# Patient Record
Sex: FEMALE | Race: WHITE | NOT HISPANIC OR LATINO | Employment: FULL TIME | URBAN - METROPOLITAN AREA
[De-identification: names, ages, dates, MRNs, and addresses within clinical notes are randomized per-mention and may not be internally consistent; named-entity substitution may affect disease eponyms.]

---

## 2017-03-28 ENCOUNTER — CARE COORDINATION (OUTPATIENT)
Dept: CARDIOLOGY | Facility: CLINIC | Age: 52
End: 2017-03-28

## 2017-03-28 DIAGNOSIS — I10 HTN (HYPERTENSION): Primary | ICD-10-CM

## 2017-03-28 NOTE — PROGRESS NOTES
Writer received a call from Karyna HUNT at [facility] mint clinic to report pt presented with flu/congestion symptoms. BP was 180/110 x2. Karyna NP request to speak with Jaqui Edmond PA-C to review plan of care for hypertension. Jaqui spoke to Karyna and advised pt will increase dose of Lisinopril to 20 mg daily, including today. Pt will continue her current dose of Biastolic. Pt will see Jaqui Edmond PA-C on 4/7/17 at 11:30 AM with a BMP, BNP and Hgb at 10:30 AM. Appointments scheduled and lab orders placed. CHACORTA Valencia.

## 2017-03-30 ENCOUNTER — HOSPITAL ENCOUNTER (OUTPATIENT)
Facility: CLINIC | Age: 52
Setting detail: OBSERVATION
Discharge: HOME | End: 2017-03-31
Attending: EMERGENCY MEDICINE | Admitting: INTERNAL MEDICINE
Payer: COMMERCIAL

## 2017-03-30 ENCOUNTER — APPOINTMENT (OUTPATIENT)
Dept: GENERAL RADIOLOGY | Facility: CLINIC | Age: 52
End: 2017-03-30
Attending: EMERGENCY MEDICINE
Payer: COMMERCIAL

## 2017-03-30 ENCOUNTER — OFFICE VISIT (OUTPATIENT)
Dept: CARDIOLOGY | Facility: CLINIC | Age: 52
End: 2017-03-30
Payer: COMMERCIAL

## 2017-03-30 VITALS
BODY MASS INDEX: 39.68 KG/M2 | WEIGHT: 293 LBS | HEART RATE: 56 BPM | HEIGHT: 72 IN | DIASTOLIC BLOOD PRESSURE: 120 MMHG | SYSTOLIC BLOOD PRESSURE: 190 MMHG

## 2017-03-30 DIAGNOSIS — I10 BENIGN ESSENTIAL HYPERTENSION: ICD-10-CM

## 2017-03-30 DIAGNOSIS — I16.1 HYPERTENSIVE EMERGENCY: ICD-10-CM

## 2017-03-30 DIAGNOSIS — I10 HTN (HYPERTENSION): ICD-10-CM

## 2017-03-30 DIAGNOSIS — R60.0 LOCALIZED EDEMA: ICD-10-CM

## 2017-03-30 DIAGNOSIS — R07.9 ACUTE CHEST PAIN: ICD-10-CM

## 2017-03-30 DIAGNOSIS — I10 BENIGN ESSENTIAL HYPERTENSION: Primary | ICD-10-CM

## 2017-03-30 LAB
ANION GAP SERPL CALCULATED.3IONS-SCNC: 10 MMOL/L (ref 3–14)
ANION GAP SERPL CALCULATED.3IONS-SCNC: 10.3 MMOL/L (ref 6–17)
BASOPHILS # BLD AUTO: 0.1 10E9/L (ref 0–0.2)
BASOPHILS NFR BLD AUTO: 1 %
BUN SERPL-MCNC: 11 MG/DL (ref 7–30)
BUN SERPL-MCNC: 9 MG/DL (ref 7–30)
CALCIUM SERPL-MCNC: 8.6 MG/DL (ref 8.5–10.1)
CALCIUM SERPL-MCNC: 8.9 MG/DL (ref 8.5–10.5)
CHLORIDE SERPL-SCNC: 106 MMOL/L (ref 94–109)
CHLORIDE SERPL-SCNC: 106 MMOL/L (ref 98–107)
CO2 SERPL-SCNC: 26 MMOL/L (ref 20–32)
CO2 SERPL-SCNC: 31 MMOL/L (ref 23–29)
CREAT SERPL-MCNC: 0.8 MG/DL (ref 0.52–1.04)
CREAT SERPL-MCNC: 0.89 MG/DL (ref 0.7–1.3)
DIFFERENTIAL METHOD BLD: NORMAL
EOSINOPHIL # BLD AUTO: 0.2 10E9/L (ref 0–0.7)
EOSINOPHIL NFR BLD AUTO: 2.5 %
ERYTHROCYTE [DISTWIDTH] IN BLOOD BY AUTOMATED COUNT: 12.4 % (ref 10–15)
GFR SERPL CREATININE-BSD FRML MDRD: 67 ML/MIN/1.7M2
GFR SERPL CREATININE-BSD FRML MDRD: 75 ML/MIN/1.7M2
GLUCOSE SERPL-MCNC: 114 MG/DL (ref 70–105)
GLUCOSE SERPL-MCNC: 98 MG/DL (ref 70–99)
HCT VFR BLD AUTO: 43.1 % (ref 35–47)
HGB BLD-MCNC: 13.9 G/DL (ref 11.7–15.7)
HGB BLD-MCNC: 14 G/DL (ref 11.7–15.7)
IMM GRANULOCYTES # BLD: 0 10E9/L (ref 0–0.4)
IMM GRANULOCYTES NFR BLD: 0.3 %
LYMPHOCYTES # BLD AUTO: 2.7 10E9/L (ref 0.8–5.3)
LYMPHOCYTES NFR BLD AUTO: 40.3 %
MCH RBC QN AUTO: 27.5 PG (ref 26.5–33)
MCHC RBC AUTO-ENTMCNC: 32.5 G/DL (ref 31.5–36.5)
MCV RBC AUTO: 85 FL (ref 78–100)
MONOCYTES # BLD AUTO: 0.4 10E9/L (ref 0–1.3)
MONOCYTES NFR BLD AUTO: 6.4 %
NEUTROPHILS # BLD AUTO: 3.3 10E9/L (ref 1.6–8.3)
NEUTROPHILS NFR BLD AUTO: 49.5 %
NRBC # BLD AUTO: 0 10*3/UL
NRBC BLD AUTO-RTO: 0 /100
NT-PROBNP SERPL-MCNC: 359 PG/ML (ref 0–125)
PLATELET # BLD AUTO: 250 10E9/L (ref 150–450)
POTASSIUM SERPL-SCNC: 3.5 MMOL/L (ref 3.4–5.3)
POTASSIUM SERPL-SCNC: 4.3 MMOL/L (ref 3.5–5.1)
RBC # BLD AUTO: 5.09 10E12/L (ref 3.8–5.2)
SODIUM SERPL-SCNC: 142 MMOL/L (ref 133–144)
SODIUM SERPL-SCNC: 143 MMOL/L (ref 136–145)
TROPONIN I SERPL-MCNC: NORMAL UG/L (ref 0–0.04)
WBC # BLD AUTO: 6.7 10E9/L (ref 4–11)

## 2017-03-30 PROCEDURE — 93005 ELECTROCARDIOGRAM TRACING: CPT

## 2017-03-30 PROCEDURE — 96374 THER/PROPH/DIAG INJ IV PUSH: CPT

## 2017-03-30 PROCEDURE — 83880 ASSAY OF NATRIURETIC PEPTIDE: CPT | Performed by: PHYSICIAN ASSISTANT

## 2017-03-30 PROCEDURE — 80048 BASIC METABOLIC PNL TOTAL CA: CPT | Performed by: PHYSICIAN ASSISTANT

## 2017-03-30 PROCEDURE — 80048 BASIC METABOLIC PNL TOTAL CA: CPT | Performed by: EMERGENCY MEDICINE

## 2017-03-30 PROCEDURE — 99214 OFFICE O/P EST MOD 30 MIN: CPT | Performed by: PHYSICIAN ASSISTANT

## 2017-03-30 PROCEDURE — 99285 EMERGENCY DEPT VISIT HI MDM: CPT | Mod: 25

## 2017-03-30 PROCEDURE — 96375 TX/PRO/DX INJ NEW DRUG ADDON: CPT

## 2017-03-30 PROCEDURE — 25000128 H RX IP 250 OP 636: Performed by: EMERGENCY MEDICINE

## 2017-03-30 PROCEDURE — 85018 HEMOGLOBIN: CPT | Performed by: PHYSICIAN ASSISTANT

## 2017-03-30 PROCEDURE — 71020 XR CHEST 2 VW: CPT

## 2017-03-30 PROCEDURE — 25000132 ZZH RX MED GY IP 250 OP 250 PS 637: Performed by: EMERGENCY MEDICINE

## 2017-03-30 PROCEDURE — 84484 ASSAY OF TROPONIN QUANT: CPT | Performed by: EMERGENCY MEDICINE

## 2017-03-30 PROCEDURE — 36415 COLL VENOUS BLD VENIPUNCTURE: CPT | Performed by: PHYSICIAN ASSISTANT

## 2017-03-30 PROCEDURE — 85025 COMPLETE CBC W/AUTO DIFF WBC: CPT | Performed by: EMERGENCY MEDICINE

## 2017-03-30 RX ORDER — LIDOCAINE 40 MG/G
CREAM TOPICAL
Status: DISCONTINUED | OUTPATIENT
Start: 2017-03-30 | End: 2017-03-31 | Stop reason: HOSPADM

## 2017-03-30 RX ORDER — NEBIVOLOL 20 MG/1
20 TABLET ORAL DAILY
COMMUNITY
End: 2017-03-30

## 2017-03-30 RX ORDER — LISINOPRIL 10 MG/1
10 TABLET ORAL 2 TIMES DAILY
Qty: 60 TABLET | Refills: 11 | Status: SHIPPED | OUTPATIENT
Start: 2017-03-30 | End: 2017-05-23

## 2017-03-30 RX ORDER — LISINOPRIL 10 MG/1
10 TABLET ORAL 2 TIMES DAILY
COMMUNITY
End: 2017-03-30

## 2017-03-30 RX ORDER — DIPHENHYDRAMINE HYDROCHLORIDE 50 MG/ML
25 INJECTION INTRAMUSCULAR; INTRAVENOUS ONCE
Status: COMPLETED | OUTPATIENT
Start: 2017-03-30 | End: 2017-03-30

## 2017-03-30 RX ORDER — SPIRONOLACTONE 25 MG/1
12.5 TABLET ORAL DAILY
Qty: 30 TABLET | Refills: 3 | Status: ON HOLD | OUTPATIENT
Start: 2017-03-30 | End: 2017-03-31

## 2017-03-30 RX ORDER — CLONIDINE HYDROCHLORIDE 0.1 MG/1
0.2 TABLET ORAL ONCE
Status: COMPLETED | OUTPATIENT
Start: 2017-03-30 | End: 2017-03-30

## 2017-03-30 RX ORDER — METOCLOPRAMIDE HYDROCHLORIDE 5 MG/ML
10 INJECTION INTRAMUSCULAR; INTRAVENOUS ONCE
Status: COMPLETED | OUTPATIENT
Start: 2017-03-30 | End: 2017-03-30

## 2017-03-30 RX ORDER — NEBIVOLOL 20 MG/1
20 TABLET ORAL DAILY
Qty: 90 TABLET | Refills: 3 | Status: SHIPPED | OUTPATIENT
Start: 2017-03-30 | End: 2018-05-07

## 2017-03-30 RX ADMIN — CLONIDINE HYDROCHLORIDE 0.2 MG: 0.1 TABLET ORAL at 22:58

## 2017-03-30 RX ADMIN — METOCLOPRAMIDE 10 MG: 5 INJECTION, SOLUTION INTRAMUSCULAR; INTRAVENOUS at 22:58

## 2017-03-30 RX ADMIN — DIPHENHYDRAMINE HYDROCHLORIDE 25 MG: 50 INJECTION, SOLUTION INTRAMUSCULAR; INTRAVENOUS at 22:58

## 2017-03-30 NOTE — PATIENT INSTRUCTIONS
1. BP is much too high, and has been x 6 months.  Limit sodium in diet   Continue lisinopril 10 mg twice a day   Continue Bystollic 20 mg daily at night   START spironolactone 12.5 mg (1/2 of a 25 mg tablet) every day in AM    2. Start taking BPs around noon so we can see if meds are working    3. See me 1 week with an update and non-fasting blood work.    4. My nurses Addie/Megan: [phone]

## 2017-03-30 NOTE — MR AVS SNAPSHOT
After Visit Summary   3/30/2017    Fouzia Arenas    MRN: [id number]           Patient Information     Date Of Birth          1965        Visit Information        Provider Department      3/30/2017 11:30 AM Peggy Edmond PA-C [facility] HEART AT [place]        Today's Diagnoses     Benign essential hypertension    -  1    Localized edema          Care Instructions    1. BP is much too high, and has been x 6 months.  Limit sodium in diet   Continue lisinopril 10 mg twice a day   Continue Bystollic 20 mg daily at night   START spironolactone 12.5 mg (1/2 of a 25 mg tablet) every day in AM    2. Start taking BPs around noon so we can see if meds are working    3. See me 1 week with an update and non-fasting blood work.    4. My nurses Addie/Megan: [phone]            Follow-ups after your visit        Additional Services     Follow-Up with Cardiac Advanced Practice Provider                 Your next 10 appointments already scheduled     May 15, 2017  4:30 PM CDT   Return Sleep Patient with Ruy Skaggs MD   [facility] ([facility])    [address] [address] [address] [phone]              Future tests that were ordered for you today     Open Future Orders        Priority Expected Expires Ordered    Basic metabolic panel Routine 4/6/2017 3/30/2018 3/30/2017    Follow-Up with Cardiac Advanced Practice Provider Routine 4/6/2017 3/30/2018 3/30/2017            Who to contact     If you have questions or need follow up information about today's clinic visit or your schedule please contact [facility] PHYSICI[facility] HEART [facility] directly at [phone].  Normal or non-critical lab and imaging results will be communicated to you by MyChart, letter or phone within 4 business days after the clinic has received the results. If you do not hear from us within 7 days, please  "contact the clinic through [employer] or phone. If you have a critical or abnormal lab result, we will notify you by phone as soon as possible.  Submit refill requests through [employer] or call your pharmacy and they will forward the refill request to us. Please allow 3 business days for your refill to be completed.          Additional Information About Your Visit        [email or web address] Information     [employer] lets you send messages to your doctor, view your test results, renew your prescriptions, schedule appointments and more. To sign up, go to [email or web address] . Click on \"Log in\" on the left side of the screen, which will take you to the Welcome page. Then click on \"Sign up Now\" on the right side of the page.     You will be asked to enter the access code listed below, as well as some personal information. Please follow the directions to create your username and password.     Your access code is: [email or web address]  Expires: 2017 12:01 PM     Your access code will  in 90 days. If you need help or a new code, please call your [place] clinic or [phone].        Care EveryWhere ID     This is your Care EveryWhere ID. This could be used by other organizations to access your [place] medical records  [id number]        Your Vitals Were     Pulse Height BMI (Body Mass Index)             56 1.829 m (6' 0.01\") 43.17 kg/m2          Blood Pressure from Last 3 Encounters:   17 (!) 190/120   16 154/84   10/10/16 (!) (P) 171/92    Weight from Last 3 Encounters:   17 (!) 144.4 kg (318 lb 6.4 oz)   16 (!) 141 kg (310 lb 13.6 oz)   10/10/16 (!) 141.1 kg (311 lb)                 Today's Medication Changes          These changes are accurate as of: 3/30/17 12:02 PM.  If you have any questions, ask your nurse or doctor.               Start taking these medicines.        Dose/Directions    spironolactone 25 MG tablet   Commonly known as:  ALDACTONE   Used for:  Benign essential hypertension   Started by: "  Peggy Edmond PA-C        Dose:  12.5 mg   Take 0.5 tablets (12.5 mg) by mouth daily   Quantity:  30 tablet   Refills:  3         These medicines have changed or have updated prescriptions.        Dose/Directions    lisinopril 10 MG tablet   Commonly known as:  PRINIVIL/ZESTRIL   This may have changed:  Another medication with the same name was removed. Continue taking this medication, and follow the directions you see here.   Used for:  Benign essential hypertension   Changed by:  Peggy Edmond PA-C        Dose:  10 mg   Take 1 tablet (10 mg) by mouth 2 times daily   Quantity:  60 tablet   Refills:  11       Nebivolol HCl 20 MG Tabs   Commonly known as:  BYSTOLIC   This may have changed:  Another medication with the same name was removed. Continue taking this medication, and follow the directions you see here.   Used for:  Benign essential hypertension   Changed by:  Peggy Edmond PA-C        Dose:  20 mg   Take 20 mg by mouth daily   Quantity:  90 tablet   Refills:  3            Where to get your medicines      These medications were sent to [facility] [address] [address] AT [facility] [address], [facility] [address]    Hours:  24-hours Phone:  [phone]     lisinopril 10 MG tablet    Nebivolol HCl 20 MG Tabs    spironolactone 25 MG tablet                Primary Care Provider Office Phone # Fax #    Karen Weiler, [phone]       [facility] [address] [facility] [address]        Thank you!     Thank you for choosing [facility] PHYSICIANS HEART AT [place]  for your care. Our goal is always to provide you with excellent care. Hearing back from our patients is one way we can continue to improve our services. Please take a few minutes to complete the written survey that you may receive in the mail after your visit with us. Thank you!             Your Updated Medication  List - Protect others around you: Learn how to safely use, store and throw away your medicines at www.disposemymeds.org.          This list is accurate as of: 3/30/17 12:02 PM.  Always use your most recent med list.                   Brand Name Dispense Instructions for use    acetaminophen-caffeine 500-65 MG Tabs    EXCEDRIN TENSION HEADACHE     Take 2 tablets by mouth every 6 hours as needed for mild pain       lisinopril 10 MG tablet    PRINIVIL/ZESTRIL    60 tablet    Take 1 tablet (10 mg) by mouth 2 times daily       Nebivolol HCl 20 MG Tabs    BYSTOLIC    90 tablet    Take 20 mg by mouth daily       order for DME      DREAMSTATION 9-12 CM/H20 FF MIRAGE QUATTRO       spironolactone 25 MG tablet    ALDACTONE    30 tablet    Take 0.5 tablets (12.5 mg) by mouth daily

## 2017-03-30 NOTE — IP AVS SNAPSHOT
MRN:[id number]                      After Visit Summary   3/30/2017    Fouzia Arenas    MRN: [id number]           Thank you!     Thank you for choosing [facility] for your care. Our goal is always to provide you with excellent care. Hearing back from our patients is one way we can continue to improve our services. Please take a few minutes to complete the written survey that you may receive in the mail after you visit. If you would like to speak to someone directly about your visit please contact Patient Relations at [phone]. Thank you!          Patient Information     Date Of Birth          1965        About your hospital stay     You were admitted on:  March 31, 2017 You last received care in the:  [facility] Observation Department    You were discharged on:  March 31, 2017        Reason for your hospital stay       Chest pain likely associated with hypertensive urgency. Symptoms resolved with improvement in blood pressure. Adjustments were made to home medications. Heart rates were in the upper 40s on day of discharge but asymptomatic at rest and with ambulation.                  Who to Call     For medical emergencies, please call 911.  For non-urgent questions about your medical care, please call your primary care provider or clinic, [phone]          Attending Provider     Provider Specialty    Alfred Ballesteros MD Emergency Medicine    [facility]       Primary Care Provider Office Phone # Fax #    Karen Weiler, [phone]       [facility] [address] [facility] [address]         When to contact your care team       Call your primary doctor if you have any of the following: temperature greater than 101, increased shortness of breath, increased chest pain, or systolic (top number) blood pressure >180.                  After Care Instructions     Activity       Your activity  upon discharge: activity as tolerated            Diet       Follow this diet upon discharge: Regular                  Follow-up Appointments     Follow-up and recommended labs and tests        Follow up with primary care provider, Karen Weiler, within 7 days to evaluate medication change and for hospital follow- up.  No follow up labs or test are needed.  Check blood pressures once daily, in AM, if possible at home.                  Your next 10 appointments already scheduled     Apr 07, 2017  7:30 AM CDT   LAB with [facility]   [facility] ([facility])    [address] [place] [address]   [phone]           Patient must bring picture ID.  Patient should be prepared to give a urine specimen  Please do not eat 10-12 hours before your appointment if you are coming in fasting for labs on lipids, cholesterol, or glucose (sugar).  Pregnant women should follow their Care Team instructions. Water with medications is okay. Do not drink coffee or other fluids.   If you have concerns about taking  your medications, please ask at office or if scheduling via [employer], send a message by clicking on Secure Messaging, Message Your Care Team.            Apr 07, 2017  8:10 AM CDT   Return Visit with Peggy Edmond PA-C   [facility] ([facility])    [address] [place] [id number] [phone]            May 15, 2017  4:30 PM CDT   Return Sleep Patient with Ruy Skaggs MD   [facility] ([facility])    [address] [facility] [address] [facility] [facility] [address]  [facility] [address]   [phone]                         Pending Results     No orders found for last 3 day(s).            Statement of Approval     Ordered          03/31/17 0927  I have reviewed and agree with all the recommendations and orders detailed in this document.  EFFECTIVE NOW    "  Approved and electronically signed by:  Wanda Tom PA-C             Admission Information     Date & Time Provider Department Dept. Phone    3/30/2017 Shen Andersen MD [facility] Observation Department [phone]      Your Vitals Were     Blood Pressure Pulse Temperature Respirations Height Weight    141/68 (BP Location: Left arm) 47 96.8  F (36  C) (Oral) 16 1.829 m (6') 136.1 kg (300 lb)    Pulse Oximetry BMI (Body Mass Index)                96% 40.69 kg/m2          MyChart Information     [employer] lets you send messages to your doctor, view your test results, renew your prescriptions, schedule appointments and more. To sign up, go to [email or web address] . Click on \"Log in\" on the left side of the screen, which will take you to the Welcome page. Then click on \"Sign up Now\" on the right side of the page.     You will be asked to enter the access code listed below, as well as some personal information. Please follow the directions to create your username and password.     Your access code is: [email or web address]  Expires: 2017 12:01 PM     Your access code will  in 90 days. If you need help or a new code, please call your [place] clinic or [phone].        Care EveryWhere ID     This is your Care EveryWhere ID. This could be used by other organizations to access your [place] medical records  [id number]           Review of your medicines      CONTINUE these medicines which may have CHANGED, or have new prescriptions. If we are uncertain of the size of tablets/capsules you have at home, strength may be listed as something that might have changed.        Dose / Directions    lisinopril 10 MG tablet   Commonly known as:  PRINIVIL/ZESTRIL   This may have changed:  how much to take   Used for:  Benign essential hypertension        Dose:  10 mg   Take 1 tablet (10 mg) by mouth 2 times daily   Quantity:  60 tablet   Refills:  11       spironolactone 25 MG tablet "   Commonly known as:  ALDACTONE   This may have changed:  how much to take   Used for:  Benign essential hypertension        Dose:  25 mg   Take 1 tablet (25 mg) by mouth daily   Quantity:  30 tablet   Refills:  3         CONTINUE these medicines which have NOT CHANGED        Dose / Directions    acetaminophen-caffeine 500-65 MG Tabs   Commonly known as:  EXCEDRIN TENSION HEADACHE        Dose:  2 tablet   Take 2 tablets by mouth every 6 hours as needed for mild pain   Refills:  0       Nebivolol HCl 20 MG Tabs   Commonly known as:  BYSTOLIC   Used for:  Benign essential hypertension        Dose:  20 mg   Take 20 mg by mouth daily   Quantity:  90 tablet   Refills:  3       order for DME        DREAMSTATION 9-12 CM/H20 FF MIRAGE QUATTRO   Refills:  0                Protect others around you: Learn how to safely use, store and throw away your medicines at www.disposemymeds.org.             Medication List: This is a list of all your medications and when to take them. Check marks below indicate your daily home schedule. Keep this list as a reference.      Medications           Morning Afternoon Evening Bedtime As Needed    acetaminophen-caffeine 500-65 MG Tabs   Commonly known as:  EXCEDRIN TENSION HEADACHE   Take 2 tablets by mouth every 6 hours as needed for mild pain                                lisinopril 10 MG tablet   Commonly known as:  PRINIVIL/ZESTRIL   Take 1 tablet (10 mg) by mouth 2 times daily                                Nebivolol HCl 20 MG Tabs   Commonly known as:  BYSTOLIC   Take 20 mg by mouth daily                                order for DME   DREAMSTATION 9-12 CM/H20 FF MIRAGE QUATTRO                                spironolactone 25 MG tablet   Commonly known as:  ALDACTONE   Take 1 tablet (25 mg) by mouth daily

## 2017-03-30 NOTE — LETTER
3/30/2017    Karen Weiler, MD  [facility] ([facility])   [address]  [facility] [address]    RE: Fouzia LAWRENCE Deepa       Dear Colleague,    I had the pleasure of seeing Fouzia today when she came accompanied by her mother and daughter for concerns regarding hypertension.  She is a very pleasant 51-year-old.  She has no known history of coronary disease.  Last stress echocardiogram done for atypical chest pain in 08/2015 at [facility] was negative for wall motion abnormalities, and echo in 01/2016 showed a low-normal ejection fraction.  She has a history of migraine headaches, hypertension and palpitations.  Palpitations have been proven by event monitor to be related to atrial tachycardia, which have usually been short-lived.  She was tried on carvedilol and eventually switched to Bystolic, which has done a good job in controlling her palpitations.      She saw Dr. Hensley back in August, at which time Aldactone, which I had started in June for hypertension and swelling, was switched to lisinopril.  She apparently did not want to take it any longer, but she does not remember having any specific problem with this.  She cannot recall if spironolactone decreased her lower extremity edema or what her blood pressures were running when she was taking it.  In any event, when she saw Dr. Hensley in August, he switched her to lisinopril and asked her to up-titrate this to 10 mg daily if blood pressure was under poor control.  She was to come back in 3 months and see us for further evaluation but we have not seen her since.    She was seen at [facility] on Tuesday, 03/28.  She saw an NP there who noted extremely high blood pressures x2 and lower extremity edema with venous stasis changes.  She was concerned about her blood pressure, but Fouzia refused to go to the Emergency Department, stating that it had been high for quite a long time.  Over the phone, I suggested that Karyna increase Fouzia's lisinopril to 10 mg  "b.i.d., continue Bystolic 20 mg daily and see me in clinic with blood work today.      Fouzia tells me that she did not remember that she was supposed to come back and see us after Dr. Hensley switched her spironolactone to lisinopril.  She states that her blood pressures have usually been in the 170-190s for at least the last 6 months, and these blood pressures that she is getting here are \"nothing new.\"  She can think of nothing she has been doing differently over these last 6 months including increased sodium in the diet, decongestant use or significant NSAID use.      The reason she went into urgent care was actually for some cold/flu symptoms, and she does admit to taking Advil Cold and Sinus on Sunday the 26th.      She denies any change in her migraines headache pattern.  She denies any nausea, vomiting or vision changes.  It appears that she has actually tolerated these elevated blood pressures without issues.      She continues to complain of lower extremity edema, noting worsening venous stasis changes.  She denies orthopnea or PND.  She is compliant with CPAP therapy but does sometimes wake up to find that the mask had been taken off in the night.  She is unaware of doing so and does put the mask back on when she notices this.      She denies chest pain, back discomfort or abdominal discomfort.     Blood work today really looked OK, with BMP showing a sodium of 142, potassium 3.5,  BUN 11 and Cr 0.8.  Her Hgb was wnl at 14.0 and BNP was just mildly elevated in the 300s.      Outpatient Encounter Prescriptions as of 3/30/2017   Medication Sig Dispense Refill     lisinopril (PRINIVIL/ZESTRIL) 10 MG tablet Take 1 tablet (10 mg) by mouth 2 times daily 60 tablet 11     Nebivolol HCl (BYSTOLIC) 20 MG TABS Take 20 mg by mouth daily 90 tablet 3     [DISCONTINUED] spironolactone (ALDACTONE) 25 MG tablet Take 0.5 tablets (12.5 mg) by mouth daily 30 tablet 3     order for DME DREAMSTATION  9-12 CM/H20  FF MIRAGE " QUATTRO       acetaminophen-caffeine (EXCEDRIN TENSION HEADACHE) 500-65 MG TABS Take 2 tablets by mouth every 6 hours as needed for mild pain       [DISCONTINUED] lisinopril (PRINIVIL/ZESTRIL) 10 MG tablet Take 10 mg by mouth 2 times daily       [DISCONTINUED] Nebivolol HCl (BYSTOLIC) 20 MG TABS Take 20 mg by mouth daily       [DISCONTINUED] lisinopril (PRINIVIL/ZESTRIL) 10 MG tablet Take 1 tablet (10 mg) by mouth daily (Patient taking differently: Take 20 mg by mouth daily ) 90 tablet 3     [DISCONTINUED] nebivolol 20 MG TABS Take 10 mg by mouth 2 times daily Take 1/2 tab by mouth (10 mg) twice daily (Patient taking differently: Take 10 mg by mouth daily 20 mg at hs) 90 tablet 3     No facility-administered encounter medications on file as of 3/30/2017.       ASSESSMENT AND PLAN:     1.  Hypertension.  Blood pressure is terribly elevated today and she states that these are the blood pressures she has been getting at home on her monitor for at least the last 6 months.  She thought she was only to worry about the diastolic number and so was not concerned when she saw systolic blood pressures this high.  I explained that we certainly were concerned about her systolic blood pressure.  We did talk about sending her to the emergency department for IV medication but again, she states these blood pressures have been chronic over the last 6 months, and she is having no symptoms such as blurred vision, increased headache or chest discomfort.      At this time, she agreed to close followup.  I will restart spironolactone 12.5 mg daily, and she will see me in a week with a BMP.  She will continue lisinopril 10 mg twice daily and Bystolic 20 mg daily at night.  I explained that we would like to limit her Excedrin use (which she has taken in the past for migraines) due to the increased caffeine which could be driving blood pressures higher.  She already knows to avoid decongestants and will not take any more Advil Cold and  Sinus.  She feels she already follows a less than 2300 mg sodium diet.  She will avoid alcohol and continue to wear CPAP for further control of her blood pressure.      She will see me in a week, at which time we will repeat a BMP.  We will see if we can increase either her Aldactone or spironolactone.  She had problems with amlodipine in the past causing significant lower extremity edema and is not eager to retry that.  Certainly we could try chlorthalidone, if spironolactone is intolerable or changes her potassium levels too much.  Other options include up-titrating lisinopril a bit further to 20 mg twice daily (though this will likely not result in a significant change).  Other options include hydralazine 3 times daily or clonidine patch.      If we get to that point, I would like to send her Dr. Au for resistant hypertension and obtain renal artery ultrasound to ensure she does not have a renal artery stenosis as a cause of her elevated blood pressures.      She at the end of the visit was able to reiterate what would bring her to the emergency department for this high blood pressure, as she does not want to go right now.      2.  Palpitations.  Bystolic seems to be working well to control her atrial tachycardia.      She will see me in 1 week with a BMP and call me in the interim should she have any problems with the spironolactone.     Again, thank you for allowing me to participate in the care of your patient.      Sincerely,    Peggy Edmond PA-C     [facility]

## 2017-03-30 NOTE — IP AVS SNAPSHOT
[facility] Observation Department    [address]    [facility] [address]    Phone:  [phone]                                       After Visit Summary   3/30/2017    Fouzia Arenas    MRN: [id number]           After Visit Summary Signature Page     I have received my discharge instructions, and my questions have been answered. I have discussed any challenges I see with this plan with the nurse or doctor.    ..........................................................................................................................................  Patient/Patient Representative Signature      ..........................................................................................................................................  Patient Representative Print Name and Relationship to Patient    ..................................................               ................................................  Date                                            Time    ..........................................................................................................................................  Reviewed by Signature/Title    ...................................................              ..............................................  Date                                                            Time

## 2017-03-30 NOTE — PROGRESS NOTES
HPI and Plan:   See dictation #[id number]    Orders Placed This Encounter   Procedures     Basic metabolic panel     Follow-Up with Cardiac Advanced Practice Provider       Orders Placed This Encounter   Medications     DISCONTD: lisinopril (PRINIVIL/ZESTRIL) 10 MG tablet     Sig: Take 10 mg by mouth 2 times daily     DISCONTD: Nebivolol HCl (BYSTOLIC) 20 MG TABS     Sig: Take 20 mg by mouth daily     spironolactone (ALDACTONE) 25 MG tablet     Sig: Take 0.5 tablets (12.5 mg) by mouth daily     Dispense:  30 tablet     Refill:  3     lisinopril (PRINIVIL/ZESTRIL) 10 MG tablet     Sig: Take 1 tablet (10 mg) by mouth 2 times daily     Dispense:  60 tablet     Refill:  11     Nebivolol HCl (BYSTOLIC) 20 MG TABS     Sig: Take 20 mg by mouth daily     Dispense:  90 tablet     Refill:  3       Medications Discontinued During This Encounter   Medication Reason     lisinopril (PRINIVIL/ZESTRIL) 10 MG tablet      nebivolol 20 MG TABS      lisinopril (PRINIVIL/ZESTRIL) 10 MG tablet Reorder     Nebivolol HCl (BYSTOLIC) 20 MG TABS Reorder         Encounter Diagnoses   Name Primary?     Benign essential hypertension Yes     Localized edema        CURRENT MEDICATIONS:  Current Outpatient Prescriptions   Medication Sig Dispense Refill     spironolactone (ALDACTONE) 25 MG tablet Take 0.5 tablets (12.5 mg) by mouth daily 30 tablet 3     lisinopril (PRINIVIL/ZESTRIL) 10 MG tablet Take 1 tablet (10 mg) by mouth 2 times daily 60 tablet 11     Nebivolol HCl (BYSTOLIC) 20 MG TABS Take 20 mg by mouth daily 90 tablet 3     order for DME DREAMSTATION  9-12 CM/H20  FF MIRAGE QUATTRO       acetaminophen-caffeine (EXCEDRIN TENSION HEADACHE) 500-65 MG TABS Take 2 tablets by mouth every 6 hours as needed for mild pain       [DISCONTINUED] lisinopril (PRINIVIL/ZESTRIL) 10 MG tablet Take 10 mg by mouth 2 times daily       [DISCONTINUED] Nebivolol HCl (BYSTOLIC) 20 MG TABS Take 20 mg by mouth daily       [DISCONTINUED] lisinopril (PRINIVIL/ZESTRIL) 10  MG tablet Take 1 tablet (10 mg) by mouth daily (Patient taking differently: Take 20 mg by mouth daily ) 90 tablet 3     [DISCONTINUED] nebivolol 20 MG TABS Take 10 mg by mouth 2 times daily Take 1/2 tab by mouth (10 mg) twice daily (Patient taking differently: Take 10 mg by mouth daily 20 mg at hs) 90 tablet 3       ALLERGIES     Allergies   Allergen Reactions     Amoxicillin Nausea and Vomiting     Percocet [Oxycodone-Acetaminophen] Nausea and Vomiting       PAST MEDICAL HISTORY:  Past Medical History:   Diagnosis Date     Classic migraine      Hypertension      Palpitations      SVT (supraventricular tachycardia) (H) 9/2015       PAST SURGICAL HISTORY:  Past Surgical History:   Procedure Laterality Date     CHOLECYSTECTOMY, LAPOROSCOPIC      Cholecystectomy, Laparoscopic     COLONOSCOPY N/A 11/25/2014    Procedure: COLONOSCOPY;  Surgeon: Wenceslao Galo MD;  Location:  GI     D & C      X2-3 for abnormal bleeding     ESOPHAGOSCOPY, GASTROSCOPY, DUODENOSCOPY (EGD), COMBINED N/A 11/25/2014    Procedure: COMBINED ESOPHAGOSCOPY, GASTROSCOPY, DUODENOSCOPY (EGD), BIOPSY SINGLE OR MULTIPLE;  Surgeon: Wenceslao Galo MD;  Location: [facility]     LAPAROSCOPY      For endometriosis       FAMILY HISTORY:  Family History   Problem Relation Age of Onset     Hypertension Mother      Lipids Mother      Hypertension Father      Prostate Cancer Father      Lipids Father      Breast Cancer Maternal Aunt      Hypertension Maternal Grandmother      C.A.D. Maternal Grandmother      Gallbladder Disease Maternal Grandmother      CANCER Maternal Grandfather      lung     CEREBROVASCULAR DISEASE Paternal Grandmother      C.A.D. Paternal Grandfather      Cancer - colorectal Other      cousin maternal        SOCIAL HISTORY:  Social History     Social History     Marital status: Single     Spouse name: N/A     Number of children: N/A     Years of education: N/A     Social History Main Topics     Smoking status: Never Smoker      "Smokeless tobacco: Never Used     Alcohol use 1.0 oz/week      Comment: 1 drink monthly     Drug use: No     Sexual activity: No     Other Topics Concern     Caffeine Concern Yes     1-2 cans qd     Special Diet Yes     started mediterranian      Exercise Yes     20 minutes walking 3-4 days weekly      Seat Belt Yes     Parent/Sibling W/ Cabg, Mi Or Angioplasty Before 65f 55m? No     Social History Narrative       Review of Systems:  Skin:  Negative       Eyes:  Positive for glasses    ENT:  Negative      Respiratory:  Positive for dyspnea on exertion     Cardiovascular:    Positive for;edema See HPI  Gastroenterology: Positive for constipation;heartburn    Genitourinary:  Negative      Musculoskeletal:  Positive for back pain    Neurologic:  Positive for migraine headaches    Psychiatric:  Negative      Heme/Lymph/Imm:  Negative      Endocrine:  Negative        Physical Exam:  Vitals: BP (!) 190/120  Pulse 56  Ht 1.829 m (6' 0.01\")  Wt (!) 144.4 kg (318 lb 6.4 oz)  BMI 43.17 kg/m2    Constitutional:  cooperative, alert and oriented, well developed, well nourished, in no acute distress obese      Skin:  warm and dry to the touch, no apparent skin lesions or masses noted        Head:  normocephalic, no masses or lesions        Eyes:  pupils equal and round;conjunctivae and lids unremarkable;sclera white;no xanthalasma        ENT:  no pallor or cyanosis, dentition good        Neck:  JVP normal        Chest:  normal respiratory excursion;normal breath sounds, clear to auscultation, normal A-P diameter, normal symmetry, normal respiratory excursion, no use of accessory muscles     no reproducible pain    Cardiac: regular rhythm;no murmurs, gallops or rubs detected                  Abdomen:  abdomen soft        Vascular: pulses full and equal                                        Extremities and Back:  no deformities, clubbing, cyanosis, erythema observed stasis pigmentation bilateral LE edema;trace      "     Neurological:  affect appropriate, oriented to time, person and place

## 2017-03-31 VITALS
WEIGHT: 293 LBS | RESPIRATION RATE: 16 BRPM | TEMPERATURE: 96.8 F | OXYGEN SATURATION: 94 % | BODY MASS INDEX: 39.68 KG/M2 | HEART RATE: 58 BPM | HEIGHT: 72 IN | SYSTOLIC BLOOD PRESSURE: 154 MMHG | DIASTOLIC BLOOD PRESSURE: 61 MMHG

## 2017-03-31 LAB
INTERPRETATION ECG - MUSE: NORMAL
TROPONIN I SERPL-MCNC: NORMAL UG/L (ref 0–0.04)
TROPONIN I SERPL-MCNC: NORMAL UG/L (ref 0–0.04)

## 2017-03-31 PROCEDURE — 99207 ZZC APP CREDIT; MD BILLING SHARED VISIT: CPT | Performed by: PHYSICIAN ASSISTANT

## 2017-03-31 PROCEDURE — 36415 COLL VENOUS BLD VENIPUNCTURE: CPT | Performed by: INTERNAL MEDICINE

## 2017-03-31 PROCEDURE — 99235 HOSP IP/OBS SAME DATE MOD 70: CPT | Performed by: INTERNAL MEDICINE

## 2017-03-31 PROCEDURE — G0378 HOSPITAL OBSERVATION PER HR: HCPCS

## 2017-03-31 PROCEDURE — 25000132 ZZH RX MED GY IP 250 OP 250 PS 637: Performed by: INTERNAL MEDICINE

## 2017-03-31 PROCEDURE — 84484 ASSAY OF TROPONIN QUANT: CPT | Mod: 91 | Performed by: INTERNAL MEDICINE

## 2017-03-31 RX ORDER — SPIRONOLACTONE 25 MG/1
25 TABLET ORAL DAILY
Qty: 30 TABLET | Refills: 3 | COMMUNITY
Start: 2017-03-31 | End: 2017-04-07

## 2017-03-31 RX ORDER — SPIRONOLACTONE 25 MG/1
25 TABLET ORAL DAILY
Status: DISCONTINUED | OUTPATIENT
Start: 2017-03-31 | End: 2017-03-31 | Stop reason: HOSPADM

## 2017-03-31 RX ORDER — LISINOPRIL 10 MG/1
10 TABLET ORAL 2 TIMES DAILY
Status: DISCONTINUED | OUTPATIENT
Start: 2017-03-31 | End: 2017-03-31 | Stop reason: HOSPADM

## 2017-03-31 RX ORDER — NEBIVOLOL 10 MG/1
20 TABLET ORAL EVERY EVENING
Status: DISCONTINUED | OUTPATIENT
Start: 2017-03-31 | End: 2017-03-31 | Stop reason: HOSPADM

## 2017-03-31 RX ORDER — ASPIRIN 81 MG/1
81 TABLET ORAL DAILY
Status: DISCONTINUED | OUTPATIENT
Start: 2017-03-31 | End: 2017-03-31 | Stop reason: HOSPADM

## 2017-03-31 RX ORDER — NEBIVOLOL 20 MG/1
20 TABLET ORAL DAILY
Status: DISCONTINUED | OUTPATIENT
Start: 2017-03-31 | End: 2017-03-31 | Stop reason: DRUGHIGH

## 2017-03-31 RX ORDER — LIDOCAINE 40 MG/G
CREAM TOPICAL
Status: DISCONTINUED | OUTPATIENT
Start: 2017-03-31 | End: 2017-03-31 | Stop reason: HOSPADM

## 2017-03-31 RX ORDER — ACETAMINOPHEN 325 MG/1
650 TABLET ORAL EVERY 4 HOURS PRN
Status: DISCONTINUED | OUTPATIENT
Start: 2017-03-31 | End: 2017-03-31 | Stop reason: HOSPADM

## 2017-03-31 RX ORDER — NITROGLYCERIN 0.4 MG/1
0.4 TABLET SUBLINGUAL EVERY 5 MIN PRN
Status: DISCONTINUED | OUTPATIENT
Start: 2017-03-31 | End: 2017-03-31 | Stop reason: HOSPADM

## 2017-03-31 RX ORDER — ALUMINA, MAGNESIA, AND SIMETHICONE 2400; 2400; 240 MG/30ML; MG/30ML; MG/30ML
15-30 SUSPENSION ORAL EVERY 4 HOURS PRN
Status: DISCONTINUED | OUTPATIENT
Start: 2017-03-31 | End: 2017-03-31 | Stop reason: HOSPADM

## 2017-03-31 RX ORDER — NALOXONE HYDROCHLORIDE 0.4 MG/ML
.1-.4 INJECTION, SOLUTION INTRAMUSCULAR; INTRAVENOUS; SUBCUTANEOUS
Status: DISCONTINUED | OUTPATIENT
Start: 2017-03-31 | End: 2017-03-31 | Stop reason: HOSPADM

## 2017-03-31 RX ORDER — ACETAMINOPHEN 650 MG/1
650 SUPPOSITORY RECTAL EVERY 4 HOURS PRN
Status: DISCONTINUED | OUTPATIENT
Start: 2017-03-31 | End: 2017-03-31 | Stop reason: HOSPADM

## 2017-03-31 RX ADMIN — ASPIRIN 81 MG: 81 TABLET, COATED ORAL at 08:27

## 2017-03-31 ASSESSMENT — ENCOUNTER SYMPTOMS
DIZZINESS: 1
NAUSEA: 1
CHEST TIGHTNESS: 1
HEADACHES: 1
SHORTNESS OF BREATH: 1

## 2017-03-31 NOTE — PLAN OF CARE
Problem: Discharge Planning  Goal: Discharge Planning (Adult, OB, Behavioral, Peds)  Outcome: Improving  ROOM #225     Living Situation (if not independent, order SW consult):ind  Facility name:     Activity level at baseline: ind  Activity level on admit: ind  Is patient a falls risk? No  Falls armband on? No,   Within Arm's Reach? Yes   Bed alarm turned on?   No  Personal alarm in place and turned on?   No     Patient registered to observation; given Patient Bill of Rights; given the opportunity to ask questions about observation status and their plan of care.  Patient has been oriented to the observation room, bathroom and call light is in place.     :

## 2017-03-31 NOTE — PLAN OF CARE
Problem: Discharge Planning  Goal: Discharge Planning (Adult, OB, Behavioral, Peds)  Outcome: Improving  PRIMARY DIAGNOSIS: CHEST PAIN  OUTPATIENT/OBSERVATION GOALS TO BE MET BEFORE DISCHARGE:     1. Negative Serial Troponin Yes x3  2. Resolution of chest pain Yes  3. Pain status: Pain free.  4. Negative stress test N/A  5. Stable vital signs No, pulse 47  6. ADLs back to baseline?  Yes  7. Activity and level of assistance: Ambulating independently.  8. Barriers to discharge noted No  9.Interpretation of rhythm per telemetry tech:Sinus binh rates in 50s.     Patient is alert and oriented. Patient denies chest pain/ SOB/heart palpitations/dizziness/lightheadedness. Patient had three trops neg. No plan for stress test. Patient on tele running sinus binh with rates in 40s-50s. Last BP in 140s systolic, HR last 47. Plan to walk patient to see how she feels when up and moving. Will continue to monitor.

## 2017-03-31 NOTE — ED PROVIDER NOTES
History     Chief Complaint:  Chest pain       HPI   Fouzia Arenas is a 51 year old female who with a history of hypertension presents via EMS for evaluation following an episode of chest pain. 2 hours before presenting to the ED this evening, the patient was grocery shopping with her daughter when she suddenly developed some chest tightness, dizziness, and nausea. The patient then rested in the car while her daughter loaded groceries. The pair began to drive home, but the patient's symptoms soon worsened and she developed more extreme chest pain, prompting the patient to pull the car over and call 911. Upon EMS arrival, the patient noted that she was hypertensive and brought her to the ED. In the ED, she complains of a headache and mild shortness of breath, but denies any problems with vision, nausea, or chest pain.     Of note, the patient saw her cardiologist in clinic this morning, following up for her ongoing, uncontrolled blood pressure. She was restarted on Spironolactone 12.5 mg daily and continuing Lisinopril 10 mg bid and Bystolic 20 mg qhs with plans to recheck in a week. She has a history of hypertension, no known history of coronary disease. Her last stress echocardiogram was in 8/2015 in [place] which was negative for wall motion abnormalities. She had an echo in 1/2016 that showed a low-normal ejection fraction.     Cardiac Risk Factors:  CAD:    Neg  Hypertension:   Pos  Hyperlipidemia:  Neg  Diabetes:   Neg  Tobacco use:   Neg  Gender:   F  Age:    51  Familial Hx of CAD:  Pos     Allergies:  Amoxicillin  Percocet [Oxycodone-Acetaminophen]     Medications:    Aldactone  Prinivil  Bystolic  Excedrin tension headache     Past Medical History:    STORMY  Migraine, classic  Palpitations  HTN  SVT  Esophageal reflux    Past Surgical History:    Cholecystectomy, laparoscopic  Colonoscopy  D & C  EGD, combined  Laparoscopy for endometriosis     Family History:    HTN  Lipids  Prostate  cancer  CAD  Gallbladder disease  Lung cancer  Cerebrovascular disease   Colorectal cancer    Social History:  Relationship status: Single  Tobacco use: Neg  Alcohol use: Pos  The patient presents alone.      Review of Systems   Respiratory: Positive for chest tightness (resolved) and shortness of breath.    Cardiovascular: Positive for chest pain (resolved).   Gastrointestinal: Positive for nausea (resolved).   Neurological: Positive for dizziness (resolved) and headaches.   All other systems reviewed and are negative.    Physical Exam   First Vitals:  BP: (!) 203/91  Pulse: 56  Heart Rate: 56  Temp: 98.1  F (36.7  C)  Resp: 18  Height: 182.9 cm (6')  Weight: 136.1 kg (300 lb)  SpO2: 96 %    Physical Exam  Constitutional:  Appears well-developed and well-nourished. Alert. Conversant. Non toxic.  HENT:   Head: Atraumatic.   Nose: Nose normal.  Mouth/Throat: Oral mucosa is clear and moist. no trismus. Pharynx normal. Tonsils symmetric. No tonsillar enlargement, erythema, or exudate.  Eyes: Conjunctivae normal. EOM normal. Pupils equal, round, and reactive to light. No scleral icterus.   Neck: Normal range of motion. Neck supple. No tracheal deviation present.  no JVD  Cardiovascular: Normal rate, regular rhythm. No gallop. No friction rub. No murmur heard. Symmetric radial and PT artery pulses   Pulmonary/Chest: Effort normal. No stridor. No respiratory distress. No wheezes. No rales. No rhonchi . No tenderness.   Abdominal: Soft. Bowel sounds normal. No distension. No mass. No tenderness. No rebound. No guarding.   Musculoskeletal: Normal range of motion.  Bilateral ankle and shin edema. No tenderness. No deformity   Lymph: No cervical adenopathy.   Neurological: Alert and oriented to person, place, and time. Cranial Nerves intact II-XII except I did not formally test gag or visual acuity.  EOMI. Strength 5/5 bilaterally in the trapezius, deltoid, biceps, triceps, , thumb opposition, finger abduction, psoas,  quadriceps, hamstring, gastrocnemius, tibialis anterior. Sensation intact to light touch in all 4 extremities (C4-T1 and L4-S1). coordination normal. Mental status normal. Attention normal. GCS 15. Memory normal. Speech fluent. Cognition normal. Gait normal.   Skin: Skin is warm and dry. No rash noted. No pallor. Normal capillary refill.  Psychiatric:  Normal mood. Normal affect.     Emergency Department Course   ECG (22:26:55):  Indication: Hypertension  Rate 56 bpm. OH interval 194. QRS duration 96. QT/QTc 456/440. P-R-T axes 4.   Interpretation: Sinus bradycardia, ST & T wave abnormality, consider anterolateral ischemia, Abnormal ECG.   Agree with computer interpretation.   No significant change compared to EKG dated 3/30/17.   Interpreted at 2230 by Dr. Ballesteros.      Imaging:  Chest XR, per radiology:  No evidence of active cardiopulmonary disease.    Radiographic findings were communicated with the patient who voiced understanding of the findings.    Laboratory:  CBC: WNL (WBC 6.7, HGB 14.0, )  BMP: WNL (Creatinine 0.80)    2249: Troponin I: <0.015    Interventions:  2258: Reglan, 10 mg, IV  2258: Benadryl, 25 mg, IV  2258: Catapres, 0.2 mg, PO    Emergency Department Course:  Nursing notes and vitals reviewed.  I performed an exam of the patient as documented above.  The above workup was undertaken.  0025: I discussed the patient with Dr. Mir of the hospitalist service who agrees to admit the patient to the hospital.   0030: I rechecked the patient and discussed results.    Findings and plan explained to the Patient who consents to admission. Discussed the patient with Dr. Mir, who will admit the patient to a [facility] bed for further monitoring, evaluation, and treatment.     Impression & Plan    Medical Decision Making:  Fouzia Arenas is a 51 year old female who presents for evaluation of elevated blood pressure that has been ongoing for her.  It was associated with a concerning  episode of exertional chest discomfort, nausea, lightheadedness this evening at about 9 PM. The workup here is negative for renal failure, CHF and the patient does not have any clinical, laboratory, ecg or historical signs of end-organ dysfunction.  She had a mild headache that is now resolved.  Initial EKG shows lateral T-wave inversions which are unchanged from prior.  Initial troponin is negative.  However not enough time has elapsed since her episode of pain to rule out an STEMI.  She is not having any active chest pain or shortness of breath.  The ER to require nitrates.  We administered clonidine for blood pressure with good effect.  Reglan and Benadryl for headache with good relief.  No signs of stroke.  Headache was not abrupt in onset to raise concern for subarachnoid hemorrhage.  We will admit to the hospitalist service for serial enzymes, blood pressure monitoring, further workup.      Critical Care time:  none    Diagnosis:    ICD-10-CM   1. Hypertensive emergency I16.1   2. Acute chest pain R07.9       Disposition:  Admitted to a obs tele bed under the care of Dr. Mir of the hospitalist service.     I, Betito Barreto, am serving as a scribe on 3/30/2017 at 10:34 PM to personally document services performed by Alfred Ballesteros,  based on my observations and the provider's statements to me.     [facility] EMERGENCY DEPARTMENT       Alfred Ballesteros MD  03/31/17 0211

## 2017-03-31 NOTE — DISCHARGE SUMMARY
[facility] Outpatient / Observation Unit  Discharge Summary        Fouzia Arenas MRN# [id number]   YOB: 1965 Age: 51 year old     Date of Admission:  3/30/2017  Date of Discharge:  3/31/2017  Admitting Physician:  Shen Andersen MD  Discharge Physician: Wanda Tom PA-C  Discharging Service: Hospitalist      Primary Provider: Weiler, Karen  Primary Care Physician Phone Number: [phone]         Primary Discharge Diagnoses:    Fouzia Arenas was admitted on 3/30/2017 for concerns of acute chest pain and hypertension.     1. Chest pain: Ruled out ACS with serial troponins. Suspect due to accelerated hypertension, symptoms resolved with improvement of blood pressure. Stress testing would not be unreasonable but recommend pursuing as an outpatient once blood pressures are stable. Defer to PCP  2. Accelerated HTN - hx of fairly poorly controlled HTN. Seen by PA in Cardiology yesterday prior to ED visit. Added spironolactone 12.5 mg, this was increased to 25 mg here. Continue Lisinopril at 10 mg BID and Bystolic at 20 mg, cardiology recommended taking at night. Follow up in one week for BP recheck and titration of medications. Likely will need recheck of BMP  3. Bradycardia - HRs 47 on morning of discharge. Asymptomatic at rest, HRs increased into 70s with ambulation and still asymptomatic. On Bystolic due to hx of SVT, recommend continuing Bystolic at current dose. Recheck with PCP.           Secondary Discharge Diagnoses:     Past Medical History:   Diagnosis Date     Classic migraine      Hypertension      Palpitations      SVT (supraventricular tachycardia) (H) 9/2015                Code Status:      Full Code        Brief Hospital Summary:        Reason for your hospital stay       Chest pain likely associated with hypertensive urgency. Symptoms resolved   with improvement in blood pressure. Adjustments were made to home   medications. Heart rates were in the upper 40s on day of  discharge but   asymptomatic at rest and with ambulation.                    Please refer to initial admission history and physical for further details.   Briefly, Fouzia Arenas was admitted on 3/30/2017 for concerns of acute chest pain and hypertension.   Initial work up in the ED did not reveal evidence of STEMI or findings consistent with unstable angina or acute coronary ischemia. BP improved in ED with 0.2 mg PO Clonidine, symptoms also resolved. Pt was registered to the Observation Unit for further evaluation.   Pt ruled out with serial troponins and home antihypertensives were adjusted. Labs were reviewed and significant results addressed. On the day of discharge, pt was pain free, with no complaints of pain. Medications were reviewed and adjustments made as necessary. Pt is instructed to follow up as below.           Significant Lab During Hospitalization:        Recent Labs  Lab 03/30/17 2249 03/30/17  1036   WBC 6.7  --    HGB 14.0 13.9   HCT 43.1  --    MCV 85  --      --        Recent Labs  Lab 03/30/17 2249 03/30/17  1036    143   POTASSIUM 3.5 4.3   CHLORIDE 106 106   CO2 26 31*   ANIONGAP 10 10.3   GLC 98 114*   BUN 11 9   CR 0.80 0.89   GFRESTIMATED 75 67   GFRESTBLACK >90African American GFR Calc 81   ZACH 8.6 8.9       Recent Labs  Lab 03/31/17  0710 03/31/17  0145 03/30/17 2249   TROPI <0.015The 99th percentile for upper reference range is 0.045 ug/L.  Troponin values in the range of 0.045 - 0.120 ug/L may be associated with risks of adverse clinical events. <0.015The 99th percentile for upper reference range is 0.045 ug/L.  Troponin values in the range of 0.045 - 0.120 ug/L may be associated with risks of adverse clinical events. <0.015The 99th percentile for upper reference range is 0.045 ug/L.  Troponin values in the range of 0.045 - 0.120 ug/L may be associated with risks of adverse clinical events.                Significant Imaging During Hospitalization:      Recent Results  (from the past 48 hour(s))   XR Chest 2 Views    Narrative    CHEST 2 VIEWS  3/30/2017 11:42 PM     HISTORY: Chest pain.    COMPARISON: 10/24/2014.    FINDINGS: The lungs are clear. Normal-sized cardiac silhouette.      Impression    IMPRESSION: No evidence of active cardiopulmonary disease.    SHANDA RIOS MD              Pending Results:        Unresulted Labs Ordered in the Past 30 Days of this Admission     No orders found for last 61 day(s).              Consultations This Hospital Stay:      No consultations were requested during this admission         Discharge Instructions and Follow-Up:      Follow-up Appointments     Follow-up and recommended labs and tests        Follow up with primary care provider, Karen Weiler, within 7 days to   evaluate medication change and for hospital follow- up.  No follow up labs   or test are needed.  Check blood pressures once daily, in AM, if possible at home.                  Pt instructed to follow up with PCP in 7 days    Follow-up Labs BMP        Discharge Disposition:      Discharged to home         Discharge Medications:        Current Discharge Medication List      CONTINUE these medications which have CHANGED    Details   spironolactone (ALDACTONE) 25 MG tablet Take 1 tablet (25 mg) by mouth daily  Qty: 30 tablet, Refills: 3    Associated Diagnoses: Benign essential hypertension         CONTINUE these medications which have NOT CHANGED    Details   lisinopril (PRINIVIL/ZESTRIL) 10 MG tablet Take 1 tablet (10 mg) by mouth 2 times daily  Qty: 60 tablet, Refills: 11    Associated Diagnoses: Benign essential hypertension      Nebivolol HCl (BYSTOLIC) 20 MG TABS Take 20 mg by mouth daily  Qty: 90 tablet, Refills: 3    Associated Diagnoses: Benign essential hypertension      order for DME DREAMSTATION  9-12 CM/H20  FF MIRAGE QUATTRO      acetaminophen-caffeine (EXCEDRIN TENSION HEADACHE) 500-65 MG TABS Take 2 tablets by mouth every 6 hours as needed for mild pain                  Allergies:         Allergies   Allergen Reactions     Amoxicillin Nausea and Vomiting     Percocet [Oxycodone-Acetaminophen] Nausea and Vomiting           Condition and Physical on Discharge:      Discharge condition: Stable   Vitals: Blood pressure 141/68, pulse (!) 47, temperature 96.8  F (36  C), temperature source Oral, resp. rate 16, height 1.829 m (6'), weight 136.1 kg (300 lb), SpO2 96 %.  300 lbs 0 oz      GENERAL:  Comfortable.  PSYCH: pleasant, oriented, No acute distress.  HEART:  Bradycardic at rest. HR regular  LUNGS:  Normal Respiratory effort.    EXTREMITIES:  Able to ambulate independently   SKIN:  Dry to touch, No obvious rash, wound or ulcerations.  NEUROLOGIC:  Grossly intact.     Wanda Tom PA-C

## 2017-03-31 NOTE — PROGRESS NOTES
HISTORY OF PRESENT ILLNESS:  I had the pleasure of seeing Fouzia today when she came accompanied by her mother and daughter for concerns regarding hypertension.  She is a very pleasant 51-year-old.  She has no known history of coronary disease.  Last stress echocardiogram done for atypical chest pain in 08/2015 at [facility] was negative for wall motion abnormalities, and echo in 01/2016 showed a low-normal ejection fraction.  She has a history of migraine headaches, hypertension and palpitations.  Palpitations have been proven by event monitor to be related to atrial tachycardia, which have usually been short-lived.  She was tried on carvedilol and eventually switched to Bystolic, which has done a good job in controlling her palpitations.      She saw Dr. Hensley back in August, at which time Aldactone, which I had started in June for hypertension and swelling, was switched to lisinopril.  She apparently did not want to take it any longer, but she does not remember having any specific problem with this.  She cannot recall if spironolactone decreased her lower extremity edema or what her blood pressures were running when she was taking it.  In any event, when she saw Dr. Hensley in August, he switched her to lisinopril and asked her to up-titrate this to 10 mg daily if blood pressure was under poor control.  She was to come back in 3 months and see us for further evaluation but we have not seen her since.    She was seen at [facility] on Tuesday, 03/28.  She saw an NP there who noted extremely high blood pressures x2 and lower extremity edema with venous stasis changes.  She was concerned about her blood pressure, but Fouzia refused to go to the Emergency Department, stating that it had been high for quite a long time.  Over the phone, I suggested that Karyna increase Fouzia's lisinopril to 10 mg b.i.d., continue Bystolic 20 mg daily and see me in clinic with blood work today.      Fouzia tells me that she  "did not remember that she was supposed to come back and see us after Dr. Hensley switched her spironolactone to lisinopril.  She states that her blood pressures have usually been in the 170-190s for at least the last 6 months, and these blood pressures that she is getting here are \"nothing new.\"  She can think of nothing she has been doing differently over these last 6 months including increased sodium in the diet, decongestant use or significant NSAID use.      The reason she went into urgent care was actually for some cold/flu symptoms, and she does admit to taking Advil Cold and Sinus on Sunday the 26th.      She denies any change in her migraines headache pattern.  She denies any nausea, vomiting or vision changes.  It appears that she has actually tolerated these elevated blood pressures without issues.      She continues to complain of lower extremity edema, noting worsening venous stasis changes.  She denies orthopnea or PND.  She is compliant with CPAP therapy but does sometimes wake up to find that the mask had been taken off in the night.  She is unaware of doing so and does put the mask back on when she notices this.      She denies chest pain, back discomfort or abdominal discomfort.     Blood work today really looked OK, with BMP showing a sodium of 142, potassium 3.5,  BUN 11 and Cr 0.8.  Her Hgb was wnl at 14.0 and BNP was just mildly elevated in the 300s.         ASSESSMENT AND PLAN:     1.  Hypertension.  Blood pressure is terribly elevated today and she states that these are the blood pressures she has been getting at home on her monitor for at least the last 6 months.  She thought she was only to worry about the diastolic number and so was not concerned when she saw systolic blood pressures this high.  I explained that we certainly were concerned about her systolic blood pressure.  We did talk about sending her to the emergency department for IV medication but again, she states these blood pressures " have been chronic over the last 6 months, and she is having no symptoms such as blurred vision, increased headache or chest discomfort.      At this time, she agreed to close followup.  I will restart spironolactone 12.5 mg daily, and she will see me in a week with a BMP.  She will continue lisinopril 10 mg twice daily and Bystolic 20 mg daily at night.  I explained that we would like to limit her Excedrin use (which she has taken in the past for migraines) due to the increased caffeine which could be driving blood pressures higher.  She already knows to avoid decongestants and will not take any more Advil Cold and Sinus.  She feels she already follows a less than 2300 mg sodium diet.  She will avoid alcohol and continue to wear CPAP for further control of her blood pressure.      She will see me in a week, at which time we will repeat a BMP.  We will see if we can increase either her Aldactone or spironolactone.  She had problems with amlodipine in the past causing significant lower extremity edema and is not eager to retry that.  Certainly we could try chlorthalidone, if spironolactone is intolerable or changes her potassium levels too much.  Other options include up-titrating lisinopril a bit further to 20 mg twice daily (though this will likely not result in a significant change).  Other options include hydralazine 3 times daily or clonidine patch.      If we get to that point, I would like to send her Dr. Au for resistant hypertension and obtain renal artery ultrasound to ensure she does not have a renal artery stenosis as a cause of her elevated blood pressures.      She at the end of the visit was able to reiterate what would bring her to the emergency department for this high blood pressure, as she does not want to go right now.      2.  Palpitations.  Bystolic seems to be working well to control her atrial tachycardia.      She will see me in 1 week with a BMP and call me in the interim should she have any  problems with the spironolactone.         KAREN GIBSON PA-C             D: 2017 12:17   T: 2017 22:19   MT: DIAMOND      Name:     EYAD TORRES   MRN:      [id number]        Account:      [id number]   :      1965           Service Date: 2017      Document: [id number]

## 2017-03-31 NOTE — PLAN OF CARE
Problem: Discharge Planning  Goal: Discharge Planning (Adult, OB, Behavioral, Peds)  Outcome: Adequate for Discharge Date Met:  03/31/17  Patient's After Visit Summary was reviewed with patient.  Patient verbalized understanding of After Visit Summary, recommended follow up and was given an opportunity to ask questions.   Discharge medications sent home with patient/family: Not applicable   Discharged with :Mother and Father. Patient is ambulatory at discharge. Patient is also stable.

## 2017-03-31 NOTE — ED NOTES
Bed: ED18  Expected date: 3/30/17  Expected time: 10:09 PM  Means of arrival: Ambulance  Comments:  Jimmie Galvan

## 2017-03-31 NOTE — PLAN OF CARE
Problem: Discharge Planning  Goal: Discharge Planning (Adult, OB, Behavioral, Peds)  Outcome: Improving  OBSERVATION patient END time: 9233

## 2017-03-31 NOTE — H&P
[facility]  Hospitalist Admission Note  Name: Fouzia Arenas    MRN: [id number]  YOB: 1965    Age: 51 year old  Date of admission: 3/30/2017  Primary care provider: Weiler, Karen    Chief Complaint:  Accelerated hypertension, chest pain    Fouzia Arenas is a 51 year old female with PMH including HTN, SVT who presents with chest pain and dizziness found to have accelerated hypertension which responded well (with symptom resolution) to 0.2 mg clonidine given in the ER.  She is being admitted to rule out ACS and also to ensure adequate blood pressure control.      Assessment and Plan:   1. Chest Pain: I suspect this is being driven by accelerated hypertension though certainly her long-standing poor bp control places her at risk for development of coronary disease.  Her initial troponin is negative and we will trend these to rule out ACS.  Her EKG shows non-specific lateral ST changes which are most likely due to blood pressure effects.  Overnight I favor working on better blood pressure control.  If she rules out for ACS and blood pressure is adequately controlled and she remains pain free I would plan on discharging her tomorrow then continuing to have close follow up with Cardiology in clinic and she could consider repeat stress testing once her blood pressure is better controlled and more stable.  I will not plan to have a stress test done now as this may just exacerbate her blood pressure.    2.   Accelerated Hypertension: It seems she has had chronically very poor control for at least six months.  Current regimen is spironolactone 12.5 mg daily (started today), lisinopril 10 mg BID and Bystolic 20 mg.      --increase spironolactone to 25 mg daily  --continue lisinopril 10 mg BID, consider increasing further  --continue Bystolic 20 mg.  --will need close monitoring of potassium in the outpatient setting while on spironolactone and lisinopril.   --previously had leg swelling w/  amlodipine.    3.   Hx of SVT: reportedly well controlled (atrial tachycardia w/ palpitations) on bystolic.  Follows w/ Cardiology for this.      DVT Prophylaxis: ambulate  Code Status: Full Code  Discharge Dispo: admit to obs status      History of Present Illness:  Fouzia Arenas is a 51 year old female with PMH including HTN, SVT who presents with chest pain which started while grocery shopping around 21:00.  She had fairly abrupt onset of chest tightness with dizziness and nausea.  Her daughter was with and she helped get her loaded into the car for rest.  The pain actually became somewhat worse and she became dizzy after she started to drive so she pulled into a parking lot and called 911.    She actually saw her cardiology PA in clinic this morning.  Of note she has had difficulty controlling her blood pressure and her medications have been titrated accordingly with recent addition of spironolactone 12.5 mg (also on lisinopril and bystolic).  She was actually seen in Urgent care on 3/28 for cold/URI symptoms and encouraged to go to the ER due to very high blood pressures but refused as she feels her pressures have been high for quite some time and she didn't see the emergency.       She notes she had a negative stress test 8/2015.    Here in the ER her blood pressure was 203/91.  EKG showed sinus bradycardiawith non-specific ST changes.  She was given 0.2 mg clonidine with good effect.  Troponin was negative in the ER.    She has recently had some cold/flu symptoms.     Past Medical History:  Past Medical History:   Diagnosis Date     Classic migraine      Hypertension      Palpitations      SVT (supraventricular tachycardia) (H) 9/2015     Past Surgical History:  Past Surgical History:   Procedure Laterality Date     CHOLECYSTECTOMY, LAPOROSCOPIC      Cholecystectomy, Laparoscopic     COLONOSCOPY N/A 11/25/2014    Procedure: COLONOSCOPY;  Surgeon: Wenceslao Galo MD;  Location:  GI     D & C      X2-3  for abnormal bleeding     ESOPHAGOSCOPY, GASTROSCOPY, DUODENOSCOPY (EGD), COMBINED N/A 11/25/2014    Procedure: COMBINED ESOPHAGOSCOPY, GASTROSCOPY, DUODENOSCOPY (EGD), BIOPSY SINGLE OR MULTIPLE;  Surgeon: Wenceslao Galo MD;  Location:  GI     LAPAROSCOPY      For endometriosis     Social History:  Social History   Substance Use Topics     Smoking status: Never Smoker     Smokeless tobacco: Never Used     Alcohol use 1.0 oz/week      Comment: 1 drink monthly     Social History     Social History Narrative     Family History:  Family History   Problem Relation Age of Onset     Hypertension Mother      Lipids Mother      Hypertension Father      Prostate Cancer Father      Lipids Father      Breast Cancer Maternal Aunt      Hypertension Maternal Grandmother      C.A.D. Maternal Grandmother      Gallbladder Disease Maternal Grandmother      CANCER Maternal Grandfather      lung     CEREBROVASCULAR DISEASE Paternal Grandmother      C.A.D. Paternal Grandfather      Cancer - colorectal Other      cousin maternal      Allergies:  Allergies   Allergen Reactions     Amoxicillin Nausea and Vomiting     Percocet [Oxycodone-Acetaminophen] Nausea and Vomiting     Medications:  Prior to Admission Medications   Prescriptions Last Dose Informant Patient Reported? Taking?   Nebivolol HCl (BYSTOLIC) 20 MG TABS   No No   Sig: Take 20 mg by mouth daily   acetaminophen-caffeine (EXCEDRIN TENSION HEADACHE) 500-65 MG TABS   Yes No   Sig: Take 2 tablets by mouth every 6 hours as needed for mild pain   lisinopril (PRINIVIL/ZESTRIL) 10 MG tablet   No No   Sig: Take 1 tablet (10 mg) by mouth 2 times daily   Patient taking differently: Take 20 mg by mouth 2 times daily    order for DME   Yes No   Sig: [employer]  9-12 CM/H20  FF [employer]   spironolactone (ALDACTONE) 25 MG tablet   No No   Sig: Take 0.5 tablets (12.5 mg) by mouth daily      Facility-Administered Medications: None         Review of Systems:  A Comprehensive  greater than 10 system review of systems was carried out.  Pertinent positives and negatives are noted above.  Otherwise negative for contributory information.     Physical Exam:  Blood pressure 150/83, pulse 56, temperature 98.1  F (36.7  C), temperature source Oral, resp. rate 18, height 1.829 m (6'), weight 136.1 kg (300 lb), SpO2 92 %.  Wt Readings from Last 1 Encounters:   03/30/17 136.1 kg (300 lb)     Exam:  General: Alert, awake, no acute distress.  Obese.  Pleasant and calm.  HEENT: NC/AT, eyes anicteric, external occular movements intact, face symmetric.  Dentition WNL, MM moist.  Cardiac: RRR, S1, S2.  1-2/6 systolic murmur.  Pulmonary: Normal chest rise, normal work of breathing.  Lungs CTA BL  Abdomen: soft, non-tender, non-distended.  Bowel Sounds Present.  No guarding.  Extremities: no deformities.  Warm, well perfused.  Skin: no rashes or lesions noted.  Warm and Dry.  Neuro: No focal deficits noted.  Speech clear.  Coordination and strength grossly normal.  Psych: Appropriate affect.    Data:  EKG:  NSR with lateral non-specific ST changes without elevations.    Imaging:  Recent Results (from the past 48 hour(s))   XR Chest 2 Views    Narrative    CHEST 2 VIEWS  3/30/2017 11:42 PM     HISTORY: Chest pain.    COMPARISON: 10/24/2014.    FINDINGS: The lungs are clear. Normal-sized cardiac silhouette.      Impression    IMPRESSION: No evidence of active cardiopulmonary disease.     Labs:    Recent Labs  Lab 03/30/17  2249 03/30/17  1036   WBC 6.7  --    HGB 14.0 13.9   HCT 43.1  --    MCV 85  --      --           Lab Results   Component Value Date     03/30/2017     03/30/2017     06/30/2016    Lab Results   Component Value Date    CHLORIDE 106 03/30/2017    CHLORIDE 106 03/30/2017    CHLORIDE 106 06/30/2016    Lab Results   Component Value Date    BUN 11 03/30/2017    BUN 9 03/30/2017    BUN 15 06/30/2016      Lab Results   Component Value Date    POTASSIUM 3.5 03/30/2017     POTASSIUM 4.3 03/30/2017    POTASSIUM 4.1 06/30/2016    Lab Results   Component Value Date    CO2 26 03/30/2017    CO2 31 03/30/2017    CO2 30 06/30/2016    Lab Results   Component Value Date    CR 0.80 03/30/2017    CR 0.89 03/30/2017    CR 0.76 06/30/2016          Recent Labs  Lab 03/30/17  2249   TROPI <0.015The 99th percentile for upper reference range is 0.045 ug/L.  Troponin values in the range of 0.045 - 0.120 ug/L may be associated with risks of adverse clinical events.         Lauri Andersen MD  Hospitalist  [facility]

## 2017-03-31 NOTE — PHARMACY-ADMISSION MEDICATION HISTORY
Admission medication history interview status for this patient is complete. See [facility] admission navigator for allergy information, prior to admission medications and immunization status.     Medication history interview source(s):Patient  Medication history resources (including written lists, pill bottles, clinic record):None    Changes made to PTA medication list:  Added: none  Deleted: none  Changed: lisinopril 20mg BID --> 10mg BID    Actions taken by pharmacist (provider contacted, etc):None     Additional medication history information:None    Medication reconciliation/reorder completed by provider prior to medication history? Yes      For patients on insulin therapy: No  Lantus/levemir/NPH/Mix 70/30 dose:  _____   in AM/PM  or twice daily   Sliding scale Novolog Y/N  If Yes, do you have a baseline novolog pre-meal dose:  ______units with meals   Patients eat three meals a day:   Y/N     Any Barriers to therapy:  cost of medications/comfortable with giving injections (if applicable)/ comfortable and confident with current diabetes regimen       Prior to Admission medications    Medication Sig Last Dose Taking? Auth Provider   spironolactone (ALDACTONE) 25 MG tablet Take 1 tablet (25 mg) by mouth daily new med yesterday Yes Wanda Tom PA-C   lisinopril (PRINIVIL/ZESTRIL) 10 MG tablet Take 1 tablet (10 mg) by mouth 2 times daily 3/30/2017 at Unknown time Yes Peggy Edmond PA-C   Nebivolol HCl (BYSTOLIC) 20 MG TABS Take 20 mg by mouth daily 3/30/2017 at pm Yes Peggy Edmond PA-C   acetaminophen-caffeine (EXCEDRIN TENSION HEADACHE) 500-65 MG TABS Take 2 tablets by mouth every 6 hours as needed for mild pain Past Month at Unknown time Yes Reported, Patient   order for DME [employer]  9-12 CM/H20  FF [employer] Unknown at Unknown time  Reported, Patient

## 2017-03-31 NOTE — ED NOTES
Pt arrives via EMS in a parking lot where she was experiencing chest pressure, SOB and lightheaded. Pt states she was shopping around 9 o'clock when she started to experience these symptoms and needed to sit down. Pt was going to drive herself home and then felt lightheaded and needed to pull into a parking lot and called 911.  Pt's BP elevated for EMS. Pt has been having hypertension since August and was seen at the clinic Tuesday where they told her she needed to go to the hospital for her BP being high. Pt seen at her cardiologist today where they messed around with some of her BP meds. ABC's intact. Denies any chest pain, SOB currently anymore but does have headache in left side of head, H/O of migraines.

## 2017-03-31 NOTE — PLAN OF CARE
Problem: Discharge Planning  Goal: Discharge Planning (Adult, OB, Behavioral, Peds)  Outcome: Improving  PRIMARY DIAGNOSIS: CHEST PAIN  OUTPATIENT/OBSERVATION GOALS TO BE MET BEFORE DISCHARGE:     1. Negative Serial Troponin Yes x1  2. Resolution of chest pain Yes  3. Pain status: Pain free.  4. Negative stress test: no test scheduled at this time  5. Stable vital signs BP elevated 165/83 HR 51  6. ADLs back to baseline?  Yes  7. Activity and level of assistance: Ambulating independently.  8. Barriers to discharge noted No  9.Interpretation of rhythm per telemetry tech: SB hr in the 40's     Patient is alert and oriented x4, independent with ambulation. Came to ED d/t having chest pain, headache, SOB, and lightheadedness. Patient reports all symptoms have resolved. Tele monitoring in place, tele tech reports SB with a HR in the 40's. IV site saline locked. Will continue to monitor, assess, and offer supportive cares.

## 2017-03-31 NOTE — PLAN OF CARE
Problem: Discharge Planning  Goal: Discharge Planning (Adult, OB, Behavioral, Peds)  Outcome: Improving  PRIMARY DIAGNOSIS: CHEST PAIN  OUTPATIENT/OBSERVATION GOALS TO BE MET BEFORE DISCHARGE:     1. Negative Serial Troponin Yes x2  2. Resolution of chest pain Yes  3. Pain status: Pain free.  4. Negative stress test: no test scheduled at this time  5. Stable vital signs HR ranging from the 40's to 50's  6. ADLs back to baseline?  Yes  7. Activity and level of assistance: Ambulating independently.  8. Barriers to discharge noted No  9.Interpretation of rhythm per telemetry tech: SB hr in the 40's-50's     Patient is alert and oriented x4, independent with ambulation. Denies chest pain, headache, SOB, and lightheadedness. Tele monitoring in place, tele tech reports SB with a HR in the 40's- 50's. IV site saline locked. Will continue to monitor, assess, and offer supportive cares.

## 2017-04-05 PROBLEM — R07.9 CHEST PAIN: Status: ACTIVE | Noted: 2017-04-05

## 2017-04-07 ENCOUNTER — OFFICE VISIT (OUTPATIENT)
Dept: CARDIOLOGY | Facility: CLINIC | Age: 52
End: 2017-04-07
Attending: PHYSICIAN ASSISTANT
Payer: COMMERCIAL

## 2017-04-07 VITALS
BODY MASS INDEX: 39.68 KG/M2 | SYSTOLIC BLOOD PRESSURE: 130 MMHG | HEIGHT: 72 IN | DIASTOLIC BLOOD PRESSURE: 82 MMHG | WEIGHT: 293 LBS | HEART RATE: 60 BPM

## 2017-04-07 DIAGNOSIS — I10 BENIGN ESSENTIAL HYPERTENSION: Primary | ICD-10-CM

## 2017-04-07 DIAGNOSIS — I10 BENIGN ESSENTIAL HYPERTENSION: ICD-10-CM

## 2017-04-07 LAB
ANION GAP SERPL CALCULATED.3IONS-SCNC: 10.2 MMOL/L (ref 6–17)
BUN SERPL-MCNC: 12 MG/DL (ref 7–30)
CALCIUM SERPL-MCNC: 8.9 MG/DL (ref 8.5–10.5)
CHLORIDE SERPL-SCNC: 102 MMOL/L (ref 98–107)
CO2 SERPL-SCNC: 30 MMOL/L (ref 23–29)
CREAT SERPL-MCNC: 0.93 MG/DL (ref 0.7–1.3)
GFR SERPL CREATININE-BSD FRML MDRD: 64 ML/MIN/1.7M2
GLUCOSE SERPL-MCNC: 129 MG/DL (ref 70–105)
POTASSIUM SERPL-SCNC: 4.2 MMOL/L (ref 3.5–5.1)
SODIUM SERPL-SCNC: 138 MMOL/L (ref 136–145)

## 2017-04-07 PROCEDURE — 99213 OFFICE O/P EST LOW 20 MIN: CPT | Performed by: PHYSICIAN ASSISTANT

## 2017-04-07 PROCEDURE — 36415 COLL VENOUS BLD VENIPUNCTURE: CPT | Performed by: PHYSICIAN ASSISTANT

## 2017-04-07 PROCEDURE — 80048 BASIC METABOLIC PNL TOTAL CA: CPT | Performed by: PHYSICIAN ASSISTANT

## 2017-04-07 RX ORDER — SPIRONOLACTONE 25 MG/1
25 TABLET ORAL DAILY
Qty: 30 TABLET | Refills: 5 | Status: SHIPPED | OUTPATIENT
Start: 2017-04-07 | End: 2017-10-06

## 2017-04-07 NOTE — PROGRESS NOTES
"HISTORY OF PRESENT ILLNESS:  I had the pleasure of seeing Fouzia today when she came for followup of  hypertension.  She is a very pleasant 51-year-old whose history is well documented in my note from just last week, 03/30/2017.  At that time we had seen her after being contacted by a nurse practitioner at [facility] where she had gone for an upper respiratory infection due to concerning elevations in blood pressure.  When I saw her, she noted that blood pressure had been running high for about 6 months but she did not feel poorly, so did not think much of it.  I instituted spironolactone 12.5 mg daily with expectations to increase this to 25 with a BMP at this visit.  Unfortunately, later after our visit, she developed chest discomfort while grocery shopping.  She also had some vision changes and a headache.  She went to the Emergency Department at [facility].  She was kept in observation.  Troponins were all negative.  Blood pressure came down from 203/91 with 0.2 of clonidine.  Upon discharge the following day (03/31), they increased her spironolactone to 25 mg daily and continued her on Bystolic and lisinopril at their current doses.      She comes in today telling me that blood pressures at home have actually looked quite a bit better on the higher dose of spironolactone.  She also states that her swelling in her lower extremities has improved dramatically.  She denies any problems with edema, orthopnea or PND.  Denies lightheadedness, amrit syncope or palpitations.  She denies any recurrent central chest discomfort.  She does note an occasional \"ache\" on the left side of her chest which is nonexertional and comes and goes.  It is not related to position or palpation.  It is nothing like what she had when she presented to the Emergency Department, which was in the center of her chest.      Last exercise echocardiogram 08/2015 at [facility] was negative for ischemia and is available in CareEverywhere.        ASSESSMENT AND PLAN:  "     1. Hypertension.  Blood pressure has really come down nicely.  It was 130/90 when she first came in and when I rechecked it I got 130/82.  Blood pressures at home have lately been in the 140s, so we are certainly making improvement on the spironolactone 25 mg.      BMP today looks good with a sodium of 138, potassium 4.2, BUN 12, creatinine 0.93.      At this time, we will have her continue spironolactone 25 mg daily, lisinopril 10 mg twice a day and Bystolic 20 mg daily at night (which she takes for palpitations).  She will contact us in 2 weeks with an update on blood pressures.  We will determine if we need to get a stress test at that time given the concerns for chest discomfort she had while being hypertensive as noted in the emergency department and observation unit.      It has been a pleasure to see Fouzia in clinic.  I have asked her to call me if she has any other questions or concerns or if she notes blood pressures climbing before 2 weeks.         KAREN GIBSON PA-C             D: 2017 08:35   T: 2017 13:45   MT: IVETTE      Name:     FOUZIA TORRES   MRN:      [id number]        Account:      [id number]   :      1965           Service Date: 2017      Document: [id number]

## 2017-04-07 NOTE — PROGRESS NOTES
HPI and Plan:   See dictation #[id number]    Orders Placed This Encounter   Medications     spironolactone (ALDACTONE) 25 MG tablet     Sig: Take 1 tablet (25 mg) by mouth daily     Dispense:  30 tablet     Refill:  5       Medications Discontinued During This Encounter   Medication Reason     spironolactone (ALDACTONE) 25 MG tablet Reorder     Encounter Diagnosis   Name Primary?     Benign essential hypertension Yes     CURRENT MEDICATIONS:  Current Outpatient Prescriptions   Medication Sig Dispense Refill     spironolactone (ALDACTONE) 25 MG tablet Take 1 tablet (25 mg) by mouth daily 30 tablet 5     lisinopril (PRINIVIL/ZESTRIL) 10 MG tablet Take 1 tablet (10 mg) by mouth 2 times daily 60 tablet 11     Nebivolol HCl (BYSTOLIC) 20 MG TABS Take 20 mg by mouth daily 90 tablet 3     order for DME DREAMSTATION  9-12 CM/H20  FF MIRAGE QUATTRO       acetaminophen-caffeine (EXCEDRIN TENSION HEADACHE) 500-65 MG TABS Take 2 tablets by mouth every 6 hours as needed for mild pain       [DISCONTINUED] spironolactone (ALDACTONE) 25 MG tablet Take 1 tablet (25 mg) by mouth daily 30 tablet 3       ALLERGIES     Allergies   Allergen Reactions     Amoxicillin Nausea and Vomiting     Percocet [Oxycodone-Acetaminophen] Nausea and Vomiting       PAST MEDICAL HISTORY:  Past Medical History:   Diagnosis Date     Classic migraine      Hypertension      Palpitations      SVT (supraventricular tachycardia) (H) 9/2015       PAST SURGICAL HISTORY:  Past Surgical History:   Procedure Laterality Date     CHOLECYSTECTOMY, LAPOROSCOPIC      Cholecystectomy, Laparoscopic     COLONOSCOPY N/A 11/25/2014    Procedure: COLONOSCOPY;  Surgeon: Wenceslao Galo MD;  Location:  GI     D & C      X2-3 for abnormal bleeding     ESOPHAGOSCOPY, GASTROSCOPY, DUODENOSCOPY (EGD), COMBINED N/A 11/25/2014    Procedure: COMBINED ESOPHAGOSCOPY, GASTROSCOPY, DUODENOSCOPY (EGD), BIOPSY SINGLE OR MULTIPLE;  Surgeon: Wenceslao Galo MD;  Location:  GI      LAPAROSCOPY      For endometriosis       FAMILY HISTORY:  Family History   Problem Relation Age of Onset     Hypertension Mother      Lipids Mother      Hypertension Father      Prostate Cancer Father      Lipids Father      Breast Cancer Maternal Aunt      Hypertension Maternal Grandmother      C.A.CRISTA. Maternal Grandmother      Gallbladder Disease Maternal Grandmother      CANCER Maternal Grandfather      lung     CEREBROVASCULAR DISEASE Paternal Grandmother      C.A.D. Paternal Grandfather      Cancer - colorectal Other      cousin maternal        SOCIAL HISTORY:  Social History     Social History     Marital status: Single     Spouse name: N/A     Number of children: N/A     Years of education: N/A     Social History Main Topics     Smoking status: Never Smoker     Smokeless tobacco: Never Used     Alcohol use 1.0 oz/week      Comment: 1 drink monthly     Drug use: No     Sexual activity: No     Other Topics Concern     Caffeine Concern Yes     1-2 cans qd     Special Diet Yes     started mediterranian      Exercise Yes     20 minutes walking 3-4 days weekly      Seat Belt Yes     Parent/Sibling W/ Cabg, Mi Or Angioplasty Before 65f 55m? No     Social History Narrative       Review of Systems:  Skin:  Negative       Eyes:  Positive for glasses    ENT:  Negative      Respiratory:  Positive for dyspnea on exertion     Cardiovascular:  Negative for;syncope or near-syncope;cyanosis;palpitations;chest pain      Gastroenterology: Positive for constipation;heartburn    Genitourinary:  Negative      Musculoskeletal:  Positive for back pain    Neurologic:  Positive for migraine headaches    Psychiatric:  Negative      Heme/Lymph/Imm:  Negative night sweats    Endocrine:  Negative        Physical Exam:  Vitals: /82  Pulse 60  Ht 1.829 m (6')  Wt (!) 143.3 kg (316 lb)  BMI 42.86 kg/m2    Constitutional:  cooperative, alert and oriented, well developed, well nourished, in no acute distress obese      Skin:  warm and  dry to the touch, no apparent skin lesions or masses noted        Head:  normocephalic, no masses or lesions        Eyes:  pupils equal and round;conjunctivae and lids unremarkable;sclera white;no xanthalasma        ENT:  no pallor or cyanosis, dentition good        Neck:  JVP normal        Chest:  normal respiratory excursion;normal breath sounds, clear to auscultation, normal A-P diameter, normal symmetry, normal respiratory excursion, no use of accessory muscles     no reproducible pain    Cardiac: regular rhythm;no murmurs, gallops or rubs detected                  Abdomen:  abdomen soft        Vascular: pulses full and equal                                        Extremities and Back:  no deformities, clubbing, cyanosis, erythema observed stasis pigmentation bilateral LE edema;trace          Neurological:  affect appropriate, oriented to time, person and place

## 2017-04-07 NOTE — PATIENT INSTRUCTIONS
1. BP looks MUCH better on the full dose of spironolactone!!  HOORAY!    2. Blood work today looked good    3. Call my nurse Carmen in ~ 2 weeks (week of 4/24) with update on BP.  If they are still looking good, will plan stress test.  [phone]

## 2017-04-07 NOTE — LETTER
"4/7/2017    Karen Weiler, MD  [facility]   [address]  [facility] [address]    RE: Fouzia LAWRENCE Deepa       Dear Colleague,    I had the pleasure of seeing Fouzia today when she came for followup of  hypertension.  She is a very pleasant 51-year-old whose history is well documented in my note from just last week, 03/30/2017.  At that time we had seen her after being contacted by a nurse practitioner at [facility] where she had gone for an upper respiratory infection due to concerning elevations in blood pressure.  When I saw her, she noted that blood pressure had been running high for about 6 months but she did not feel poorly, so did not think much of it.  I instituted spironolactone 12.5 mg daily with expectations to increase this to 25 with a BMP at this visit.  Unfortunately, later after our visit, she developed chest discomfort while grocery shopping.  She also had some vision changes and a headache.  She went to the Emergency Department at [facility].  She was kept in observation.  Troponins were all negative.  Blood pressure came down from 203/91 with 0.2 of clonidine.  Upon discharge the following day (03/31), they increased her spironolactone to 25 mg daily and continued her on Bystolic and lisinopril at their current doses.      She comes in today telling me that blood pressures at home have actually looked quite a bit better on the higher dose of spironolactone.  She also states that her swelling in her lower extremities has improved dramatically.  She denies any problems with edema, orthopnea or PND.  Denies lightheadedness, amrit syncope or palpitations.  She denies any recurrent central chest discomfort.  She does note an occasional \"ache\" on the left side of her chest which is nonexertional and comes and goes.  It is not related to position or palpation.  It is nothing like what she had when she presented to the Emergency Department, which was in the center of her chest.      Last exercise echocardiogram " 08/2015 at [facility] negative for ischemia [facility]      Outpatient Encounter Prescriptions as of 4/7/2017   Medication Sig Dispense Refill     spironolactone (ALDACTONE) 25 MG tablet Take 1 tablet (25 mg) by mouth daily 30 tablet 5     lisinopril (PRINIVIL/ZESTRIL) 10 MG tablet Take 1 tablet (10 mg) by mouth 2 times daily 60 tablet 11     Nebivolol HCl (BYSTOLIC) 20 MG TABS Take 20 mg by mouth daily 90 tablet 3     order for DME DREAMSTATION  9-12 CM/H20  FF MIRAGE QUATTRO       acetaminophen-caffeine (EXCEDRIN TENSION HEADACHE) 500-65 MG TABS Take 2 tablets by mouth every 6 hours as needed for mild pain       [DISCONTINUED] spironolactone (ALDACTONE) 25 MG tablet Take 1 tablet (25 mg) by mouth daily 30 tablet 3     No facility-administered encounter medications on file as of 4/7/2017.      ASSESSMENT AND PLAN:      1. Hypertension.  Blood pressure has really come down nicely.  It was 130/90 when she first came in and when I rechecked it I got 130/82.  Blood pressures at home have lately been in the 140s, so we are certainly making improvement on the spironolactone 25 mg.      BMP today looks good with a sodium of 138, potassium 4.2, BUN 12, creatinine 0.93.      At this time, we will have her continue spironolactone 25 mg daily, lisinopril 10 mg twice a day and Bystolic 20 mg daily at night (which she takes for palpitations).  She will contact us in 2 weeks with an update on blood pressures.  We will determine if we need to get a stress test at that time given the concerns for chest discomfort she had while being hypertensive as noted in the emergency department and observation unit.      It has been a pleasure to see Fouzia in clinic.  I have asked her to call me if she has any other questions or concerns or if she notes blood pressures climbing before 2 weeks.     Sincerely,    Peggy Edmond PA-C     [facility]

## 2017-04-07 NOTE — MR AVS SNAPSHOT
After Visit Summary   4/7/2017    Fouzia Arenas    MRN: [id number]           Patient Information     Date Of Birth          1965        Visit Information        Provider Department      4/7/2017 8:10 AM Peggy Edmond PA-C [facility] [place]        Today's Diagnoses     Benign essential hypertension    -  1      Care Instructions    1. BP looks MUCH better on the full dose of spironolactone!!  HOORAY!    2. Blood work today looked good    3. Call my nurse Carmen in ~ 2 weeks (week of 4/24) with update on BP.  If they are still looking good, will plan stress test.  [phone]            Follow-ups after your visit        Your next 10 appointments already scheduled     May 15, 2017  4:30 PM CDT   Return Sleep Patient with Ruy Skaggs MD   [facility]    [address]  [place] [address] [phone]              Who to contact     If you have questions or need follow up information about today's clinic visit or your schedule please contact [facility] [place] directly at [phone].  Normal or non-critical lab and imaging results will be communicated to you by MyChart, letter or phone within 4 business days after the clinic has received the results. If you do not hear from us within 7 days, please contact the clinic through MyChart or phone. If you have a critical or abnormal lab result, we will notify you by phone as soon as possible.  Submit refill requests through [employer] or call your pharmacy and they will forward the refill request to us. Please allow 3 business days for your refill to be completed.          Additional Information About Your Visit        [employer] Information     [employer] lets you send messages to your doctor, view your test results, renew your prescriptions, schedule appointments and more. To sign up, go to  [email or web address] . Click on \"Log in\" on the left side of the screen, which will take you to the Welcome page. Then click on \"Sign up Now\" on the right side of the page.     You will be asked to enter the access code listed below, as well as some personal information. Please follow the directions to create your username and password.     Your access code is: [email or web address]  Expires: 2017 12:01 PM     Your access code will  in 90 days. If you need help or a new code, please call your [place] clinic or [phone].        Care EveryWhere ID     This is your Care EveryWhere ID. This could be used by other organizations to access your [place] medical records  [id number]        Your Vitals Were     Pulse Height BMI (Body Mass Index)             60 1.829 m (6') 42.86 kg/m2          Blood Pressure from Last 3 Encounters:   17 130/82   17 154/61   17 (!) 190/120    Weight from Last 3 Encounters:   17 (!) 143.3 kg (316 lb)   17 136.1 kg (300 lb)   17 (!) 144.4 kg (318 lb 6.4 oz)              We Performed the Following     Follow-Up with Cardiac Advanced Practice Provider          Where to get your medicines      These medications were sent to [facility] [address] [address] AT [facility] [address] [address], [facility] [address]    Hours:  24-hours Phone:  [phone]     spironolactone 25 MG tablet          Primary Care Provider Office Phone # Fax #    Karen Weiler, [phone]       [facility] [address] [address]        Thank you!     Thank you for choosing [facility] [place]  for your care. Our goal is always to provide you with excellent care. Hearing back from our patients is one way we can continue to improve our services. Please take a few minutes to complete the written survey that you may receive in the mail after your visit with " us. Thank you!             Your Updated Medication List - Protect others around you: Learn how to safely use, store and throw away your medicines at www.disposemymeds.org.          This list is accurate as of: 4/7/17  8:44 AM.  Always use your most recent med list.                   Brand Name Dispense Instructions for use    acetaminophen-caffeine 500-65 MG Tabs    EXCEDRIN TENSION HEADACHE     Take 2 tablets by mouth every 6 hours as needed for mild pain       lisinopril 10 MG tablet    PRINIVIL/ZESTRIL    60 tablet    Take 1 tablet (10 mg) by mouth 2 times daily       Nebivolol HCl 20 MG Tabs    BYSTOLIC    90 tablet    Take 20 mg by mouth daily       order for DME      DREAMSTATION 9-12 CM/H20 FF MIRAGE QUATTRO       spironolactone 25 MG tablet    ALDACTONE    30 tablet    Take 1 tablet (25 mg) by mouth daily

## 2017-04-18 ENCOUNTER — DOCUMENTATION ONLY (OUTPATIENT)
Dept: SLEEP MEDICINE | Facility: CLINIC | Age: 52
End: 2017-04-18

## 2017-04-18 NOTE — PROGRESS NOTES
6 Month [facility] visit    Data only recheck unable to leave voicemail as mailbox is currently full.     Objective measures: 14 day rolling measures     Device settings:    11.0 cm H20     Objective measures: 14 day rolling measures      Compliance   (Goal >70%)  --% compliance greater than four hours rolling average 14 days: 78.57 %      Leak   (Goal < 10%)  --Average % of night in large leak Rolling Average 14 days (CHU): 0%      AHI  (Goal < 5)  --AHI Rolling Average 14 Day: 2.21      Usage  (Goal >240)  --Time mask on face 14 day average: 347 min    Assessment:Pt meeting objective benchmarks.   Action plan: pt to follow up per provider request (1-2 yrs)

## 2017-04-28 ENCOUNTER — TELEPHONE (OUTPATIENT)
Dept: CARDIOLOGY | Facility: CLINIC | Age: 52
End: 2017-04-28

## 2017-04-28 DIAGNOSIS — R07.2 PRECORDIAL PAIN: Primary | ICD-10-CM

## 2017-04-28 DIAGNOSIS — I10 BENIGN ESSENTIAL HYPERTENSION: ICD-10-CM

## 2017-04-28 NOTE — TELEPHONE ENCOUNTER
Pt called and still having the same pain when she had gone to the ER, but now states that it is more continuous. States that she repositions herself it is a little better. Pt states that BP's have been better with yesterday 136/86, pt did not take one today. Pt states the pain is deep above left breast and under L arm pit and also occasionally in her back. Pt is just wondering if there is something that she should do.  Pt has not discussed this with her PMD. Blaise

## 2017-04-28 NOTE — TELEPHONE ENCOUNTER
Pt was seen in hospital 3/30 with c/o atypical CP and ruled out with serial troponins.    Last stress test was stress echo 8/2015 ([facility], normal)    Given that she can reposition herself and sxs improve, it would be very atypical for ischemia BUT ER MD suggested a stress test once her BP was under good control. If appears that it's good on the current medications so we can get a stress test to evaluate this chest discomfort (though atypical).     If she is able, would like to get an exercise nuclear stress test (they can switch to chemical stress if unable to exercise). Would like to keep her ON Bystolic for the test (though may blunt HR response) to see if ischemia on current meds and to keep BP down during test. Get BMP at that time as well.     I'll see her back to review stress test, blood work and list of BPs.

## 2017-04-28 NOTE — TELEPHONE ENCOUNTER
Made pt aware of Ebony recommendations and agrees to have Stress test and lab work. Explained that scheduling will not call until Monday to schedule. Pt stated understanding. Blaise

## 2017-05-10 ENCOUNTER — HOSPITAL ENCOUNTER (OUTPATIENT)
Dept: NUCLEAR MEDICINE | Facility: CLINIC | Age: 52
Setting detail: NUCLEAR MEDICINE
Discharge: HOME | End: 2017-05-11
Attending: PHYSICIAN ASSISTANT | Admitting: PHYSICIAN ASSISTANT
Payer: COMMERCIAL

## 2017-05-10 DIAGNOSIS — R07.2 PRECORDIAL PAIN: ICD-10-CM

## 2017-05-10 RX ADMIN — Medication 10.8 MILLICURIE: at 10:36

## 2017-05-11 ENCOUNTER — HOSPITAL ENCOUNTER (OUTPATIENT)
Dept: NUCLEAR MEDICINE | Facility: CLINIC | Age: 52
Setting detail: NUCLEAR MEDICINE
End: 2017-05-11
Attending: PHYSICIAN ASSISTANT
Payer: COMMERCIAL

## 2017-05-11 ENCOUNTER — RADIANT APPOINTMENT (OUTPATIENT)
Dept: MAMMOGRAPHY | Facility: CLINIC | Age: 52
End: 2017-05-11
Payer: COMMERCIAL

## 2017-05-11 ENCOUNTER — HOSPITAL ENCOUNTER (OUTPATIENT)
Dept: CARDIOLOGY | Facility: CLINIC | Age: 52
Discharge: HOME | End: 2017-05-11
Attending: PHYSICIAN ASSISTANT | Admitting: PHYSICIAN ASSISTANT
Payer: COMMERCIAL

## 2017-05-11 DIAGNOSIS — Z12.31 VISIT FOR SCREENING MAMMOGRAM: ICD-10-CM

## 2017-05-11 PROCEDURE — 93017 CV STRESS TEST TRACING ONLY: CPT

## 2017-05-11 PROCEDURE — 78452 HT MUSCLE IMAGE SPECT MULT: CPT | Mod: 26 | Performed by: INTERNAL MEDICINE

## 2017-05-11 PROCEDURE — 93018 CV STRESS TEST I&R ONLY: CPT | Performed by: INTERNAL MEDICINE

## 2017-05-11 PROCEDURE — G0202 SCR MAMMO BI INCL CAD: HCPCS | Mod: TC

## 2017-05-11 PROCEDURE — 93016 CV STRESS TEST SUPVJ ONLY: CPT | Performed by: INTERNAL MEDICINE

## 2017-05-11 RX ADMIN — Medication 33 MILLICURIE: at 11:37

## 2017-05-11 NOTE — LETTER
[facility]     [address] [place] [address]                                           [phone]   May 15, 2017    Fouzia Arenas  [address] [place] [address]      Dear Fouzia,    Here is a summary of your recent test results:    Your recent mammogram was normal. Annual mammograms are recommended, so you will be due again in May 2018.   You may receive a separate result letter in the mail from the imaging center.     Your test results are enclosed.      Thank you for choosing [facility].  We appreciate the opportunity to serve you and look forward to supporting your healthcare needs in the future.    If you have any questions or concerns, please call me or my staff at [phone].    Best regards,  Jaqui Edwards PA-C  Physician extender for Dr. Karen Weiler       Results for orders placed or performed in visit on 05/11/17   MA Screening Digital Bilateral    Narrative    SCREENING MAMMOGRAM, BILATERAL, DIGITAL w/CAD - 5/11/2017 9:25 AM.    BREAST SYMPTOMS: No current breast complaints.     COMPARISON:  12/09/2010, 05/05/2009, 11/29/2007.    BREAST DENSITY: Scattered fibroglandular densities.    COMMENTS: No findings of suspicion for malignancy.       Impression    IMPRESSION: BI-RADS CATEGORY: 1 - Negative.    RECOMMENDED FOLLOW-UP: Annual Mammography.  Recommend routine annual screening mammography.    Exam results letter mailed to patient.                INDU MADRID MD

## 2017-05-12 NOTE — TELEPHONE ENCOUNTER
Pls let pt know that at level she was able to exercise to (HR stayed low b/c we kept her on Bystolic) there was no evidence that the chest discomfort she's had is due to heart blockages.    Keep track of BP - see me 5.23 with lab    Thx!

## 2017-05-15 NOTE — TELEPHONE ENCOUNTER
Pt made aware of stress test results and was happy to hear them. Pt will see Jaqui on 5/23 with labs at 1340 and Jaqui at 1430. Blaise

## 2017-05-23 ENCOUNTER — OFFICE VISIT (OUTPATIENT)
Dept: CARDIOLOGY | Facility: CLINIC | Age: 52
End: 2017-05-23
Attending: PHYSICIAN ASSISTANT
Payer: COMMERCIAL

## 2017-05-23 VITALS
WEIGHT: 293 LBS | HEART RATE: 56 BPM | DIASTOLIC BLOOD PRESSURE: 86 MMHG | SYSTOLIC BLOOD PRESSURE: 132 MMHG | BODY MASS INDEX: 39.68 KG/M2 | HEIGHT: 72 IN

## 2017-05-23 DIAGNOSIS — I87.2 VENOUS (PERIPHERAL) INSUFFICIENCY: ICD-10-CM

## 2017-05-23 DIAGNOSIS — I10 BENIGN ESSENTIAL HYPERTENSION: ICD-10-CM

## 2017-05-23 DIAGNOSIS — R07.2 PRECORDIAL PAIN: ICD-10-CM

## 2017-05-23 LAB
ANION GAP SERPL CALCULATED.3IONS-SCNC: 11.1 MMOL/L (ref 6–17)
BUN SERPL-MCNC: 9 MG/DL (ref 7–30)
CALCIUM SERPL-MCNC: 8.4 MG/DL (ref 8.5–10.5)
CHLORIDE SERPL-SCNC: 105 MMOL/L (ref 98–107)
CO2 SERPL-SCNC: 27 MMOL/L (ref 23–29)
CREAT SERPL-MCNC: 0.82 MG/DL (ref 0.7–1.3)
GFR SERPL CREATININE-BSD FRML MDRD: 74 ML/MIN/1.7M2
GLUCOSE SERPL-MCNC: 93 MG/DL (ref 70–105)
POTASSIUM SERPL-SCNC: 4.1 MMOL/L (ref 3.5–5.1)
SODIUM SERPL-SCNC: 139 MMOL/L (ref 136–145)

## 2017-05-23 PROCEDURE — 99214 OFFICE O/P EST MOD 30 MIN: CPT | Performed by: PHYSICIAN ASSISTANT

## 2017-05-23 PROCEDURE — 80048 BASIC METABOLIC PNL TOTAL CA: CPT | Performed by: PHYSICIAN ASSISTANT

## 2017-05-23 PROCEDURE — 36415 COLL VENOUS BLD VENIPUNCTURE: CPT | Performed by: PHYSICIAN ASSISTANT

## 2017-05-23 RX ORDER — LISINOPRIL 10 MG/1
20 TABLET ORAL 2 TIMES DAILY
Start: 2017-05-23 | End: 2017-06-06 | Stop reason: ALTCHOICE

## 2017-05-23 NOTE — LETTER
5/23/2017    Karen Weiler, MD  [facility]   [address]  [facility] [address]    RE: Fouzia LAWRENCE Deepa       Dear Colleague,    I had the pleasure of seeing Fouzia today when she came for followup of her hypertension and recent testing.  She is a very pleasant 51 year old.  Her history is well documented in my note from 03/30/2017.  We have since started her on and up titrated spironolactone for resistant hypertension and ordered a treadmill stress test (on Bystolic) to evaluate some atypical chest discomfort that she had been seen in the emergency department for.      She comes back today telling me that her blood pressures are looking better.  They dropped into the low 140s and high 130s, which is certainly an improvement.  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  She is hopeful to continue to lose weight and get her blood pressure down even further.      The chest discomfort that she had described is still present.  She gets it at random times throughout the day.  It is not exertional.  It is noted on the left side of the chest and comes and goes.  It is not related to position or palpation, and she states it is quite random.      Stress test on 05/11/2017 was negative for ischemia with a normal ejection fraction.      Blood work today on spironolactone 25 mg daily and lisinopril 10 mg twice daily looked great, showing a sodium of 139, potassium 4.1, BUN 9, creatinine 0.82.      Her only concern is that she is experiencing more significant redness in the right lower extremity.  On exam, she has significant erythema of the right medial malleolus.  This is certainly a significant change compared to her last visit, where she demonstrated just venous stasis changes.  She usually wears compression stockings, but is just not wearing them today.     Outpatient Encounter Prescriptions as of 5/23/2017   Medication Sig Dispense  Refill     lisinopril (PRINIVIL/ZESTRIL) 10 MG tablet Take 2 tablets (20 mg) by mouth 2 times daily       spironolactone (ALDACTONE) 25 MG tablet Take 1 tablet (25 mg) by mouth daily 30 tablet 5     Nebivolol HCl (BYSTOLIC) 20 MG TABS Take 20 mg by mouth daily 90 tablet 3     order for DME DREAMSTATION  9-12 CM/H20  FF MIRAGE QUATTRO       acetaminophen-caffeine (EXCEDRIN TENSION HEADACHE) 500-65 MG TABS Take 2 tablets by mouth every 6 hours as needed for mild pain       [DISCONTINUED] lisinopril (PRINIVIL/ZESTRIL) 10 MG tablet Take 1 tablet (10 mg) by mouth 2 times daily 60 tablet 11     No facility-administered encounter medications on file as of 5/23/2017.       ASSESSMENT AND PLAN:     1.  Hypertension.  Blood pressure is coming down, but it is still not great.  We will increase her lisinopril to 20 mg twice daily.  She will call me in 2 weeks with an update.  We can certainly add diuretic therapy (chlorthalidone/HCTZ if needed) with close followup of her blood work.     2.  Concern for venous ulceration.  I was able to contact Sabina Edwards from our Vascular Department, who recommended evaluation for venous insufficiency.  I have ordered a venous competency study per her recommendation and encouraged her to wear her compression stockings.  She will then meet with our vascular providers to review this.     3.  Chest discomfort.  She has atypical chest discomfort located on the left side.  Her stress test just done in May was negative for ischemia with a normal ejection fraction.  She previously also had had a normal stress echocardiogram.  It does not appear that this is cardiac in nature.      It has been a pleasure to see her in clinic.     Sincerely,    Peggy Edmond PA-C     [facility]

## 2017-05-23 NOTE — MR AVS SNAPSHOT
After Visit Summary   5/23/2017    Fouzia Arenas    MRN: [id number]           Patient Information     Date Of Birth          1965        Visit Information        Provider Department      5/23/2017 2:30 PM Peggy Edmond PA-C [facility]        Today's Diagnoses     Venous (peripheral) insufficiency    -  1    Benign essential hypertension        Precordial pain          Care Instructions    1. Due to concern for venous insufficiency, will get venous ultrasound and see one of the providers in the Vein Clinic    2. Wear compression stockings    3. Blood work today looked great!!    4. INCREASE lisinopril to 20 mg twice a day for better BP control. Keep up awesome lifestyle changes!!    5. Call me 2 weeks with monse (Addie/Lexis: [phone]) or Carmen [phone]        Follow-ups after your visit        Additional Services     Follow-Up with Vascular Cardiologist                 Future tests that were ordered for you today     Open Future Orders        Priority Expected Expires Ordered    Follow-Up with Vascular Cardiologist Routine 5/24/2017 5/23/2018 5/23/2017    US Venous Competency Bilateral Routine 6/22/2017 5/23/2018 5/23/2017            Who to contact     If you have questions or need follow up information about today's clinic visit or your schedule please contact [facility] directly at [phone].  Normal or non-critical lab and imaging results will be communicated to you by MyChart, letter or phone within 4 business days after the clinic has received the results. If you do not hear from us within 7 days, please contact the clinic through MyChart or phone. If you have a critical or abnormal lab result, we will notify you by phone as soon as possible.  Submit refill requests through [employer] or call your pharmacy and they will forward the refill request to us. Please allow 3 business days  "for your refill to be completed.          Additional Information About Your Visit        Pixchart Information     [employer] lets you send messages to your doctor, view your test results, renew your prescriptions, schedule appointments and more. To sign up, go to [email or web address] . Click on \"Log in\" on the left side of the screen, which will take you to the Welcome page. Then click on \"Sign up Now\" on the right side of the page.     You will be asked to enter the access code listed below, as well as some personal information. Please follow the directions to create your username and password.     Your access code is: [email or web address]  Expires: 2017 12:01 PM     Your access code will  in 90 days. If you need help or a new code, please call your [place] clinic or [phone].        Care EveryWhere ID     This is your Care EveryWhere ID. This could be used by other organizations to access your [place] medical records  [id number]        Your Vitals Were     Pulse Height BMI (Body Mass Index)             56 1.829 m (6') 43.52 kg/m2          Blood Pressure from Last 3 Encounters:   17 132/86   17 130/82   17 154/61    Weight from Last 3 Encounters:   17 (!) 145.6 kg (320 lb 14.4 oz)   17 (!) 143.3 kg (316 lb)   17 136.1 kg (300 lb)              We Performed the Following     Follow-Up with Cardiac Advanced Practice Provider          Today's Medication Changes          These changes are accurate as of: 17  3:09 PM.  If you have any questions, ask your nurse or doctor.               These medicines have changed or have updated prescriptions.        Dose/Directions    lisinopril 10 MG tablet   Commonly known as:  PRINIVIL/ZESTRIL   This may have changed:  how much to take   Used for:  Benign essential hypertension   Changed by:  Peggy Edmond PA-C        Dose:  20 mg   Take 2 tablets (20 mg) by mouth 2 times daily   Refills:  0            Where to get your " medicines      Some of these will need a paper prescription and others can be bought over the counter.  Ask your nurse if you have questions.     You don't need a prescription for these medications     lisinopril 10 MG tablet                Primary Care Provider Office Phone # Fax #    Karen Weiler, [phone]       [facility] [address] [address]        Thank you!     Thank you for choosing [facility] [place]  for your care. Our goal is always to provide you with excellent care. Hearing back from our patients is one way we can continue to improve our services. Please take a few minutes to complete the written survey that you may receive in the mail after your visit with us. Thank you!             Your Updated Medication List - Protect others around you: Learn how to safely use, store and throw away your medicines at www.disposemymeds.org.          This list is accurate as of: 5/23/17  3:09 PM.  Always use your most recent med list.                   Brand Name Dispense Instructions for use    acetaminophen-caffeine 500-65 MG Tabs    EXCEDRIN TENSION HEADACHE     Take 2 tablets by mouth every 6 hours as needed for mild pain       lisinopril 10 MG tablet    PRINIVIL/ZESTRIL     Take 2 tablets (20 mg) by mouth 2 times daily       Nebivolol HCl 20 MG Tabs    BYSTOLIC    90 tablet    Take 20 mg by mouth daily       order for DME      DREAMSTATION 9-12 CM/H20 FF MIRAGE QUATTRO       spironolactone 25 MG tablet    ALDACTONE    30 tablet    Take 1 tablet (25 mg) by mouth daily

## 2017-05-23 NOTE — PROGRESS NOTES
HPI and Plan:   See dictation #[id number]    Orders Placed This Encounter   Procedures     US Venous Competency Bilateral     Follow-Up with Vascular Cardiologist       Orders Placed This Encounter   Medications     lisinopril (PRINIVIL/ZESTRIL) 10 MG tablet     Sig: Take 2 tablets (20 mg) by mouth 2 times daily       Medications Discontinued During This Encounter   Medication Reason     lisinopril (PRINIVIL/ZESTRIL) 10 MG tablet Reorder       Encounter Diagnoses   Name Primary?     Benign essential hypertension      Precordial pain      Venous (peripheral) insufficiency        CURRENT MEDICATIONS:  Current Outpatient Prescriptions   Medication Sig Dispense Refill     lisinopril (PRINIVIL/ZESTRIL) 10 MG tablet Take 2 tablets (20 mg) by mouth 2 times daily       spironolactone (ALDACTONE) 25 MG tablet Take 1 tablet (25 mg) by mouth daily 30 tablet 5     Nebivolol HCl (BYSTOLIC) 20 MG TABS Take 20 mg by mouth daily 90 tablet 3     order for DME DREAMSTATION  9-12 CM/H20  FF MIRAGE QUATTRO       acetaminophen-caffeine (EXCEDRIN TENSION HEADACHE) 500-65 MG TABS Take 2 tablets by mouth every 6 hours as needed for mild pain       [DISCONTINUED] lisinopril (PRINIVIL/ZESTRIL) 10 MG tablet Take 1 tablet (10 mg) by mouth 2 times daily 60 tablet 11       ALLERGIES     Allergies   Allergen Reactions     Amoxicillin Nausea and Vomiting     Percocet [Oxycodone-Acetaminophen] Nausea and Vomiting       PAST MEDICAL HISTORY:  Past Medical History:   Diagnosis Date     Classic migraine      Hypertension      Palpitations      SVT (supraventricular tachycardia) (H) 9/2015       PAST SURGICAL HISTORY:  Past Surgical History:   Procedure Laterality Date     CHOLECYSTECTOMY, LAPOROSCOPIC      Cholecystectomy, Laparoscopic     COLONOSCOPY N/A 11/25/2014    Procedure: COLONOSCOPY;  Surgeon: Wenceslao Galo MD;  Location:  GI     D & C      X2-3 for abnormal bleeding     ESOPHAGOSCOPY, GASTROSCOPY, DUODENOSCOPY (EGD), COMBINED N/A  11/25/2014    Procedure: COMBINED ESOPHAGOSCOPY, GASTROSCOPY, DUODENOSCOPY (EGD), BIOPSY SINGLE OR MULTIPLE;  Surgeon: Wenceslao Galo MD;  Location:  GI     LAPAROSCOPY      For endometriosis       FAMILY HISTORY:  Family History   Problem Relation Age of Onset     Hypertension Mother      Lipids Mother      Hypertension Father      Prostate Cancer Father      Lipids Father      Breast Cancer Maternal Aunt      Hypertension Maternal Grandmother      C.A.D. Maternal Grandmother      Gallbladder Disease Maternal Grandmother      CANCER Maternal Grandfather      lung     CEREBROVASCULAR DISEASE Paternal Grandmother      C.A.D. Paternal Grandfather      Cancer - colorectal Other      cousin maternal        SOCIAL HISTORY:  Social History     Social History     Marital status: Single     Spouse name: N/A     Number of children: N/A     Years of education: N/A     Social History Main Topics     Smoking status: Never Smoker     Smokeless tobacco: Never Used     Alcohol use No     Drug use: No     Sexual activity: No     Other Topics Concern     Caffeine Concern Yes     1-2 cans qd     Special Diet Yes     started mediterranian      Exercise Yes     20 minutes walking 3-4 days weekly      Seat Belt Yes     Parent/Sibling W/ Cabg, Mi Or Angioplasty Before 65f 55m? No     Social History Narrative       Review of Systems:  Skin:  Negative       Eyes:  Positive for glasses    ENT:  Negative      Respiratory:  Positive for dyspnea on exertion     Cardiovascular:  Negative for;syncope or near-syncope;cyanosis;palpitations;chest pain      Gastroenterology: Positive for constipation;heartburn    Genitourinary:  Negative      Musculoskeletal:  Positive for back pain    Neurologic:  Positive for migraine headaches    Psychiatric:  Negative      Heme/Lymph/Imm:  Negative night sweats    Endocrine:  Negative        Physical Exam:  Vitals: /86  Pulse 56  Ht 1.829 m (6')  Wt (!) 145.6 kg (320 lb 14.4 oz)  BMI 43.52  kg/m2    Constitutional:  cooperative, alert and oriented, well developed, well nourished, in no acute distress obese      Skin:  warm and dry to the touch, no apparent skin lesions or masses noted        Head:  normocephalic, no masses or lesions        Eyes:  pupils equal and round;conjunctivae and lids unremarkable;sclera white;no xanthalasma        ENT:  no pallor or cyanosis, dentition good        Neck:  JVP normal        Chest:  normal respiratory excursion;normal breath sounds, clear to auscultation, normal A-P diameter, normal symmetry, normal respiratory excursion, no use of accessory muscles     no reproducible pain    Cardiac: regular rhythm;no murmurs, gallops or rubs detected                  Abdomen:  abdomen soft obese      Vascular: pulses full and equal                                        Extremities and Back:  no deformities, clubbing, cyanosis, erythema observed stasis pigmentation;erythema bilateral LE edema;trace     R medial malleolus erythema. No drainage    Neurological:  affect appropriate, oriented to time, person and place

## 2017-05-23 NOTE — PATIENT INSTRUCTIONS
1. Due to concern for venous insufficiency, will get venous ultrasound and see one of the providers in the Vein Clinic    2. Wear compression stockings    3. Blood work today looked great!!    4. INCREASE lisinopril to 20 mg twice a day for better BP control. Keep up awesome lifestyle changes!!    5. Call me 2 weeks with update (Segundo: [phone]) or Carmen [phone]

## 2017-05-24 NOTE — PROGRESS NOTES
HISTORY OF PRESENT ILLNESS:  I had the pleasure of seeing Fouzia today when she came for followup of her hypertension and recent testing.  She is a very pleasant 51 year old.  Her history is well documented in my note from 03/30/2017.  We have since started her on and up titrated spironolactone for resistant hypertension and ordered a treadmill stress test (on Bystolic) to evaluate some atypical chest discomfort that she had been seen in the emergency department for.      She comes back today telling me that her blood pressures are looking better.  They dropped into the low 140s and high 130s, which is certainly an improvement.  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  She is hopeful to continue to lose weight and get her blood pressure down even further.      The chest discomfort that she had described is still present.  She gets it at random times throughout the day.  It is not exertional.  It is noted on the left side of the chest and comes and goes.  It is not related to position or palpation, and she states it is quite random.      Stress test on 05/11/2017 was negative for ischemia with a normal ejection fraction.      Blood work today on spironolactone 25 mg daily and lisinopril 10 mg twice daily looked great, showing a sodium of 139, potassium 4.1, BUN 9, creatinine 0.82.      Her only concern is that she is experiencing more significant redness in the right lower extremity.  On exam, she has significant erythema of the right medial malleolus.  This is certainly a significant change compared to her last visit, where she demonstrated just venous stasis changes.  She usually wears compression stockings, but is just not wearing them today.        ASSESSMENT AND PLAN:     1.  Hypertension.  Blood pressure is coming down, but it is still not great.  We will increase her lisinopril to 20 mg twice daily.  She will call me in 2  weeks with an update.  We can certainly add diuretic therapy (chlorthalidone/HCTZ if needed) with close followup of her blood work.     2.  Concern for venous ulceration.  I was able to contact Sabina Edwards from our Vascular Department, who recommended evaluation for venous insufficiency.  I have ordered a venous competency study per her recommendation and encouraged her to wear her compression stockings.  She will then meet with our vascular providers to review this.     3.  Chest discomfort.  She has atypical chest discomfort located on the left side.  Her stress test just done in May was negative for ischemia with a normal ejection fraction.  She previously also had had a normal stress echocardiogram.  It does not appear that this is cardiac in nature.      It has been a pleasure to see her in clinic.         KAREN GIBSON PA-C             D: 2017 15:46   T: 2017 15:24   MT: [email or web address]      Name:     EYAD TORRES   MRN:      [id number]        Account:      [id number]   :      1965           Service Date: 2017      Document: [id number]

## 2017-05-30 ENCOUNTER — RADIANT APPOINTMENT (OUTPATIENT)
Dept: VASCULAR ULTRASOUND | Facility: CLINIC | Age: 52
End: 2017-05-30
Attending: PHYSICIAN ASSISTANT
Payer: COMMERCIAL

## 2017-05-30 DIAGNOSIS — I87.2 VENOUS (PERIPHERAL) INSUFFICIENCY: ICD-10-CM

## 2017-05-30 PROCEDURE — 93970 EXTREMITY STUDY: CPT | Performed by: INTERNAL MEDICINE

## 2017-06-05 ENCOUNTER — OFFICE VISIT (OUTPATIENT)
Dept: CARDIOLOGY | Facility: CLINIC | Age: 52
End: 2017-06-05
Attending: PHYSICIAN ASSISTANT
Payer: COMMERCIAL

## 2017-06-05 VITALS — DIASTOLIC BLOOD PRESSURE: 80 MMHG | HEART RATE: 62 BPM | SYSTOLIC BLOOD PRESSURE: 145 MMHG

## 2017-06-05 DIAGNOSIS — I87.2 VENOUS (PERIPHERAL) INSUFFICIENCY: ICD-10-CM

## 2017-06-05 PROCEDURE — 99214 OFFICE O/P EST MOD 30 MIN: CPT | Performed by: NURSE PRACTITIONER

## 2017-06-05 NOTE — LETTER
6/5/2017    Karen Weiler, MD  [address] [place] [address]    RE: Fouzia Arenas       Dear Colleague,    I had the pleasure of seeing Fouzia Arenas in the [facility].    I had the pleasure of meeting Fouzia Arenas and her mom in Vascular Clinic today to review the results of her vascular ultrasound.  She is a pleasant 51-year-old patient who follows with Jaqui and Dr. Hensley in our group for her resistant hypertension and palpitations.  Her palpitations have been proven by event monitor to be related to atrial tachycardia and are usually short-lived.  For this reason, she is on for this reason she is on Bystolic.  She also has a history of migraines.      She saw Jaqui in May and was doing well, but was having more redness on her right lower inner leg and on the right medial malleolus.  Jaqui recommended venous competency studies which do show significant reflexes throughout her right leg.    throughout the course of the greater saphenous vein from of saphenofemoral junction to the level of the ankle, it was 3.1 to 5.3 seconds.  The left greater saphenous vein had 2 areas of reflex at the saphenofemoral junction of 1.8 seconds and at the level of the knee of 3.5 seconds.  She also has significant reflux noted in the right small saphenous vein at calf level.      Today, we reviewed the results and measured her for compression stockings and gave her thigh-high Jobst stockings which are 20-30 mmHg.  She tells me today that she is concerned about the right area of erythema on her malleolus.  She has some pain in it when the sheet covers it while she was lying in bed.  She also complains of leg fatigue.  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      PHYSICAL EXAMINATION:   GENERAL:  A 51-year-old woman, obese.   VITAL SIGNS:  Blood pressure 145/80 by my check, heart rate 62.  Weight, refused.   LUNGS:  Bilaterally clear to auscultation.   CARDIOVASCULAR:   Rate regular, normal S1, S2.   EXTREMITIES:  Bilateral trace ankle edema and telangiectasia.  She does have an area of right erythema at her malleolus.        She does have significant reflex on the right.  I suspect this is a venous ulcer she is developing.  I do recommend compression therapy and I have given her the stockings.  She should follow up with Dr. Blake or Dr. Au in a couple of months to discuss the possibility of radiofrequency ablation.  I did review the procedure with her briefly and gave her literature to further review.  I discussed that this would be for symptomatic improvement.      Thank you for allowing me to see Fouzia today.  She says she is supposed to call Jaqui on Friday for her blood pressure.         KOKI HAM, CNP       Addendum: After our visit I noticed she has not had stress ABIs done, I will have those done before she see Dr. Au.    Again, thank you for allowing me to participate in the care of your patient.      Sincerely,    KOKI Shelley CNP     [facility]

## 2017-06-05 NOTE — MR AVS SNAPSHOT
After Visit Summary   6/5/2017    Fouzia Arenas    MRN: [id number]           Patient Information     Date Of Birth          1965        Visit Information        Provider Department      6/5/2017 3:10 PM Leti Watkins APRN CNP [facility]        Today's Diagnoses     Venous (peripheral) insufficiency           Follow-ups after your visit        Additional Services     Follow-Up with Vascular Cardiologist       Dr. Blake or Dr Au to assess results from compression/conservative therapy                  Your next 10 appointments already scheduled     Aug 10, 2017  9:45 AM CDT   Return Visit with Adair Au MD   [facility] ([facility])    [address] [place] [id number] [phone]              Future tests that were ordered for you today     Open Future Orders        Priority Expected Expires Ordered    Follow-Up with Vascular Cardiologist Routine 8/11/2017 6/5/2018 6/5/2017            Who to contact     If you have questions or need follow up information about today's clinic visit or your schedule please contact [facility] directly at [phone].  Normal or non-critical lab and imaging results will be communicated to you by K[employer]elah[employer], letter or phone within 4 business days after the clinic has received the results. If you do not hear from us within 7 days, please contact the clinic through K[employer]el[employer] or phone. If you have a critical or abnormal lab result, we will notify you by phone as soon as possible.  Submit refill requests through [employer] or call your pharmacy and they will forward the refill request to us. Please allow 3 business days for your refill to be completed.          Additional Information About Your Visit        MyChart Information     [employer] lets you send messages to your doctor, view your test results,  "renew your prescriptions, schedule appointments and more. To sign up, go to [email or web address] . Click on \"Log in\" on the left side of the screen, which will take you to the Welcome page. Then click on \"Sign up Now\" on the right side of the page.     You will be asked to enter the access code listed below, as well as some personal information. Please follow the directions to create your username and password.     Your access code is: [email or web address]  Expires: 2017 12:01 PM     Your access code will  in 90 days. If you need help or a new code, please call your [place] clinic or [phone].        Care EveryWhere ID     This is your Care EveryWhere ID. This could be used by other organizations to access your [place] medical records  [id number]        Your Vitals Were     Pulse                   62            Blood Pressure from Last 3 Encounters:   17 145/80   17 132/86   17 130/82    Weight from Last 3 Encounters:   17 (!) 145.6 kg (320 lb 14.4 oz)   17 (!) 143.3 kg (316 lb)   17 136.1 kg (300 lb)              We Performed the Following     Follow-Up with Vascular Cardiologist        Primary Care Provider Office Phone # Fax #    Karen Weiler, [phone]       [facility] [address] [facility] [address]        Thank you!     Thank you for choosing [facility] [place]  for your care. Our goal is always to provide you with excellent care. Hearing back from our patients is one way we can continue to improve our services. Please take a few minutes to complete the written survey that you may receive in the mail after your visit with us. Thank you!             Your Updated Medication List - Protect others around you: Learn how to safely use, store and throw away your medicines at www.disposemymeds.org.          This list is accurate as of: 17  4:04 PM.  Always use your most recent med list.             "       Brand Name Dispense Instructions for use    acetaminophen-caffeine 500-65 MG Tabs    EXCEDRIN TENSION HEADACHE     Take 2 tablets by mouth every 6 hours as needed for mild pain       lisinopril 10 MG tablet    PRINIVIL/ZESTRIL     Take 2 tablets (20 mg) by mouth 2 times daily       Nebivolol HCl 20 MG Tabs    BYSTOLIC    90 tablet    Take 20 mg by mouth daily       order for DME      DREAMSTATION 9-12 CM/H20 FF MIRAGE QUATTRO       spironolactone 25 MG tablet    ALDACTONE    30 tablet    Take 1 tablet (25 mg) by mouth daily

## 2017-06-05 NOTE — PROGRESS NOTES
HPI and Plan:   See dictation    No orders of the defined types were placed in this encounter.      No orders of the defined types were placed in this encounter.      There are no discontinued medications.      Encounter Diagnosis   Name Primary?     Venous (peripheral) insufficiency        CURRENT MEDICATIONS:  Current Outpatient Prescriptions   Medication Sig Dispense Refill     lisinopril (PRINIVIL/ZESTRIL) 10 MG tablet Take 2 tablets (20 mg) by mouth 2 times daily       spironolactone (ALDACTONE) 25 MG tablet Take 1 tablet (25 mg) by mouth daily 30 tablet 5     Nebivolol HCl (BYSTOLIC) 20 MG TABS Take 20 mg by mouth daily 90 tablet 3     acetaminophen-caffeine (EXCEDRIN TENSION HEADACHE) 500-65 MG TABS Take 2 tablets by mouth every 6 hours as needed for mild pain       order for DME DREAMSTATION  9-12 CM/H20  FF MIRAGE QUATTRO         ALLERGIES     Allergies   Allergen Reactions     Amoxicillin Nausea and Vomiting     Percocet [Oxycodone-Acetaminophen] Nausea and Vomiting       PAST MEDICAL HISTORY:  Past Medical History:   Diagnosis Date     Classic migraine      Hypertension      Palpitations      SVT (supraventricular tachycardia) (H) 9/2015       PAST SURGICAL HISTORY:  Past Surgical History:   Procedure Laterality Date     CHOLECYSTECTOMY, LAPOROSCOPIC      Cholecystectomy, Laparoscopic     COLONOSCOPY N/A 11/25/2014    Procedure: COLONOSCOPY;  Surgeon: Wenceslao Galo MD;  Location:  GI     D & C      X2-3 for abnormal bleeding     ESOPHAGOSCOPY, GASTROSCOPY, DUODENOSCOPY (EGD), COMBINED N/A 11/25/2014    Procedure: COMBINED ESOPHAGOSCOPY, GASTROSCOPY, DUODENOSCOPY (EGD), BIOPSY SINGLE OR MULTIPLE;  Surgeon: Wenceslao Galo MD;  Location:  GI     LAPAROSCOPY      For endometriosis       FAMILY HISTORY:  Family History   Problem Relation Age of Onset     Hypertension Mother      Lipids Mother      Hypertension Father      Prostate Cancer Father      Lipids Father      Breast Cancer Maternal Aunt       Hypertension Maternal Grandmother      C.A.CRISTA. Maternal Grandmother      Gallbladder Disease Maternal Grandmother      CANCER Maternal Grandfather      lung     CEREBROVASCULAR DISEASE Paternal Grandmother      C.A.CRISTA. Paternal Grandfather      Cancer - colorectal Other      cousin maternal        SOCIAL HISTORY:  Social History     Social History     Marital status: Single     Spouse name: N/A     Number of children: N/A     Years of education: N/A     Social History Main Topics     Smoking status: Never Smoker     Smokeless tobacco: Never Used     Alcohol use No     Drug use: No     Sexual activity: No     Other Topics Concern     Caffeine Concern Yes     1-2 cans qd     Special Diet Yes     started mediterranian      Exercise Yes     20 minutes walking 3-4 days weekly      Seat Belt Yes     Parent/Sibling W/ Cabg, Mi Or Angioplasty Before 65f 55m? No     Social History Narrative       Review of Systems:  Skin:  Positive for bruising Bilateral lower legs   Eyes:  Positive for glasses    ENT:  Negative      Respiratory:  Positive for dyspnea on exertion     Cardiovascular:  Negative for;syncope or near-syncope;cyanosis;chest pain palpitations;Positive for    Gastroenterology: Positive for constipation;heartburn    Genitourinary:  Negative      Musculoskeletal:  Positive for back pain    Neurologic:  Positive for migraine headaches    Psychiatric:  Negative      Heme/Lymph/Imm:  Negative night sweats    Endocrine:  Negative        Physical Exam:  Vitals: BP (!) 152/98  Pulse 62    Constitutional:  cooperative, alert and oriented, well developed, well nourished, in no acute distress obese      Skin:  warm and dry to the touch, no apparent skin lesions or masses noted        Head:  normocephalic, no masses or lesions        Eyes:  pupils equal and round;conjunctivae and lids unremarkable;sclera white;no xanthalasma        ENT:  no pallor or cyanosis, dentition good        Neck:  JVP normal        Chest:  normal  respiratory excursion;normal breath sounds, clear to auscultation, normal A-P diameter, normal symmetry, normal respiratory excursion, no use of accessory muscles     no reproducible pain    Cardiac: regular rhythm;no murmurs, gallops or rubs detected                  Abdomen:  abdomen soft obese      Vascular: pulses full and equal                                        Extremities and Back:  no deformities, clubbing, cyanosis, erythema observed stasis pigmentation;erythema bilateral LE edema;trace;telangiectasia     R medial malleolus erythema, no open wound    Neurological:  affect appropriate, oriented to time, person and place              CC  Karen Weiler, MD  [facility]  [address] [place] [address]

## 2017-06-06 ENCOUNTER — TELEPHONE (OUTPATIENT)
Dept: CARDIOLOGY | Facility: CLINIC | Age: 52
End: 2017-06-06

## 2017-06-06 DIAGNOSIS — I10 HTN (HYPERTENSION): Primary | ICD-10-CM

## 2017-06-06 RX ORDER — LISINOPRIL 20 MG/1
20 TABLET ORAL 2 TIMES DAILY
Qty: 60 TABLET | Refills: 11 | Status: SHIPPED | OUTPATIENT
Start: 2017-06-06 | End: 2018-06-19

## 2017-06-06 NOTE — PROGRESS NOTES
HISTORY OF PRESENT ILLNESS:  I had the pleasure of meeting Fouzia Arenas and her mom in Vascular Clinic today to review the results of her vascular ultrasound.  She is a pleasant 51-year-old patient who follows with Jaqui and Dr. Hensley in our group for her resistant hypertension and palpitations.  Her palpitations have been proven by event monitor to be related to atrial tachycardia and are usually short-lived.  For this reason, she is on for this reason she is on Bystolic.  She also has a history of migraines.      She saw Jaqui in May and was doing well, but was having more redness on her right lower inner leg and on the right medial malleolus.  Jaqui recommended venous competency studies which do show significant reflexes throughout her right leg.    throughout the course of the greater saphenous vein from of saphenofemoral junction to the level of the ankle, it was 3.1 to 5.3 seconds.  The left greater saphenous vein had 2 areas of reflex at the saphenofemoral junction of 1.8 seconds and at the level of the knee of 3.5 seconds.  She also has significant reflux noted in the right small saphenous vein at calf level.      Today, we reviewed the results and measured her for compression stockings and gave her thigh-high Jobst stockings which are 20-30 mmHg.  She tells me today that she is concerned about the right area of erythema on her malleolus.  She has some pain in it when the sheet covers it while she was lying in bed.  She also complains of leg fatigue.  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      PHYSICAL EXAMINATION:   GENERAL:  A 51-year-old woman, obese.   VITAL SIGNS:  Blood pressure 145/80 by my check, heart rate 62.  Weight, refused.   LUNGS:  Bilaterally clear to auscultation.   CARDIOVASCULAR:  Rate regular, normal S1, S2.   EXTREMITIES:  Bilateral trace ankle edema and telangiectasia.  She does have an area of right erythema at her malleolus.        She does have  significant reflex on the right.  I suspect this is a venous ulcer she is developing.  I do recommend compression therapy and I have given her the stockings.  She should follow up with Dr. Blake or Dr. Au in a couple of months to discuss the possibility of radiofrequency ablation.  I did review the procedure with her briefly and gave her literature to further review.  I discussed that this would be for symptomatic improvement.      Thank you for allowing me to see Eyad today.  She says she is supposed to call Jaqui on Friday for her blood pressure.         KOKI HAM, CNP       Addendum: After our visit I noticed she has not had stress ABIs done, I will have those done before she see Dr. Au.     D: 2017 16:02   T: 2017 07:17   MT: MIGUEL A      Name:     EYAD TORRES   MRN:      [id number]        Account:      [id number]   :      1965           Service Date: 2017      Document: [id number]

## 2017-06-07 ENCOUNTER — TELEPHONE (OUTPATIENT)
Dept: CARDIOLOGY | Facility: CLINIC | Age: 52
End: 2017-06-07

## 2017-06-07 DIAGNOSIS — I87.2 VENOUS (PERIPHERAL) INSUFFICIENCY: Primary | ICD-10-CM

## 2017-06-07 NOTE — TELEPHONE ENCOUNTER
Received message from Gracy NP:      Please call pt, I see she hasn't had stress ABIs done before. She should have those done before she sees Dr. Au in f/u as part of a work up before possible ablation.      Thanks, Gracy    Stress ABIs ordered. Called patient but she did not answer. Left a message to call team 4 with the direct number.

## 2017-06-07 NOTE — TELEPHONE ENCOUNTER
Please call pt, I see she hasn't had stress ABIs done before. She should have those done before she sees Dr. Au in f/u as part of a work up before possible ablation.     Thanks, Gracy

## 2017-06-08 NOTE — TELEPHONE ENCOUNTER
Spoke to patient and reviewed Gracy's recommendation below. Pt scheduled for Stress [place] 6/13/17.  Pt also stated that she had tried to wear the thigh high compression stockings and they are too painful and annoying to wear especially when she is sitting all day for work and it bunches behind the knee. Pt stated that at the end of the day yesterday her leg was throbbing. Pt stated she would not continue to wear them. Writer asked patient if she would be willing to wear the knee high ones. Pt stated she would wear those. Writer placed knee high stockings behind  for writer to .     Will route to Gracy to notify her of the compression stockings.     6/5/17 OV with Gracy NP  She saw Jaqui in May and was doing well, but was having more redness on her right lower inner leg and on the right medial malleolus.  Jaqui recommended venous competency studies which do show significant reflexes throughout her right leg.    throughout the course of the greater saphenous vein from of saphenofemoral junction to the level of the ankle, it was 3.1 to 5.3 seconds.  The left greater saphenous vein had 2 areas of reflex at the saphenofemoral junction of 1.8 seconds and at the level of the knee of 3.5 seconds.  She also has significant reflux noted in the right small saphenous vein at calf level.       Today, we reviewed the results and measured her for compression stockings and gave her thigh-high Jobst stockings which are 20-30 mmHg.  She tells me today that she is concerned about the right area of erythema on her malleolus.  She has some pain in it when the sheet covers it while she was lying in bed.  She also complains of leg fatigue.  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      She does have significant reflex on the right.  I suspect this is a venous ulcer she is developing.  I do recommend compression therapy and I have given her the stockings.  She should follow up with Dr. Blake  or Dr. Au in a couple of months to discuss the possibility of radiofrequency ablation.  I did review the procedure with her briefly and gave her literature to further review.  I discussed that this would be for symptomatic improvement.

## 2017-06-20 ENCOUNTER — OFFICE VISIT (OUTPATIENT)
Dept: FAMILY MEDICINE | Facility: CLINIC | Age: 52
End: 2017-06-20
Payer: COMMERCIAL

## 2017-06-20 VITALS
TEMPERATURE: 98.4 F | WEIGHT: 293 LBS | DIASTOLIC BLOOD PRESSURE: 78 MMHG | OXYGEN SATURATION: 97 % | BODY MASS INDEX: 39.68 KG/M2 | HEIGHT: 72 IN | HEART RATE: 59 BPM | SYSTOLIC BLOOD PRESSURE: 134 MMHG

## 2017-06-20 DIAGNOSIS — J06.9 UPPER RESPIRATORY TRACT INFECTION, UNSPECIFIED TYPE: Primary | ICD-10-CM

## 2017-06-20 PROCEDURE — 99213 OFFICE O/P EST LOW 20 MIN: CPT | Performed by: PHYSICIAN ASSISTANT

## 2017-06-20 RX ORDER — CODEINE PHOSPHATE AND GUAIFENESIN 10; 100 MG/5ML; MG/5ML
1-2 SOLUTION ORAL AT BEDTIME
Qty: 120 ML | Refills: 0 | Status: SHIPPED | OUTPATIENT
Start: 2017-06-20 | End: 2017-08-10

## 2017-06-20 NOTE — PROGRESS NOTES
SUBJECTIVE:                                                    Fouzia Arenas is a 51 year old female who presents to clinic today for the following health issues:      Acute Illness   Acute illness concerns: cough, body aches, sore throat  Non-smoker.  No hx of asthma.  Remote hx of pneumonia in the past.  No improvement from 1 week ago.  Hurts in her chest/throat to cough.   No painful breathing or SOB.  Nasal congestion has improved today.   Gums/teeth feel a little sore.   No recent abx use.   Dad suggested she get checked out as he had walking pneumonia.  Does not feel like when she's had pneumonia in the past though.    Onset: started last monday    Fever: no    Chills/Sweats: YES, sweats    Headache (location?): YES    Sinus Pressure:YES    Conjunctivitis:  no    Ear Pain: no    Rhinorrhea: no    Congestion: YES    Sore Throat: YES     Cough: YES - sometimes a little phlegm.    Wheeze: no    Decreased Appetite: no    Nausea: no    Vomiting: no    Diarrhea:  no    Dysuria/Freq.: no    Fatigue/Achiness: YES    Sick/Strep Exposure: no     Therapies Tried and outcome: Tried Flonase, Zyrtec and Mucinex at the pharmacy - hasn't noticed any difference since she started taking them Saturday night.       Problem list and histories reviewed & adjusted, as indicated.  Additional history: as documented    Patient Active Problem List   Diagnosis     Esophageal reflux     CARDIOVASCULAR SCREENING; LDL GOAL LESS THAN 160     Hypertension, goal below 140/90     Palpitations     STORMY (obstructive sleep apnea)     Chest pain     Past Surgical History:   Procedure Laterality Date     CHOLECYSTECTOMY, LAPOROSCOPIC      Cholecystectomy, Laparoscopic     COLONOSCOPY N/A 11/25/2014    Procedure: COLONOSCOPY;  Surgeon: Wenceslao Galo MD;  Location:  GI     D & C      X2-3 for abnormal bleeding     ESOPHAGOSCOPY, GASTROSCOPY, DUODENOSCOPY (EGD), COMBINED N/A 11/25/2014    Procedure: COMBINED ESOPHAGOSCOPY, GASTROSCOPY,  DUODENOSCOPY (EGD), BIOPSY SINGLE OR MULTIPLE;  Surgeon: Wenceslao Galo MD;  Location: [facility]     LAPAROSCOPY      For endometriosis       Social History   Substance Use Topics     Smoking status: Never Smoker     Smokeless tobacco: Never Used     Alcohol use No     Family History   Problem Relation Age of Onset     Hypertension Mother      Lipids Mother      Hypertension Father      Prostate Cancer Father      Lipids Father      Breast Cancer Maternal Aunt      Hypertension Maternal Grandmother      C.A.D. Maternal Grandmother      Gallbladder Disease Maternal Grandmother      CANCER Maternal Grandfather      lung     CEREBROVASCULAR DISEASE Paternal Grandmother      C.A.D. Paternal Grandfather      Cancer - colorectal Other      cousin maternal          Current Outpatient Prescriptions   Medication Sig Dispense Refill     lisinopril (PRINIVIL/ZESTRIL) 20 MG tablet Take 1 tablet (20 mg) by mouth 2 times daily 60 tablet 11     spironolactone (ALDACTONE) 25 MG tablet Take 1 tablet (25 mg) by mouth daily 30 tablet 5     Nebivolol HCl (BYSTOLIC) 20 MG TABS Take 20 mg by mouth daily 90 tablet 3     order for DME DREAMSTATION  9-12 CM/H20  FF MIRAGE QUATTRO       acetaminophen-caffeine (EXCEDRIN TENSION HEADACHE) 500-65 MG TABS Take 2 tablets by mouth every 6 hours as needed for mild pain       Allergies   Allergen Reactions     Amoxicillin Nausea and Vomiting     Percocet [Oxycodone-Acetaminophen] Nausea and Vomiting       Reviewed and updated as needed this visit by clinical staff       Reviewed and updated as needed this visit by Provider         ROS:  Constitutional, HEENT, cardiovascular, pulmonary, gi and gu systems are negative, except as otherwise noted.    OBJECTIVE:                                                    /78  Pulse 59  Temp 98.4  F (36.9  C) (Oral)  Ht 6' (1.829 m)  Wt (!) 315 lb (142.9 kg)  SpO2 97%  Breastfeeding? No  BMI 42.72 kg/m2  Body mass index is 42.72 kg/(m^2).  GENERAL:  healthy, alert and no distress  EYES: Eyes grossly normal to inspection, PERRL and conjunctivae and sclerae normal  HENT: ear canals and TM's normal, nose and mouth without ulcers or lesions. Mild nasal congestion.   NECK: no adenopathy, no asymmetry, masses, or scars and thyroid normal to palpation  RESP: lungs clear to auscultation - no rales, rhonchi or wheezes  CV: regular rate and rhythm, normal S1 S2, no S3 or S4, no murmur, click or rub, no peripheral edema and peripheral pulses strong    Diagnostic Test Results:  none      ASSESSMENT/PLAN:                                                        ICD-10-CM    1. Upper respiratory tract infection, unspecified type J06.9 guaiFENesin-codeine (ROBITUSSIN AC) 100-10 MG/5ML SOLN solution     See Patient Instructions  Patient Instructions   I'm sorry you're not feeling well.  Symptoms and exam findings are most consistent with a viral process.  Treatment is supportive.  Re-check anytime with failure to improve as expected or with new concerns.  May take cough med at bedtime. SE/risks as reviewed.    Electronically Signed By: Ruthie Phipps PA-C

## 2017-06-20 NOTE — MR AVS SNAPSHOT
After Visit Summary   6/20/2017    Fouzia Arenas    MRN: [id number]           Patient Information     Date Of Birth          1965        Visit Information        Provider Department      6/20/2017 11:00 AM Ruthie Phipps PA-C [facility]        Today's Diagnoses     Upper respiratory tract infection, unspecified type    -  1      Care Instructions    I'm sorry you're not feeling well.  Symptoms and exam findings are most consistent with a viral process.  Treatment is supportive.  Re-check anytime with failure to improve as expected or with new concerns.  May take cough med at bedtime. SE/risks as reviewed.    Electronically Signed By: Ruthie Phipps PA-C            Follow-ups after your visit        Your next 10 appointments already scheduled     Aug 10, 2017  9:45 AM CDT   Vein Return with Adair Au MD   [facility])    [address] [place] [id number] [phone]              Who to contact     If you have questions or need follow up information about today's clinic visit or your schedule please contact [facility] directly at [phone].  Normal or non-critical lab and imaging results will be communicated to you by MyChart, letter or phone within 4 business days after the clinic has received the results. If you do not hear from us within 7 days, please contact the clinic through MyChart or phone. If you have a critical or abnormal lab result, we will notify you by phone as soon as possible.  Submit refill requests through [employer] or call your pharmacy and they will forward the refill request to us. Please allow 3 business days for your refill to be completed.          Additional Information About Your Visit        [employer] Information     Cross[employer]ader lets you send messages to your doctor, view your test results, renew your prescriptions, schedule appointments and  "more. To sign up, go to [email or web address] . Click on \"Log in\" on the left side of the screen, which will take you to the Welcome page. Then click on \"Sign up Now\" on the right side of the page.     You will be asked to enter the access code listed below, as well as some personal information. Please follow the directions to create your username and password.     Your access code is: [email or web address]  Expires: 2017 12:01 PM     Your access code will  in 90 days. If you need help or a new code, please call your [facility] clinic or [phone].        Care EveryWhere ID     This is your Care EveryWhere ID. This could be used by other organizations to access your [facility] medical records  [id number]        Your Vitals Were     Pulse Temperature Height Pulse Oximetry Breastfeeding? BMI (Body Mass Index)    59 98.4  F (36.9  C) (Oral) 6' (1.829 m) 97% No 42.72 kg/m2       Blood Pressure from Last 3 Encounters:   17 134/78   17 145/80   17 132/86    Weight from Last 3 Encounters:   17 (!) 315 lb (142.9 kg)   17 (!) 320 lb 14.4 oz (145.6 kg)   17 (!) 316 lb (143.3 kg)              Today, you had the following     No orders found for display         Today's Medication Changes          These changes are accurate as of: 17 11:21 AM.  If you have any questions, ask your nurse or doctor.               Start taking these medicines.        Dose/Directions    guaiFENesin-codeine 100-10 MG/5ML Soln solution   Commonly known as:  ROBITUSSIN AC   Used for:  Upper respiratory tract infection, unspecified type   Started by:  Ruthie Phipps PA-C        Dose:  1-2 tsp.   Take 5-10 mLs by mouth At Bedtime   Quantity:  120 mL   Refills:  0            Where to get your medicines      Some of these will need a paper prescription and others can be bought over the counter.  Ask your nurse if you have questions.     Bring a paper prescription for each of these medications     " guaiFENesin-codeine 100-10 MG/5ML Soln solution                Primary Care Provider Office Phone # Fax #    Karen Weiler, [phone]       [facility] [address] [place] [address]        Thank you!     Thank you for choosing [facility]  for your care. Our goal is always to provide you with excellent care. Hearing back from our patients is one way we can continue to improve our services. Please take a few minutes to complete the written survey that you may receive in the mail after your visit with us. Thank you!             Your Updated Medication List - Protect others around you: Learn how to safely use, store and throw away your medicines at www.disposemymeds.org.          This list is accurate as of: 6/20/17 11:21 AM.  Always use your most recent med list.                   Brand Name Dispense Instructions for use    acetaminophen-caffeine 500-65 MG Tabs    EXCEDRIN TENSION HEADACHE     Take 2 tablets by mouth every 6 hours as needed for mild pain       guaiFENesin-codeine 100-10 MG/5ML Soln solution    ROBITUSSIN AC    120 mL    Take 5-10 mLs by mouth At Bedtime       lisinopril 20 MG tablet    PRINIVIL/ZESTRIL    60 tablet    Take 1 tablet (20 mg) by mouth 2 times daily       Nebivolol HCl 20 MG Tabs    BYSTOLIC    90 tablet    Take 20 mg by mouth daily       order for DME      DREAMSTATION 9-12 CM/H20 FF MIRAGE QUATTRO       spironolactone 25 MG tablet    ALDACTONE    30 tablet    Take 1 tablet (25 mg) by mouth daily

## 2017-06-20 NOTE — PATIENT INSTRUCTIONS
I'm sorry you're not feeling well.  Symptoms and exam findings are most consistent with a viral process.  Treatment is supportive.  Re-check anytime with failure to improve as expected or with new concerns.  May take cough med at bedtime. SE/risks as reviewed.    Electronically Signed By: Ruthie Phipps PA-C

## 2017-06-20 NOTE — NURSING NOTE
Chief Complaint   Patient presents with     URI       Initial /78  Pulse 59  Temp 98.4  F (36.9  C) (Oral)  Ht 6' (1.829 m)  Wt (!) 315 lb (142.9 kg)  SpO2 97%  Breastfeeding? No  BMI 42.72 kg/m2 Estimated body mass index is 42.72 kg/(m^2) as calculated from the following:    Height as of this encounter: 6' (1.829 m).    Weight as of this encounter: 315 lb (142.9 kg).  Medication Reconciliation: complete

## 2017-08-03 ENCOUNTER — HOSPITAL ENCOUNTER (OUTPATIENT)
Dept: ULTRASOUND IMAGING | Facility: CLINIC | Age: 52
Discharge: HOME | End: 2017-08-03
Attending: NURSE PRACTITIONER | Admitting: NURSE PRACTITIONER
Payer: COMMERCIAL

## 2017-08-03 DIAGNOSIS — I87.2 VENOUS (PERIPHERAL) INSUFFICIENCY: ICD-10-CM

## 2017-08-03 PROCEDURE — 93924 LWR XTR VASC STDY BILAT: CPT

## 2017-08-03 PROCEDURE — 93924 LWR XTR VASC STDY BILAT: CPT | Performed by: INTERNAL MEDICINE

## 2017-08-10 ENCOUNTER — OFFICE VISIT (OUTPATIENT)
Dept: CARDIOLOGY | Facility: CLINIC | Age: 52
End: 2017-08-10
Attending: NURSE PRACTITIONER
Payer: COMMERCIAL

## 2017-08-10 VITALS
HEART RATE: 60 BPM | WEIGHT: 293 LBS | HEIGHT: 72 IN | BODY MASS INDEX: 39.68 KG/M2 | DIASTOLIC BLOOD PRESSURE: 98 MMHG | SYSTOLIC BLOOD PRESSURE: 156 MMHG

## 2017-08-10 DIAGNOSIS — I10 HYPERTENSION, GOAL BELOW 140/90: Primary | ICD-10-CM

## 2017-08-10 DIAGNOSIS — I87.2 VENOUS (PERIPHERAL) INSUFFICIENCY: ICD-10-CM

## 2017-08-10 PROCEDURE — 99213 OFFICE O/P EST LOW 20 MIN: CPT | Performed by: INTERNAL MEDICINE

## 2017-08-10 NOTE — PROGRESS NOTES
Vascular Cardiology Progress Note          Assessment and Plan:     1. Right lower extremity lipoma dermatosclerosis, CEAP class 4 with increased risk of future venous ulcer    Venous competency study reviewed.  Severe reflux along the entire length of the greater saphenous vein.  There is also a significant venous varicosity feeding into the small saphenous vein.  We discussed radiofrequency ablation of the GSV and SSV with foam sclerotherapy of the varicose vein refluxing into the small saphenous vein.    Patient is concerned about having a procedure done.  She would like to try weight loss with decreasing the amount of sugared pop she drinks.  We discussed that if her skin changes progress, we should see her back sooner.  Otherwise three-month follow-up and reassess.      This note was transcribed using electronic voice recognition software and there may be typographical errors present.                Interval History:   The patient is a very pleasant 52 year old whom I'm meeting for the first time.  She has significant right-sided venous disease with greater saphenous vein reflux and small saphenous vein reflux fed by a lengthy varicosity.  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  She is trying to wear her compression stockings as much as possible.                       Review of Systems:   Review of Systems:  Skin:  Positive for itching;bruising   Eyes:  Positive for glasses  ENT:  Negative    Respiratory:  Negative    Cardiovascular:    Positive for;palpitations;lower extremity symptoms;edema  Gastroenterology: Positive for heartburn  Genitourinary:  not assessed    Musculoskeletal:  Positive for back pain  Neurologic:  Positive for migraine headaches  Psychiatric:  Negative    Heme/Lymph/Imm:  Negative    Endocrine:  Negative                Physical Exam:     Vitals: BP (!) 156/98  Pulse 60  Ht 1.829 m (6')  Wt (!) 143.8 kg (317 lb)  BMI 42.99 kg/m2    The skin in the medial aspect of the  ankle is tight and would be consistent with lipoma dermatosclerosis from vein disease.  There is not yet an ulcer, but there is discoloration and skin changes both in the color and texture.         Medications:     Current Outpatient Prescriptions   Medication Sig Dispense Refill     Ibuprofen (ADVIL PO) Take 200 mg by mouth as needed for moderate pain       lisinopril (PRINIVIL/ZESTRIL) 20 MG tablet Take 1 tablet (20 mg) by mouth 2 times daily 60 tablet 11     spironolactone (ALDACTONE) 25 MG tablet Take 1 tablet (25 mg) by mouth daily 30 tablet 5     Nebivolol HCl (BYSTOLIC) 20 MG TABS Take 20 mg by mouth daily 90 tablet 3     acetaminophen-caffeine (EXCEDRIN TENSION HEADACHE) 500-65 MG TABS Take 2 tablets by mouth every 6 hours as needed for mild pain       order for DME DREAMSTATION  9-12 CM/H20  FF [facility]                  Data:   All laboratory data reviewed  No results found for this or any previous visit (from the past 24 hour(s)).    All laboratory data reviewed  Lab Results   Component Value Date    CHOL 190 07/07/2014     Lab Results   Component Value Date    HDL 48 07/07/2014     Lab Results   Component Value Date     07/07/2014     Lab Results   Component Value Date    TRIG 94 07/07/2014     Lab Results   Component Value Date    CHOLHDLRATIO 4.0 07/07/2014     TSH   Date Value Ref Range Status   06/23/2014 2.71 0.4 - 5.0 mU/L Final     Last Basic Metabolic Panel:  Lab Results   Component Value Date     05/23/2017      Lab Results   Component Value Date    POTASSIUM 4.1 05/23/2017     Lab Results   Component Value Date    CHLORIDE 105 05/23/2017     Lab Results   Component Value Date    ZACH 8.4 05/23/2017     Lab Results   Component Value Date    CO2 27 05/23/2017     Lab Results   Component Value Date    BUN 9 05/23/2017     Lab Results   Component Value Date    CR 0.82 05/23/2017     Lab Results   Component Value Date    GLC 93 05/23/2017     Lab Results   Component Value Date     WBC 6.7 03/30/2017     Lab Results   Component Value Date    RBC 5.09 03/30/2017     Lab Results   Component Value Date    HGB 14.0 03/30/2017     Lab Results   Component Value Date    HCT 43.1 03/30/2017     Lab Results   Component Value Date    MCV 85 03/30/2017     Lab Results   Component Value Date    MCH 27.5 03/30/2017     Lab Results   Component Value Date    MCHC 32.5 03/30/2017     Lab Results   Component Value Date    RDW 12.4 03/30/2017     Lab Results   Component Value Date     03/30/2017

## 2017-08-10 NOTE — MR AVS SNAPSHOT
"              After Visit Summary   8/10/2017    Fouzia Arenas    MRN: [id number]           Patient Information     Date Of Birth          1965        Visit Information        Provider Department      8/10/2017 9:45 AM Adair Au MD [facility]        Today's Diagnoses     Hypertension, goal below 140/90    -  1    Venous (peripheral) insufficiency           Follow-ups after your visit        Additional Services     Follow-Up with Cardiologist                 Future tests that were ordered for you today     Open Future Orders        Priority Expected Expires Ordered    Follow-Up with Cardiologist Routine 11/8/2017 8/10/2018 8/10/2017            Who to contact     If you have questions or need follow up information about today's clinic visit or your schedule please contact [facility] directly at [phone].  Normal or non-critical lab and imaging results will be communicated to you by MyChart, letter or phone within 4 business days after the clinic has received the results. If you do not hear from us within 7 days, please contact the clinic through MyChart or phone. If you have a critical or abnormal lab result, we will notify you by phone as soon as possible.  Submit refill requests through [employer] or call your pharmacy and they will forward the refill request to us. Please allow 3 business days for your refill to be completed.          Additional Information About Your Visit        MyChart Information     [employer] lets you send messages to your doctor, view your test results, renew your prescriptions, schedule appointments and more. To sign up, go to [email or web address] . Click on \"Log in\" on the left side of the screen, which will take you to the Welcome page. Then click on \"Sign up Now\" on the right side of the page.     You will be asked to enter the access code listed below, as well as some personal " information. Please follow the directions to create your username and password.     Your access code is: [email or web address]  Expires: 2017 10:32 AM     Your access code will  in 90 days. If you need help or a new code, please call your [facility] clinic or [phone].        Care EveryWhere ID     This is your Care EveryWhere ID. This could be used by other organizations to access your [facility] medical records          Your Vitals Were     Pulse Height BMI (Body Mass Index)             60 1.829 m (6') 42.99 kg/m2          Blood Pressure from Last 3 Encounters:   08/10/17 (!) 156/98   17 134/78   17 145/80    Weight from Last 3 Encounters:   08/10/17 (!) 143.8 kg (317 lb)   17 (!) 142.9 kg (315 lb)   17 (!) 145.6 kg (320 lb 14.4 oz)              We Performed the Following     Follow-Up with Vascular Cardiologist        Primary Care Provider Office Phone # Fax #    Karen Weiler, [phone] [address]  [place] [address]        Equal Access to Services     [facility] AH: Hadii aad ku hadasho Soomaali, waaxda luqadaha, qaybta kaalmada adeegyada, lili nickersonin haypamelan darya bahena. So [facility] [phone].    ATENCIÓN: Si habla español, tiene a hay disposición servicios gratuitos de asistencia lingüística. Llame al [phone].    We comply with applicable federal civil rights laws and [place] laws. We do not discriminate on the basis of race, color, national origin, age, disability sex, sexual orientation or gender identity.            Thank you!     Thank you for choosing [facility] [place]  for your care. Our goal is always to provide you with excellent care. Hearing back from our patients is one way we can continue to improve our services. Please take a few minutes to complete the written survey that you may receive in the mail after your visit with us. Thank you!             Your Updated Medication List -  Protect others around you: Learn how to safely use, store and throw away your medicines at www.disposemymeds.org.          This list is accurate as of: 8/10/17 10:32 AM.  Always use your most recent med list.                   Brand Name Dispense Instructions for use Diagnosis    acetaminophen-caffeine 500-65 MG Tabs    EXCEDRIN TENSION HEADACHE     Take 2 tablets by mouth every 6 hours as needed for mild pain        ADVIL PO      Take 200 mg by mouth as needed for moderate pain        lisinopril 20 MG tablet    PRINIVIL/ZESTRIL    60 tablet    Take 1 tablet (20 mg) by mouth 2 times daily    HTN (hypertension)       Nebivolol HCl 20 MG Tabs    BYSTOLIC    90 tablet    Take 20 mg by mouth daily    Benign essential hypertension       order for DME      DREAMSTATION 9-12 CM/H20 FF [facility]IR[facility]        spironolactone 25 MG tablet    ALDACTONE    30 tablet    Take 1 tablet (25 mg) by mouth daily    Benign essential hypertension

## 2017-08-10 NOTE — LETTER
8/10/2017    Karen Weiler, MD  [address] [place] [address]    RE: Fouzia Arenas       Dear Colleague,    I had the pleasure of seeing Fouzia Arenas in the [facility].    Vascular Cardiology Progress Note          Assessment and Plan:     1. Right lower extremity lipoma dermatosclerosis, CEAP class 4 with increased risk of future venous ulcer    Venous competency study reviewed.  Severe reflux along the entire length of the greater saphenous vein.  There is also a significant venous varicosity feeding into the small saphenous vein.  We discussed radiofrequency ablation of the GSV and SSV with foam sclerotherapy of the varicose vein refluxing into the small saphenous vein.    Patient is concerned about having a procedure done.  She would like to try weight loss with decreasing the amount of sugared pop she drinks.  We discussed that if her skin changes progress, we should see her back sooner.  Otherwise three-month follow-up and reassess.      This note was transcribed using electronic voice recognition software and there may be typographical errors present.                Interval History:   The patient is a very pleasant 52 year old whom I'm meeting for the first time.  She has significant right-sided venous disease with greater saphenous vein reflux and small saphenous vein reflux fed by a lengthy varicosity.  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  She is trying to wear her compression stockings as much as possible.                       Review of Systems:   Review of Systems:  Skin:  Positive for itching;bruising   Eyes:  Positive for glasses  ENT:  Negative    Respiratory:  Negative    Cardiovascular:    Positive for;palpitations;lower extremity symptoms;edema  Gastroenterology: Positive for heartburn  Genitourinary:  not assessed    Musculoskeletal:  Positive for back pain  Neurologic:  Positive for migraine headaches  Psychiatric:  Negative    Heme/Lymph/Imm:   Negative    Endocrine:  Negative                Physical Exam:     Vitals: BP (!) 156/98  Pulse 60  Ht 1.829 m (6')  Wt (!) 143.8 kg (317 lb)  BMI 42.99 kg/m2    The skin in the medial aspect of the ankle is tight and would be consistent with lipoma dermatosclerosis from vein disease.  There is not yet an ulcer, but there is discoloration and skin changes both in the color and texture.         Medications:     Current Outpatient Prescriptions   Medication Sig Dispense Refill     Ibuprofen (ADVIL PO) Take 200 mg by mouth as needed for moderate pain       lisinopril (PRINIVIL/ZESTRIL) 20 MG tablet Take 1 tablet (20 mg) by mouth 2 times daily 60 tablet 11     spironolactone (ALDACTONE) 25 MG tablet Take 1 tablet (25 mg) by mouth daily 30 tablet 5     Nebivolol HCl (BYSTOLIC) 20 MG TABS Take 20 mg by mouth daily 90 tablet 3     acetaminophen-caffeine (EXCEDRIN TENSION HEADACHE) 500-65 MG TABS Take 2 tablets by mouth every 6 hours as needed for mild pain       order for DME DREAMSTATION  9-12 CM/H20  FF [facility]IR[facility]                  Data:   All laboratory data reviewed  No results found for this or any previous visit (from the past 24 hour(s)).    All laboratory data reviewed  Lab Results   Component Value Date    CHOL 190 07/07/2014     Lab Results   Component Value Date    HDL 48 07/07/2014     Lab Results   Component Value Date     07/07/2014     Lab Results   Component Value Date    TRIG 94 07/07/2014     Lab Results   Component Value Date    CHOLHDLRATIO 4.0 07/07/2014     TSH   Date Value Ref Range Status   06/23/2014 2.71 0.4 - 5.0 mU/L Final     Last Basic Metabolic Panel:  Lab Results   Component Value Date     05/23/2017      Lab Results   Component Value Date    POTASSIUM 4.1 05/23/2017     Lab Results   Component Value Date    CHLORIDE 105 05/23/2017     Lab Results   Component Value Date    ZACH 8.4 05/23/2017     Lab Results   Component Value Date    CO2 27 05/23/2017     Lab Results    Component Value Date    BUN 9 05/23/2017     Lab Results   Component Value Date    CR 0.82 05/23/2017     Lab Results   Component Value Date    GLC 93 05/23/2017     Lab Results   Component Value Date    WBC 6.7 03/30/2017     Lab Results   Component Value Date    RBC 5.09 03/30/2017     Lab Results   Component Value Date    HGB 14.0 03/30/2017     Lab Results   Component Value Date    HCT 43.1 03/30/2017     Lab Results   Component Value Date    MCV 85 03/30/2017     Lab Results   Component Value Date    MCH 27.5 03/30/2017     Lab Results   Component Value Date    MCHC 32.5 03/30/2017     Lab Results   Component Value Date    RDW 12.4 03/30/2017     Lab Results   Component Value Date     03/30/2017     Thank you for allowing me to participate in the care of your patient.    Sincerely,     Adair Au MD     [facility]

## 2017-09-09 ENCOUNTER — HEALTH MAINTENANCE LETTER (OUTPATIENT)
Age: 52
End: 2017-09-09

## 2017-09-28 ENCOUNTER — APPOINTMENT (OUTPATIENT)
Dept: GENERAL RADIOLOGY | Facility: CLINIC | Age: 52
End: 2017-09-28
Attending: EMERGENCY MEDICINE
Payer: COMMERCIAL

## 2017-09-28 ENCOUNTER — HOSPITAL ENCOUNTER (EMERGENCY)
Facility: CLINIC | Age: 52
Discharge: HOME | End: 2017-09-28
Attending: EMERGENCY MEDICINE | Admitting: EMERGENCY MEDICINE
Payer: COMMERCIAL

## 2017-09-28 VITALS
BODY MASS INDEX: 39.68 KG/M2 | DIASTOLIC BLOOD PRESSURE: 92 MMHG | HEIGHT: 72 IN | RESPIRATION RATE: 18 BRPM | OXYGEN SATURATION: 93 % | WEIGHT: 293 LBS | HEART RATE: 63 BPM | SYSTOLIC BLOOD PRESSURE: 122 MMHG | TEMPERATURE: 97.9 F

## 2017-09-28 DIAGNOSIS — R07.9 ACUTE CHEST PAIN: ICD-10-CM

## 2017-09-28 PROBLEM — R07.89 ATYPICAL CHEST PAIN: Status: ACTIVE | Noted: 2017-04-05

## 2017-09-28 LAB
ANION GAP SERPL CALCULATED.3IONS-SCNC: 7 MMOL/L (ref 3–14)
BASOPHILS # BLD AUTO: 0.1 10E9/L (ref 0–0.2)
BASOPHILS NFR BLD AUTO: 1.4 %
BUN SERPL-MCNC: 10 MG/DL (ref 7–30)
CALCIUM SERPL-MCNC: 9.2 MG/DL (ref 8.5–10.1)
CHLORIDE SERPL-SCNC: 107 MMOL/L (ref 94–109)
CO2 SERPL-SCNC: 26 MMOL/L (ref 20–32)
CREAT SERPL-MCNC: 0.84 MG/DL (ref 0.52–1.04)
D DIMER PPP FEU-MCNC: <0.3 UG/ML FEU (ref 0–0.5)
DIFFERENTIAL METHOD BLD: NORMAL
EOSINOPHIL # BLD AUTO: 0.1 10E9/L (ref 0–0.7)
EOSINOPHIL NFR BLD AUTO: 2.3 %
ERYTHROCYTE [DISTWIDTH] IN BLOOD BY AUTOMATED COUNT: 12.4 % (ref 10–15)
GFR SERPL CREATININE-BSD FRML MDRD: 71 ML/MIN/1.7M2
GLUCOSE SERPL-MCNC: 101 MG/DL (ref 70–99)
HCT VFR BLD AUTO: 44.5 % (ref 35–47)
HGB BLD-MCNC: 14.4 G/DL (ref 11.7–15.7)
IMM GRANULOCYTES # BLD: 0 10E9/L (ref 0–0.4)
IMM GRANULOCYTES NFR BLD: 0.2 %
INTERPRETATION ECG - MUSE: NORMAL
INTERPRETATION ECG - MUSE: NORMAL
LYMPHOCYTES # BLD AUTO: 2.1 10E9/L (ref 0.8–5.3)
LYMPHOCYTES NFR BLD AUTO: 37.5 %
MCH RBC QN AUTO: 27.8 PG (ref 26.5–33)
MCHC RBC AUTO-ENTMCNC: 32.4 G/DL (ref 31.5–36.5)
MCV RBC AUTO: 86 FL (ref 78–100)
MONOCYTES # BLD AUTO: 0.4 10E9/L (ref 0–1.3)
MONOCYTES NFR BLD AUTO: 7.1 %
NEUTROPHILS # BLD AUTO: 2.9 10E9/L (ref 1.6–8.3)
NEUTROPHILS NFR BLD AUTO: 51.5 %
NRBC # BLD AUTO: 0 10*3/UL
NRBC BLD AUTO-RTO: 0 /100
PLATELET # BLD AUTO: 185 10E9/L (ref 150–450)
PLATELET # BLD EST: NORMAL 10*3/UL
POTASSIUM SERPL-SCNC: 3.7 MMOL/L (ref 3.4–5.3)
RBC # BLD AUTO: 5.18 10E12/L (ref 3.8–5.2)
RBC MORPH BLD: NORMAL
SODIUM SERPL-SCNC: 140 MMOL/L (ref 133–144)
TROPONIN I BLD-MCNC: 0 UG/L (ref 0–0.1)
TROPONIN I SERPL-MCNC: <0.015 UG/L (ref 0–0.04)
WBC # BLD AUTO: 5.7 10E9/L (ref 4–11)

## 2017-09-28 PROCEDURE — 93005 ELECTROCARDIOGRAM TRACING: CPT

## 2017-09-28 PROCEDURE — 80048 BASIC METABOLIC PNL TOTAL CA: CPT | Performed by: EMERGENCY MEDICINE

## 2017-09-28 PROCEDURE — 36415 COLL VENOUS BLD VENIPUNCTURE: CPT | Performed by: EMERGENCY MEDICINE

## 2017-09-28 PROCEDURE — 93005 ELECTROCARDIOGRAM TRACING: CPT | Mod: 76

## 2017-09-28 PROCEDURE — 84484 ASSAY OF TROPONIN QUANT: CPT

## 2017-09-28 PROCEDURE — 84484 ASSAY OF TROPONIN QUANT: CPT | Mod: 91 | Performed by: EMERGENCY MEDICINE

## 2017-09-28 PROCEDURE — 71020 XR CHEST 2 VW: CPT

## 2017-09-28 PROCEDURE — 99285 EMERGENCY DEPT VISIT HI MDM: CPT

## 2017-09-28 PROCEDURE — 25000132 ZZH RX MED GY IP 250 OP 250 PS 637: Performed by: EMERGENCY MEDICINE

## 2017-09-28 PROCEDURE — 85025 COMPLETE CBC W/AUTO DIFF WBC: CPT | Performed by: EMERGENCY MEDICINE

## 2017-09-28 PROCEDURE — 85379 FIBRIN DEGRADATION QUANT: CPT | Performed by: EMERGENCY MEDICINE

## 2017-09-28 PROCEDURE — 25000128 H RX IP 250 OP 636

## 2017-09-28 RX ORDER — NITROGLYCERIN 0.4 MG/1
0.4 TABLET SUBLINGUAL
Status: COMPLETED | OUTPATIENT
Start: 2017-09-28 | End: 2017-09-28

## 2017-09-28 RX ORDER — ONDANSETRON 2 MG/ML
INJECTION INTRAMUSCULAR; INTRAVENOUS
Status: COMPLETED
Start: 2017-09-28 | End: 2017-09-28

## 2017-09-28 RX ORDER — NITROGLYCERIN 0.4 MG/1
0.4 TABLET SUBLINGUAL EVERY 5 MIN PRN
Status: DISCONTINUED | OUTPATIENT
Start: 2017-09-28 | End: 2017-09-28 | Stop reason: HOSPADM

## 2017-09-28 RX ADMIN — NITROGLYCERIN 0.4 MG: 0.4 TABLET SUBLINGUAL at 04:42

## 2017-09-28 RX ADMIN — NITROGLYCERIN 0.4 MG: 0.4 TABLET SUBLINGUAL at 04:32

## 2017-09-28 RX ADMIN — ONDANSETRON 4 MG: 2 INJECTION INTRAMUSCULAR; INTRAVENOUS at 04:36

## 2017-09-28 ASSESSMENT — ENCOUNTER SYMPTOMS
CHEST TIGHTNESS: 1
NAUSEA: 1
SHORTNESS OF BREATH: 1
LIGHT-HEADEDNESS: 1

## 2017-09-28 NOTE — ED AVS SNAPSHOT
[facility] Emergency Department    [address]    [facility] [address]    Phone:  [phone]    Fax:  [phone]                                       Fouzia Arenas   MRN: [id number]    Department:  [facility] Emergency Department   Date of Visit:  9/28/2017           Patient Information     Date Of Birth          1965        Your diagnoses for this visit were:     Acute chest pain        You were seen by Macrina Welsh MD.      Follow-up Information     Follow up with [facility] Emergency Department.    Specialty:  EMERGENCY MEDICINE    Why:  immediately , If symptoms worsen    Contact information:    [address]  [facility] [address] [phone]        Follow up with Weiler, Karen, MD In 2 days.    Specialty:  Family Practice    Why:  for recheck of your symptoms    Contact information:    [address] [place] [address]  [phone]          Discharge Instructions       Discharge Instructions  Chest Pain    You have been seen today for chest pain or discomfort.  At this time, your doctor has found no signs that your chest pain is due to a serious or life-threatening condition, (or you have declined more testing and/or admission to the hospital). However, sometimes there is a serious problem that does not show up right away. Your evaluation today may not be complete and you may need further testing and evaluation.     You need to follow-up with your regular doctor within 3 days.    Return to the Emergency Department if:    Your chest pain changes, gets worse, starts to happen more often, or comes with less activity.    You are short of breath.    You get very weak or tired.    You pass out or faint.    You have any new symptoms, like fever, cough, numb legs, or you cough up blood.    You have anything else that worries you.    Until you follow-up with your regular doctor please do the following:    Take one aspirin daily  unless you have an allergy or are told not to by your doctor.    If a stress test appointment has been made, go to the appointment.    If you have questions, contact your regular doctor.    If your doctor today has told you to follow-up with your regular doctor, it is very important that you make an appointment with your clinic and go to the appointment.  If you do not follow-up with your primary doctor, it may result in missing an important development which could result in permanent injury or disability and/or lasting pain.  If there is any problem keeping your appointment, call your doctor or return to the Emergency Department.    If you were given a prescription for medicine here today, be sure to read all of the information (including the package insert) that comes with your prescription.  This will include important information about the medicine, its side effects, and any warnings that you need to know about.  The pharmacist who fills the prescription can provide more information and answer questions you may have about the medicine.  If you have questions or concerns that the pharmacist cannot address, please call or return to the Emergency Department.         24 Hour Appointment Hotline       To make an appointment at any [facility], call [phone] ([phone]). If you don't have a family doctor or clinic, we will help you find one. [facility] clinics are conveniently located to serve the needs of you and your family.             Review of your medicines      Our records show that you are taking the medicines listed below. If these are incorrect, please call your family doctor or clinic.        Dose / Directions Last dose taken    acetaminophen-caffeine 500-65 MG Tabs   Commonly known as:  EXCEDRIN TENSION HEADACHE   Dose:  2 tablet        Take 2 tablets by mouth every 6 hours as needed for mild pain   Refills:  0        ADVIL PO   Dose:  200 mg        Take 200 mg by mouth as needed for moderate  pain   Refills:  0        lisinopril 20 MG tablet   Commonly known as:  PRINIVIL/ZESTRIL   Dose:  20 mg   Quantity:  60 tablet        Take 1 tablet (20 mg) by mouth 2 times daily   Refills:  11        Nebivolol HCl 20 MG Tabs   Commonly known as:  BYSTOLIC   Dose:  20 mg   Quantity:  90 tablet        Take 20 mg by mouth daily   Refills:  3        order for DME        DREAMSTATION 9-12 CM/H20 FF MIRAGE QUATTRO   Refills:  0        spironolactone 25 MG tablet   Commonly known as:  ALDACTONE   Dose:  25 mg   Quantity:  30 tablet        Take 1 tablet (25 mg) by mouth daily   Refills:  5                Procedures and tests performed during your visit     Procedure/Test Number of Times Performed    Basic metabolic panel 1    CBC with platelets differential 1    D dimer quantitative 1    EKG 12 lead 2    Troponin I 1    Troponin POCT 1    XR Chest 2 Views 1      Orders Needing Specimen Collection     None      Pending Results     No orders found from 9/26/2017 to 9/29/2017.            Pending Culture Results     No orders found from 9/26/2017 to 9/29/2017.            Pending Results Instructions     If you had any lab results that were not finalized at the time of your Discharge, you can call the ED Lab Result RN at [phone]. You will be contacted by this team for any positive Lab results or changes in treatment. The nurses are available 7 days a week from 10A to 6:30P.  You can leave a message 24 hours per day and they will return your call.        Test Results From Your Hospital Stay        9/28/2017  5:04 AM      Component Results     Component Value Ref Range & Units Status    WBC 5.7 4.0 - 11.0 10e9/L Final    RBC Count 5.18 3.8 - 5.2 10e12/L Final    Hemoglobin 14.4 11.7 - 15.7 g/dL Final    Hematocrit 44.5 35.0 - 47.0 % Final    MCV 86 78 - 100 fl Final    MCH 27.8 26.5 - 33.0 pg Final    MCHC 32.4 31.5 - 36.5 g/dL Final    RDW 12.4 10.0 - 15.0 % Final    Platelet Count 185 150 - 450 10e9/L Final    Diff Method  Automated Method  Final    % Neutrophils 51.5 % Final    % Lymphocytes 37.5 % Final    % Monocytes 7.1 % Final    % Eosinophils 2.3 % Final    % Basophils 1.4 % Final    % Immature Granulocytes 0.2 % Final    Nucleated RBCs 0 0 /100 Final    Absolute Neutrophil 2.9 1.6 - 8.3 10e9/L Final    Absolute Lymphocytes 2.1 0.8 - 5.3 10e9/L Final    Absolute Monocytes 0.4 0.0 - 1.3 10e9/L Final    Absolute Eosinophils 0.1 0.0 - 0.7 10e9/L Final    Absolute Basophils 0.1 0.0 - 0.2 10e9/L Final    Abs Immature Granulocytes 0.0 0 - 0.4 10e9/L Final    Absolute Nucleated RBC 0.0  Final    RBC Morphology   Final    Consistent with reported results    Platelet Estimate Normal  Final         9/28/2017  4:52 AM      Component Results     Component Value Ref Range & Units Status    Sodium 140 133 - 144 mmol/L Final    Potassium 3.7 3.4 - 5.3 mmol/L Final    Chloride 107 94 - 109 mmol/L Final    Carbon Dioxide 26 20 - 32 mmol/L Final    Anion Gap 7 3 - 14 mmol/L Final    Glucose 101 (H) 70 - 99 mg/dL Final    Urea Nitrogen 10 7 - 30 mg/dL Final    Creatinine 0.84 0.52 - 1.04 mg/dL Final    GFR Estimate 71 >60 mL/min/1.7m2 Final    Non  GFR Calc    GFR Estimate If Black 86 >60 mL/min/1.7m2 Final    African American GFR Calc    Calcium 9.2 8.5 - 10.1 mg/dL Final         9/28/2017  4:52 AM      Component Results     Component Value Ref Range & Units Status    Troponin I ES <0.015 0.000 - 0.045 ug/L Final    The 99th percentile for upper reference range is 0.045 ug/L.  Troponin values   in the range of 0.045 - 0.120 ug/L may be associated with risks of adverse   clinical events.           9/28/2017  5:03 AM      Component Results     Component Value Ref Range & Units Status    D Dimer <0.3 0.0 - 0.50 ug/ml FEU Final    This D-dimer assay is intended for use in conjunction with a clinical pretest   probability assessment model to exclude pulmonary embolism (PE) and deep   venous thrombosis (DVT) in outpatients suspected of  PE or DVT. The cut-off   value is 0.5 ug/mL FEU.           9/28/2017  4:30 AM      Component Results     Component Value Ref Range & Units Status    Troponin I 0.00 0.00 - 0.10 ug/L Final         9/28/2017  6:13 AM      Narrative     XR CHEST 2 VW  9/28/2017 5:30 AM      HISTORY: Chest Pain, Shortness of Breath     COMPARISON: 3/30/2017.    FINDINGS: The heart size is normal. The lungs are clear. No  pneumothorax or pleural effusion.        Impression     IMPRESSION:  No acute abnormality.    SUREKHA VEE MD                Clinical Quality Measure: Blood Pressure Screening     Your blood pressure was checked while you were in the emergency department today. The last reading we obtained was  BP: 106/61 . Please read the guidelines below about what these numbers mean and what you should do about them.  If your systolic blood pressure (the top number) is less than 120 and your diastolic blood pressure (the bottom number) is less than 80, then your blood pressure is normal. There is nothing more that you need to do about it.  If your systolic blood pressure (the top number) is 120-139 or your diastolic blood pressure (the bottom number) is 80-89, your blood pressure may be higher than it should be. You should have your blood pressure rechecked within a year by a primary care provider.  If your systolic blood pressure (the top number) is 140 or greater or your diastolic blood pressure (the bottom number) is 90 or greater, you may have high blood pressure. High blood pressure is treatable, but if left untreated over time it can put you at risk for heart attack, stroke, or kidney failure. You should have your blood pressure rechecked by a primary care provider within the next 4 weeks.  If your provider in the emergency department today gave you specific instructions to follow-up with your doctor or provider even sooner than that, you should follow that instruction and not wait for up to 4 weeks for your follow-up  "visit.        Thank you for choosing [facility]       Thank you for choosing [facility] for your care. Our goal is always to provide you with excellent care. Hearing back from our patients is one way we can continue to improve our services. Please take a few minutes to complete the written survey that you may receive in the mail after you visit with us. Thank you!        [employer] Information     [employer] lets you send messages to your doctor, view your test results, renew your prescriptions, schedule appointments and more. To sign up, go to [email or web address] . Click on \"Log in\" on the left side of the screen, which will take you to the Welcome page. Then click on \"Sign up Now\" on the right side of the page.     You will be asked to enter the access code listed below, as well as some personal information. Please follow the directions to create your username and password.     Your access code is: [email or web address]  Expires: 2017 10:32 AM     Your access code will  in 90 days. If you need help or a new code, please call your [facility] clinic or [phone].        Care EveryWhere ID     This is your Care EveryWhere ID. This could be used by other organizations to access your [facility] medical records  [id number]        Equal Access to Services     TESSA GUERRA : Carla jarviso Sogaleali, waaxda luqadaha, qaybta kaalmada adeegyada, lili bahena. So [facility] [phone].    ATENCIÓN: Si habla español, tiene a hay disposición servicios gratuitos de asistencia lingüística. Llame al [phone].    We comply with applicable federal civil rights laws and [place] laws. We do not discriminate on the basis of race, color, national origin, age, disability sex, sexual orientation or gender identity.            After Visit Summary       This is your record. Keep this with you and show to your community pharmacist(s) and doctor(s) at your next visit.                  "

## 2017-09-28 NOTE — ED AVS SNAPSHOT
[facility] Emergency Department    [address]    [facility] [address]    Phone:  [phone]    Fax:  [phone]                                       Fouzia Arenas   MRN: [id number]    Department:  [facility] Emergency Department   Date of Visit:  9/28/2017           After Visit Summary Signature Page     I have received my discharge instructions, and my questions have been answered. I have discussed any challenges I see with this plan with the nurse or doctor.    ..........................................................................................................................................  Patient/Patient Representative Signature      ..........................................................................................................................................  Patient Representative Print Name and Relationship to Patient    ..................................................               ................................................  Date                                            Time    ..........................................................................................................................................  Reviewed by Signature/Title    ...................................................              ..............................................  Date                                                            Time

## 2017-09-28 NOTE — ED PROVIDER NOTES
"  History     Chief Complaint:  Chest Pain    HPI   Fouzia Arenas is a nonanticoagulated 52 year old female with history of hypertension who presents to the emergency department today for evaluation of chest pain. The patient reports earlier yesterday evening she felt \"car sick\", but later around , she felt onset of chest pain with symptoms of chest \"heaviness\", shortness of breath, and lightheadedness which decreased around midnight, but the chest pain flared back up to a severity of 7/10 and presented with increased heart rate and nausea, waking her up from her sleep around 0300 today. The patient endorses similar pain which she was seen in the hospital for and also saw a heart specialist. The patient reports standing, walking and leaning improve the chest pain, but sitting down worsens it. The patient endorses history of irregular heart beat. The patient endorses daily medications of Lisinopril, Spironolactone, and Nebivolol. The patient reports \"achyness\" in the upper right thigh. The patient reports the chest discomfort is presently a 6/10. The patient denies leg swelling .    Cardiac/PE/DVT Risk Factors:  History of hypertension - Positive  History of hyperlipidemia - Negative  History of diabetes - Negative  History of smoking - Negative  Personal history of PE/DVT - Negative  Family history of PE/DVT - Negative  Family history of heart complications - Positive, 2 grandparents  from MIs    Nuc Med Stress Test 2017  Impression  1.  Myocardial perfusion imaging using single isotope technique  demonstrated probably normal perfusion with breast attenuation  artifact. The study is nondiagnostic on the basis of inadequate heart  rate achieved, however.  2. Gated images demonstrated normal wall motion and thickening.  The  left ventricular systolic function is 627% at rest and 65% post  stress.  3. Compared to the prior study from 2014, prior study  demonstrated no ischemia or infarct " .    Allergies:  Amoxicillin  Percocet [Oxycodone-Acetaminophen]    Medications:    Advil  Lisinopril  Spironolactone   Nebivolol  Excedrin     Past Medical History:    Classic migraine   Hypertension   Palpitations   SVT (supraventricular tachycardia)     Past Surgical History:    Laparoscopic Cholecystectomy  D & C, X2-3 abdominal bleeding    Family History:    Mother: Hypertension, Hyperlipidemia  Father: Hypertension, Prostate Cancer, Hyperlipidemia  Maternal Aunt: Breast Cancer  Maternal Grandmother: Hypertension, C.A.D., Gallbladder Disease  Maternal Grandfather: Lung Cancer  Paternal Grandmother: Cerebrovascular Disease  Paternal Grandfather: C.A.D.    Social History:  The patient was accompanied to the ED by daughter.  Smoking Status: Never Smoker  Smokeless Tobacco: Never Used  Alcohol Use: Negative  Marital Status:  Single [1]     Review of Systems   Respiratory: Positive for chest tightness and shortness of breath (resolved).    Cardiovascular: Positive for chest pain. Negative for leg swelling.   Gastrointestinal: Positive for nausea.   Musculoskeletal:        Upper right thigh pain   Neurological: Positive for light-headedness.   All other systems reviewed and are negative.    Physical Exam     Vitals:  Patient Vitals for the past 24 hrs:   BP Temp Temp src Pulse Heart Rate Resp SpO2 Height Weight   09/28/17 0630 (!) 122/92 - - - 55 - 93 % - -   09/28/17 0500 106/61 - - - 54 - 94 % - -   09/28/17 0445 110/64 - - - 59 - 90 % - -   09/28/17 0437 (!) 122/93 - - - 56 - 98 % - -   09/28/17 0430 143/86 - - - 61 - - - -   09/28/17 0419 137/79 - - - 59 - 99 % - -   09/28/17 0401 - 97.9  F (36.6  C) Temporal 63 - 18 96 % 1.829 m (6') 136.1 kg (300 lb)     Physical Exam  Gen: Pleasant, appears stated age.    Eye:   Pupils are equal, round, and reactive.     Sclera non-injected.    ENT:   Moist mucus membranes.     Normal tongue.    Oropharynx without lesions.    Cardiac:     Normal rate and regular rhythm.    No  murmurs, gallops, or rubs.    Pulmonary:     Clear to auscultation bilaterally.    No wheezes, rales, or rhonchi.    Abdomen:     Normal active bowel sounds.     Abdomen is soft and non-distended, without focal tenderness.    Musculoskeletal:     Normal movement of all extremities without evidence for deficit.    Extremities:    No edema.   Minimal tenderness on the top of the right thigh.   Calves are nontender.    Skin:   Warm and dry.    Neurologic:    Non-focal exam without asymmetric weakness or numbness.    Normal tone    Psychiatric:     Normal affect with appropriate interaction with examiner.    Emergency Department Course     ECG #1:  ECG taken at 0359, ECG read at 0359  Normal sinus rhythm  ST & T wave abnormality, consider inferior ischemia  ST & T wave abnormality, consider anterolateral ischemia  Abnormal ECG  Rate 63 bpm. ME interval 162 ms. QRS duration 102 ms. QT/QTc 434/444 ms. P-R-T axes -10 16 -13.    ECG #2:  ECG taken at 0404, ECG read at 0405  Junctional rhythm  ST & T wave abnormality, consider inferior ischemia  ST & T wave abnormality, consider anterior ischemia  Abnormal ECG  Posterior  Rate 63 bpm. ME interval * ms. QRS duration 94 ms. QT/QTc 424/433 ms. P-R-T axes * 5 -18.     Imaging:  Radiology findings were communicated with the patient who voiced understanding of the findings.    XR Chest 2 Views  1.  No acute abnormality.  SUREKHA VEE MD  Reading per radiology    Laboratory:  Laboratory findings were communicated with the patient who voiced understanding of the findings.    D dimer (Collected 0443): <0.3  CBC: WBC 5.7, HGB 14.4,   BMP: Glucose: 101 (H) o/w WNL (Creatinine 0.84)  Troponin (Collected 0420): <0.015  Troponin POCT (Collected 0417): 0.00    Interventions:  0432 Nitroglycerin 0.4 mg Sublingual  0436 Zofran 4 mg IV  0442 Nitroglycerin 0.4 mg Sublingual    Emergency Department Course:    0401 Nursing notes and vitals reviewed.    0402 I performed an exam of the  patient as documented above.     0455 The patient reports she is chest pain free after 2 nitroglycerins.     0526 The patient was sent for a XR Chest 2 Views while in the emergency department, results above.     0533 I rechecked the patient who maintains they are pain free.    0534 I discussed the treatment plan with the patient. They expressed understanding of this plan and consented to discharge. They will be discharged home with instructions for care and follow up. In addition, the patient will return to the emergency department if their symptoms persist, worsen, if new symptoms arise or if there is any concern.  All questions were answered.    0534 I personally reviewed the laboratory, imaging, and ECG results with the patient and answered all related questions prior to discharge.    Impression & Plan      Medical Decision Making:  Fouzia Arenas is a 52 year old female with history of hypertension who presents to the emergency department today for evaluation of 8 hours of substernal chest pressure. On exam, the patient was initially noted to be hypertensive, however when she had her blood pressure measured with a cuff that actually fit, blood pressure was normal. ECG demonstrates lateral T wave inversions and mild ST depressions although this is unchanged compared to previous. Chest pain was completely resolved following 2 nitroglycerin tablets. Troponin test is negative. At this point, she has been ruled out for ACS given the troponin testing was obtained greater than 8 hours after onset of constant pain. D dimer test is negative which I think rules out pulmonary embolism given she is otherwise low risk. Chest Xray is otherwise negative for evidence of aortic dissection with normal mediastinum, and also there's no evidence for pneumothorax or pneumonia. At this point, I think she is safe to be discharged home. She had provacative testing 4 months ago that was limited by habitus but otherwise negative for any  evidence of ischemia. She will return to the ED for chest discomfort, shortness of breath, dizziness, or any other concerns.     Diagnosis:    ICD-10-CM    1. Acute chest pain R07.9      Disposition:   The patient is discharged to home.    Scribe Disclosure:  Kevin MULLIGAN, am serving as a scribe at 4:02 AM on 9/28/2017 to document services personally performed by Macrina Welsh MD, based on my observations and the provider's statements to me.    [facility] EMERGENCY DEPARTMENT       Macrina Welsh MD  09/28/17 173

## 2017-09-28 NOTE — ED NOTES
Pt felt off this afternoon   Felt like heart  Racing  Then when picked up daughter chest heavy feeling rapid heart rate on and off  Went to bed 12 woke with this cont feeling   Here for eval   Also c/o tenderness right thigh leg   Lying back pain improves  Did take 6 baby asa prior  to arrival  Rates pain 7/10   Nauseated

## 2017-10-06 DIAGNOSIS — I10 BENIGN ESSENTIAL HYPERTENSION: ICD-10-CM

## 2017-10-06 RX ORDER — SPIRONOLACTONE 25 MG/1
25 TABLET ORAL DAILY
Qty: 90 TABLET | Refills: 3 | Status: SHIPPED | OUTPATIENT
Start: 2017-10-06 | End: 2018-10-10

## 2017-12-19 ENCOUNTER — TELEPHONE (OUTPATIENT)
Dept: FAMILY MEDICINE | Facility: CLINIC | Age: 52
End: 2017-12-19

## 2017-12-19 NOTE — TELEPHONE ENCOUNTER
12/19/2017    Call Regarding Preventive Health Screening Cervical/PAP    Attempt 1    Message on voicemail    Comments:       Outreach   Araceli Garcia

## 2017-12-27 NOTE — TELEPHONE ENCOUNTER
12/27/2017    Call Regarding Preventive Health Screening Cervical/PAP    Attempt 2    Message on voicemail    Comments:       Outreach   Araceli Garcia

## 2018-01-02 NOTE — TELEPHONE ENCOUNTER
1/2/2018    Call Regarding Preventive Health Screening Cervical/PAP    Attempt 3    Message on voicemail    Comments:       Outreach   Araceli Garcia

## 2018-02-06 ENCOUNTER — OFFICE VISIT (OUTPATIENT)
Dept: FAMILY MEDICINE | Facility: CLINIC | Age: 53
End: 2018-02-06
Payer: COMMERCIAL

## 2018-02-06 VITALS
HEART RATE: 61 BPM | DIASTOLIC BLOOD PRESSURE: 84 MMHG | BODY MASS INDEX: 39.68 KG/M2 | TEMPERATURE: 98.3 F | WEIGHT: 293 LBS | OXYGEN SATURATION: 98 % | HEIGHT: 72 IN | SYSTOLIC BLOOD PRESSURE: 118 MMHG

## 2018-02-06 DIAGNOSIS — Z23 NEED FOR PROPHYLACTIC VACCINATION AND INOCULATION AGAINST INFLUENZA: ICD-10-CM

## 2018-02-06 DIAGNOSIS — Z23 NEED FOR TD VACCINE: ICD-10-CM

## 2018-02-06 DIAGNOSIS — Z23 NEED FOR TETANUS BOOSTER: ICD-10-CM

## 2018-02-06 DIAGNOSIS — R73.9 ELEVATED BLOOD SUGAR: ICD-10-CM

## 2018-02-06 DIAGNOSIS — N95.0 POST-MENOPAUSE BLEEDING: ICD-10-CM

## 2018-02-06 DIAGNOSIS — R39.15 URINARY URGENCY: ICD-10-CM

## 2018-02-06 DIAGNOSIS — E66.01 MORBID OBESITY (H): ICD-10-CM

## 2018-02-06 DIAGNOSIS — Z12.31 ENCOUNTER FOR SCREENING MAMMOGRAM FOR BREAST CANCER: ICD-10-CM

## 2018-02-06 DIAGNOSIS — R35.0 URINARY FREQUENCY: ICD-10-CM

## 2018-02-06 DIAGNOSIS — Z12.4 CERVICAL CANCER SCREENING: ICD-10-CM

## 2018-02-06 DIAGNOSIS — Z00.01 ENCOUNTER FOR ROUTINE ADULT HEALTH EXAMINATION WITH ABNORMAL FINDINGS: Primary | ICD-10-CM

## 2018-02-06 DIAGNOSIS — N64.59 NIPPLE SYMPTOM OR SIGN IN FEMALE: ICD-10-CM

## 2018-02-06 DIAGNOSIS — N39.41 URGENCY INCONTINENCE: ICD-10-CM

## 2018-02-06 DIAGNOSIS — Z11.59 NEED FOR HEPATITIS C SCREENING TEST: ICD-10-CM

## 2018-02-06 DIAGNOSIS — Z13.6 CARDIOVASCULAR SCREENING; LDL GOAL LESS THAN 160: ICD-10-CM

## 2018-02-06 LAB
ALBUMIN UR-MCNC: NEGATIVE MG/DL
APPEARANCE UR: CLEAR
BACTERIA #/AREA URNS HPF: ABNORMAL /HPF
BILIRUB UR QL STRIP: NEGATIVE
CHOLEST SERPL-MCNC: 168 MG/DL
COLOR UR AUTO: YELLOW
ERYTHROCYTE [DISTWIDTH] IN BLOOD BY AUTOMATED COUNT: 12.4 % (ref 10–15)
GLUCOSE UR STRIP-MCNC: NEGATIVE MG/DL
HBA1C MFR BLD: 5.6 % (ref 4.3–6)
HCT VFR BLD AUTO: 42.9 % (ref 35–47)
HCV AB SERPL QL IA: NONREACTIVE
HDLC SERPL-MCNC: 53 MG/DL
HGB BLD-MCNC: 13.8 G/DL (ref 11.7–15.7)
HGB UR QL STRIP: ABNORMAL
KETONES UR STRIP-MCNC: NEGATIVE MG/DL
LDLC SERPL CALC-MCNC: 102 MG/DL
LEUKOCYTE ESTERASE UR QL STRIP: ABNORMAL
MCH RBC QN AUTO: 28.2 PG (ref 26.5–33)
MCHC RBC AUTO-ENTMCNC: 32.2 G/DL (ref 31.5–36.5)
MCV RBC AUTO: 88 FL (ref 78–100)
NITRATE UR QL: NEGATIVE
NON-SQ EPI CELLS #/AREA URNS LPF: ABNORMAL /LPF
NONHDLC SERPL-MCNC: 115 MG/DL
PH UR STRIP: 5.5 PH (ref 5–7)
PLATELET # BLD AUTO: 234 10E9/L (ref 150–450)
RBC # BLD AUTO: 4.89 10E12/L (ref 3.8–5.2)
RBC #/AREA URNS AUTO: ABNORMAL /HPF
SOURCE: ABNORMAL
SP GR UR STRIP: 1.02 (ref 1–1.03)
T4 FREE SERPL-MCNC: 0.93 NG/DL (ref 0.76–1.46)
TRIGL SERPL-MCNC: 66 MG/DL
TSH SERPL DL<=0.005 MIU/L-ACNC: 4.12 MU/L (ref 0.4–4)
UROBILINOGEN UR STRIP-ACNC: 0.2 EU/DL (ref 0.2–1)
WBC # BLD AUTO: 6.7 10E9/L (ref 4–11)
WBC #/AREA URNS AUTO: ABNORMAL /HPF

## 2018-02-06 PROCEDURE — 84439 ASSAY OF FREE THYROXINE: CPT | Performed by: PHYSICIAN ASSISTANT

## 2018-02-06 PROCEDURE — 86803 HEPATITIS C AB TEST: CPT | Performed by: PHYSICIAN ASSISTANT

## 2018-02-06 PROCEDURE — 90686 IIV4 VACC NO PRSV 0.5 ML IM: CPT | Performed by: PHYSICIAN ASSISTANT

## 2018-02-06 PROCEDURE — 80061 LIPID PANEL: CPT | Performed by: PHYSICIAN ASSISTANT

## 2018-02-06 PROCEDURE — 84443 ASSAY THYROID STIM HORMONE: CPT | Performed by: PHYSICIAN ASSISTANT

## 2018-02-06 PROCEDURE — 85027 COMPLETE CBC AUTOMATED: CPT | Performed by: PHYSICIAN ASSISTANT

## 2018-02-06 PROCEDURE — 83036 HEMOGLOBIN GLYCOSYLATED A1C: CPT | Performed by: PHYSICIAN ASSISTANT

## 2018-02-06 PROCEDURE — 87624 HPV HI-RISK TYP POOLED RSLT: CPT | Performed by: PHYSICIAN ASSISTANT

## 2018-02-06 PROCEDURE — 99213 OFFICE O/P EST LOW 20 MIN: CPT | Mod: 25 | Performed by: PHYSICIAN ASSISTANT

## 2018-02-06 PROCEDURE — 90714 TD VACC NO PRESV 7 YRS+ IM: CPT | Performed by: PHYSICIAN ASSISTANT

## 2018-02-06 PROCEDURE — 81001 URINALYSIS AUTO W/SCOPE: CPT | Performed by: PHYSICIAN ASSISTANT

## 2018-02-06 PROCEDURE — 36415 COLL VENOUS BLD VENIPUNCTURE: CPT | Performed by: PHYSICIAN ASSISTANT

## 2018-02-06 PROCEDURE — 99396 PREV VISIT EST AGE 40-64: CPT | Mod: 25 | Performed by: PHYSICIAN ASSISTANT

## 2018-02-06 PROCEDURE — 90471 IMMUNIZATION ADMIN: CPT | Performed by: PHYSICIAN ASSISTANT

## 2018-02-06 PROCEDURE — G0145 SCR C/V CYTO,THINLAYER,RESCR: HCPCS | Performed by: PHYSICIAN ASSISTANT

## 2018-02-06 NOTE — LETTER
February 14, 2018    Fouzia Arenas  [address]  [place] [address]    Dear Fouzia,  We are happy to inform you that your PAP smear result from 02/06/18 is normal. We are now able to do a follow up test on PAP smears. The DNA test is for HPV (Human Papilloma Virus). Cervical cancer is closely linked with certain types of HPV. Your result showed no evidence of HPV.  Therefore we recommend you return in 3 years for your next pap smear.  You will still need to return to the clinic every year for an annual exam and other preventive tests.  Please make an appointment for follow up appointment with OB/Gyn for vaginal bleeding. See the referral below.  Your provider has referred you to:  [facility]: [facility] [place] [phone]   [email or web address]    Dr. Rachel Starr  Please contact the clinic at [phone] with any questions.  Sincerely,  Ruthie Phipps PA-C./  Anel De Leon RN-Pap Tracking

## 2018-02-06 NOTE — NURSING NOTE
Chief Complaint   Patient presents with     Physical       Initial /84 (BP Location: Right arm, Cuff Size: Adult Large)  Pulse 61  Temp 98.3  F (36.8  C) (Oral)  Ht 6' (1.829 m)  Wt (!) 321 lb (145.6 kg)  SpO2 98%  Breastfeeding? No  BMI 43.54 kg/m2 Estimated body mass index is 43.54 kg/(m^2) as calculated from the following:    Height as of this encounter: 6' (1.829 m).    Weight as of this encounter: 321 lb (145.6 kg).  Medication Reconciliation: complete

## 2018-02-06 NOTE — MR AVS SNAPSHOT
After Visit Summary   2/6/2018    Fouzia Arenas    MRN: [id number]           Patient Information     Date Of Birth          1965        Visit Information        Provider Department      2/6/2018 8:00 AM Ruthie Phipps PA-C [facility]        Today's Diagnoses     Encounter for routine adult health examination with abnormal findings    -  1    Elevated blood sugar        CARDIOVASCULAR SCREENING; LDL GOAL LESS THAN 160        Need for hepatitis C screening test        Urinary urgency        Urinary frequency        Vaginal spotting        Urgency incontinence        Post-menopause        Encounter for screening mammogram for breast cancer        Cervical cancer screening        Nipple symptom or sign in female        Need for tetanus booster        Morbid obesity (H)          Care Instructions      Preventive Health Recommendations  Female Ages 50 - 64    Yearly exam: See your health care provider every year in order to  o Review health changes.   o Discuss preventive care.    o Review your medicines if your doctor has prescribed any.      Get a Pap test every three years (unless you have an abnormal result and your provider advises testing more often).    If you get Pap tests with HPV test, you only need to test every 5 years, unless you have an abnormal result.     You do not need a Pap test if your uterus was removed (hysterectomy) and you have not had cancer.    You should be tested each year for STDs (sexually transmitted diseases) if you're at risk.     Have a mammogram every 1 to 2 years.    Have a colonoscopy at age 50, or have a yearly FIT test (stool test). These exams screen for colon cancer.      Have a cholesterol test every 5 years, or more often if advised.    Have a diabetes test (fasting glucose) every three years. If you are at risk for diabetes, you should have this test more often.     If you are at risk for osteoporosis (brittle bone disease), think  about having a bone density scan (DEXA).    Shots: Get a flu shot each year. Get a tetanus shot every 10 years.    Nutrition:     Eat at least 5 servings of fruits and vegetables each day.    Eat whole-grain bread, whole-wheat pasta and brown rice instead of white grains and rice.    Talk to your provider about Calcium and Vitamin D.     Lifestyle    Exercise at least 150 minutes a week (30 minutes a day, 5 days a week). This will help you control your weight and prevent disease.    Limit alcohol to one drink per day.    No smoking.     Wear sunscreen to prevent skin cancer.     See your dentist every six months for an exam and cleaning.    See your eye doctor every 1 to 2 years.            Follow-ups after your visit        Additional Services     OB/GYN REFERRAL       Your provider has referred you to:  [facility]: [facility] [place] [phone]   [email or web address]    Dr. Rachel Starr    Please be aware that coverage of these services is subject to the terms and limitations of your health insurance plan.  Call member services at your health plan with any benefit or coverage questions.      Please bring the following with you to your appointment:    (1) Any X-Rays, CTs or MRIs which have been performed.  Contact the facility where they were done to arrange for  prior to your scheduled appointment.   (2) List of current medications   (3) This referral request   (4) Any documents/labs given to you for this referral                  Future tests that were ordered for you today     Open Future Orders        Priority Expected Expires Ordered    US Pelvic Complete w Transvaginal Routine  2/6/2019 2/6/2018    MA Diagnostic Digital Bilateral Routine  2/6/2019 2/6/2018            Who to contact     If you have questions or need follow up information about today's clinic visit or your schedule please contact [facility] directly at [phone].  Normal or  "non-critical lab and imaging results will be communicated to you by MyChart, letter or phone within 4 business days after the clinic has received the results. If you do not hear from us within 7 days, please contact the clinic through [email or web address] or phone. If you have a critical or abnormal lab result, we will notify you by phone as soon as possible.  Submit refill requests through [employer] or call your pharmacy and they will forward the refill request to us. Please allow 3 business days for your refill to be completed.          Additional Information About Your Visit        [employer] Information     [employer] lets you send messages to your doctor, view your test results, renew your prescriptions, schedule appointments and more. To sign up, go to [email or web address] . Click on \"Log in\" on the left side of the screen, which will take you to the Welcome page. Then click on \"Sign up Now\" on the right side of the page.     You will be asked to enter the access code listed below, as well as some personal information. Please follow the directions to create your username and password.     Your access code is: [email or web address]  Expires: 2018  8:49 AM     Your access code will  in 90 days. If you need help or a new code, please call your [place] clinic or [phone].        Care EveryWhere ID     This is your Care EveryWhere ID. This could be used by other organizations to access your [place] medical records  [id number]        Your Vitals Were     Pulse Temperature Height Pulse Oximetry Breastfeeding? BMI (Body Mass Index)    61 98.3  F (36.8  C) (Oral) 6' (1.829 m) 98% No 43.54 kg/m2       Blood Pressure from Last 3 Encounters:   18 118/84   17 (!) 122/92   08/10/17 (!) 156/98    Weight from Last 3 Encounters:   18 (!) 321 lb (145.6 kg)   17 300 lb (136.1 kg)   08/10/17 (!) 317 lb (143.8 kg)              We Performed the Following     *UA reflex to Microscopic and Culture ([place] and [place] " Clinics (except [facility] and [place])     CBC with platelets     Hemoglobin A1c     Hepatitis C Screen Reflex to HCV RNA Quant and Genotype     HPV High Risk Types DNA Cervical     Lipid panel reflex to direct LDL Fasting     OB/GYN REFERRAL     Pap imaged thin layer screen with HPV - recommended age 30 - 65 years (select HPV order below)        Primary Care Provider Office Phone # Fax #    Karen Weiler, [phone] [address]  [facility] [address]        Equal Access to Services     [facility] MARI : Hadii aad ku hadasho Soomaali, waaxda luqadaha, qaybta kaalmada adeegyada, waxay idiin hayaan adeeg kharash la'aan . So [facility] [phone].    ATENCIÓN: Si habla español, tiene a hay disposición servicios gratuitos de asistencia lingüística. [facility] [phone].    We comply with applicable federal civil rights laws and [place] laws. We do not discriminate on the basis of race, color, national origin, age, disability, sex, sexual orientation, or gender identity.            Thank you!     Thank you for choosing [facility]  for your care. Our goal is always to provide you with excellent care. Hearing back from our patients is one way we can continue to improve our services. Please take a few minutes to complete the written survey that you may receive in the mail after your visit with us. Thank you!             Your Updated Medication List - Protect others around you: Learn how to safely use, store and throw away your medicines at www.disposemymeds.org.          This list is accurate as of 2/6/18  8:49 AM.  Always use your most recent med list.                   Brand Name Dispense Instructions for use Diagnosis    acetaminophen-caffeine 500-65 MG Tabs    EXCEDRIN TENSION HEADACHE     Take 2 tablets by mouth every 6 hours as needed for mild pain        ADVIL PO      Take 200 mg by mouth as needed for moderate pain        lisinopril 20 MG tablet    PRINIVIL/ZESTRIL    60 tablet     Take 1 tablet (20 mg) by mouth 2 times daily    HTN (hypertension)       Nebivolol HCl 20 MG Tabs    BYSTOLIC    90 tablet    Take 20 mg by mouth daily    Benign essential hypertension       order for DME      DREAMSTATION 9-12 CM/H20 FF MIRAGE QUATTRO        spironolactone 25 MG tablet    ALDACTONE    90 tablet    Take 1 tablet (25 mg) by mouth daily    Benign essential hypertension

## 2018-02-06 NOTE — PROGRESS NOTES
SUBJECTIVE:   CC: Fouzia Arenas is an 52 year old woman who presents for preventive health visit.     Healthy Habits:    Do you get at least three servings of calcium containing foods daily (dairy, green leafy vegetables, etc.)? yes    Amount of exercise or daily activities, outside of work: 0 day(s) per week    Problems taking medications regularly No    Medication side effects: No    Have you had an eye exam in the past two years? yes    Do you see a dentist twice per year? yes    Do you have sleep apnea, excessive snoring or daytime drowsiness? no      Followed by [place] [facility] for palpitations and HTN.    Was a time when she was pre-DM. Cut out pop, tries to limit sugar. Admits she likes treats.      Today's PHQ-2 Score:   PHQ-2 ( 1999 Pfizer) 6/20/2017 12/7/2013   Q1: Little interest or pleasure in doing things 0 0   Q2: Feeling down, depressed or hopeless 0 0   PHQ-2 Score 0 0       Abuse: Current or Past(Physical, Sexual or Emotional)- No  Do you feel safe in your environment - Yes    Social History   Substance Use Topics     Smoking status: Never Smoker     Smokeless tobacco: Never Used     Alcohol use No     If you drink alcohol do you typically have >3 drinks per day or >7 drinks per week? No                     Reviewed orders with patient.  Reviewed health maintenance and updated orders accordingly - Yes  BP Readings from Last 3 Encounters:   02/06/18 118/84   09/28/17 (!) 122/92   08/10/17 (!) 156/98    Wt Readings from Last 3 Encounters:   02/06/18 (!) 321 lb (145.6 kg)   09/28/17 300 lb (136.1 kg)   08/10/17 (!) 317 lb (143.8 kg)                  Patient Active Problem List   Diagnosis     Esophageal reflux     CARDIOVASCULAR SCREENING; LDL GOAL LESS THAN 160     Hypertension, goal below 140/90     Palpitations     STORMY (obstructive sleep apnea)     Atypical chest pain     BPPV (benign paroxysmal positional vertigo)     Other disorder of menstruation and other abnormal bleeding from female genital  tract     Past Surgical History:   Procedure Laterality Date     CHOLECYSTECTOMY, LAPOROSCOPIC      Cholecystectomy, Laparoscopic     COLONOSCOPY N/A 11/25/2014    Procedure: COLONOSCOPY;  Surgeon: Wenceslao Galo MD;  Location:  GI     D & C      X2-3 for abnormal bleeding     ESOPHAGOSCOPY, GASTROSCOPY, DUODENOSCOPY (EGD), COMBINED N/A 11/25/2014    Procedure: COMBINED ESOPHAGOSCOPY, GASTROSCOPY, DUODENOSCOPY (EGD), BIOPSY SINGLE OR MULTIPLE;  Surgeon: Wenceslao Galo MD;  Location:  GI     LAPAROSCOPY      For endometriosis       Social History   Substance Use Topics     Smoking status: Never Smoker     Smokeless tobacco: Never Used     Alcohol use No     Family History   Problem Relation Age of Onset     Hypertension Mother      Lipids Mother      Hypertension Father      Prostate Cancer Father      Lipids Father      Breast Cancer Maternal Aunt      Hypertension Maternal Grandmother      C.A.D. Maternal Grandmother      Gallbladder Disease Maternal Grandmother      CANCER Maternal Grandfather      lung     CEREBROVASCULAR DISEASE Paternal Grandmother      C.A.D. Paternal Grandfather      Cancer - colorectal Other      cousin maternal          Current Outpatient Prescriptions   Medication Sig Dispense Refill     spironolactone (ALDACTONE) 25 MG tablet Take 1 tablet (25 mg) by mouth daily 90 tablet 3     Ibuprofen (ADVIL PO) Take 200 mg by mouth as needed for moderate pain       lisinopril (PRINIVIL/ZESTRIL) 20 MG tablet Take 1 tablet (20 mg) by mouth 2 times daily 60 tablet 11     Nebivolol HCl (BYSTOLIC) 20 MG TABS Take 20 mg by mouth daily 90 tablet 3     order for DME DREAMSTATION  9-12 CM/H20  FF MIRAGE QUATTRO       acetaminophen-caffeine (EXCEDRIN TENSION HEADACHE) 500-65 MG TABS Take 2 tablets by mouth every 6 hours as needed for mild pain       Allergies   Allergen Reactions     Amoxicillin Nausea and Vomiting     Percocet [Oxycodone-Acetaminophen] Nausea and Vomiting       Patient over age  "50, mutual decision to screen reflected in health maintenance.  Had completed in May and was normal.    Pertinent mammograms are reviewed under the imaging tab.  History of abnormal Pap smear:   Last 3 Pap Results:   PAP (no units)   Date Value   07/07/2014 NIL   Remote abnormal pap >17 yrs ago.  Has had consecutive normal paps since that time.  Last clinician told her she could go to every 3 yr screening    Reviewed and updated as needed this visit by clinical staff  Tobacco  Allergies  Meds  Med Hx  Surg Hx  Fam Hx  Soc Hx        Reviewed and updated as needed this visit by Provider  Tobacco  Allergies  Meds  Med Hx  Surg Hx  Fam Hx  Soc Hx           ROS:  C: NEGATIVE for fever, chills, change in weight  I: NEGATIVE for worrisome rashes, moles or lesions  E: NEGATIVE for vision changes or irritation  ENT: NEGATIVE for ear, mouth and throat problems  R: NEGATIVE for significant cough or SOB  B: + for R niplle itching for past 2 weeks. Even went out and bought new bras. NEGATIVE for masses, tenderness or discharge  CV: NEGATIVE for chest pain, palpitations or peripheral edema  GI: + for intermittent R pelvic cramping feeling. NEGATIVE for nausea, abdominal pain, heartburn, or change in bowel habits  : + for vaginal bleeding for past few months. Occasionally will notice red on the toilet paper after going to the bathroom. This has happened maybe 10 times in the past 2 months. Will wake up at night and have cramping in her pelvic cramping.   Hx of endometriosis.  Endorses \"terrible bladder control\" and reports that she already wears pads for this.  Sometimes the pad will appear \"pink.\"  No obvious hematuria.  Has urinary frequency at night.   Drinks a lot of water though too.  Has incontinence with urgency.  Estimates LMP 4 years ago    M: NEGATIVE for significant arthralgias or myalgia  N: NEGATIVE for weakness, dizziness or paresthesias  P: NEGATIVE for changes in mood or affect     OBJECTIVE:   BP " 118/84 (BP Location: Right arm, Cuff Size: Adult Large)  Pulse 61  Temp 98.3  F (36.8  C) (Oral)  Ht 6' (1.829 m)  Wt (!) 321 lb (145.6 kg)  SpO2 98%  Breastfeeding? No  BMI 43.54 kg/m2  EXAM:  GENERAL: healthy, alert and no distress  EYES: Eyes grossly normal to inspection, PERRL and conjunctivae and sclerae normal  HENT: ear canals and TM's normal, nose and mouth without ulcers or lesions  NECK: no adenopathy, no asymmetry, masses, or scars and thyroid normal to palpation  RESP: lungs clear to auscultation - no rales, rhonchi or wheezes  BREAST: normal without masses, tenderness or nipple discharge and no palpable axillary masses or adenopathy. Does report that R breast nipple itching though. No scabs or appreciable lesions seen.  CV: regular rate and rhythm, normal S1 S2, no S3 or S4, no murmur, click or rub, no peripheral edema and peripheral pulses strong  ABDOMEN: soft, nontender, no hepatosplenomegaly, no masses and bowel sounds normal   (female): normal female external genitalia, normal urethral meatus, vaginal mucosa pink, moist, well rugated, and normal cervix/adnexa/uterus without masses or discharge. Exam somewhat limited by body habitus. No reported tenderness in R lower pelvis though where pt reports she feels intermittent cramping.  MS: no gross musculoskeletal defects noted, no edema  SKIN: no suspicious lesions or rashes  NEURO: Normal strength and tone, mentation intact and speech normal  PSYCH: mentation appears normal, affect normal/bright    ASSESSMENT/PLAN:       ICD-10-CM    1. Encounter for routine adult health examination with abnormal findings Z00.01    2. Elevated blood sugar R73.9 Hemoglobin A1c   3. CARDIOVASCULAR SCREENING; LDL GOAL LESS THAN 160 Z13.6 Lipid panel reflex to direct LDL Fasting   4. Need for hepatitis C screening test Z11.59 Hepatitis C Screen Reflex to HCV RNA Quant and Genotype   5. Urinary urgency R39.15 OB/GYN REFERRAL   6. Urinary frequency R35.0 *UA reflex  to Microscopic and Culture ([place] and [place] [facility] (except [place] and [place])     OB/GYN REFERRAL   7. Urgency incontinence N39.41 OB/GYN REFERRAL   8. Encounter for screening mammogram for breast cancer Z12.31 MA Diagnostic Digital Bilateral     CANCELED: *MA Screening Digital Bilateral   9. Cervical cancer screening Z12.4 Pap imaged thin layer screen with HPV - recommended age 30 - 65 years (select HPV order below)     HPV High Risk Types DNA Cervical   10. Nipple symptom or sign in female N64.59 MA Diagnostic Digital Bilateral   11. Need for tetanus booster Z23    12. Morbid obesity (H) E66.01    13. Post-menopause bleeding N95.0 CBC with platelets     OB/GYN REFERRAL     US Pelvic Complete w Transvaginal   Pt reports intermittent post-menopausal bleeding with red on toilet paper or on pad 10x over the past 2 months. Also endorses some pelvic cramping at times during the night.  Does have hx of urge incontinence so advised to obtain UA today as well as unclear if sx related to bladder versus rule out pelvic pathology.  Advised pelvic ultrasound and then consult with OB/GYN for further evaluation as may need to consider endometrial biopsy.  She additionally mentioned R nipple itching for the past 2 weeks such that she went out and bought all new bras. Advised diagnostic mammogram and re-check with OB/GYN for this too.  Will obtain preventative health screening labs as well and notify of results when available.  She also mentioned episodic light-headedness at the end of out appt and I asked that she schedule another appt to address this.    COUNSELING:   Reviewed preventive health counseling, as reflected in patient instructions       Regular exercise       Healthy diet/nutrition       Immunizations    Vaccinated for: Influenza and Td             Colon cancer screening       Consider Hep C screening for patients born between 1945 and 1965       reports that she has never smoked. She has never used  smokeless tobacco.    Estimated body mass index is 43.54 kg/(m^2) as calculated from the following:    Height as of this encounter: 6' (1.829 m).    Weight as of this encounter: 321 lb (145.6 kg).   Weight management plan: Discussed healthy diet and exercise guidelines and patient will follow up in 12 months in clinic to re-evaluate.    Counseling Resources:  ATP IV Guidelines  Pooled Cohorts Equation Calculator  Breast Cancer Risk Calculator  FRAX Risk Assessment  ICSI Preventive Guidelines  Dietary Guidelines for Americans, 2010  USDA's MyPlate  ASA Prophylaxis  Lung CA Screening    Ruthie Phipps PA-C  [facility]

## 2018-02-06 NOTE — LETTER
February 12, 2018      Fouzia LAWRENCE Deepa  [address] [place] [facility] [address]        Dear ,    We are writing to inform you of your test results.    -Cholesterol levels (LDL,HDL, Triglycerides) are normal.  ADVISE: rechecking in 1 year.  -TSH (thyroid stimulating hormone) level is slightly elevated which can suggest hypothyroidism (an underactive thyroid).  This can cause a number of symptoms including weight gain, fatigue, high cholesterol, constipation, hair loss, cold intolerance.  ADVISE:rechecking TSH in 1 month. (TSH w/ reflex T4)  -Normal red blood cell (hgb) levels, normal white blood cell count and normal platelet levels.  -A1C (diabetic test) is normal and indicates that your blood sugar has been in a normal range the last 3 months.  -Hepatitis C antibody screen test shows no signs of a previous hepatitis C infection.  -Urine had slight amount of red and white blood cells noted. Given the vaginal bleeding you reported it's unclear the origin of this. Please follow-up to complete the pelvic ultrasound and schedule a consult with OB/GYN as previously discussed in clinic. We should also repeat urinalysis and obtain urine culture in 1-2 weeks. (UA reflex micro/culture, DX: abnormal urine)    Resulted Orders   Hemoglobin A1c   Result Value Ref Range    Hemoglobin A1C 5.6 4.3 - 6.0 %   Lipid panel reflex to direct LDL Fasting   Result Value Ref Range    Cholesterol 168 <200 mg/dL    Triglycerides 66 <150 mg/dL      Comment:      Fasting specimen    HDL Cholesterol 53 >49 mg/dL    LDL Cholesterol Calculated 102 (H) <100 mg/dL      Comment:      Above desirable:  100-129 mg/dl  Borderline High:  130-159 mg/dL  High:             160-189 mg/dL  Very high:       >189 mg/dl      Non HDL Cholesterol 115 <130 mg/dL   Hepatitis C Screen Reflex to HCV RNA Quant and Genotype   Result Value Ref Range    Hepatitis C Antibody Nonreactive NR^Nonreactive      Comment:      Assay performance characteristics have  not been established for newborns,   infants, and children     CBC with platelets   Result Value Ref Range    WBC 6.7 4.0 - 11.0 10e9/L    RBC Count 4.89 3.8 - 5.2 10e12/L    Hemoglobin 13.8 11.7 - 15.7 g/dL    Hematocrit 42.9 35.0 - 47.0 %    MCV 88 78 - 100 fl    MCH 28.2 26.5 - 33.0 pg    MCHC 32.2 31.5 - 36.5 g/dL    RDW 12.4 10.0 - 15.0 %    Platelet Count 234 150 - 450 10e9/L   *UA reflex to Microscopic and Culture ([place] and [facility] (except [place] and [place])   Result Value Ref Range    Color Urine Yellow     Appearance Urine Clear     Glucose Urine Negative NEG^Negative mg/dL    Bilirubin Urine Negative NEG^Negative    Ketones Urine Negative NEG^Negative mg/dL    Specific Gravity Urine 1.025 1.003 - 1.035    Blood Urine Moderate (A) NEG^Negative    pH Urine 5.5 5.0 - 7.0 pH    Protein Albumin Urine Negative NEG^Negative mg/dL    Urobilinogen Urine 0.2 0.2 - 1.0 EU/dL    Nitrite Urine Negative NEG^Negative    Leukocyte Esterase Urine Small (A) NEG^Negative    Source Midstream Urine    TSH with free T4 reflex   Result Value Ref Range    TSH 4.12 (H) 0.40 - 4.00 mU/L   Urine Microscopic   Result Value Ref Range    WBC Urine 2-5 (A) OTO2^O - 2 /HPF    RBC Urine 2-5 (A) OTO2^O - 2 /HPF    Squamous Epithelial /LPF Urine Few FEW^Few /LPF    Bacteria Urine Moderate (A) NEG^Negative /HPF   T4 free   Result Value Ref Range    T4 Free 0.93 0.76 - 1.46 ng/dL       If you have any questions or concerns, please call the clinic at the number listed above.       Sincerely,        Ruthie Phipps PA-C

## 2018-02-06 NOTE — PROGRESS NOTES

## 2018-02-07 ENCOUNTER — HOSPITAL ENCOUNTER (OUTPATIENT)
Dept: ULTRASOUND IMAGING | Facility: CLINIC | Age: 53
End: 2018-02-07
Attending: PHYSICIAN ASSISTANT
Payer: COMMERCIAL

## 2018-02-07 ENCOUNTER — HOSPITAL ENCOUNTER (OUTPATIENT)
Dept: MAMMOGRAPHY | Facility: CLINIC | Age: 53
Discharge: HOME | End: 2018-02-07
Attending: PHYSICIAN ASSISTANT | Admitting: PHYSICIAN ASSISTANT
Payer: COMMERCIAL

## 2018-02-07 DIAGNOSIS — Z12.31 ENCOUNTER FOR SCREENING MAMMOGRAM FOR BREAST CANCER: ICD-10-CM

## 2018-02-07 DIAGNOSIS — N64.59 NIPPLE SYMPTOM OR SIGN IN FEMALE: ICD-10-CM

## 2018-02-07 PROCEDURE — 77066 DX MAMMO INCL CAD BI: CPT

## 2018-02-07 PROCEDURE — 76642 ULTRASOUND BREAST LIMITED: CPT | Mod: RT

## 2018-02-07 NOTE — LETTER
February 12, 2018      Fouzia LAWRENCE Deepa  [address] [place]  [place] [address]        Dear ,    We are writing to inform you of your test results.    Mammogram was normal. Please follow-up with OB/GYN with any persistent nipple itching.    Resulted Orders   US Breast Right    Narrative    DIAGNOSTIC MAMMOGRAM BILATERAL DIGITAL w/CAD w/TOMOSYNTHESIS  ULTRASOUND RIGHT  BREAST  2/7/2018.    HISTORY: Right nipple itching for 3 weeks.    COMPARISON:  Prior mammograms through 2010.    BREAST DENSITY: Scattered fibroglandular densities    FINDINGS:  No suspicious findings on mammography in either breast. No  sonographic abnormalities in the right periareolar region in the  region of itching. Any further evaluation should be based on clinical  findings and clinical suspicion.      Impression    IMPRESSION: BI-RADS CATEGORY: 1 -  NEGATIVE    RECOMMENDED FOLLOW-UP: Annual Mammography    MEMO SOUZA MD       If you have any questions or concerns, please call the clinic at the number listed above.       Sincerely,        Ruthie Phipps PA-C

## 2018-02-07 NOTE — LETTER
[facility] Breast Ultrasound  [address] [facility], [address]  [facility] [address]                                                                                                            Fouzia Arenas  [address] [place] [address]      February 7, 2018  Date of Exam:     Dear Fouzia:    Thank you for your recent visit.  Breast Imaging Result: We are pleased to inform you that the results of your recent breast imaging show no evidence of malignancy (cancer).    If you are experiencing any breast problems such as a lump or localized pain we request that you discuss this with your health care provider if you haven t already done so, as additional testing may be necessary.    As you know, early detection of cancer is very important. Although mammography is the most accurate method for early detection, not all cancers are found through mammography. A thorough examination includes a combination of mammography, physical examination and breast self-examination. Currently the American College of Radiology and the Society of Breast Imaging recommend an annual mammogram for all women beginning at the age of 40.    A report of your breast imaging results was sent to: Ruthie Phipps    Your breast imaging will become part of your medical file here at [facility] for at least 10 years. You are responsible for informing any new health care provider or breast imaging facility of the date and location of this examination.    We appreciate the opportunity to participate in your health care.    Sincerely,    Joan Quintana MD  Interpreting Radiologist  [facility] Breast Ultrasound

## 2018-02-08 LAB
COPATH REPORT: NORMAL
PAP: NORMAL

## 2018-02-09 LAB
FINAL DIAGNOSIS: NORMAL
HPV HR 12 DNA CVX QL NAA+PROBE: NEGATIVE
HPV16 DNA SPEC QL NAA+PROBE: NEGATIVE
HPV18 DNA SPEC QL NAA+PROBE: NEGATIVE
SPECIMEN DESCRIPTION: NORMAL
SPECIMEN SOURCE CVX/VAG CYTO: NORMAL

## 2018-02-11 NOTE — PROGRESS NOTES
Please call or write patient with the following results:    -Mammogram was normal. Please follow-up with OB/GYN with any persistent nipple itching.    Electronically Signed By: Ruthie Phipps PA-C

## 2018-02-11 NOTE — PROGRESS NOTES
Please call or write patient with the following results:    -Cholesterol levels (LDL,HDL, Triglycerides) are normal.  ADVISE: rechecking in 1 year.  -TSH (thyroid stimulating hormone) level is slightly elevated which can suggest hypothyroidism (an underactive thyroid).  This can cause a number of symptoms including weight gain, fatigue, high cholesterol, constipation, hair loss, cold intolerance.  ADVISE:rechecking TSH in 1 month. (TSH w/ reflex T4)  -Normal red blood cell (hgb) levels, normal white blood cell count and normal platelet levels.  -A1C (diabetic test) is normal and indicates that your blood sugar has been in a normal range the last 3 months.  -Hepatitis C antibody screen test shows no signs of a previous hepatitis C infection.  -Urine had slight amount of red and white blood cells noted. Given the vaginal bleeding you reported it's unclear the origin of this. Please follow-up to complete the pelvic ultrasound and schedule a consult with OB/GYN as previously discussed in clinic. We should also repeat urinalysis and obtain urine culture in 1-2 weeks. (UA reflex micro/culture, DX: abnormal urine)    Electronically Signed By: Ruthie Phipps PA-C

## 2018-02-14 NOTE — PROGRESS NOTES
Yes, ok to recommend pap in 3 years. Can we be sure she gets an appt with OB/GYN though? I don't see anything scheduled at this point. Thank you!  Electronically Signed By: Ruthie Phipps PA-C

## 2018-02-25 NOTE — TELEPHONE ENCOUNTER
Fouzia called this morning stating she needed a refill on her lisinopril. She had been doubling up on her 10 mg tablets to equal 20 mg bid that Jaqui had requested she do last week at their office visit. I refilled lisinopril 20mg bid for 1 month with 11 refills and sent to [facility] to fill. CHACORTA Barroso    check pt's chart

## 2018-03-06 ENCOUNTER — RADIANT APPOINTMENT (OUTPATIENT)
Dept: ULTRASOUND IMAGING | Facility: CLINIC | Age: 53
End: 2018-03-06
Attending: PHYSICIAN ASSISTANT
Payer: COMMERCIAL

## 2018-03-06 DIAGNOSIS — N95.0 POST-MENOPAUSE BLEEDING: ICD-10-CM

## 2018-03-06 PROCEDURE — 76856 US EXAM PELVIC COMPLETE: CPT | Performed by: OBSTETRICS & GYNECOLOGY

## 2018-03-06 PROCEDURE — 76830 TRANSVAGINAL US NON-OB: CPT | Performed by: OBSTETRICS & GYNECOLOGY

## 2018-03-15 NOTE — PROGRESS NOTES
Please CALL patient with the following results:    -Pelvic ultrasound showed small uterine fibroids, but that there was also abnormal thickening of the endometrial lining. Endometrial biopsy was advised for further evaluation. Please follow-up with OB/GYN as previously discussed at your appointment. Please help assist patient in scheduling if needed.    Electronically Signed By: Ruthie Phipps PA-C

## 2018-03-21 ENCOUNTER — TELEPHONE (OUTPATIENT)
Dept: LAB | Facility: CLINIC | Age: 53
End: 2018-03-21

## 2018-03-21 DIAGNOSIS — R79.89 ABNORMAL TSH: ICD-10-CM

## 2018-03-21 DIAGNOSIS — R82.90 ABNORMAL FINDING ON URINALYSIS: Primary | ICD-10-CM

## 2018-03-21 NOTE — TELEPHONE ENCOUNTER
Fouzia Bruno is scheduled for a Lab Appointment on 3/29/18.  The appointment note mentions Thyroid and Urinalysis testing, your notes from 2/11/18 mention rechecking the thyroid in a month; and a UA / UC in 1-2 weeks.  Please place any future orders you would like done.      Thanks,  Yissel Mott MLT ([facility])

## 2018-03-22 ENCOUNTER — TELEPHONE (OUTPATIENT)
Dept: OBGYN | Facility: CLINIC | Age: 53
End: 2018-03-22

## 2018-03-22 ENCOUNTER — OFFICE VISIT (OUTPATIENT)
Dept: OBGYN | Facility: CLINIC | Age: 53
End: 2018-03-22
Payer: COMMERCIAL

## 2018-03-22 VITALS
HEART RATE: 74 BPM | WEIGHT: 293 LBS | SYSTOLIC BLOOD PRESSURE: 116 MMHG | HEIGHT: 72 IN | BODY MASS INDEX: 39.68 KG/M2 | DIASTOLIC BLOOD PRESSURE: 74 MMHG

## 2018-03-22 DIAGNOSIS — D25.0 SUBMUCOUS MYOMA OF UTERUS: ICD-10-CM

## 2018-03-22 DIAGNOSIS — R93.89 THICKENED ENDOMETRIUM: ICD-10-CM

## 2018-03-22 DIAGNOSIS — N88.2 CERVICAL STENOSIS (UTERINE CERVIX): Primary | ICD-10-CM

## 2018-03-22 PROCEDURE — 99203 OFFICE O/P NEW LOW 30 MIN: CPT | Performed by: OBSTETRICS & GYNECOLOGY

## 2018-03-22 RX ORDER — MISOPROSTOL 200 UG/1
200 TABLET ORAL ONCE
Qty: 2 TABLET | Refills: 0 | Status: SHIPPED | OUTPATIENT
Start: 2018-03-22 | End: 2018-03-22

## 2018-03-22 ASSESSMENT — PATIENT HEALTH QUESTIONNAIRE - PHQ9: 5. POOR APPETITE OR OVEREATING: NOT AT ALL

## 2018-03-22 ASSESSMENT — ANXIETY QUESTIONNAIRES
6. BECOMING EASILY ANNOYED OR IRRITABLE: NOT AT ALL
3. WORRYING TOO MUCH ABOUT DIFFERENT THINGS: NOT AT ALL
2. NOT BEING ABLE TO STOP OR CONTROL WORRYING: NOT AT ALL
1. FEELING NERVOUS, ANXIOUS, OR ON EDGE: NOT AT ALL
7. FEELING AFRAID AS IF SOMETHING AWFUL MIGHT HAPPEN: SEVERAL DAYS
5. BEING SO RESTLESS THAT IT IS HARD TO SIT STILL: NOT AT ALL
IF YOU CHECKED OFF ANY PROBLEMS ON THIS QUESTIONNAIRE, HOW DIFFICULT HAVE THESE PROBLEMS MADE IT FOR YOU TO DO YOUR WORK, TAKE CARE OF THINGS AT HOME, OR GET ALONG WITH OTHER PEOPLE: NOT DIFFICULT AT ALL
GAD7 TOTAL SCORE: 1

## 2018-03-22 NOTE — TELEPHONE ENCOUNTER
Type of surgery: HSC w/MYOSURE  Location of surgery: [facility] OR  Date and time of surgery: 3/29/2018 7:30am ARRIVAL 6am  Surgeon: LIZBET Elena  Pre-Op Appt Date: PRIMARY  Post-Op Appt Date: 2WEEKS TBD   Packet sent out: HANDED 3/22/2018  Pre-cert/Authorization completed:  TBD  Date: 3/22/2018 [place] w/Wanda Johnson  Surgery Scheduler        Order Questions      Question Answer Comment     Procedure name(s) - multi select Hysteroscopy with MyoSure      Reason for procedure thickened endometrium, submycous myoma      Is this a multi surgeon case? No      Laterality N/A      Request for additional equipment Other (see comments) None     Anesthesia General      Initiate Pre-op orders for above procedure: Yes, as ordered in Epic Additional orders noted there also     Location of Case: [facility] OR      Patient Class (for admit prior to surgery, specify number of days in comments): Same day (hospital outpatient)      Why can t this outpatient surgery be done at the [facility] or  ASC? na      Post-Op Appointment 2 weeks      Vendor Needed? No

## 2018-03-22 NOTE — PROGRESS NOTES
SUBJECTIVE:                                                   Fouzia Arenas is a 52 year old female who presents to clinic today for the following health issue(s):  Patient presents with:  Consult: Had ultrasound at [facility], dx w/ fibroid. Discuss         HPI:  Patient is seen for complaints of abnormal bleeding and cramping.  Patient states that she has approximately 4 years menopausal.  She recently started having bleeding and persistent abdominal cramping.  Ultrasound examination showed an endometrial thickness of 10+ mm and 2 small fibroids 1 of which appeared to be probable submucosal.    No LMP recorded. Patient is postmenopausal..   Patient is not sexually active, .  Using menopause for contraception.    reports that she has never smoked. She has never used smokeless tobacco.    STD testing offered?  Declined    Health maintenance updated:  yes    Today's PHQ-2 Score:   PHQ-2 (  [employer]) 2017   Q1: Little interest or pleasure in doing things 0   Q2: Feeling down, depressed or hopeless 0   PHQ-2 Score 0     Today's PHQ-9 Score:   PHQ-9 SCORE 3/22/2018   Total Score 2     Today's CHANNING-7 Score:   CHANNING-7 SCORE 3/22/2018   Total Score 1       Problem list and histories reviewed & adjusted, as indicated.  Additional history: as documented.    Patient Active Problem List   Diagnosis     Esophageal reflux     CARDIOVASCULAR SCREENING; LDL GOAL LESS THAN 160     Hypertension, goal below 140/90     Palpitations     STORMY (obstructive sleep apnea)     Atypical chest pain     BPPV (benign paroxysmal positional vertigo)     Other disorder of menstruation and other abnormal bleeding from female genital tract     Morbid obesity (H)     Past Surgical History:   Procedure Laterality Date     CHOLECYSTECTOMY, LAPOROSCOPIC      Cholecystectomy, Laparoscopic     COLONOSCOPY N/A 2014    Procedure: COLONOSCOPY;  Surgeon: Wenceslao Galo MD;  Location:  GI     D & C      X2-3 for abnormal  bleeding     ESOPHAGOSCOPY, GASTROSCOPY, DUODENOSCOPY (EGD), COMBINED N/A 11/25/2014    Procedure: COMBINED ESOPHAGOSCOPY, GASTROSCOPY, DUODENOSCOPY (EGD), BIOPSY SINGLE OR MULTIPLE;  Surgeon: Wenceslao Galo MD;  Location: [facility] GI     LAPAROSCOPY      For endometriosis      Social History   Substance Use Topics     Smoking status: Never Smoker     Smokeless tobacco: Never Used     Alcohol use No      Problem (# of Occurrences) Relation (Name,Age of Onset)    Breast Cancer (1) Maternal Aunt    C.A.D. (2) Maternal Grandmother, Paternal Grandfather    CANCER (1) Maternal Grandfather: lung    CEREBROVASCULAR DISEASE (1) Paternal Grandmother    Cancer - colorectal (1) Other: cousin maternal     Colon Cancer (2) Cousin, Cousin    Gallbladder Disease (1) Maternal Grandmother    Hypertension (3) Mother, Father, Maternal Grandmother    Lipids (2) Mother, Father    Prostate Cancer (1) Father       Negative family history of: DIABETES            Current Outpatient Prescriptions   Medication Sig     misoprostol (CYTOTEC) 200 MCG tablet Take 1 tablet (200 mcg) by mouth once for 1 dose     spironolactone (ALDACTONE) 25 MG tablet Take 1 tablet (25 mg) by mouth daily     Ibuprofen (ADVIL PO) Take 200 mg by mouth as needed for moderate pain     lisinopril (PRINIVIL/ZESTRIL) 20 MG tablet Take 1 tablet (20 mg) by mouth 2 times daily     Nebivolol HCl (BYSTOLIC) 20 MG TABS Take 20 mg by mouth daily     order for DME DREAMSTATION  9-12 CM/H20  FF MIRAGE QUATTRO     acetaminophen-caffeine (EXCEDRIN TENSION HEADACHE) 500-65 MG TABS Take 2 tablets by mouth every 6 hours as needed for mild pain     No current facility-administered medications for this visit.      Allergies   Allergen Reactions     Amoxicillin Nausea and Vomiting     Percocet [Oxycodone-Acetaminophen] Nausea and Vomiting       ROS:  12 point review of systems negative other than symptoms noted below.  Respiratory: Cough  Gastrointestinal: Abdominal Pain  Genitourinary:  Cramps, Hot Flashes, Incontinence, Night Sweats, Spotting and Urgency  Neurologic: Dizziness and Headaches    OBJECTIVE:     /74  Pulse 74  Ht 6' (1.829 m)  Wt 322 lb (146.1 kg)  BMI 43.67 kg/m2  Body mass index is 43.67 kg/(m^2).    Exam:  Constitutional:  Appearance: Well nourished, well developed alert, in no acute distress  Chest:  Respiratory Effort:  Breathing unlabored  Cardiovascular: no edema  Skin:General Inspection:  No rashes present, no lesions present, no areas of discoloration; Genitalia and Groin:  No rashes present, no lesions present, no areas of discoloration, no masses present.  Neurologic/Psychiatric:  Mental Status:  Oriented X3   No Pelvic Exam performed     In-Clinic Test Results:    CLINICAL INFORMATION      Indications for ultrasound: Post menopausal bleeding      LMP: post menopausal 5+ years    Hormones: none      Measurements:  Uterus: 6.4 x 4.3 x 4.4 cm.     Position is retroverted.  Contour is irreg w myomata:   1) mid 1.7 x 1.0 cm, 2) fundal 1.2 x 0.7 cm.      Endo cav: 10.7 mm         Prominent and Distorted  Cervix: Wnl      Right ovary: 1.6 x 1.2 x 1.6 cm.   Wnl  Left ovary: Nv      Cul de sac: no free fluid      ===================================  Complete pelvic ultrasound utilizing both abdominal and vaginal transducers. Small myomas, affecting cavity of uterus. Abnormally thickened endometrial lining , recommend endometrial sampling , rule out hyperplasia, neoplasm.      LILIAN RONDON M.D.    ASSESSMENT/PLAN:                                                        ICD-10-CM    1. Cervical stenosis (uterine cervix) N88.2 misoprostol (CYTOTEC) 200 MCG tablet   2. Thickened endometrium R93.8 Mohini-Operative Worksheet GYN   3. Submucous myoma of uterus D25.0 Mohini-Operative Worksheet GYN           She will be scheduled for hysteroscopy with mild Essure as an outpatient at [facility].    Urban Elena MD  [facility]

## 2018-03-22 NOTE — MR AVS SNAPSHOT
After Visit Summary   3/22/2018    Fouzia Arenas    MRN: [id number]           Patient Information     Date Of Birth          1965        Visit Information        Provider Department      3/22/2018 9:15 AM Urban Elena MD [facility]        Today's Diagnoses     Cervical stenosis (uterine cervix)    -  1    Thickened endometrium        Submucous myoma of uterus           Follow-ups after your visit        Your next 10 appointments already scheduled     Mar 26, 2018 11:00 AM CDT   Pre-Op physical with Ruthie Phipps PA-C   [facility] ([facility])    [address] [address] [phone]            Mar 29, 2018   Procedure with Urban Elena MD   [facility] (--)    [address]  [facility] [address]   [phone]            Mar 29, 2018  9:00 AM CDT   Office Visit with Richelle Suarez,    [facility] ([facility])    [address] [address] [phone]           Bring a current list of meds and any records pertaining to this visit. For Physicals, please bring immunization records and any forms needing to be filled out. Please arrive 10 minutes early to complete paperwork.            Mar 29, 2018  9:30 AM CDT   LAB with SV LAB   [facility] ([facility])    [address] [id number] [phone]           Please do not eat 10-12 hours before your appointment if you are coming in fasting for labs on lipids, cholesterol, or glucose (sugar). This does not apply to pregnant women. Water, hot tea and black coffee (with nothing added) are okay. Do not drink other fluids, diet soda or chew gum.              Who to contact     If you have questions or need follow up information about today's clinic visit or your schedule please contact [facility] directly at  "[phone].  Normal or non-critical lab and imaging results will be communicated to you by MyChart, letter or phone within 4 business days after the clinic has received the results. If you do not hear from us within 7 days, please contact the clinic through [employer] or phone. If you have a critical or abnormal lab result, we will notify you by phone as soon as possible.  Submit refill requests through [employer] or call your pharmacy and they will forward the refill request to us. Please allow 3 business days for your refill to be completed.          Additional Information About Your Visit        [employer] Information     [employer] lets you send messages to your doctor, view your test results, renew your prescriptions, schedule appointments and more. To sign up, go to [email or web address] . Click on \"Log in\" on the left side of the screen, which will take you to the Welcome page. Then click on \"Sign up Now\" on the right side of the page.     You will be asked to enter the access code listed below, as well as some personal information. Please follow the directions to create your username and password.     Your access code is: [email or web address]  Expires: 2018  9:49 AM     Your access code will  in 90 days. If you need help or a new code, please call your [facility] clinic or [phone].        Care EveryWhere ID     This is your Care EveryWhere ID. This could be used by other organizations to access your [facility] medical records  [id number]        Your Vitals Were     Pulse Height BMI (Body Mass Index)             74 6' (1.829 m) 43.67 kg/m2          Blood Pressure from Last 3 Encounters:   18 116/74   18 118/84   17 (!) 122/92    Weight from Last 3 Encounters:   18 322 lb (146.1 kg)   18 (!) 321 lb (145.6 kg)   17 300 lb (136.1 kg)              We Performed the Following     Mohini-Operative Worksheet GYN          Today's Medication Changes          These changes are accurate as " of 3/22/18 10:29 AM.  If you have any questions, ask your nurse or doctor.               Start taking these medicines.        Dose/Directions    misoprostol 200 MCG tablet   Commonly known as:  CYTOTEC   Used for:  Cervical stenosis (uterine cervix)   Started by:  Urban Elena MD        Dose:  200 mcg   Take 1 tablet (200 mcg) by mouth once for 1 dose   Quantity:  2 tablet   Refills:  0            Where to get your medicines      These medications were sent to [facility] [address] [address] AT [facility] [address] [address], [facility] [address]    Hours:  24-hours Phone:  [phone]     misoprostol 200 MCG tablet                Primary Care Provider Office Phone # Fax #    Karen Weiler, [phone] [address]  [place] [address]        Equal Access to Services     [facility] AH: Hadii hudson ku hadasho Soomaali, waaxda luqadaha, qaybta kaalmada adeegyada, waxay krishanin haypamelan darya gerber . So [facility] [phone].    ATENCIÓN: Si habla español, tiene a hay disposición servicios gratuitos de asistencia lingüística. Genny al [phone].    We comply with applicable federal civil rights laws and [place] laws. We do not discriminate on the basis of race, color, national origin, age, disability, sex, sexual orientation, or gender identity.            Thank you!     Thank you for choosing [facility]  for your care. Our goal is always to provide you with excellent care. Hearing back from our patients is one way we can continue to improve our services. Please take a few minutes to complete the written survey that you may receive in the mail after your visit with us. Thank you!             Your Updated Medication List - Protect others around you: Learn how to safely use, store and throw away your medicines at www.disposemymeds.org.          This list is accurate as of 3/22/18 10:29 AM.  Always use your most  recent med list.                   Brand Name Dispense Instructions for use Diagnosis    acetaminophen-caffeine 500-65 MG Tabs    EXCEDRIN TENSION HEADACHE     Take 2 tablets by mouth every 6 hours as needed for mild pain        ADVIL PO      Take 200 mg by mouth as needed for moderate pain        lisinopril 20 MG tablet    PRINIVIL/ZESTRIL    60 tablet    Take 1 tablet (20 mg) by mouth 2 times daily    HTN (hypertension)       misoprostol 200 MCG tablet    CYTOTEC    2 tablet    Take 1 tablet (200 mcg) by mouth once for 1 dose    Cervical stenosis (uterine cervix)       Nebivolol HCl 20 MG Tabs    BYSTOLIC    90 tablet    Take 20 mg by mouth daily    Benign essential hypertension       order for DME      DREAMSTATION 9-12 CM/H20 FF MIRAGE QUATTRO        spironolactone 25 MG tablet    ALDACTONE    90 tablet    Take 1 tablet (25 mg) by mouth daily    Benign essential hypertension

## 2018-03-23 ASSESSMENT — ANXIETY QUESTIONNAIRES: GAD7 TOTAL SCORE: 1

## 2018-03-23 ASSESSMENT — PATIENT HEALTH QUESTIONNAIRE - PHQ9: SUM OF ALL RESPONSES TO PHQ QUESTIONS 1-9: 2

## 2018-03-26 ENCOUNTER — OFFICE VISIT (OUTPATIENT)
Dept: FAMILY MEDICINE | Facility: CLINIC | Age: 53
End: 2018-03-26
Payer: COMMERCIAL

## 2018-03-26 VITALS
DIASTOLIC BLOOD PRESSURE: 88 MMHG | HEART RATE: 58 BPM | WEIGHT: 293 LBS | HEIGHT: 72 IN | BODY MASS INDEX: 39.68 KG/M2 | TEMPERATURE: 98.5 F | SYSTOLIC BLOOD PRESSURE: 128 MMHG | OXYGEN SATURATION: 97 %

## 2018-03-26 DIAGNOSIS — I10 HYPERTENSION GOAL BP (BLOOD PRESSURE) < 130/80: ICD-10-CM

## 2018-03-26 DIAGNOSIS — Z01.818 PREOP GENERAL PHYSICAL EXAM: Primary | ICD-10-CM

## 2018-03-26 DIAGNOSIS — E66.01 MORBID OBESITY (H): ICD-10-CM

## 2018-03-26 DIAGNOSIS — R42 DIZZINESS: ICD-10-CM

## 2018-03-26 DIAGNOSIS — R00.1 SINUS BRADYCARDIA: ICD-10-CM

## 2018-03-26 DIAGNOSIS — G47.33 OSA (OBSTRUCTIVE SLEEP APNEA): ICD-10-CM

## 2018-03-26 LAB
ANION GAP SERPL CALCULATED.3IONS-SCNC: 4 MMOL/L (ref 3–14)
BUN SERPL-MCNC: 10 MG/DL (ref 7–30)
CALCIUM SERPL-MCNC: 8.4 MG/DL (ref 8.5–10.1)
CHLORIDE SERPL-SCNC: 108 MMOL/L (ref 94–109)
CO2 SERPL-SCNC: 29 MMOL/L (ref 20–32)
CREAT SERPL-MCNC: 0.78 MG/DL (ref 0.52–1.04)
ERYTHROCYTE [DISTWIDTH] IN BLOOD BY AUTOMATED COUNT: 12.7 % (ref 10–15)
GFR SERPL CREATININE-BSD FRML MDRD: 78 ML/MIN/1.7M2
GLUCOSE SERPL-MCNC: 92 MG/DL (ref 70–99)
HCT VFR BLD AUTO: 42.2 % (ref 35–47)
HGB BLD-MCNC: 13.9 G/DL (ref 11.7–15.7)
MCH RBC QN AUTO: 28.2 PG (ref 26.5–33)
MCHC RBC AUTO-ENTMCNC: 32.9 G/DL (ref 31.5–36.5)
MCV RBC AUTO: 86 FL (ref 78–100)
PLATELET # BLD AUTO: 233 10E9/L (ref 150–450)
POTASSIUM SERPL-SCNC: 3.9 MMOL/L (ref 3.4–5.3)
RBC # BLD AUTO: 4.93 10E12/L (ref 3.8–5.2)
SODIUM SERPL-SCNC: 141 MMOL/L (ref 133–144)
WBC # BLD AUTO: 6.5 10E9/L (ref 4–11)

## 2018-03-26 PROCEDURE — 80048 BASIC METABOLIC PNL TOTAL CA: CPT | Performed by: PHYSICIAN ASSISTANT

## 2018-03-26 PROCEDURE — 36415 COLL VENOUS BLD VENIPUNCTURE: CPT | Performed by: PHYSICIAN ASSISTANT

## 2018-03-26 PROCEDURE — 93000 ELECTROCARDIOGRAM COMPLETE: CPT | Performed by: PHYSICIAN ASSISTANT

## 2018-03-26 PROCEDURE — 99215 OFFICE O/P EST HI 40 MIN: CPT | Performed by: PHYSICIAN ASSISTANT

## 2018-03-26 PROCEDURE — 85027 COMPLETE CBC AUTOMATED: CPT | Performed by: PHYSICIAN ASSISTANT

## 2018-03-26 RX ORDER — NEBIVOLOL 10 MG/1
10 TABLET ORAL DAILY
Qty: 90 TABLET | Refills: 3 | Status: SHIPPED | OUTPATIENT
Start: 2018-03-26 | End: 2018-11-29

## 2018-03-26 NOTE — MR AVS SNAPSHOT
After Visit Summary   3/26/2018    Fouzia Arenas    MRN: [id number]           Patient Information     Date Of Birth          1965        Visit Information        Provider Department      3/26/2018 11:00 AM Ruthie Phipps PA-C [facility] [place]        Today's Diagnoses     Preop general physical exam    -  1    Hypertension goal BP (blood pressure) < 130/80        Morbid obesity (H)        STORMY (obstructive sleep apnea)        Dizziness        Sinus bradycardia          Care Instructions      Before Your Surgery      Call your surgeon if there is any change in your health. This includes signs of a cold or flu (such as a sore throat, runny nose, cough, rash or fever).    Do not smoke, drink alcohol or take over the counter medicine (unless your surgeon or primary care doctor tells you to) for the 24 hours before and after surgery.    If you take prescribed drugs: Follow your doctor s orders about which medicines to take and which to stop until after surgery.    Eating and drinking prior to surgery: follow the instructions from your surgeon    Take a shower or bath the night before surgery. Use the soap your surgeon gave you to gently clean your skin. If you do not have soap from your surgeon, use your regular soap. Do not shave or scrub the surgery site.  Wear clean pajamas and have clean sheets on your bed.           Follow-ups after your visit        Your next 10 appointments already scheduled     Mar 29, 2018   Procedure with Ubran Elena MD   [facility] (--)    [address]  [facility] [id number] [phone]              Who to contact     If you have questions or need follow up information about today's clinic visit or your schedule please contact [facility] [place] directly at [phone].  Normal or non-critical lab and imaging results will be communicated to you by MyChart, letter or phone within 4 business days after  "the clinic has received the results. If you do not hear from us within 7 days, please contact the clinic through Neighborhood[facility] or phone. If you have a critical or abnormal lab result, we will notify you by phone as soon as possible.  Submit refill requests through [facility]ighborhoods or call your pharmacy and they will forward the refill request to us. Please allow 3 business days for your refill to be completed.          Additional Information About Your Visit        Neighborhoods Information     Neighborhoods lets you send messages to your doctor, view your test results, renew your prescriptions, schedule appointments and more. To sign up, go to [email or web address] . Click on \"Log in\" on the left side of the screen, which will take you to the Welcome page. Then click on \"Sign up Now\" on the right side of the page.     You will be asked to enter the access code listed below, as well as some personal information. Please follow the directions to create your username and password.     Your access code is: [email or web address]  Expires: 2018  9:49 AM     Your access code will  in 90 days. If you need help or a new code, please call your [facility] clinic or [phone].        Care EveryWhere ID     This is your Care EveryWhere ID. This could be used by other organizations to access your [facility] medical records  [id number]        Your Vitals Were     Pulse Temperature Height Pulse Oximetry Breastfeeding? BMI (Body Mass Index)    58 98.5  F (36.9  C) (Oral) 6' (1.829 m) 97% No 43.94 kg/m2       Blood Pressure from Last 3 Encounters:   18 128/88   18 116/74   18 118/84    Weight from Last 3 Encounters:   18 324 lb (147 kg)   18 322 lb (146.1 kg)   18 (!) 321 lb (145.6 kg)              We Performed the Following     Basic metabolic panel  (Ca, Cl, CO2, Creat, Gluc, K, Na, BUN)     CBC with platelets     EKG 12-lead complete w/read - Clinics          Today's Medication Changes          These changes are " accurate as of 3/26/18 11:59 PM.  If you have any questions, ask your nurse or doctor.               These medicines have changed or have updated prescriptions.        Dose/Directions    * Nebivolol HCl 20 MG Tabs   Commonly known as:  BYSTOLIC   This may have changed:  Another medication with the same name was added. Make sure you understand how and when to take each.   Used for:  Benign essential hypertension   Changed by:  Ruthie Phipps PA-C        Dose:  20 mg   Take 20 mg by mouth daily   Quantity:  90 tablet   Refills:  3       * nebivolol 10 MG tablet   Commonly known as:  BYSTOLIC   This may have changed:  You were already taking a medication with the same name, and this prescription was added. Make sure you understand how and when to take each.   Used for:  Hypertension goal BP (blood pressure) < 130/80   Changed by:  Ruthie Phipps PA-C        Dose:  10 mg   Take 1 tablet (10 mg) by mouth daily   Quantity:  90 tablet   Refills:  3       * Notice:  This list has 2 medication(s) that are the same as other medications prescribed for you. Read the directions carefully, and ask your doctor or other care provider to review them with you.         Where to get your medicines      These medications were sent to [facility] [address] [place] [address] AT [address] [address]    Hours:  24-hours Phone:  [phone]     nebivolol 10 MG tablet                Primary Care Provider Office Phone # Fax #    Karen Weiler, [phone] [address]  [place] [address]        Equal Access to Services     [facility] AH: Hadii aad ku hadasho Soomaali, waaxda luqadaha, qaybta kaalmada adeegyada, lili bahena. So [facility] [phone].    ATENCIÓN: Si habla español, tiene a hay disposición servicios gratuitos de asistencia lingüística. Llame al [phone].    We comply with  applicable federal civil rights laws and [place] laws. We do not discriminate on the basis of race, color, national origin, age, disability, sex, sexual orientation, or gender identity.            Thank you!     Thank you for choosing [facility]  for your care. Our goal is always to provide you with excellent care. Hearing back from our patients is one way we can continue to improve our services. Please take a few minutes to complete the written survey that you may receive in the mail after your visit with us. Thank you!             Your Updated Medication List - Protect others around you: Learn how to safely use, store and throw away your medicines at www.disposemymeds.org.          This list is accurate as of 3/26/18 11:59 PM.  Always use your most recent med list.                   Brand Name Dispense Instructions for use Diagnosis    acetaminophen-caffeine 500-65 MG Tabs    EXCEDRIN TENSION HEADACHE     Take 2 tablets by mouth every 6 hours as needed for mild pain        ADVIL PO      Take 200 mg by mouth as needed for moderate pain        lisinopril 20 MG tablet    PRINIVIL/ZESTRIL    60 tablet    Take 1 tablet (20 mg) by mouth 2 times daily    HTN (hypertension)       * Nebivolol HCl 20 MG Tabs    BYSTOLIC    90 tablet    Take 20 mg by mouth daily    Benign essential hypertension       * nebivolol 10 MG tablet    BYSTOLIC    90 tablet    Take 1 tablet (10 mg) by mouth daily    Hypertension goal BP (blood pressure) < 130/80       order for DME      DREAMSTATION 9-12 CM/H20 FF MIRAGE QUATTRO        spironolactone 25 MG tablet    ALDACTONE    90 tablet    Take 1 tablet (25 mg) by mouth daily    Benign essential hypertension       * Notice:  This list has 2 medication(s) that are the same as other medications prescribed for you. Read the directions carefully, and ask your doctor or other care provider to review them with you.

## 2018-03-26 NOTE — PROGRESS NOTES
"  [facility]  [address]  [facility] [address] [phone]  Dept: [phone]    PRE-OP EVALUATION:  Today's date: 3/26/2018    Fouzia Arenas (: 1965) presents for pre-operative evaluation assessment as requested by  .  She requires evaluation and anesthesia risk assessment prior to undergoing surgery/procedure for treatment of submucosal myoma of uterus.    Proposed Surgery/ Procedure: OPERATIVE HYSTEROSCOPY WITH MORCELLATOR (MYOSURE)  Date of Surgery/ Procedure: 3/29/2018  Time of Surgery/ Procedure: 6:00 am  Hospital/Surgical Facility: [facility]  Fax number for surgical facility:   Primary Physician: Weiler, Karen  Type of Anesthesia Anticipated: General    Patient has a Health Care Directive or Living Will:  NO    1. NO - Do you have a history of heart attack, stroke, stent, bypass or surgery on an artery in the head, neck, heart or legs?  2. NO - Do you ever have any pain or discomfort in your chest?  3. NO - Do you have a history of  Heart Failure?  4. NO - Are you troubled by shortness of breath when: walking on the level, up a slight hill or at night?  5. NO - Do you currently have a cold, bronchitis or other respiratory infection?  6. YES - Do you have a cough, shortness of breath or wheezing? \"I always have a tickle in my throat\" - usually occurs only if she talks to much or if lies down to sleep. Onset for \"forever.\" This was present ever before she started lisinopril. Did trial acid reducer for a period of time and isn't sure if that ever made a difference. Last this was completed was 20 years. Has known hx of GERD.  7. NO - Do you sometimes get pains in the calves of your legs when you walk?  8. NO - Do you or anyone in your family have previous history of blood clots?  9. NO - Do you or does anyone in your family have a serious bleeding problem such as prolonged bleeding following surgeries or cuts?  10. NO - Have you ever had problems with " anemia or been told to take iron pills?  11. NO - Have you had any abnormal blood loss such as black, tarry or bloody stools, or abnormal vaginal bleeding?  12. NO - Have you ever had a blood transfusion?  13. YES - Have you or any of your relatives ever had problems with anesthesia? Pt reports she develops N/V after receiving anesthesia.   14. YES - Do you have sleep apnea, excessive snoring or daytime drowsiness? STORMY- wears CPAP faithfully.  15. NO - Do you have any prosthetic heart valves?  16. NO - Do you have prosthetic joints?  17. NO - Is there any chance that you may be pregnant?  Phyllis Pascal MA          HPI:     HPI related to upcoming procedure: Fouzia is a 51 yo female here today for pre-op. Seen by me for routine physical in beginning of February with c/o intermittent post-menopausal bleeding. Continues to have this sporadically - estimates a couple times per week. Was evaluated by OB/GYN and found to have fibroids and thickened endometrial lining. Now pursuing operative hysteroscopy with morcellator for further evaluation.       See problem list for active medical problems.  Problems all longstanding and stable, except as noted/documented.  See ROS for pertinent symptoms related to these conditions.                                                                                                      MEDICAL HISTORY:     Patient Active Problem List    Diagnosis Date Noted     Hypertension goal BP (blood pressure) < 130/80 03/26/2018     Priority: Medium     Morbid obesity (H) 02/06/2018     Priority: Medium     Atypical chest pain 04/05/2017     Priority: Medium     STORMY (obstructive sleep apnea) 09/30/2016     Priority: Medium     Palpitations      Priority: Medium     Esophageal reflux 12/07/2013     Priority: Medium     CARDIOVASCULAR SCREENING; LDL GOAL LESS THAN 160 12/07/2013     Priority: Medium     BPPV (benign paroxysmal positional vertigo) 09/12/2012     Priority: Medium     Other disorder  of menstruation and other abnormal bleeding from female genital tract 02/24/2011     Priority: Medium      Past Medical History:   Diagnosis Date     Classic migraine      Hypertension      Palpitations      SVT (supraventricular tachycardia) (H) 9/2015     Past Surgical History:   Procedure Laterality Date     CHOLECYSTECTOMY, LAPOROSCOPIC      Cholecystectomy, Laparoscopic     COLONOSCOPY N/A 11/25/2014    Procedure: COLONOSCOPY;  Surgeon: Wenceslao Galo MD;  Location:  GI     D & C      X2-3 for abnormal bleeding     ESOPHAGOSCOPY, GASTROSCOPY, DUODENOSCOPY (EGD), COMBINED N/A 11/25/2014    Procedure: COMBINED ESOPHAGOSCOPY, GASTROSCOPY, DUODENOSCOPY (EGD), BIOPSY SINGLE OR MULTIPLE;  Surgeon: Wenceslao Galo MD;  Location: [facility]     LAPAROSCOPY      For endometriosis     Current Outpatient Prescriptions   Medication Sig Dispense Refill     nebivolol (BYSTOLIC) 10 MG tablet Take 1 tablet (10 mg) by mouth daily 90 tablet 3     lisinopril (PRINIVIL/ZESTRIL) 20 MG tablet Take 1 tablet (20 mg) by mouth 2 times daily 60 tablet 11     Nebivolol HCl (BYSTOLIC) 20 MG TABS Take 20 mg by mouth daily 90 tablet 3     spironolactone (ALDACTONE) 25 MG tablet Take 1 tablet (25 mg) by mouth daily 90 tablet 3     Ibuprofen (ADVIL PO) Take 200 mg by mouth as needed for moderate pain       order for DME DREAMSTATION  9-12 CM/H20  FF MIRAGE QUATTRO       acetaminophen-caffeine (EXCEDRIN TENSION HEADACHE) 500-65 MG TABS Take 2 tablets by mouth every 6 hours as needed for mild pain       OTC products: none    Allergies   Allergen Reactions     Amoxicillin Nausea and Vomiting     Percocet [Oxycodone-Acetaminophen] Nausea and Vomiting      Latex Allergy: NO    Social History   Substance Use Topics     Smoking status: Never Smoker     Smokeless tobacco: Never Used     Alcohol use No     History   Drug Use No       REVIEW OF SYSTEMS:   CONSTITUTIONAL: NEGATIVE for fever, chills, change in weight  INTEGUMENTARY/SKIN: NEGATIVE  "for worrisome rashes, moles or lesions  EYES: NEGATIVE for vision changes or irritation  ENT/MOUTH: NEGATIVE for ear, mouth and throat problems  RESP: NEGATIVE for significant cough or SOB  CV: NEGATIVE for chest pain, palpitations or peripheral edema  GI: NEGATIVE for nausea, abdominal pain, heartburn, or change in bowel habits  : + for abdominal cramping with intermittent vaginal bleeding. NEGATIVE for frequency, dysuria, or hematuria  MUSCULOSKELETAL: NEGATIVE for significant arthralgias or myalgia  NEURO: + for feeling a little \"woozy\" when sitting or standing. No syncope. Just feels like to room is moving around her. Onset daily for the past 2 weeks, but started a few months ago. Unsure if wooziness is worse on days when she has bad cramps. Lasts a few seconds and resolves on own. No CP, SOB or diaphoresis. None currently. Hx of vertigo - seems more reminiscent of this.   In the past she has had a low K+ - reports was worked up for feeling syncopal and found to have hypokalemia then so will eat a banana if ever feels her K+ is low.   Also reports she is known to have \"stress panic attacks\" so isn't sure if it's related to anxiety as well.   Tried prozac in the past, but felt better off it than on it.   Hx of palpitations - started about 3-4 yrs ago. Had multiple EKGs, prior NM stress test and holter monitor for this. This showed NSR and/or premature beats. Was put on bystolic at that time.    NEGATIVE for weakness or paresthesias  ENDOCRINE: NEGATIVE for temperature intolerance, skin/hair changes  HEME: NEGATIVE for bleeding problems  PSYCHIATRIC: NEGATIVE for changes in mood or affect    EXAM:   /88 (BP Location: Right arm, Cuff Size: Adult Large)  Pulse 58  Temp 98.5  F (36.9  C) (Oral)  Ht 6' (1.829 m)  Wt 324 lb (147 kg)  SpO2 97%  Breastfeeding? No  BMI 43.94 kg/m2    GENERAL APPEARANCE: healthy, alert and no distress     EYES: EOMI, PERRL     HENT: ear canals and TM's normal and nose and " mouth without ulcers or lesions     NECK: no adenopathy, no asymmetry, masses.     RESP: lungs clear to auscultation - no rales, rhonchi or wheezes     CV: bradycardia, regular rhythm. No murmur, rub or gallops.     ABDOMEN:  soft, nontender, no HSM or masses and bowel sounds normal     MS: extremities normal- no gross deformities noted, no evidence of inflammation in joints, FROM in all extremities.     SKIN: no suspicious lesions or rashes     NEURO: Normal strength and tone, sensory exam grossly normal, mentation intact and speech normal     PSYCH: mentation appears normal. and affect normal/bright     LYMPHATICS: No cervical adenopathy    DIAGNOSTICS:   EKG: personal reading shows sinus bradycardia, non-specific ST depression and T-abnormality is noted in anterolateral leads. Prior EKG review shows this has been present previously from EKG in 9/2017.  Pt did under go NM stress testing 5/2017 which no ischemia or infarct from test in 2014. Study was considered limited though by pt's body habitus.    Recent Labs   Lab Test  02/06/18   0854  09/28/17   0420  05/23/17   1400   HGB  13.8  14.4   --    PLT  234  185   --    NA   --   140  139   POTASSIUM   --   3.7  4.1   CR   --   0.84  0.82   A1C  5.6   --    --       Results for orders placed or performed in visit on 03/26/18   CBC with platelets   Result Value Ref Range    WBC 6.5 4.0 - 11.0 10e9/L    RBC Count 4.93 3.8 - 5.2 10e12/L    Hemoglobin 13.9 11.7 - 15.7 g/dL    Hematocrit 42.2 35.0 - 47.0 %    MCV 86 78 - 100 fl    MCH 28.2 26.5 - 33.0 pg    MCHC 32.9 31.5 - 36.5 g/dL    RDW 12.7 10.0 - 15.0 %    Platelet Count 233 150 - 450 10e9/L   Basic metabolic panel  (Ca, Cl, CO2, Creat, Gluc, K, Na, BUN)   Result Value Ref Range    Sodium 141 133 - 144 mmol/L    Potassium 3.9 3.4 - 5.3 mmol/L    Chloride 108 94 - 109 mmol/L    Carbon Dioxide 29 20 - 32 mmol/L    Anion Gap 4 3 - 14 mmol/L    Glucose 92 70 - 99 mg/dL    Urea Nitrogen 10 7 - 30 mg/dL    Creatinine  0.78 0.52 - 1.04 mg/dL    GFR Estimate 78 >60 mL/min/1.7m2    GFR Estimate If Black >90 >60 mL/min/1.7m2    Calcium 8.4 (L) 8.5 - 10.1 mg/dL       IMPRESSION:   Reason for surgery/procedure: submucosal myoma of uterus/OPERATIVE HYSTEROSCOPY WITH MORCELLATOR (MYOSURE)  Diagnosis/reason for consult: pre-op, HTN, STORMY, morbid obesity, dizziness    The proposed surgical procedure is considered INTERMEDIATE risk.    REVISED CARDIAC RISK INDEX  The patient has the following serious cardiovascular risks for perioperative complications such as (MI, PE, VFib and 3  AV Block):  No serious cardiac risks  INTERPRETATION: 0 risks: Class I (very low risk - 0.4% complication rate)    The patient has the following additional risks for perioperative complications:  No identified additional risks      ICD-10-CM    1. Preop general physical exam Z01.818 EKG 12-lead complete w/read - Clinics     CBC with platelets     Basic metabolic panel  (Ca, Cl, CO2, Creat, Gluc, K, Na, BUN)   2. Hypertension goal BP (blood pressure) < 130/80 I10 Basic metabolic panel  (Ca, Cl, CO2, Creat, Gluc, K, Na, BUN)     nebivolol (BYSTOLIC) 10 MG tablet   3. Morbid obesity (H) E66.01    4. STORMY (obstructive sleep apnea) G47.33    5. Dizziness R42 EKG 12-lead complete w/read - Clinics     CBC with platelets     Basic metabolic panel  (Ca, Cl, CO2, Creat, Gluc, K, Na, BUN)   6. Sinus bradycardia R00.1        RECOMMENDATIONS:       Obstructive Sleep Apnea (or suspected sleep apnea)  Patient is clearly advised to use their home CPAP when released from surgery      --Patient is to take all scheduled medications on the day of surgery EXCEPT for modifications listed below.    ACE Inhibitor or Angiotensin Receptor Blocker (ARB) Use  Ace inhibitor or Angiotensin Receptor Blocker (ARB) and should HOLD this medication for the 24 hours prior to surgery.    Pt also reported having intermittent dizziness over the past few months with sinus bradycardia on EKG. Suspect this  is secondary to bystolic and advised she decrease dose to 10mg daily. Re-check 1-2 weeks after surgery to be sure she is doing well and that BP still optimally managed.       APPROVAL GIVEN to proceed with proposed procedure, without further diagnostic evaluation     Consulted with Dr. Wenceslao Ibarra, supervising physician, and he agrees with treatment plan.    Signed Electronically by: Ruthie Phipps PA-C      Copy of this evaluation report is provided to requesting physician.    [place] Preop Guidelines

## 2018-03-26 NOTE — NURSING NOTE
Chief Complaint   Patient presents with     Pre-Op Exam       Initial /88 (BP Location: Right arm, Cuff Size: Adult Large)  Pulse 58  Temp 98.5  F (36.9  C) (Oral)  Ht 6' (1.829 m)  Wt 324 lb (147 kg)  SpO2 97%  Breastfeeding? No  BMI 43.94 kg/m2 Estimated body mass index is 43.94 kg/(m^2) as calculated from the following:    Height as of this encounter: 6' (1.829 m).    Weight as of this encounter: 324 lb (147 kg).  Medication Reconciliation: complete

## 2018-03-28 ENCOUNTER — ANESTHESIA EVENT (OUTPATIENT)
Dept: SURGERY | Facility: CLINIC | Age: 53
End: 2018-03-28
Payer: COMMERCIAL

## 2018-03-28 ASSESSMENT — ENCOUNTER SYMPTOMS: DYSRHYTHMIAS: 1

## 2018-03-28 NOTE — ANESTHESIA PREPROCEDURE EVALUATION
Anesthesia Evaluation     . Pt has had prior anesthetic. Type: General           ROS/MED HX    ENT/Pulmonary:     (+)sleep apnea, , . .    Neurologic: Comment: vertigo    (+)migraines,     Cardiovascular:     (+) hypertension----. : . . . :. dysrhythmias Other, Irregular Heartbeat/Palpitations, .       METS/Exercise Tolerance:     Hematologic:         Musculoskeletal:         GI/Hepatic:     (+) GERD Asymptomatic on medication,       Renal/Genitourinary:         Endo:     (+) Obesity, .      Psychiatric:         Infectious Disease:         Malignancy:         Other:                     Physical Exam  Normal systems: cardiovascular, pulmonary and dental    Airway   Mallampati: II  TM distance: >3 FB  Neck ROM: full    Dental     Cardiovascular   Rhythm and rate: regular and normal      Pulmonary    breath sounds clear to auscultation                    Anesthesia Plan      History & Physical Review  History and physical reviewed and following examination; no interval change.    ASA Status:  3 .    NPO Status:  > 8 hours    Plan for General and LMA with Propofol induction. Maintenance will be TIVA.    PONV prophylaxis:  Ondansetron (or other 5HT-3) and Dexamethasone or Solumedrol       Postoperative Care  Postoperative pain management:  IV analgesics and Oral pain medications.      Consents                          .

## 2018-03-28 NOTE — PROGRESS NOTES
Please call patient with the following results:    -Metabolic panel is normal/stable from previous. Ok to proceed with surgery as planned.    Electronically Signed By: Ruthie Phipps PA-C

## 2018-03-29 ENCOUNTER — HOSPITAL ENCOUNTER (OUTPATIENT)
Facility: CLINIC | Age: 53
Discharge: HOME | End: 2018-03-29
Attending: OBSTETRICS & GYNECOLOGY | Admitting: OBSTETRICS & GYNECOLOGY
Payer: COMMERCIAL

## 2018-03-29 ENCOUNTER — SURGERY (OUTPATIENT)
Age: 53
End: 2018-03-29
Payer: COMMERCIAL

## 2018-03-29 ENCOUNTER — ANESTHESIA (OUTPATIENT)
Dept: SURGERY | Facility: CLINIC | Age: 53
End: 2018-03-29
Payer: COMMERCIAL

## 2018-03-29 VITALS
HEIGHT: 72 IN | DIASTOLIC BLOOD PRESSURE: 92 MMHG | RESPIRATION RATE: 16 BRPM | SYSTOLIC BLOOD PRESSURE: 114 MMHG | BODY MASS INDEX: 39.68 KG/M2 | OXYGEN SATURATION: 97 % | TEMPERATURE: 97.5 F | WEIGHT: 293 LBS

## 2018-03-29 DIAGNOSIS — G89.18 ACUTE POST-OPERATIVE PAIN: Primary | ICD-10-CM

## 2018-03-29 PROCEDURE — 27210794 ZZH OR GENERAL SUPPLY STERILE: Performed by: OBSTETRICS & GYNECOLOGY

## 2018-03-29 PROCEDURE — 58558 HYSTEROSCOPY BIOPSY: CPT | Performed by: OBSTETRICS & GYNECOLOGY

## 2018-03-29 PROCEDURE — 71000012 ZZH RECOVERY PHASE 1 LEVEL 1 FIRST HR: Performed by: OBSTETRICS & GYNECOLOGY

## 2018-03-29 PROCEDURE — 40000170 ZZH STATISTIC PRE-PROCEDURE ASSESSMENT II: Performed by: OBSTETRICS & GYNECOLOGY

## 2018-03-29 PROCEDURE — 25000128 H RX IP 250 OP 636: Performed by: NURSE ANESTHETIST, CERTIFIED REGISTERED

## 2018-03-29 PROCEDURE — 36000056 ZZH SURGERY LEVEL 3 1ST 30 MIN: Performed by: OBSTETRICS & GYNECOLOGY

## 2018-03-29 PROCEDURE — 25000125 ZZHC RX 250: Performed by: NURSE ANESTHETIST, CERTIFIED REGISTERED

## 2018-03-29 PROCEDURE — 25800025 ZZH RX 258: Performed by: OBSTETRICS & GYNECOLOGY

## 2018-03-29 PROCEDURE — 37000009 ZZH ANESTHESIA TECHNICAL FEE, EACH ADDTL 15 MIN: Performed by: OBSTETRICS & GYNECOLOGY

## 2018-03-29 PROCEDURE — 71000027 ZZH RECOVERY PHASE 2 EACH 15 MINS: Performed by: OBSTETRICS & GYNECOLOGY

## 2018-03-29 PROCEDURE — 27210995 ZZH RX 272: Performed by: OBSTETRICS & GYNECOLOGY

## 2018-03-29 PROCEDURE — 25000128 H RX IP 250 OP 636: Performed by: OBSTETRICS & GYNECOLOGY

## 2018-03-29 PROCEDURE — 71000013 ZZH RECOVERY PHASE 1 LEVEL 1 EA ADDTL HR: Performed by: OBSTETRICS & GYNECOLOGY

## 2018-03-29 PROCEDURE — 37000008 ZZH ANESTHESIA TECHNICAL FEE, 1ST 30 MIN: Performed by: OBSTETRICS & GYNECOLOGY

## 2018-03-29 PROCEDURE — 36000058 ZZH SURGERY LEVEL 3 EA 15 ADDTL MIN: Performed by: OBSTETRICS & GYNECOLOGY

## 2018-03-29 PROCEDURE — 88305 TISSUE EXAM BY PATHOLOGIST: CPT | Mod: 26 | Performed by: OBSTETRICS & GYNECOLOGY

## 2018-03-29 PROCEDURE — 88305 TISSUE EXAM BY PATHOLOGIST: CPT | Performed by: OBSTETRICS & GYNECOLOGY

## 2018-03-29 RX ORDER — MEPERIDINE HYDROCHLORIDE 25 MG/ML
12.5 INJECTION INTRAMUSCULAR; INTRAVENOUS; SUBCUTANEOUS
Status: DISCONTINUED | OUTPATIENT
Start: 2018-03-29 | End: 2018-03-29 | Stop reason: HOSPADM

## 2018-03-29 RX ORDER — HYDROCODONE BITARTRATE AND ACETAMINOPHEN 5; 325 MG/1; MG/1
1 TABLET ORAL
Status: DISCONTINUED | OUTPATIENT
Start: 2018-03-29 | End: 2018-03-29 | Stop reason: HOSPADM

## 2018-03-29 RX ORDER — SODIUM CHLORIDE, SODIUM LACTATE, POTASSIUM CHLORIDE, CALCIUM CHLORIDE 600; 310; 30; 20 MG/100ML; MG/100ML; MG/100ML; MG/100ML
INJECTION, SOLUTION INTRAVENOUS CONTINUOUS
Status: DISCONTINUED | OUTPATIENT
Start: 2018-03-29 | End: 2018-03-29 | Stop reason: HOSPADM

## 2018-03-29 RX ORDER — CEFAZOLIN SODIUM 1 G
1 VIAL (EA) INJECTION SEE ADMIN INSTRUCTIONS
Status: DISCONTINUED | OUTPATIENT
Start: 2018-03-29 | End: 2018-03-29 | Stop reason: HOSPADM

## 2018-03-29 RX ORDER — FENTANYL CITRATE 50 UG/ML
25-50 INJECTION, SOLUTION INTRAMUSCULAR; INTRAVENOUS EVERY 5 MIN PRN
Status: DISCONTINUED | OUTPATIENT
Start: 2018-03-29 | End: 2018-03-29 | Stop reason: HOSPADM

## 2018-03-29 RX ORDER — PROPOFOL 10 MG/ML
INJECTION, EMULSION INTRAVENOUS PRN
Status: DISCONTINUED | OUTPATIENT
Start: 2018-03-29 | End: 2018-03-29

## 2018-03-29 RX ORDER — KETOROLAC TROMETHAMINE 30 MG/ML
30 INJECTION, SOLUTION INTRAMUSCULAR; INTRAVENOUS EVERY 6 HOURS PRN
Status: DISCONTINUED | OUTPATIENT
Start: 2018-03-29 | End: 2018-03-29 | Stop reason: HOSPADM

## 2018-03-29 RX ORDER — LIDOCAINE HYDROCHLORIDE 20 MG/ML
INJECTION, SOLUTION INFILTRATION; PERINEURAL PRN
Status: DISCONTINUED | OUTPATIENT
Start: 2018-03-29 | End: 2018-03-29

## 2018-03-29 RX ORDER — FENTANYL CITRATE 50 UG/ML
25-50 INJECTION, SOLUTION INTRAMUSCULAR; INTRAVENOUS
Status: DISCONTINUED | OUTPATIENT
Start: 2018-03-29 | End: 2018-03-29 | Stop reason: HOSPADM

## 2018-03-29 RX ORDER — IBUPROFEN 600 MG/1
600 TABLET, FILM COATED ORAL EVERY 6 HOURS PRN
Qty: 30 TABLET | Refills: 0 | Status: ON HOLD | OUTPATIENT
Start: 2018-03-29 | End: 2018-05-08

## 2018-03-29 RX ORDER — FENTANYL CITRATE 50 UG/ML
INJECTION, SOLUTION INTRAMUSCULAR; INTRAVENOUS PRN
Status: DISCONTINUED | OUTPATIENT
Start: 2018-03-29 | End: 2018-03-29

## 2018-03-29 RX ORDER — ONDANSETRON 2 MG/ML
4 INJECTION INTRAMUSCULAR; INTRAVENOUS EVERY 30 MIN PRN
Status: DISCONTINUED | OUTPATIENT
Start: 2018-03-29 | End: 2018-03-29 | Stop reason: HOSPADM

## 2018-03-29 RX ORDER — DEXAMETHASONE SODIUM PHOSPHATE 4 MG/ML
INJECTION, SOLUTION INTRA-ARTICULAR; INTRALESIONAL; INTRAMUSCULAR; INTRAVENOUS; SOFT TISSUE PRN
Status: DISCONTINUED | OUTPATIENT
Start: 2018-03-29 | End: 2018-03-29

## 2018-03-29 RX ORDER — NALOXONE HYDROCHLORIDE 0.4 MG/ML
.1-.4 INJECTION, SOLUTION INTRAMUSCULAR; INTRAVENOUS; SUBCUTANEOUS
Status: DISCONTINUED | OUTPATIENT
Start: 2018-03-29 | End: 2018-03-29 | Stop reason: HOSPADM

## 2018-03-29 RX ORDER — CEFAZOLIN SODIUM 1 G/50ML
3 SOLUTION INTRAVENOUS
Status: COMPLETED | OUTPATIENT
Start: 2018-03-29 | End: 2018-03-29

## 2018-03-29 RX ORDER — ONDANSETRON 4 MG/1
4 TABLET, ORALLY DISINTEGRATING ORAL EVERY 30 MIN PRN
Status: DISCONTINUED | OUTPATIENT
Start: 2018-03-29 | End: 2018-03-29 | Stop reason: HOSPADM

## 2018-03-29 RX ORDER — IBUPROFEN 600 MG/1
600 TABLET, FILM COATED ORAL
Status: DISCONTINUED | OUTPATIENT
Start: 2018-03-29 | End: 2018-03-29 | Stop reason: HOSPADM

## 2018-03-29 RX ORDER — HYDROMORPHONE HYDROCHLORIDE 1 MG/ML
.3-.5 INJECTION, SOLUTION INTRAMUSCULAR; INTRAVENOUS; SUBCUTANEOUS EVERY 10 MIN PRN
Status: DISCONTINUED | OUTPATIENT
Start: 2018-03-29 | End: 2018-03-29 | Stop reason: HOSPADM

## 2018-03-29 RX ORDER — SODIUM CHLORIDE, SODIUM LACTATE, POTASSIUM CHLORIDE, CALCIUM CHLORIDE 600; 310; 30; 20 MG/100ML; MG/100ML; MG/100ML; MG/100ML
INJECTION, SOLUTION INTRAVENOUS CONTINUOUS PRN
Status: DISCONTINUED | OUTPATIENT
Start: 2018-03-29 | End: 2018-03-29

## 2018-03-29 RX ORDER — KETOROLAC TROMETHAMINE 30 MG/ML
INJECTION, SOLUTION INTRAMUSCULAR; INTRAVENOUS PRN
Status: DISCONTINUED | OUTPATIENT
Start: 2018-03-29 | End: 2018-03-29

## 2018-03-29 RX ORDER — MAGNESIUM HYDROXIDE 1200 MG/15ML
LIQUID ORAL PRN
Status: DISCONTINUED | OUTPATIENT
Start: 2018-03-29 | End: 2018-03-29 | Stop reason: HOSPADM

## 2018-03-29 RX ORDER — PROPOFOL 10 MG/ML
INJECTION, EMULSION INTRAVENOUS CONTINUOUS PRN
Status: DISCONTINUED | OUTPATIENT
Start: 2018-03-29 | End: 2018-03-29

## 2018-03-29 RX ORDER — ONDANSETRON 2 MG/ML
INJECTION INTRAMUSCULAR; INTRAVENOUS PRN
Status: DISCONTINUED | OUTPATIENT
Start: 2018-03-29 | End: 2018-03-29

## 2018-03-29 RX ADMIN — Medication 3 G: at 07:34

## 2018-03-29 RX ADMIN — SODIUM CHLORIDE 1000 ML: 900 IRRIGANT IRRIGATION at 07:56

## 2018-03-29 RX ADMIN — PROPOFOL 250 MG: 10 INJECTION, EMULSION INTRAVENOUS at 07:29

## 2018-03-29 RX ADMIN — ONDANSETRON 4 MG: 2 INJECTION INTRAMUSCULAR; INTRAVENOUS at 07:50

## 2018-03-29 RX ADMIN — SODIUM CHLORIDE 2250 ML: 900 IRRIGANT IRRIGATION at 07:56

## 2018-03-29 RX ADMIN — LIDOCAINE HYDROCHLORIDE 100 MG: 20 INJECTION, SOLUTION INFILTRATION; PERINEURAL at 07:29

## 2018-03-29 RX ADMIN — FENTANYL CITRATE 50 MCG: 50 INJECTION, SOLUTION INTRAMUSCULAR; INTRAVENOUS at 07:29

## 2018-03-29 RX ADMIN — KETOROLAC TROMETHAMINE 30 MG: 30 INJECTION, SOLUTION INTRAMUSCULAR at 07:50

## 2018-03-29 RX ADMIN — MIDAZOLAM 2 MG: 1 INJECTION INTRAMUSCULAR; INTRAVENOUS at 07:29

## 2018-03-29 RX ADMIN — DEXAMETHASONE SODIUM PHOSPHATE 4 MG: 4 INJECTION, SOLUTION INTRA-ARTICULAR; INTRALESIONAL; INTRAMUSCULAR; INTRAVENOUS; SOFT TISSUE at 07:38

## 2018-03-29 RX ADMIN — SODIUM CHLORIDE, POTASSIUM CHLORIDE, SODIUM LACTATE AND CALCIUM CHLORIDE: 600; 310; 30; 20 INJECTION, SOLUTION INTRAVENOUS at 07:26

## 2018-03-29 RX ADMIN — PROPOFOL 200 MCG/KG/MIN: 10 INJECTION, EMULSION INTRAVENOUS at 07:29

## 2018-03-29 NOTE — IP AVS SNAPSHOT
MRN:[id number]                      After Visit Summary   3/29/2018    Fouzia Arenas    MRN: [id number]           Thank you!     Thank you for choosing [facility] for your care. Our goal is always to provide you with excellent care. Hearing back from our patients is one way we can continue to improve our services. Please take a few minutes to complete the written survey that you may receive in the mail after you visit with us. Thank you!        Patient Information     Date Of Birth          1965        Designated Caregiver       Most Recent Value    Caregiver    Will someone help with your care after discharge? yes    Name of designated caregiver Bob, daughter and mom and dad Bienvenido and Katherine    Phone number of caregiver [phone]      About your hospital stay     You were admitted on:  March 29, 2018 [facility]    You were discharged on:  March 29, 2018       Who to Call     For medical emergencies, please call 911.  For non-urgent questions about your medical care, please call your primary care provider or clinic, [phone]  For questions related to your surgery, please call your surgery clinic        Attending Provider     Provider Urban Garg MD OB/Gyn       Primary Care Provider Office Phone # Fax #    Karen Weiler, [phone]      After Care Instructions     Discharge Instructions       Patient to arrange follow up appointment in 2-3  weeks                  Further instructions from your care team       **If you have questions or concerns about your procedure,  call Dr. Elena at [phone]**        Today you received Toradol, an antiinflammatory medication similar to Ibuprofen.  You should not take other antiinflammatory medication, such as Ibuprofen, Motrin, Advil, Aleve, Naprosyn, etc, until 2pm.       Same Day Surgery Discharge Instructions for  Sedation and General Anesthesia        It's not unusual to feel dizzy, light-headed or faint for up to 24 hours after surgery or while taking pain medication.  If you have these symptoms: sit for a few minutes before standing and have someone assist you when you get up to walk or use the bathroom.      You should rest and relax for the next 24 hours. We recommend you make arrangements to have an adult stay with you for at least 24 hours after your discharge.  Avoid hazardous and strenuous activity.      DO NOT DRIVE any vehicle or operate mechanical equipment for 24 hours following the end of your surgery.  Even though you may feel normal, your reactions may be affected by the medication you have received.      Do not drink alcoholic beverages for 24 hours following surgery.       Slowly progress to your regular diet as you feel able. It's not unusual to feel nauseated and/or vomit after receiving anesthesia.  If you develop these symptoms, drink clear liquids (apple juice, ginger ale, broth, 7-up, etc. ) until you feel better.  If your nausea and vomiting persists for 24 hours, please notify your surgeon.        All narcotic pain medications, along with inactivity and anesthesia, can cause constipation. Drinking plenty of liquids and increasing fiber intake will help.      For any questions of a medical nature, call your surgeon.      Do not make important decisions for 24 hours.      If you had general anesthesia, you may have a sore throat for a couple of days related to the breathing tube used during surgery.  You may use Cepacol lozenges to help with this discomfort.  If it worsens or if you develop a fever, contact your surgeon.       If you feel your pain is not well managed with the pain medications prescribed by your surgeon, please contact your surgeon's office to let them know so they can address your concerns.       [facility]  Discharge Instructions  Following D & C / Hysteroscopy    Activity  You may resume normal  "activities including lifting as needed.  It is permissible to climb stairs. You may drive a car after 24 hours as long as you are not taking narcotic pain pills.   Baths or showers are perfectly acceptable.     Vaginal Discharge  You may have some vaginal bleeding or discharge for about a week after procedure.  You may use tampons or pads.    Temperature  If you develop temperature elevations to over 101  Fahrenheit, your physician should be called immediately.    Diet  [place] or light diet is advisable the day of surgery.  If nausea persists, continue this diet.  If severe, call.    Follow-up  Make an appointment in 1-2 weeks if instructed to at: [phone]        [facility]   [phone]                                                                  Pending Results     Date and Time Order Name Status Description    3/29/2018 0803 Surgical pathology exam In process             Admission Information     Date & Time Provider Department Dept. Phone    3/29/2018 Urban Elena MD [facility] Same Day Surgery [phone]      Your Vitals Were     Blood Pressure Temperature Respirations Height Weight Pulse Oximetry    130/72 97.5  F (36.4  C) 15 1.829 m (6') 147.2 kg (324 lb 9 oz) 97%    BMI (Body Mass Index)                   44.02 kg/m2           MyChart Information     [facility] lets you send messages to your doctor, view your test results, renew your prescriptions, schedule appointments and more. To sign up, go to [email or web address] . Click on \"Log in\" on the left side of the screen, which will take you to the Welcome page. Then click on \"Sign up Now\" on the right side of the page.     You will be asked to enter the access code listed below, as well as some personal information. Please follow the directions to create your username and password.     Your access code is: [email or web address]  Expires: 2018  9:49 AM     Your access code will  in 90 days. If you need help or a " new code, please call your [facility] clinic or [phone].        Care EveryWhere ID     This is your Care EveryWhere ID. This could be used by other organizations to access your [facility] medical records  [id number]        Equal Access to Services     TESSA GUERRA : Hadlaly hudson martin juan jose Soregis, waaxda luqadaha, qaybta kaalmada adelolis, lili lackey alexadayna segura zabrina bahena. So [facility] [phone].    ATENCIÓN: Si habla español, tiene a hay disposición servicios gratuitos de asistencia lingüística. Llame al [phone].    We comply with applicable federal civil rights laws and [place] laws. We do not discriminate on the basis of race, color, national origin, age, disability, sex, sexual orientation, or gender identity.               Review of your medicines      CONTINUE these medicines which may have CHANGED, or have new prescriptions. If we are uncertain of the size of tablets/capsules you have at home, strength may be listed as something that might have changed.        Dose / Directions    * ADVIL PO   This may have changed:  Another medication with the same name was added. Make sure you understand how and when to take each.        Dose:  200 mg   Take 200 mg by mouth as needed for moderate pain   Refills:  0       * ibuprofen 600 MG tablet   Commonly known as:  ADVIL/MOTRIN   This may have changed:  You were already taking a medication with the same name, and this prescription was added. Make sure you understand how and when to take each.   Used for:  Acute post-operative pain        Dose:  600 mg   Take 1 tablet (600 mg) by mouth every 6 hours as needed for pain (mild)   Quantity:  30 tablet   Refills:  0       * Notice:  This list has 2 medication(s) that are the same as other medications prescribed for you. Read the directions carefully, and ask your doctor or other care provider to review them with you.      CONTINUE these medicines which have NOT CHANGED        Dose / Directions     acetaminophen-caffeine 500-65 MG Tabs   Commonly known as:  EXCEDRIN TENSION HEADACHE        Dose:  2 tablet   Take 2 tablets by mouth every 6 hours as needed for mild pain   Refills:  0       lisinopril 20 MG tablet   Commonly known as:  PRINIVIL/ZESTRIL   Used for:  HTN (hypertension)        Dose:  20 mg   Take 1 tablet (20 mg) by mouth 2 times daily   Quantity:  60 tablet   Refills:  11       * Nebivolol HCl 20 MG Tabs   Commonly known as:  BYSTOLIC   Used for:  Benign essential hypertension        Dose:  20 mg   Take 20 mg by mouth daily   Quantity:  90 tablet   Refills:  3       * nebivolol 10 MG tablet   Commonly known as:  BYSTOLIC   Used for:  Hypertension goal BP (blood pressure) < 130/80        Dose:  10 mg   Take 1 tablet (10 mg) by mouth daily   Quantity:  90 tablet   Refills:  3       order for DME        DREAMSTATION 9-12 CM/H20 FF MIRAGE QUATTRO   Refills:  0       spironolactone 25 MG tablet   Commonly known as:  ALDACTONE   Used for:  Benign essential hypertension        Dose:  25 mg   Take 1 tablet (25 mg) by mouth daily   Quantity:  90 tablet   Refills:  3       * Notice:  This list has 2 medication(s) that are the same as other medications prescribed for you. Read the directions carefully, and ask your doctor or other care provider to review them with you.         Where to get your medicines      These medications were sent to [facility] - [address]  [address] [facility] [address], [place] [address]     Phone:  [phone]     ibuprofen 600 MG tablet                Protect others around you: Learn how to safely use, store and throw away your medicines at www.disposemymeds.org.             Medication List: This is a list of all your medications and when to take them. Check marks below indicate your daily home schedule. Keep this list as a reference.      Medications           Morning Afternoon Evening Bedtime As Needed    acetaminophen-caffeine 500-65 MG Tabs    Commonly known as:  EXCEDRIN TENSION HEADACHE   Take 2 tablets by mouth every 6 hours as needed for mild pain                                * ADVIL PO   Take 200 mg by mouth as needed for moderate pain                                * ibuprofen 600 MG tablet   Commonly known as:  ADVIL/MOTRIN   Take 1 tablet (600 mg) by mouth every 6 hours as needed for pain (mild)                                lisinopril 20 MG tablet   Commonly known as:  PRINIVIL/ZESTRIL   Take 1 tablet (20 mg) by mouth 2 times daily                                * Nebivolol HCl 20 MG Tabs   Commonly known as:  BYSTOLIC   Take 20 mg by mouth daily                                * nebivolol 10 MG tablet   Commonly known as:  BYSTOLIC   Take 1 tablet (10 mg) by mouth daily                                order for DME   DREAMSTATION 9-12 CM/H20 FF MIRAGE QUATTRO                                spironolactone 25 MG tablet   Commonly known as:  ALDACTONE   Take 1 tablet (25 mg) by mouth daily                                * Notice:  This list has 4 medication(s) that are the same as other medications prescribed for you. Read the directions carefully, and ask your doctor or other care provider to review them with you.

## 2018-03-29 NOTE — DISCHARGE INSTRUCTIONS
**If you have questions or concerns about your procedure,  call Dr. Elena at [phone]**        Today you received Toradol, an antiinflammatory medication similar to Ibuprofen.  You should not take other antiinflammatory medication, such as Ibuprofen, Motrin, Advil, Aleve, Naprosyn, etc, until 2pm.       Same Day Surgery Discharge Instructions for  Sedation and General Anesthesia       It's not unusual to feel dizzy, light-headed or faint for up to 24 hours after surgery or while taking pain medication.  If you have these symptoms: sit for a few minutes before standing and have someone assist you when you get up to walk or use the bathroom.      You should rest and relax for the next 24 hours. We recommend you make arrangements to have an adult stay with you for at least 24 hours after your discharge.  Avoid hazardous and strenuous activity.      DO NOT DRIVE any vehicle or operate mechanical equipment for 24 hours following the end of your surgery.  Even though you may feel normal, your reactions may be affected by the medication you have received.      Do not drink alcoholic beverages for 24 hours following surgery.       Slowly progress to your regular diet as you feel able. It's not unusual to feel nauseated and/or vomit after receiving anesthesia.  If you develop these symptoms, drink clear liquids (apple juice, ginger ale, broth, 7-up, etc. ) until you feel better.  If your nausea and vomiting persists for 24 hours, please notify your surgeon.        All narcotic pain medications, along with inactivity and anesthesia, can cause constipation. Drinking plenty of liquids and increasing fiber intake will help.      For any questions of a medical nature, call your surgeon.      Do not make important decisions for 24 hours.      If you had general anesthesia, you may have a sore throat for a couple of days related to the breathing tube used during surgery.  You may use Cepacol lozenges to help with this  discomfort.  If it worsens or if you develop a fever, contact your surgeon.       If you feel your pain is not well managed with the pain medications prescribed by your surgeon, please contact your surgeon's office to let them know so they can address your concerns.       [facility]  Discharge Instructions  Following D & C / Hysteroscopy    Activity  You may resume normal activities including lifting as needed.  It is permissible to climb stairs. You may drive a car after 24 hours as long as you are not taking narcotic pain pills.   Baths or showers are perfectly acceptable.     Vaginal Discharge  You may have some vaginal bleeding or discharge for about a week after procedure.  You may use tampons or pads.    Temperature  If you develop temperature elevations to over 101  Fahrenheit, your physician should be called immediately.    Diet  [place] or light diet is advisable the day of surgery.  If nausea persists, continue this diet.  If severe, call.    Follow-up  Make an appointment in 1-2 weeks if instructed to at: [phone]        [facility]   [phone]

## 2018-03-29 NOTE — IP AVS SNAPSHOT
[facility] Same Day Surgery    [address]    [place] [address]    Phone:  [phone]    Fax:  [phone]                                       After Visit Summary   3/29/2018    Fouzia Arenas    MRN: [id number]           After Visit Summary Signature Page     I have received my discharge instructions, and my questions have been answered. I have discussed any challenges I see with this plan with the nurse or doctor.    ..........................................................................................................................................  Patient/Patient Representative Signature      ..........................................................................................................................................  Patient Representative Print Name and Relationship to Patient    ..................................................               ................................................  Date                                            Time    ..........................................................................................................................................  Reviewed by Signature/Title    ...................................................              ..............................................  Date                                                            Time

## 2018-03-29 NOTE — BRIEF OP NOTE
[facility] Brief Operative Note    Pre-operative diagnosis: THICKENED ENDOMETRIUM ; MYOMA    Post-operative diagnosis THICKENED ENDOMETRIUM   Procedure: Procedure(s):  OPERATIVE HYSTEROSCOPY WITH MORCELLATOR  - Wound Class: II-Clean Contaminated   Surgeon(s): Surgeon(s) and Role:     * Urban Elena MD - Primary   Estimated blood loss: 2 mL    Specimens:  Endometrium   Findings:  Patient was prepped and draped in dorsolithotomy position.  Speculum was placed vagina and the patient was grasped with tenaculum.  The uterus was probed sounded to 11 cm.  Endocervix was dilated to 21 [place] and using the mild Essure hysteroscope the cavity was explored with the findings of generalized thickened endometrium but no obvious submucous myomas.  The entire endometrial cavity was resected with the my Essure device no polyps or no fibroids were found beneath the endometrial layer.  The procedure the instruments were removed.

## 2018-03-29 NOTE — H&P (VIEW-ONLY)
"  [facility]  [address]  [facility] [address] [phone]  Dept: [phone]    PRE-OP EVALUATION:  Today's date: 3/26/2018    Fouzia Arenas (: 1965) presents for pre-operative evaluation assessment as requested by  .  She requires evaluation and anesthesia risk assessment prior to undergoing surgery/procedure for treatment of submucosal myoma of uterus.    Proposed Surgery/ Procedure: OPERATIVE HYSTEROSCOPY WITH MORCELLATOR (MYOSURE)  Date of Surgery/ Procedure: 3/29/2018  Time of Surgery/ Procedure: 6:00 am  Hospital/Surgical Facility: [facility]  Fax number for surgical facility:   Primary Physician: Weiler, Karen  Type of Anesthesia Anticipated: General    Patient has a Health Care Directive or Living Will:  NO    1. NO - Do you have a history of heart attack, stroke, stent, bypass or surgery on an artery in the head, neck, heart or legs?  2. NO - Do you ever have any pain or discomfort in your chest?  3. NO - Do you have a history of  Heart Failure?  4. NO - Are you troubled by shortness of breath when: walking on the level, up a slight hill or at night?  5. NO - Do you currently have a cold, bronchitis or other respiratory infection?  6. YES - Do you have a cough, shortness of breath or wheezing? \"I always have a tickle in my throat\" - usually occurs only if she talks to much or if lies down to sleep. Onset for \"forever.\" This was present ever before she started lisinopril. Did trial acid reducer for a period of time and isn't sure if that ever made a difference. Last this was completed was 20 years. Has known hx of GERD.  7. NO - Do you sometimes get pains in the calves of your legs when you walk?  8. NO - Do you or anyone in your family have previous history of blood clots?  9. NO - Do you or does anyone in your family have a serious bleeding problem such as prolonged bleeding following surgeries or cuts?  10. NO - Have you ever had problems with " anemia or been told to take iron pills?  11. NO - Have you had any abnormal blood loss such as black, tarry or bloody stools, or abnormal vaginal bleeding?  12. NO - Have you ever had a blood transfusion?  13. YES - Have you or any of your relatives ever had problems with anesthesia? Pt reports she develops N/V after receiving anesthesia.   14. YES - Do you have sleep apnea, excessive snoring or daytime drowsiness? STORMY- wears CPAP faithfully.  15. NO - Do you have any prosthetic heart valves?  16. NO - Do you have prosthetic joints?  17. NO - Is there any chance that you may be pregnant?  Phyllis Pascal MA          HPI:     HPI related to upcoming procedure: Fouzia is a 51 yo female here today for pre-op. Seen by me for routine physical in beginning of February with c/o intermittent post-menopausal bleeding. Continues to have this sporadically - estimates a couple times per week. Was evaluated by OB/GYN and found to have fibroids and thickened endometrial lining. Now pursuing operative hysteroscopy with morcellator for further evaluation.       See problem list for active medical problems.  Problems all longstanding and stable, except as noted/documented.  See ROS for pertinent symptoms related to these conditions.                                                                                                      MEDICAL HISTORY:     Patient Active Problem List    Diagnosis Date Noted     Hypertension goal BP (blood pressure) < 130/80 03/26/2018     Priority: Medium     Morbid obesity (H) 02/06/2018     Priority: Medium     Atypical chest pain 04/05/2017     Priority: Medium     STORMY (obstructive sleep apnea) 09/30/2016     Priority: Medium     Palpitations      Priority: Medium     Esophageal reflux 12/07/2013     Priority: Medium     CARDIOVASCULAR SCREENING; LDL GOAL LESS THAN 160 12/07/2013     Priority: Medium     BPPV (benign paroxysmal positional vertigo) 09/12/2012     Priority: Medium     Other disorder  of menstruation and other abnormal bleeding from female genital tract 02/24/2011     Priority: Medium      Past Medical History:   Diagnosis Date     Classic migraine      Hypertension      Palpitations      SVT (supraventricular tachycardia) (H) 9/2015     Past Surgical History:   Procedure Laterality Date     CHOLECYSTECTOMY, LAPOROSCOPIC      Cholecystectomy, Laparoscopic     COLONOSCOPY N/A 11/25/2014    Procedure: COLONOSCOPY;  Surgeon: Wenceslao Galo MD;  Location:  GI     D & C      X2-3 for abnormal bleeding     ESOPHAGOSCOPY, GASTROSCOPY, DUODENOSCOPY (EGD), COMBINED N/A 11/25/2014    Procedure: COMBINED ESOPHAGOSCOPY, GASTROSCOPY, DUODENOSCOPY (EGD), BIOPSY SINGLE OR MULTIPLE;  Surgeon: Wenceslao Galo MD;  Location: [facility]     LAPAROSCOPY      For endometriosis     Current Outpatient Prescriptions   Medication Sig Dispense Refill     nebivolol (BYSTOLIC) 10 MG tablet Take 1 tablet (10 mg) by mouth daily 90 tablet 3     lisinopril (PRINIVIL/ZESTRIL) 20 MG tablet Take 1 tablet (20 mg) by mouth 2 times daily 60 tablet 11     Nebivolol HCl (BYSTOLIC) 20 MG TABS Take 20 mg by mouth daily 90 tablet 3     spironolactone (ALDACTONE) 25 MG tablet Take 1 tablet (25 mg) by mouth daily 90 tablet 3     Ibuprofen (ADVIL PO) Take 200 mg by mouth as needed for moderate pain       order for DME DREAMSTATION  9-12 CM/H20  FF MIRAGE QUATTRO       acetaminophen-caffeine (EXCEDRIN TENSION HEADACHE) 500-65 MG TABS Take 2 tablets by mouth every 6 hours as needed for mild pain       OTC products: none    Allergies   Allergen Reactions     Amoxicillin Nausea and Vomiting     Percocet [Oxycodone-Acetaminophen] Nausea and Vomiting      Latex Allergy: NO    Social History   Substance Use Topics     Smoking status: Never Smoker     Smokeless tobacco: Never Used     Alcohol use No     History   Drug Use No       REVIEW OF SYSTEMS:   CONSTITUTIONAL: NEGATIVE for fever, chills, change in weight  INTEGUMENTARY/SKIN: NEGATIVE  "for worrisome rashes, moles or lesions  EYES: NEGATIVE for vision changes or irritation  ENT/MOUTH: NEGATIVE for ear, mouth and throat problems  RESP: NEGATIVE for significant cough or SOB  CV: NEGATIVE for chest pain, palpitations or peripheral edema  GI: NEGATIVE for nausea, abdominal pain, heartburn, or change in bowel habits  : + for abdominal cramping with intermittent vaginal bleeding. NEGATIVE for frequency, dysuria, or hematuria  MUSCULOSKELETAL: NEGATIVE for significant arthralgias or myalgia  NEURO: + for feeling a little \"woozy\" when sitting or standing. No syncope. Just feels like to room is moving around her. Onset daily for the past 2 weeks, but started a few months ago. Unsure if wooziness is worse on days when she has bad cramps. Lasts a few seconds and resolves on own. No CP, SOB or diaphoresis. None currently. Hx of vertigo - seems more reminiscent of this.   In the past she has had a low K+ - reports was worked up for feeling syncopal and found to have hypokalemia then so will eat a banana if ever feels her K+ is low.   Also reports she is known to have \"stress panic attacks\" so isn't sure if it's related to anxiety as well.   Tried prozac in the past, but felt better off it than on it.   Hx of palpitations - started about 3-4 yrs ago. Had multiple EKGs, prior NM stress test and holter monitor for this. This showed NSR and/or premature beats. Was put on bystolic at that time.    NEGATIVE for weakness or paresthesias  ENDOCRINE: NEGATIVE for temperature intolerance, skin/hair changes  HEME: NEGATIVE for bleeding problems  PSYCHIATRIC: NEGATIVE for changes in mood or affect    EXAM:   /88 (BP Location: Right arm, Cuff Size: Adult Large)  Pulse 58  Temp 98.5  F (36.9  C) (Oral)  Ht 6' (1.829 m)  Wt 324 lb (147 kg)  SpO2 97%  Breastfeeding? No  BMI 43.94 kg/m2    GENERAL APPEARANCE: healthy, alert and no distress     EYES: EOMI, PERRL     HENT: ear canals and TM's normal and nose and " mouth without ulcers or lesions     NECK: no adenopathy, no asymmetry, masses.     RESP: lungs clear to auscultation - no rales, rhonchi or wheezes     CV: bradycardia, regular rhythm. No murmur, rub or gallops.     ABDOMEN:  soft, nontender, no HSM or masses and bowel sounds normal     MS: extremities normal- no gross deformities noted, no evidence of inflammation in joints, FROM in all extremities.     SKIN: no suspicious lesions or rashes     NEURO: Normal strength and tone, sensory exam grossly normal, mentation intact and speech normal     PSYCH: mentation appears normal. and affect normal/bright     LYMPHATICS: No cervical adenopathy    DIAGNOSTICS:   EKG: personal reading shows sinus bradycardia, non-specific ST depression and T-abnormality is noted in anterolateral leads. Prior EKG review shows this has been present previously from EKG in 9/2017.  Pt did under go NM stress testing 5/2017 which no ischemia or infarct from test in 2014. Study was considered limited though by pt's body habitus.    Recent Labs   Lab Test  02/06/18   0854  09/28/17   0420  05/23/17   1400   HGB  13.8  14.4   --    PLT  234  185   --    NA   --   140  139   POTASSIUM   --   3.7  4.1   CR   --   0.84  0.82   A1C  5.6   --    --       Results for orders placed or performed in visit on 03/26/18   CBC with platelets   Result Value Ref Range    WBC 6.5 4.0 - 11.0 10e9/L    RBC Count 4.93 3.8 - 5.2 10e12/L    Hemoglobin 13.9 11.7 - 15.7 g/dL    Hematocrit 42.2 35.0 - 47.0 %    MCV 86 78 - 100 fl    MCH 28.2 26.5 - 33.0 pg    MCHC 32.9 31.5 - 36.5 g/dL    RDW 12.7 10.0 - 15.0 %    Platelet Count 233 150 - 450 10e9/L   Basic metabolic panel  (Ca, Cl, CO2, Creat, Gluc, K, Na, BUN)   Result Value Ref Range    Sodium 141 133 - 144 mmol/L    Potassium 3.9 3.4 - 5.3 mmol/L    Chloride 108 94 - 109 mmol/L    Carbon Dioxide 29 20 - 32 mmol/L    Anion Gap 4 3 - 14 mmol/L    Glucose 92 70 - 99 mg/dL    Urea Nitrogen 10 7 - 30 mg/dL    Creatinine  0.78 0.52 - 1.04 mg/dL    GFR Estimate 78 >60 mL/min/1.7m2    GFR Estimate If Black >90 >60 mL/min/1.7m2    Calcium 8.4 (L) 8.5 - 10.1 mg/dL       IMPRESSION:   Reason for surgery/procedure: submucosal myoma of uterus/OPERATIVE HYSTEROSCOPY WITH MORCELLATOR (MYOSURE)  Diagnosis/reason for consult: pre-op, HTN, STORMY, morbid obesity, dizziness    The proposed surgical procedure is considered INTERMEDIATE risk.    REVISED CARDIAC RISK INDEX  The patient has the following serious cardiovascular risks for perioperative complications such as (MI, PE, VFib and 3  AV Block):  No serious cardiac risks  INTERPRETATION: 0 risks: Class I (very low risk - 0.4% complication rate)    The patient has the following additional risks for perioperative complications:  No identified additional risks      ICD-10-CM    1. Preop general physical exam Z01.818 EKG 12-lead complete w/read - Clinics     CBC with platelets     Basic metabolic panel  (Ca, Cl, CO2, Creat, Gluc, K, Na, BUN)   2. Hypertension goal BP (blood pressure) < 130/80 I10 Basic metabolic panel  (Ca, Cl, CO2, Creat, Gluc, K, Na, BUN)     nebivolol (BYSTOLIC) 10 MG tablet   3. Morbid obesity (H) E66.01    4. STORMY (obstructive sleep apnea) G47.33    5. Dizziness R42 EKG 12-lead complete w/read - Clinics     CBC with platelets     Basic metabolic panel  (Ca, Cl, CO2, Creat, Gluc, K, Na, BUN)   6. Sinus bradycardia R00.1        RECOMMENDATIONS:       Obstructive Sleep Apnea (or suspected sleep apnea)  Patient is clearly advised to use their home CPAP when released from surgery      --Patient is to take all scheduled medications on the day of surgery EXCEPT for modifications listed below.    ACE Inhibitor or Angiotensin Receptor Blocker (ARB) Use  Ace inhibitor or Angiotensin Receptor Blocker (ARB) and should HOLD this medication for the 24 hours prior to surgery.    Pt also reported having intermittent dizziness over the past few months with sinus bradycardia on EKG. Suspect this  is secondary to bystolic and advised she decrease dose to 10mg daily. Re-check 1-2 weeks after surgery to be sure she is doing well and that BP still optimally managed.       APPROVAL GIVEN to proceed with proposed procedure, without further diagnostic evaluation     Consulted with Dr. Wenceslao Ibarra, supervising physician, and he agrees with treatment plan.    Signed Electronically by: Ruthie Phipps PA-C      Copy of this evaluation report is provided to requesting physician.    [facility] Preop Guidelines

## 2018-03-29 NOTE — ANESTHESIA CARE TRANSFER NOTE
Patient: Fouzia Arenas    Procedure(s):  OPERATIVE HYSTEROSCOPY WITH MORCELLATOR  - Wound Class: II-Clean Contaminated    Diagnosis: THICKENED ENDOMETRIUM ; MYOMA   Diagnosis Additional Information: No value filed.    Anesthesia Type:   General, LMA     Note:  Airway :Face Mask  Patient transferred to:PACU  Handoff Report: Identifed the Patient, Identified the Reponsible Provider, Reviewed the pertinent medical history, Discussed the surgical course, Reviewed Intra-OP anesthesia mangement and issues during anesthesia, Set expectations for post-procedure period and Allowed opportunity for questions and acknowledgement of understanding    Awake, responding, good exchange, report to RN  Vitals: (Last set prior to Anesthesia Care Transfer)    CRNA VITALS  3/29/2018 0733 - 3/29/2018 0811      3/29/2018             Resp Rate (set): 10         BP: 126/71  P: 58  SaO2 96%      Electronically Signed By: KOKI Fontenot CRNA  March 29, 2018  8:11 AM

## 2018-03-29 NOTE — OP NOTE
Procedure Date: 2018      OPERATIVE NOTE:      DATE OF SURGERY:  2018      PREOPERATIVE STATUS:  The is a 52-year-old white female who is 4 years postmenopause who recently had bleeding and persistent abdominal cramping.  Ultrasound showed an endometrial thickness of 10+ mm with 2 small fibroids, 1 of which appeared to be possibly submucosal.      PREOPERATIVE DIAGNOSES:  Thickened endometrium and possible submucosal myoma.      OPERATION:  Hysteroscopy with MyoSure resection of endometrium.      SURGEON:  Jazlyn Elena MD      ANESTHESIA:  General.      OPERATIVE PROCEDURE:  Under general anesthesia, the patient was placed in lithotomy position, prepped and draped in the usual fashion.  A weighted speculum was placed in the vagina.  Anterior lip of cervix grasped with a tenaculum and the uterus probed to 11 cm.  The cervix was then dilated to 21-Macedonian and using the MyoSure hysteroscope, the cavity was explored with findings of a thickened endometrium with no obvious submucous myoma.  There was a somewhat arcuate shape to the uterus.  The MyoSure shaver was then used to shave away all the endometrial tissue.  No submucous myoma again was seen to be extruding into the cavity.  At the end of the case, instruments were removed.  The patient was taken to recovery in good condition.  Estimated blood loss was 2 mL.        FINAL DIAGNOSIS:  Thickened endometrium.      FINAL PATHOLOGY:  Pending.         JAZLYN ELENA MD             D: 2018   T: 2018   MT: LEONOR      Name:     EYAD TORRES   MRN:      [id number]        Account:        [id number]   :      1965           Procedure Date: 2018      Document: [id number]       cc: Ruthie NUNEZ

## 2018-03-29 NOTE — ANESTHESIA POSTPROCEDURE EVALUATION
Patient: Fouzia Arenas    Procedure(s):  OPERATIVE HYSTEROSCOPY WITH MORCELLATOR  - Wound Class: II-Clean Contaminated    Diagnosis:THICKENED ENDOMETRIUM ; MYOMA   Diagnosis Additional Information: No value filed.    Anesthesia Type:  General, LMA    Note:  Anesthesia Post Evaluation    Patient location during evaluation: PACU  Patient participation: Able to fully participate in evaluation  Level of consciousness: awake  Pain management: adequate  Airway patency: patent  Cardiovascular status: acceptable  Respiratory status: acceptable  Hydration status: acceptable  PONV: controlled     Anesthetic complications: None          Last vitals:  Vitals:    03/29/18 0630 03/29/18 0809 03/29/18 0815   BP: 129/71 126/71 129/70   Resp: 18 18 18   Temp: 35.4  C (95.8  F) 34.8  C (94.7  F) 35.1  C (95.2  F)   SpO2: 97% 95% 98%         Electronically Signed By: Wenceslao Glover MD  March 29, 2018  8:20 AM

## 2018-03-30 LAB — COPATH REPORT: NORMAL

## 2018-04-25 ENCOUNTER — OFFICE VISIT (OUTPATIENT)
Dept: OBGYN | Facility: CLINIC | Age: 53
End: 2018-04-25
Payer: COMMERCIAL

## 2018-04-25 VITALS
HEART RATE: 72 BPM | HEIGHT: 72 IN | SYSTOLIC BLOOD PRESSURE: 120 MMHG | DIASTOLIC BLOOD PRESSURE: 78 MMHG | BODY MASS INDEX: 39.68 KG/M2 | WEIGHT: 293 LBS

## 2018-04-25 DIAGNOSIS — N85.01 ENDOMETRIAL HYPERPLASIA WITHOUT ATYPIA, COMPLEX: ICD-10-CM

## 2018-04-25 DIAGNOSIS — R30.0 DYSURIA: Primary | ICD-10-CM

## 2018-04-25 DIAGNOSIS — Z48.816 AFTERCARE FOLLOWING SURGERY OF THE GENITOURINARY SYSTEM: Primary | ICD-10-CM

## 2018-04-25 LAB
ALBUMIN UR-MCNC: ABNORMAL MG/DL
APPEARANCE UR: ABNORMAL
BACTERIA #/AREA URNS HPF: ABNORMAL /HPF
BILIRUB UR QL STRIP: NEGATIVE
COLOR UR AUTO: YELLOW
GLUCOSE UR STRIP-MCNC: NEGATIVE MG/DL
HGB BLD-MCNC: 13.7 G/DL (ref 11.7–15.7)
HGB UR QL STRIP: ABNORMAL
KETONES UR STRIP-MCNC: NEGATIVE MG/DL
LEUKOCYTE ESTERASE UR QL STRIP: ABNORMAL
NITRATE UR QL: NEGATIVE
PH UR STRIP: 5.5 PH (ref 5–7)
RBC #/AREA URNS AUTO: ABNORMAL /HPF
SOURCE: ABNORMAL
SP GR UR STRIP: <=1.005 (ref 1–1.03)
UROBILINOGEN UR STRIP-ACNC: 0.2 EU/DL (ref 0.2–1)
WBC #/AREA URNS AUTO: ABNORMAL /HPF

## 2018-04-25 PROCEDURE — 85018 HEMOGLOBIN: CPT | Performed by: OBSTETRICS & GYNECOLOGY

## 2018-04-25 PROCEDURE — 81001 URINALYSIS AUTO W/SCOPE: CPT | Performed by: OBSTETRICS & GYNECOLOGY

## 2018-04-25 PROCEDURE — 87088 URINE BACTERIA CULTURE: CPT | Performed by: OBSTETRICS & GYNECOLOGY

## 2018-04-25 PROCEDURE — 87086 URINE CULTURE/COLONY COUNT: CPT | Performed by: OBSTETRICS & GYNECOLOGY

## 2018-04-25 PROCEDURE — 36415 COLL VENOUS BLD VENIPUNCTURE: CPT | Performed by: OBSTETRICS & GYNECOLOGY

## 2018-04-25 PROCEDURE — 99213 OFFICE O/P EST LOW 20 MIN: CPT | Performed by: OBSTETRICS & GYNECOLOGY

## 2018-04-25 RX ORDER — NITROFURANTOIN 25; 75 MG/1; MG/1
100 CAPSULE ORAL 2 TIMES DAILY
Qty: 14 CAPSULE | Refills: 1 | Status: SHIPPED | OUTPATIENT
Start: 2018-04-25 | End: 2018-05-07

## 2018-04-25 NOTE — PROGRESS NOTES
SUBJECTIVE:                                                   Fouzia Arenas is a 52 year old female who presents to clinic today for the following health issue(s):  Patient presents with:  Follow Up For: post op Hysteroscopy surgery         HPI:  Patient is seen for follow-up after a recent hysteroscopy.  Pathology showed complex endometrial hyperplasia without atypia and fragments of endometrial polyps.  She also complains of a urinary tract infection with dysuria urgency and frequency.    No LMP recorded. Patient is postmenopausal..   Patient is not sexually active, .  Using menopause for contraception.    reports that she has never smoked. She has never used smokeless tobacco.    STD testing offered?  Declined    Health maintenance updated:  yes    Today's PHQ-2 Score:   PHQ-2 (  [employer]) 2017   Q1: Little interest or pleasure in doing things 0   Q2: Feeling down, depressed or hopeless 0   PHQ-2 Score 0     Today's PHQ-9 Score:   PHQ-9 SCORE 3/22/2018   Total Score 2     Today's CHANNING-7 Score:   CHANNING-7 SCORE 3/22/2018   Total Score 1       Problem list and histories reviewed & adjusted, as indicated.  Additional history: as documented.    Patient Active Problem List   Diagnosis     Esophageal reflux     CARDIOVASCULAR SCREENING; LDL GOAL LESS THAN 160     Palpitations     STORMY (obstructive sleep apnea)     Atypical chest pain     BPPV (benign paroxysmal positional vertigo)     Other disorder of menstruation and other abnormal bleeding from female genital tract     Morbid obesity (H)     Hypertension goal BP (blood pressure) < 130/80     Past Surgical History:   Procedure Laterality Date     CHOLECYSTECTOMY, LAPOROSCOPIC      Cholecystectomy, Laparoscopic     COLONOSCOPY N/A 2014    Procedure: COLONOSCOPY;  Surgeon: Wenceslao Galo MD;  Location:  GI     D & C      X2-3 for abnormal bleeding     ESOPHAGOSCOPY, GASTROSCOPY, DUODENOSCOPY (EGD), COMBINED N/A 2014    Procedure: COMBINED  ESOPHAGOSCOPY, GASTROSCOPY, DUODENOSCOPY (EGD), BIOPSY SINGLE OR MULTIPLE;  Surgeon: Wenceslao Galo MD;  Location:  GI     LAPAROSCOPY      For endometriosis     OPERATIVE HYSTEROSCOPY WITH MORCELLATOR N/A 3/29/2018    Procedure: OPERATIVE HYSTEROSCOPY WITH MORCELLATOR (MYOSURE);  OPERATIVE HYSTEROSCOPY WITH MORCELLATOR ;  Surgeon: Urban Elena MD;  [facility]      Social History   Substance Use Topics     Smoking status: Never Smoker     Smokeless tobacco: Never Used     Alcohol use No      Problem (# of Occurrences) Relation (Name,Age of Onset)    Breast Cancer (1) Maternal Aunt    C.A.D. (2) Maternal Grandmother, Paternal Grandfather    CANCER (1) Maternal Grandfather: lung    CEREBROVASCULAR DISEASE (1) Paternal Grandmother    Cancer - colorectal (1) Other: cousin maternal     Colon Cancer (2) Cousin, Cousin    Gallbladder Disease (1) Maternal Grandmother    Hypertension (3) Mother, Father, Maternal Grandmother    Lipids (2) Mother, Father    Prostate Cancer (1) Father       Negative family history of: DIABETES            Current Outpatient Prescriptions   Medication Sig     acetaminophen-caffeine (EXCEDRIN TENSION HEADACHE) 500-65 MG TABS Take 2 tablets by mouth every 6 hours as needed for mild pain     ibuprofen (ADVIL/MOTRIN) 600 MG tablet Take 1 tablet (600 mg) by mouth every 6 hours as needed for pain (mild)     lisinopril (PRINIVIL/ZESTRIL) 20 MG tablet Take 1 tablet (20 mg) by mouth 2 times daily     nebivolol (BYSTOLIC) 10 MG tablet Take 1 tablet (10 mg) by mouth daily     Nebivolol HCl (BYSTOLIC) 20 MG TABS Take 20 mg by mouth daily     nitroFURantoin, macrocrystal-monohydrate, (MACROBID) 100 MG capsule Take 1 capsule (100 mg) by mouth 2 times daily     order for DME DREAMSTATION  9-12 CM/H20  FF MIRAGE QUATTRO     spironolactone (ALDACTONE) 25 MG tablet Take 1 tablet (25 mg) by mouth daily     No current facility-administered medications for this visit.      Allergies   Allergen  Reactions     Amoxicillin Nausea and Vomiting     Percocet [Oxycodone-Acetaminophen] Nausea and Vomiting       ROS:      OBJECTIVE:     /78  Pulse 72  Ht 6' (1.829 m)  Wt 320 lb (145.2 kg)  BMI 43.4 kg/m2  Body mass index is 43.4 kg/(m^2).    Exam:  Constitutional:  Appearance: Well nourished, well developed alert, in no acute distress  Chest:  Respiratory Effort:  Breathing unlabored  Cardiovascular:no edema  Neurologic/Psychiatric:  Mental Status:  Oriented X3   No Pelvic Exam performed     In-Clinic Test Results:  Results for orders placed or performed in visit on 04/25/18 (from the past 24 hour(s))   UA with Microscopic   Result Value Ref Range    Color Urine Yellow     Appearance Urine Cloudy     Glucose Urine Negative NEG^Negative mg/dL    Bilirubin Urine Negative NEG^Negative    Ketones Urine Negative NEG^Negative mg/dL    Specific Gravity Urine <=1.005 1.003 - 1.035    pH Urine 5.5 5.0 - 7.0 pH    Protein Albumin Urine 2+ (A) NEG^Negative mg/dL    Urobilinogen Urine 0.2 0.2 - 1.0 EU/dL    Nitrite Urine Negative NEG^Negative    Blood Urine 3+ (A) NEG^Negative    Leukocyte Esterase Urine 3+ (A) NEG^Negative    Source Midstream Urine     WBC Urine  (A) OTO5^0 - 5 /HPF    RBC Urine 2-5 (A) OTO2^O - 2 /HPF    Bacteria Urine Few (A) NEG^Negative /HPF       ASSESSMENT/PLAN:                                                        ICD-10-CM    1. Dysuria R30.0 UA with Microscopic     nitroFURantoin, macrocrystal-monohydrate, (MACROBID) 100 MG capsule     Urine Culture Aerobic Bacterial   2. Endometrial hyperplasia without atypia, complex N85.01            Plan: I discussed options with Tari.  My recommendation was either observation with a repeat endometrial biopsy in 6 months, a Mirena IUD or oral progestin, and also the option of a hysterectomy.  I do not feel that a hysterectomy is necessary at this time.  She may be able to be followed with observation or the Mirena IUD.  The patient will make a  decision and get back to me.    Urban Elena MD  [facility] FOR [facility]

## 2018-04-25 NOTE — MR AVS SNAPSHOT
"              After Visit Summary   2018    Fouzia Arenas    MRN: [id number]           Patient Information     Date Of Birth          1965        Visit Information        Provider Department      2018 9:30 AM Urban Elena MD 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  Normal or non-critical lab and imaging results will be communicated to you by [employer], letter or phone within 4 business days after the clinic has received the results. If you do not hear from us within 7 days, please contact the clinic through [employer] or phone. If you have a critical or abnormal lab result, we will notify you by phone as soon as possible.  Submit refill requests through [employer] or call your pharmacy and they will forward the refill request to us. Please allow 3 business days for your refill to be completed.          Additional Information About Your Visit        MyChart Information     [employer] lets you send messages to your doctor, view your test results, renew your prescriptions, schedule appointments and more. To sign up, go to [email or web address] . Click on \"Log in\" on the left side of the screen, which will take you to the Welcome page. Then click on \"Sign up Now\" on the right side of the page.     You will be asked to enter the access code listed below, as well as some personal information. Please follow the directions to create your username and password.     Your access code is: [email or web address]  Expires: 2018  9:49 AM     Your access code will  in 90 days. If you need help or a new code, please call your [facility] or [phone].        Care EveryWhere ID     This is your " Care EveryWhere ID. This could be used by other organizations to access your [facility] medical records  [id number]        Your Vitals Were     Pulse Height BMI (Body Mass Index)             72 6' (1.829 m) 43.4 kg/m2          Blood Pressure from Last 3 Encounters:   04/25/18 120/78   03/29/18 (!) 114/92   03/26/18 128/88    Weight from Last 3 Encounters:   04/25/18 320 lb (145.2 kg)   03/29/18 324 lb 9 oz (147.2 kg)   03/26/18 324 lb (147 kg)              We Performed the Following     UA with Microscopic     Urine Culture Aerobic Bacterial          Today's Medication Changes          These changes are accurate as of 4/25/18 11:00 AM.  If you have any questions, ask your nurse or doctor.               Start taking these medicines.        Dose/Directions    nitroFURantoin (macrocrystal-monohydrate) 100 MG capsule   Commonly known as:  MACROBID   Used for:  Dysuria   Started by:  Urban Elena MD        Dose:  100 mg   Take 1 capsule (100 mg) by mouth 2 times daily   Quantity:  14 capsule   Refills:  1            Where to get your medicines      These medications were sent to [facility] [place], [place] - [address], [address]  [address], [place] [address], [facility] [address]     Phone:  [phone]     nitroFURantoin (macrocrystal-monohydrate) 100 MG capsule                Primary Care Provider Office Phone # Fax #    Karen Weiler, [phone] [address]  [facility] [address]        Equal Access to Services     [facility] : Hadii hudson jarviso Soregis, waaxda luqadaha, qaybta kaalmalili khanna. So [facility] [phone].    ATENCIÓN: Si habla español, tiene a hay disposición servicios gratuitos de asistencia lingüística. Llame al [phone].    We comply with applicable federal civil rights laws and [place] laws. We do not discriminate on the basis of race, color, national origin, age, disability, sex, sexual  orientation, or gender identity.            Thank you!     Thank you for choosing [facility]  for your care. Our goal is always to provide you with excellent care. Hearing back from our patients is one way we can continue to improve our services. Please take a few minutes to complete the written survey that you may receive in the mail after your visit with us. Thank you!             Your Updated Medication List - Protect others around you: Learn how to safely use, store and throw away your medicines at www.disposemymeds.org.          This list is accurate as of 4/25/18 11:00 AM.  Always use your most recent med list.                   Brand Name Dispense Instructions for use Diagnosis    acetaminophen-caffeine 500-65 MG Tabs    EXCEDRIN TENSION HEADACHE     Take 2 tablets by mouth every 6 hours as needed for mild pain        ibuprofen 600 MG tablet    ADVIL/MOTRIN    30 tablet    Take 1 tablet (600 mg) by mouth every 6 hours as needed for pain (mild)    Acute post-operative pain       lisinopril 20 MG tablet    PRINIVIL/ZESTRIL    60 tablet    Take 1 tablet (20 mg) by mouth 2 times daily    HTN (hypertension)       * Nebivolol HCl 20 MG Tabs    BYSTOLIC    90 tablet    Take 20 mg by mouth daily    Benign essential hypertension       * nebivolol 10 MG tablet    BYSTOLIC    90 tablet    Take 1 tablet (10 mg) by mouth daily    Hypertension goal BP (blood pressure) < 130/80       nitroFURantoin (macrocrystal-monohydrate) 100 MG capsule    MACROBID    14 capsule    Take 1 capsule (100 mg) by mouth 2 times daily    Dysuria       order for DME      DREAMSTATION 9-12 CM/H20 FF MIRAGE QUATTRO        spironolactone 25 MG tablet    ALDACTONE    90 tablet    Take 1 tablet (25 mg) by mouth daily    Benign essential hypertension       * Notice:  This list has 2 medication(s) that are the same as other medications prescribed for you. Read the directions carefully, and ask your doctor or other care provider to  review them with you.

## 2018-04-26 LAB
BACTERIA SPEC CULT: ABNORMAL
Lab: ABNORMAL
SPECIMEN SOURCE: ABNORMAL

## 2018-05-07 ENCOUNTER — APPOINTMENT (OUTPATIENT)
Dept: CT IMAGING | Facility: CLINIC | Age: 53
End: 2018-05-07
Attending: EMERGENCY MEDICINE
Payer: COMMERCIAL

## 2018-05-07 ENCOUNTER — HOSPITAL ENCOUNTER (OUTPATIENT)
Facility: CLINIC | Age: 53
Setting detail: OBSERVATION
Discharge: HOME | End: 2018-05-08
Attending: EMERGENCY MEDICINE | Admitting: INTERNAL MEDICINE
Payer: COMMERCIAL

## 2018-05-07 ENCOUNTER — TELEPHONE (OUTPATIENT)
Dept: OBGYN | Facility: CLINIC | Age: 53
End: 2018-05-07

## 2018-05-07 ENCOUNTER — APPOINTMENT (OUTPATIENT)
Dept: GENERAL RADIOLOGY | Facility: CLINIC | Age: 53
End: 2018-05-07
Attending: EMERGENCY MEDICINE
Payer: COMMERCIAL

## 2018-05-07 DIAGNOSIS — R11.0 NAUSEA: ICD-10-CM

## 2018-05-07 DIAGNOSIS — R82.90 ABNORMAL FINDING ON URINALYSIS: ICD-10-CM

## 2018-05-07 DIAGNOSIS — R42 VERTIGO: Primary | ICD-10-CM

## 2018-05-07 LAB
ANION GAP SERPL CALCULATED.3IONS-SCNC: 3 MMOL/L (ref 3–14)
BASOPHILS # BLD AUTO: 0.1 10E9/L (ref 0–0.2)
BASOPHILS NFR BLD AUTO: 0.9 %
BUN SERPL-MCNC: 9 MG/DL (ref 7–30)
CALCIUM SERPL-MCNC: 8.5 MG/DL (ref 8.5–10.1)
CHLORIDE SERPL-SCNC: 106 MMOL/L (ref 94–109)
CO2 SERPL-SCNC: 30 MMOL/L (ref 20–32)
CREAT SERPL-MCNC: 0.82 MG/DL (ref 0.52–1.04)
D DIMER PPP FEU-MCNC: <0.3 UG/ML FEU (ref 0–0.5)
DIFFERENTIAL METHOD BLD: NORMAL
EOSINOPHIL # BLD AUTO: 0.1 10E9/L (ref 0–0.7)
EOSINOPHIL NFR BLD AUTO: 1 %
ERYTHROCYTE [DISTWIDTH] IN BLOOD BY AUTOMATED COUNT: 12.5 % (ref 10–15)
GFR SERPL CREATININE-BSD FRML MDRD: 73 ML/MIN/1.7M2
GLUCOSE SERPL-MCNC: 108 MG/DL (ref 70–99)
HCT VFR BLD AUTO: 42.5 % (ref 35–47)
HGB BLD-MCNC: 13.8 G/DL (ref 11.7–15.7)
IMM GRANULOCYTES # BLD: 0 10E9/L (ref 0–0.4)
IMM GRANULOCYTES NFR BLD: 0.3 %
LYMPHOCYTES # BLD AUTO: 2.2 10E9/L (ref 0.8–5.3)
LYMPHOCYTES NFR BLD AUTO: 26 %
MCH RBC QN AUTO: 28 PG (ref 26.5–33)
MCHC RBC AUTO-ENTMCNC: 32.5 G/DL (ref 31.5–36.5)
MCV RBC AUTO: 86 FL (ref 78–100)
MONOCYTES # BLD AUTO: 0.5 10E9/L (ref 0–1.3)
MONOCYTES NFR BLD AUTO: 6.1 %
NEUTROPHILS # BLD AUTO: 5.6 10E9/L (ref 1.6–8.3)
NEUTROPHILS NFR BLD AUTO: 65.7 %
NRBC # BLD AUTO: 0 10*3/UL
NRBC BLD AUTO-RTO: 0 /100
PLATELET # BLD AUTO: 261 10E9/L (ref 150–450)
POTASSIUM SERPL-SCNC: 4 MMOL/L (ref 3.4–5.3)
RBC # BLD AUTO: 4.92 10E12/L (ref 3.8–5.2)
SODIUM SERPL-SCNC: 139 MMOL/L (ref 133–144)
TROPONIN I SERPL-MCNC: <0.015 UG/L (ref 0–0.04)
WBC # BLD AUTO: 8.6 10E9/L (ref 4–11)

## 2018-05-07 PROCEDURE — 99285 EMERGENCY DEPT VISIT HI MDM: CPT | Mod: 25

## 2018-05-07 PROCEDURE — 70450 CT HEAD/BRAIN W/O DYE: CPT

## 2018-05-07 PROCEDURE — 71046 X-RAY EXAM CHEST 2 VIEWS: CPT

## 2018-05-07 PROCEDURE — 25000128 H RX IP 250 OP 636: Performed by: EMERGENCY MEDICINE

## 2018-05-07 PROCEDURE — 96375 TX/PRO/DX INJ NEW DRUG ADDON: CPT

## 2018-05-07 PROCEDURE — 25000131 ZZH RX MED GY IP 250 OP 636 PS 637: Performed by: EMERGENCY MEDICINE

## 2018-05-07 PROCEDURE — 85379 FIBRIN DEGRADATION QUANT: CPT | Performed by: EMERGENCY MEDICINE

## 2018-05-07 PROCEDURE — 93005 ELECTROCARDIOGRAM TRACING: CPT

## 2018-05-07 PROCEDURE — 84484 ASSAY OF TROPONIN QUANT: CPT | Performed by: EMERGENCY MEDICINE

## 2018-05-07 PROCEDURE — 96374 THER/PROPH/DIAG INJ IV PUSH: CPT | Mod: 59

## 2018-05-07 PROCEDURE — 70496 CT ANGIOGRAPHY HEAD: CPT

## 2018-05-07 PROCEDURE — 85025 COMPLETE CBC W/AUTO DIFF WBC: CPT | Performed by: EMERGENCY MEDICINE

## 2018-05-07 PROCEDURE — 80048 BASIC METABOLIC PNL TOTAL CA: CPT | Performed by: EMERGENCY MEDICINE

## 2018-05-07 PROCEDURE — 96361 HYDRATE IV INFUSION ADD-ON: CPT

## 2018-05-07 RX ORDER — ONDANSETRON 2 MG/ML
4 INJECTION INTRAMUSCULAR; INTRAVENOUS ONCE
Status: COMPLETED | OUTPATIENT
Start: 2018-05-07 | End: 2018-05-07

## 2018-05-07 RX ORDER — SODIUM CHLORIDE 9 MG/ML
1000 INJECTION, SOLUTION INTRAVENOUS CONTINUOUS
Status: DISCONTINUED | OUTPATIENT
Start: 2018-05-07 | End: 2018-05-08

## 2018-05-07 RX ORDER — MECLIZINE HYDROCHLORIDE 25 MG/1
25 TABLET ORAL ONCE
Status: COMPLETED | OUTPATIENT
Start: 2018-05-07 | End: 2018-05-07

## 2018-05-07 RX ORDER — IOPAMIDOL 755 MG/ML
500 INJECTION, SOLUTION INTRAVASCULAR ONCE
Status: COMPLETED | OUTPATIENT
Start: 2018-05-07 | End: 2018-05-07

## 2018-05-07 RX ADMIN — IOPAMIDOL 70 ML: 755 INJECTION, SOLUTION INTRAVENOUS at 22:45

## 2018-05-07 RX ADMIN — ONDANSETRON 4 MG: 2 INJECTION, SOLUTION INTRAMUSCULAR; INTRAVENOUS at 21:44

## 2018-05-07 RX ADMIN — SODIUM CHLORIDE 80 ML: 9 INJECTION, SOLUTION INTRAVENOUS at 22:45

## 2018-05-07 RX ADMIN — MECLIZINE HYDROCHLORIDE 25 MG: 25 TABLET ORAL at 21:45

## 2018-05-07 RX ADMIN — SODIUM CHLORIDE 1000 ML: 9 INJECTION, SOLUTION INTRAVENOUS at 21:44

## 2018-05-07 ASSESSMENT — ENCOUNTER SYMPTOMS
PALPITATIONS: 0
DIZZINESS: 1
NAUSEA: 1

## 2018-05-07 NOTE — IP AVS SNAPSHOT
[facility] Observation Department    [address]    [facility] [address]    Phone:  [phone]                                       After Visit Summary   5/7/2018    Fouzia Arenas    MRN: [id number]           After Visit Summary Signature Page     I have received my discharge instructions, and my questions have been answered. I have discussed any challenges I see with this plan with the nurse or doctor.    ..........................................................................................................................................  Patient/Patient Representative Signature      ..........................................................................................................................................  Patient Representative Print Name and Relationship to Patient    ..................................................               ................................................  Date                                            Time    ..........................................................................................................................................  Reviewed by Signature/Title    ...................................................              ..............................................  Date                                                            Time

## 2018-05-07 NOTE — TELEPHONE ENCOUNTER
Patient has made decision regarding options that were discussed at last visit.  Would like to move forward with surgery, but has a few additional questions for Dr. Elena.

## 2018-05-07 NOTE — IP AVS SNAPSHOT
MRN:[id number]                      After Visit Summary   5/7/2018    Fouzia Arenas    MRN: [id number]           Thank you!     Thank you for choosing [facility] for your care. Our goal is always to provide you with excellent care. Hearing back from our patients is one way we can continue to improve our services. Please take a few minutes to complete the written survey that you may receive in the mail after you visit. If you would like to speak to someone directly about your visit please contact Patient Relations at [phone]. Thank you!          Patient Information     Date Of Birth          1965        Designated Caregiver       Most Recent Value    Caregiver    Will someone help with your care after discharge? yes    Name of designated caregiver Daughter    Phone number of caregiver Home phone    Caregiver address Pt home address      About your hospital stay     You were admitted on:  May 8, 2018 You last received care in the:  [facility] Observation Department    You were discharged on:  May 8, 2018        Reason for your hospital stay       You were evaluated in the hospital for dizziness. Your head imaging did not show evidence for stroke. Your symptoms improved with an anti-dizziness medication called meclizine. You likely have a self-limited type of vertigo. You can take meclizine as needed every 6 hours for dizziness. We will also give you an outpatient referral to see vestibular therapy outpatient. Regarding your chest pressure you had yesterday, your lab work did not show any evidence of heart attack. You can follow up outpatient with your primary care clinician within 1 week. They may consider getting an outpatient stress test for further cardiac work up if they feel it is necessary. Get a repeat urinalysis done to recheck your urine for infection (last sample looked possibly contaminated).                  Who to Call     For medical emergencies,  please call [phone].  For non-urgent questions about your medical care, please call your primary care provider or clinic, [phone]          Attending Provider     Provider Specialty    Connie Dill MD Emergency Medicine    [facility]       Primary Care Provider Office Phone # Fax #    Karen Weiler, [phone]       When to contact your care team       Call your primary doctor if you have any of the following: temperature greater than 101, increased shortness of breath or increased pain.                  After Care Instructions     Activity       Your activity upon discharge: activity as tolerated            Diet       Follow this diet upon discharge: Orders Placed This Encounter      Regular Diet Adult                  Follow-up Appointments     Follow-up and recommended labs and tests        Follow up with primary care provider, Karen Weiler, within 1 week, for hospital follow- up. Repeat urinalysis at your follow up appointment.                  Additional Services     PHYSICAL THERAPY REFERRAL       *This therapy referral will be filtered to a centralized scheduling office at [facility] and the patient will receive a call to schedule an appointment at a [facility] location most convenient for them. *     [facility] provides Physical Therapy evaluation and treatment and many specialty services across the [facility] system.  If requesting a specialty program, please choose from the list below.    If you have not heard from the scheduling office within 2 business days, please call [phone] for all locations, with the exception of [place], please call [phone] and [facility], please call [phone]  Treatment: Evaluation & Treatment  Special Instructions/Modalities: BPPV evaluation  Special Programs: Balance/Vestibular    Please be aware that coverage of these services is subject to the terms and limitations of your health  "insurance plan.  Call member services at your health plan with any benefit or coverage questions.      **Note to Provider:  If you are referring outside of [facility] for the therapy appointment, please list the name of the location in the \"special instructions\" above, print the referral and give to the patient to schedule the appointment.                             Future tests that were ordered for you     UA reflex to Microscopic                 Pending Results     Date and Time Order Name Status Description    2018 2215 XR Chest 2 Views Preliminary             Statement of Approval     Ordered          18 [id number]  I have reviewed and agree with all the recommendations and orders detailed in this document.  EFFECTIVE NOW     Approved and electronically signed by:  Nadya Jay PA-C             Admission Information     Date & Time Department Dept. Phone    2018 [facility] Observation Department [phone]      Your Vitals Were     Blood Pressure Pulse Temperature Respirations Height Weight    161/77 (BP Location: Left arm) 56 98.1  F (36.7  C) (Oral) 16 1.829 m (6') 132.5 kg (292 lb 3.2 oz)    Pulse Oximetry BMI (Body Mass Index)                95% 39.63 kg/m2          MyChart Information     [employer] lets you send messages to your doctor, view your test results, renew your prescriptions, schedule appointments and more. To sign up, go to [email or web address] . Click on \"Log in\" on the left side of the screen, which will take you to the Welcome page. Then click on \"Sign up Now\" on the right side of the page.     You will be asked to enter the access code listed below, as well as some personal information. Please follow the directions to create your username and password.     Your access code is: [email or web address]  Expires: 2018  3:36 PM     Your access code will  in 90 days. If you need help or a new code, please call your [facility] clinic or [phone].        Care " EveryWhere ID     This is your Care EveryWhere ID. This could be used by other organizations to access your [facility] medical records  [id number]        Equal Access to Services     TESSA GUERRA : Carla Choudhury, esme crook, zach treadwellmabhaskar lackeycurrybhaskar, lili lantigua hillarygeorgina liudayna segura zabrina bahena. So [facility] [phone].    ATENCIÓN: Si habla español, tiene a hay disposición servicios gratuitos de asistencia lingüística. Llame al [phone].    We comply with applicable federal civil rights laws and [place] laws. We do not discriminate on the basis of race, color, national origin, age, disability, sex, sexual orientation, or gender identity.               Review of your medicines      START taking        Dose / Directions    meclizine 25 MG tablet   Commonly known as:  ANTIVERT        Dose:  25 mg   Take 1 tablet (25 mg) by mouth 3 times daily as needed for dizziness   Quantity:  25 tablet   Refills:  0         CONTINUE these medicines which have NOT CHANGED        Dose / Directions    EXCEDRIN EXTRA STRENGTH PO        Dose:  2 tablet   Take 2 tablets by mouth every 6 hours as needed   Refills:  0       lisinopril 20 MG tablet   Commonly known as:  PRINIVIL/ZESTRIL   Used for:  HTN (hypertension)        Dose:  20 mg   Take 1 tablet (20 mg) by mouth 2 times daily   Quantity:  60 tablet   Refills:  11       nebivolol 10 MG tablet   Commonly known as:  BYSTOLIC   Used for:  Hypertension goal BP (blood pressure) < 130/80        Dose:  10 mg   Take 1 tablet (10 mg) by mouth daily   Quantity:  90 tablet   Refills:  3       order for DME        DREAMSTATION 9-12 CM/H20 FF MIRAGE QUATTRO   Refills:  0       spironolactone 25 MG tablet   Commonly known as:  ALDACTONE   Used for:  Benign essential hypertension        Dose:  25 mg   Take 1 tablet (25 mg) by mouth daily   Quantity:  90 tablet   Refills:  3            Where to get your medicines      These medications were sent to [facility] [id number] -  [place], [place] - [address] [facility] [address] [facility]  [address], [place] [address]    Hours:  24-hours Phone:  [phone]     meclizine 25 MG tablet                Protect others around you: Learn how to safely use, store and throw away your medicines at www.disposemymeds.org.             Medication List: This is a list of all your medications and when to take them. Check marks below indicate your daily home schedule. Keep this list as a reference.      Medications           Morning Afternoon Evening Bedtime As Needed    EXCEDRIN EXTRA STRENGTH PO   Take 2 tablets by mouth every 6 hours as needed   Last time this was given:  1 tablet on 5/8/2018  3:02 PM                                lisinopril 20 MG tablet   Commonly known as:  PRINIVIL/ZESTRIL   Take 1 tablet (20 mg) by mouth 2 times daily                                meclizine 25 MG tablet   Commonly known as:  ANTIVERT   Take 1 tablet (25 mg) by mouth 3 times daily as needed for dizziness   Last time this was given:  25 mg on 5/8/2018  3:02 PM                                nebivolol 10 MG tablet   Commonly known as:  BYSTOLIC   Take 1 tablet (10 mg) by mouth daily                                order for DME   DREAMSTATION 9-12 CM/H20 FF MIRAGE QUATTRO                                spironolactone 25 MG tablet   Commonly known as:  ALDACTONE   Take 1 tablet (25 mg) by mouth daily

## 2018-05-07 NOTE — TELEPHONE ENCOUNTER
Talked with patient.  She recently had a hysteroscopy and removal of a polyp that showed focal complex hyperplasia.  She decided she would like to proceed with a hysterectomy.  She also has a history of urinary stress incontinence with minimal activity.  She would be a candidate for a suburethral sling at the same time.  She will make an appointment to come in and discuss this with me in person and then schedule surgery.

## 2018-05-08 ENCOUNTER — APPOINTMENT (OUTPATIENT)
Dept: MRI IMAGING | Facility: CLINIC | Age: 53
End: 2018-05-08
Attending: INTERNAL MEDICINE
Payer: COMMERCIAL

## 2018-05-08 VITALS
OXYGEN SATURATION: 95 % | BODY MASS INDEX: 39.58 KG/M2 | HEART RATE: 56 BPM | TEMPERATURE: 98.1 F | DIASTOLIC BLOOD PRESSURE: 77 MMHG | RESPIRATION RATE: 16 BRPM | HEIGHT: 72 IN | WEIGHT: 292.2 LBS | SYSTOLIC BLOOD PRESSURE: 161 MMHG

## 2018-05-08 PROBLEM — R42 VERTIGO: Status: ACTIVE | Noted: 2018-05-08

## 2018-05-08 LAB
INTERPRETATION ECG - MUSE: NORMAL
TROPONIN I SERPL-MCNC: <0.015 UG/L (ref 0–0.04)

## 2018-05-08 PROCEDURE — 25000128 H RX IP 250 OP 636: Performed by: INTERNAL MEDICINE

## 2018-05-08 PROCEDURE — 36415 COLL VENOUS BLD VENIPUNCTURE: CPT | Performed by: INTERNAL MEDICINE

## 2018-05-08 PROCEDURE — 25000131 ZZH RX MED GY IP 250 OP 636 PS 637: Performed by: INTERNAL MEDICINE

## 2018-05-08 PROCEDURE — 99219 ZZC INITIAL OBSERVATION CARE,LEVL II: CPT | Performed by: INTERNAL MEDICINE

## 2018-05-08 PROCEDURE — 70551 MRI BRAIN STEM W/O DYE: CPT

## 2018-05-08 PROCEDURE — 25000132 ZZH RX MED GY IP 250 OP 250 PS 637: Performed by: PHYSICIAN ASSISTANT

## 2018-05-08 PROCEDURE — 25000132 ZZH RX MED GY IP 250 OP 250 PS 637: Performed by: EMERGENCY MEDICINE

## 2018-05-08 PROCEDURE — 96375 TX/PRO/DX INJ NEW DRUG ADDON: CPT

## 2018-05-08 PROCEDURE — 25000128 H RX IP 250 OP 636: Performed by: EMERGENCY MEDICINE

## 2018-05-08 PROCEDURE — 84484 ASSAY OF TROPONIN QUANT: CPT | Performed by: INTERNAL MEDICINE

## 2018-05-08 PROCEDURE — G0378 HOSPITAL OBSERVATION PER HR: HCPCS

## 2018-05-08 RX ORDER — MECLIZINE HYDROCHLORIDE 25 MG/1
25 TABLET ORAL 3 TIMES DAILY
Status: DISCONTINUED | OUTPATIENT
Start: 2018-05-08 | End: 2018-05-08 | Stop reason: HOSPADM

## 2018-05-08 RX ORDER — ONDANSETRON 2 MG/ML
4 INJECTION INTRAMUSCULAR; INTRAVENOUS EVERY 6 HOURS PRN
Status: DISCONTINUED | OUTPATIENT
Start: 2018-05-08 | End: 2018-05-08 | Stop reason: HOSPADM

## 2018-05-08 RX ORDER — ACETAMINOPHEN 325 MG/1
650 TABLET ORAL EVERY 4 HOURS PRN
Status: DISCONTINUED | OUTPATIENT
Start: 2018-05-08 | End: 2018-05-08 | Stop reason: HOSPADM

## 2018-05-08 RX ORDER — METOCLOPRAMIDE HYDROCHLORIDE 5 MG/ML
5 INJECTION INTRAMUSCULAR; INTRAVENOUS ONCE
Status: COMPLETED | OUTPATIENT
Start: 2018-05-08 | End: 2018-05-08

## 2018-05-08 RX ORDER — AMOXICILLIN 250 MG
1 CAPSULE ORAL 2 TIMES DAILY PRN
Status: DISCONTINUED | OUTPATIENT
Start: 2018-05-08 | End: 2018-05-08 | Stop reason: HOSPADM

## 2018-05-08 RX ORDER — ASPIRIN 325 MG
325 TABLET ORAL ONCE
Status: COMPLETED | OUTPATIENT
Start: 2018-05-08 | End: 2018-05-08

## 2018-05-08 RX ORDER — DIAZEPAM 10 MG/2ML
2.5 INJECTION, SOLUTION INTRAMUSCULAR; INTRAVENOUS EVERY 4 HOURS PRN
Status: DISCONTINUED | OUTPATIENT
Start: 2018-05-08 | End: 2018-05-08 | Stop reason: HOSPADM

## 2018-05-08 RX ORDER — NITROGLYCERIN 0.4 MG/1
0.4 TABLET SUBLINGUAL EVERY 5 MIN PRN
Status: DISCONTINUED | OUTPATIENT
Start: 2018-05-08 | End: 2018-05-08 | Stop reason: HOSPADM

## 2018-05-08 RX ORDER — LIDOCAINE 40 MG/G
CREAM TOPICAL
Status: DISCONTINUED | OUTPATIENT
Start: 2018-05-08 | End: 2018-05-08 | Stop reason: HOSPADM

## 2018-05-08 RX ORDER — NALOXONE HYDROCHLORIDE 0.4 MG/ML
.1-.4 INJECTION, SOLUTION INTRAMUSCULAR; INTRAVENOUS; SUBCUTANEOUS
Status: DISCONTINUED | OUTPATIENT
Start: 2018-05-08 | End: 2018-05-08 | Stop reason: HOSPADM

## 2018-05-08 RX ORDER — ACETAMINOPHEN 650 MG/1
650 SUPPOSITORY RECTAL EVERY 4 HOURS PRN
Status: DISCONTINUED | OUTPATIENT
Start: 2018-05-08 | End: 2018-05-08 | Stop reason: HOSPADM

## 2018-05-08 RX ORDER — LISINOPRIL 20 MG/1
20 TABLET ORAL 2 TIMES DAILY
Status: DISCONTINUED | OUTPATIENT
Start: 2018-05-08 | End: 2018-05-08 | Stop reason: HOSPADM

## 2018-05-08 RX ORDER — AMOXICILLIN 250 MG
2 CAPSULE ORAL 2 TIMES DAILY PRN
Status: DISCONTINUED | OUTPATIENT
Start: 2018-05-08 | End: 2018-05-08 | Stop reason: HOSPADM

## 2018-05-08 RX ORDER — ONDANSETRON 4 MG/1
4 TABLET, ORALLY DISINTEGRATING ORAL EVERY 6 HOURS PRN
Status: DISCONTINUED | OUTPATIENT
Start: 2018-05-08 | End: 2018-05-08 | Stop reason: HOSPADM

## 2018-05-08 RX ORDER — NEBIVOLOL 10 MG/1
10 TABLET ORAL DAILY
Status: DISCONTINUED | OUTPATIENT
Start: 2018-05-08 | End: 2018-05-08 | Stop reason: HOSPADM

## 2018-05-08 RX ORDER — LORAZEPAM 2 MG/ML
0.5 INJECTION INTRAMUSCULAR
Status: COMPLETED | OUTPATIENT
Start: 2018-05-08 | End: 2018-05-08

## 2018-05-08 RX ORDER — MECLIZINE HYDROCHLORIDE 25 MG/1
25 TABLET ORAL 3 TIMES DAILY PRN
Qty: 25 TABLET | Refills: 0 | Status: SHIPPED | OUTPATIENT
Start: 2018-05-08 | End: 2018-05-17

## 2018-05-08 RX ORDER — SODIUM CHLORIDE 9 MG/ML
INJECTION, SOLUTION INTRAVENOUS CONTINUOUS
Status: DISCONTINUED | OUTPATIENT
Start: 2018-05-08 | End: 2018-05-08 | Stop reason: HOSPADM

## 2018-05-08 RX ADMIN — SODIUM CHLORIDE: 9 INJECTION, SOLUTION INTRAVENOUS at 03:00

## 2018-05-08 RX ADMIN — METOCLOPRAMIDE 5 MG: 5 INJECTION, SOLUTION INTRAMUSCULAR; INTRAVENOUS at 00:22

## 2018-05-08 RX ADMIN — MECLIZINE HYDROCHLORIDE 25 MG: 25 TABLET ORAL at 08:17

## 2018-05-08 RX ADMIN — ACETAMINOPHEN, ASPIRIN AND CAFFEINE 1 TABLET: 250; 250; 65 TABLET, FILM COATED ORAL at 15:02

## 2018-05-08 RX ADMIN — MECLIZINE HYDROCHLORIDE 25 MG: 25 TABLET ORAL at 15:02

## 2018-05-08 RX ADMIN — LORAZEPAM 0.5 MG: 2 INJECTION INTRAMUSCULAR; INTRAVENOUS at 13:37

## 2018-05-08 RX ADMIN — ASPIRIN 325 MG ORAL TABLET 325 MG: 325 PILL ORAL at 00:23

## 2018-05-08 ASSESSMENT — ACTIVITIES OF DAILY LIVING (ADL)
RETIRED_EATING: 0-->INDEPENDENT
DRESS: 0-->INDEPENDENT
COGNITION: 0 - NO COGNITION ISSUES REPORTED
SWALLOWING: 0-->SWALLOWS FOODS/LIQUIDS WITHOUT DIFFICULTY
FALL_HISTORY_WITHIN_LAST_SIX_MONTHS: NO
RETIRED_COMMUNICATION: 0-->UNDERSTANDS/COMMUNICATES WITHOUT DIFFICULTY
BATHING: 0-->INDEPENDENT
TOILETING: 0-->INDEPENDENT
AMBULATION: 0-->INDEPENDENT
TRANSFERRING: 0-->INDEPENDENT

## 2018-05-08 NOTE — ED NOTES
Observation Brochure and Video   Patient informed of observation status based on provider's order. Observation video watched.  Jaki Griffith RN

## 2018-05-08 NOTE — ED NOTES
[facility]  ED Nurse Handoff Report    Fouzia Arenas is a 52 year old female   ED Chief complaint: No chief complaint on file.  . ED Diagnosis:   Final diagnoses:   Nausea     Allergies:   Allergies   Allergen Reactions     Amoxicillin Nausea and Vomiting     Percocet [Oxycodone-Acetaminophen] Nausea and Vomiting       Code Status: Full Code  Activity level - Baseline/Home:  Independent. Activity Level - Current:   Stand with Assist. Lift room needed: No. Bariatric: No   Needed: No   Isolation: No. Infection: Not Applicable.     Vital Signs:   Vitals:    05/07/18 2230 05/07/18 2330 05/07/18 2345 05/08/18 0015   BP: 160/80 136/89 110/78 (!) 160/98   Pulse:       Resp: 13      Temp:       TempSrc:       SpO2: 95% 95% 97% 94%       Cardiac Rhythm:  ,   Cardiac  Cardiac Rhythm: Sinus bradycardia (Rate 59-61)  Pain level: 0-10 Pain Scale: 0  Patient confused: No. Patient Falls Risk: Yes.   Elimination Status: Has voided   Patient Report - Initial Complaint: dizziness. Focused Assessment: chest pain associated the dizziness and nausea   Tests Performed:   Labs Ordered and Resulted from Time of ED Arrival Up to the Time of Departure from the ED   BASIC METABOLIC PANEL - Abnormal; Notable for the following:        Result Value    Glucose 108 (*)     All other components within normal limits   CBC WITH PLATELETS DIFFERENTIAL   D DIMER QUANTITATIVE   TROPONIN I   CARDIAC CONTINUOUS MONITORING   . Abnormal Results: see above.   Treatments provided: ASA, reglan, zofran, IVF  Family Comments: mother in room  OBS brochure/video discussed/provided to patient:  Yes  ED Medications:   Medications   0.9% sodium chloride BOLUS (0 mLs Intravenous Stopped 5/7/18 2236)     Followed by   sodium chloride 0.9% infusion (not administered)   ondansetron (ZOFRAN) injection 4 mg (4 mg Intravenous Given 5/7/18 2144)   meclizine (ANTIVERT) tablet 25 mg (25 mg Oral Given 5/7/18 2145)   iopamidol (ISOVUE-370) solution 500  mL (70 mLs Intravenous Given 5/7/18 2245)   0.9% sodium chloride BOLUS (0 mLs Intravenous Stopped 5/7/18 2246)   aspirin tablet 325 mg (325 mg Oral Given 5/8/18 0023)   metoclopramide (REGLAN) injection 5 mg (5 mg Intravenous Given 5/8/18 0022)     Drips infusing:  No  For the majority of the shift, the patient's behavior Green.     Severe Sepsis OR Septic Shock Diagnosis Present: No      ED Nurse Name/Phone Number: Jaki Gladis,   12:34 AM  RECEIVING UNIT ED HANDOFF REVIEW    Above ED Nurse Handoff Report was reviewed: Yes  Reviewed by: Ria Tran on May 8, 2018 at 12:50 AM

## 2018-05-08 NOTE — PLAN OF CARE
"Problem: Patient Care Overview  Goal: Plan of Care/Patient Progress Review  Outcome: No Change  PRIMARY DIAGNOSIS: DIZZINESS, NAUSEA, CHEST PAIN  OUTPATIENT/OBSERVATION GOALS TO BE MET BEFORE DISCHARGE:  1. ADLs back to baseline: Yes    2. Activity and level of assistance: Ambulating independently.    3. Pain status: Reporting mild posterior and frontal headache. Normally takes Excedrin. Denied need for intervention at this time.         4. Return to near baseline physical activity: Yes    5.   Cardiac rhythm per telemetry tech: SB/SR, \"borderline\" 1st degree block, ST depression. Rate 55 per telemetry tech.    Vitally stable. Denies dizziness at rest. Reports getting up to bathroom this AM and denies dizziness at that time also. Scheduled Meclizine given. IVF infusing. MRI ordered, but not able to be performed at this time due to equipment being down. Patient updated on plan of care.      Discharge Planner Nurse   Safe discharge environment identified: Yes  Barriers to discharge: No       Entered by: Zoila Okeefe 05/08/2018     Please review provider order for any additional goals.   Nurse to notify provider when observation goals have been met and patient is ready for discharge.      "

## 2018-05-08 NOTE — ED TRIAGE NOTES
Pt dizzy since standing at 5 pm and c/o chest tightness as well.    Pt A&O x 3, CMS x 3, ABCD's adequate in triage

## 2018-05-08 NOTE — PLAN OF CARE
"Problem: Patient Care Overview  Goal: Plan of Care/Patient Progress Review  Outcome: Improving  PRIMARY DIAGNOSIS: DIZZINESS, NAUSEA, CHEST PAIN  OUTPATIENT/OBSERVATION GOALS TO BE MET BEFORE DISCHARGE:  1. ADLs back to baseline: Yes     2. Activity and level of assistance: Ambulating independently.     3. Pain status: Reporting mild posterior headache. Normally takes Excedrin. Notified provider.          4. Return to near baseline physical activity: Yes               5.   Cardiac rhythm per telemetry tech: SB/SR, \"borderline\" 1st degree block, ST depression. Rate 55 per telemetry tech.     Vitally stable. Denies dizziness at rest and with activity. Ambulated long distance in hallways and denied dizziness. Mild posterior headache present, reporting taking Excedrin at home; notified provider for this to be ordered. Per radiology staff, MRI machine should be working \"soon\" and they will notify staff when they are able to complete exam.     Discharge Planner Nurse   Safe discharge environment identified: Yes  Barriers to discharge: No       Entered by: Zoila Okeefe 05/08/2018      Please review provider order for any additional goals.   Nurse to notify provider when observation goals have been met and patient is ready for discharge.         "

## 2018-05-08 NOTE — PLAN OF CARE
Problem: Patient Care Overview  Goal: Plan of Care/Patient Progress Review  Outcome: Adequate for Discharge Date Met: 05/08/18  Patient's After Visit Summary was reviewed with patient and/or family.   Patient verbalized understanding of After Visit Summary, recommended follow up and was given an opportunity to ask questions.   Discharge medications sent home with patient/family: YES, prescription sent to Pt's pharmacy   Discharged with spouse    OBSERVATION patient END time: 1612

## 2018-05-08 NOTE — PROVIDER NOTIFICATION
New admit just up to the floor. No orders placed. Old overdue order for continuous NS @ 125ml/hr. Still want that administered? Will continue to monitor.

## 2018-05-08 NOTE — H&P
[facility]  Hospitalist Admission Note  May 8, 2018  Name: Fouzia Arenas    MRN: [id number]  YOB: 1965    Age: 52 year old  Date of admission: 5/7/2018  Primary care provider: Weiler, Karen      Summary:  Patient is a 52-year-old female with a history of SVT, migraines, hypertension, and obstructive sleep apnea who presents here with dizziness and nausea.  Symptoms seems consistent with benign vertigo and peripheral in nature at this time.  Suspect BPPV as symptoms are reproducible with head movements.  No tinnitus or decreased hearing loss.  No history of chronic intermittent vertigo to suggest Ménière's disease.  Less likely is an occult infratentorial CVA.  Noncontrast CT of the head and CT angiogram was negative.  Additional complaints included some chest heaviness which started shortly after she began feeling dizzy.    Problem list/Plan:  1. Dizziness: Suspect vertigo in nature.  Doubt central cause.  Neurologically intact and slightly improved.  Will place patient on scheduled meclizine for now and as needed Valium.  Also will order an MRI to rule out a central cause although felt less likely.  2. Chest heaviness: Appears atypical for angina.  Could be anxiety as symptoms started after she was feeling dizzy.  EKG demonstrates normal sinus rhythm with T-wave inversions in the anterior lateral leads.  Will check another troponin and if negative, will hold off on further inpatient workup for this and did encourage patient to follow-up with her primary MD to order an outpatient stress test..  3. History of SVT: Not an active issue at this time.  Chronically on bysystolic.  4. Hypertension: Continue lisinopril, Bystolic, but hold Aldactone      -Additional workup plan which will include MRI of the brain    All lab work and imaging data independently reviewed by myself    Prophylaxis;  Low risk/Ambulation.     Code status: Full code    Discharge: Home later today if MRI is  "negative and symptoms are better controlled.    Chief Complaint:   Dizziness/nausea/chest heaviness   HPI  Patient is a 52-year-old female with a history of SVT, migraines, hypertension, and obstructive sleep apnea who presents here with dizziness and nausea along with some chest heaviness after dizziness started. The patient had sudden onset of room spinning dizziness and feeling like she was going to faint, followed shortly by nausea, all of which started 4 hours ago. The room still felt like it was spinning even when she laid down. She reports some chest pain \"where her bra sits\" and radiated upwards, but not to her arm or jaw. Later in the day, about 2-3 hour ago, she felt some arm numbness and was also worried because the nausea has not resolved. She also felt like the room was spinning when she walked into the ED. She had vertigo several years ago, but this did not feel similar, because it was not improving with laying down. The dizziness lasted several minutes, but has currently resolved. She does not have chest pain right now. She denies palpitations. She still feels nauseous currently. Her Bystolic was decreased to 10mg from 20mg recently because of her slow heart rate and reports of non-vertiginous lightheadedness. She had an ovarian cyst removal surgery about a month and a half ago. She denies new leg swelling.   I did discuss the case in detail with the ED physician.     Past Medical History:     Past Medical History:   Diagnosis Date     Classic migraine      Complex endometrial hyperplasia     without atypia     Hypertension      STORMY (obstructive sleep apnea)      Palpitations      PONV (postoperative nausea and vomiting)      SVT (supraventricular tachycardia) (H) 9/2015     Past Surgical History:     Past Surgical History:   Procedure Laterality Date     CHOLECYSTECTOMY, LAPOROSCOPIC      Cholecystectomy, Laparoscopic     COLONOSCOPY N/A 11/25/2014    Procedure: COLONOSCOPY;  Surgeon: Iraj" Wenceslao SOMERS MD;  Location:  GI     D & C      X2-3 for abnormal bleeding     ESOPHAGOSCOPY, GASTROSCOPY, DUODENOSCOPY (EGD), COMBINED N/A 11/25/2014    Procedure: COMBINED ESOPHAGOSCOPY, GASTROSCOPY, DUODENOSCOPY (EGD), BIOPSY SINGLE OR MULTIPLE;  Surgeon: Wenceslao Galo MD;  Location: [facility]     LAPAROSCOPY      For endometriosis     OPERATIVE HYSTEROSCOPY WITH MORCELLATOR N/A 3/29/2018    Procedure: OPERATIVE HYSTEROSCOPY WITH MORCELLATOR (MYOSURE);  OPERATIVE HYSTEROSCOPY WITH MORCELLATOR ;  Surgeon: Urban Elena MD;  Location: [facility]     Social History:     Social History   Substance Use Topics     Smoking status: Never Smoker     Smokeless tobacco: Never Used     Alcohol use 1.0 oz/week     Family History:  Family history reviewed. NO pertinent family history     Allergies:     Allergies   Allergen Reactions     Amoxicillin Nausea and Vomiting     Percocet [Oxycodone-Acetaminophen] Nausea and Vomiting     Medications:     Prescriptions Prior to Admission   Medication Sig Dispense Refill Last Dose     ASPIRIN PO Take 500 mg by mouth   5/7/2018 at Unknown time     lisinopril (PRINIVIL/ZESTRIL) 20 MG tablet Take 1 tablet (20 mg) by mouth 2 times daily 60 tablet 11 5/7/2018 at 0800     nebivolol (BYSTOLIC) 10 MG tablet Take 1 tablet (10 mg) by mouth daily 90 tablet 3 5/6/2018 at Unknown time     spironolactone (ALDACTONE) 25 MG tablet Take 1 tablet (25 mg) by mouth daily 90 tablet 3 5/7/2018 at Unknown time     acetaminophen-caffeine (EXCEDRIN TENSION HEADACHE) 500-65 MG TABS Take 2 tablets by mouth every 6 hours as needed for mild pain   Unknown at Unknown time     ibuprofen (ADVIL/MOTRIN) 600 MG tablet Take 1 tablet (600 mg) by mouth every 6 hours as needed for pain (mild) 30 tablet 0 Unknown at Unknown time     order for DME DREAMSTATION  9-12 CM/H20  FF [employer]IR[employer] QUATTRO   Unknown at Unknown time       Review of Systems:   A Comprehensive greater than 10 system review of systems was carried out.   Pertinent positives and negatives are noted above.  Otherwise negative for contributory information.        Physical Exam:  Blood pressure 135/52, pulse 57, temperature 98  F (36.7  C), temperature source Oral, resp. rate 16, height 1.829 m (6'), weight 132.5 kg (292 lb 3.2 oz), SpO2 92 %, not currently breastfeeding.  Gen: Pleasant in no acute distress.  HEENT: NCAT. EOMI. PERRL.  Neck: Normal inspection. No bruit, JVD or thyromegaly.  Lungs: Normal respiratory effort. Clear to auscultation bilaterally with no crackles or wheezes.  Card: N s1s2. RRR. No M/R/G.  Peripheral pulses present and symmetric.   Abd: Soft NT ND. No mass. Normal bowel sounds.  Skin: No rash. Warm to the touch  Extr: No edema. CMS intact  Psychiatric: Patient alert oriented ×3.  Normal affect  Neurologic: Cranial nerves II-XII are intact.  Sensation normal.  Motor strength 5/5.  Some left lateral gaze nystagmus    Data:       Recent Labs  Lab 05/07/18  2112   WBC 8.6   HGB 13.8   HCT 42.5   MCV 86          Recent Labs  Lab 05/07/18  2112      POTASSIUM 4.0   CHLORIDE 106   CO2 30   ANIONGAP 3   *   BUN 9   CR 0.82   GFRESTIMATED 73   GFRESTBLACK 89   ZACH 8.5     No results for input(s): INR in the last 168 hours.    Recent Labs  Lab 05/08/18  0717 05/07/18  2112   TROPI <0.015 <0.015       Imaging:   Recent Results (from the past 24 hour(s))   Head CT w/o contrast    Narrative    CT OF THE HEAD WITHOUT CONTRAST  5/7/2018 11:07 PM     COMPARISON: None.    HISTORY:  Dizzy.     TECHNIQUE: Axial CT images of the head from the skull base to the  vertex were acquired without IV contrast.    FINDINGS:  The ventricles and basal cisterns are within normal limits  in configuration. There is no midline shift. There are no extra-axial  fluid collections.  Gray-white differentiation is well maintained.     No intracranial hemorrhage, mass or recent infarct.    The visualized paranasal sinuses are well aerated. There is no  mastoiditis.  There are no fractures of the visualized bones.       Impression    IMPRESSION:  Normal head CT.     Radiation dose for this scan was reduced using automated exposure  control, adjustment of the mA and/or kV according to patient size, or  iterative reconstruction technique.   Head/neck angiogram CT  w & w/o contrast    Narrative    CT ANGIOGRAM OF THE HEAD AND NECK WITHOUT AND WITH CONTRAST  5/7/2018  11:07 PM     COMPARISON: None.    HISTORY: Dizziness.     TECHNIQUE:  Precontrast localizing scans were followed by CT  angiography with an injection of 70 mL Isovue-370 nonionic intravenous  contrast material with scans through the head and neck.  Images were  transferred to a separate 3-D workstation where multiplanar  reformations and 3-D images were created.  Estimates of carotid  stenoses are made relative to the distal internal carotid artery  diameters except as noted.      FINDINGS:   Neck CTA: The bilateral common carotid, bilateral cervical internal  carotid, dominant left vertebral and small right vertebral arteries  are patent without stenosis.    Head CTA: The basilar, bilateral distal internal carotid, bilateral  anterior cerebral, bilateral middle cerebral and bilateral posterior  cerebral arteries are patent and unremarkable. The anterior  communicating artery is patent and unremarkable.      Impression    IMPRESSION: Normal neck and head CTA.     I concur with the preliminary report provided to the ordering provider  by the overnight Neuroradiologist from [facility].    Radiation dose for this scan was reduced using automated exposure  control, adjustment of the mA and/or kV according to patient size, or  iterative reconstruction technique.   XR Chest 2 Views    Narrative    XR CHEST 2 VW  5/7/2018 11:09 PM     HISTORY: Chest pain.    COMPARISON: 9/28/2017.    FINDINGS: The heart size is normal. The lungs are clear. No  pneumothorax or pleural effusion.      Impression    IMPRESSION: No acute  abnormality.       Cuca Nelson MD Pager [phone]

## 2018-05-08 NOTE — PLAN OF CARE
Problem: Patient Care Overview  Goal: Plan of Care/Patient Progress Review  Outcome: No Change  PRIMARY DIAGNOSIS: GENERALIZED WEAKNESS    OUTPATIENT/OBSERVATION GOALS TO BE MET BEFORE DISCHARGE  1. Orthostatic performed: N/A    2. Tolerating PO medications: Yes    3. Return to near baseline physical activity: Yes, denies dizziness or nausea    4. Cleared for discharge by consultants (if involved): N/A    Discharge Planner Nurse   Safe discharge environment identified: Yes  Barriers to discharge: Yes, per MD note will need an MRI before discharge       Entered by: Ria Tran 05/08/2018 3:17 AM     Please review provider order for any additional goals.   Nurse to notify provider when observation goals have been met and patient is ready for discharge.    05/08/18 3:18 AM  Pt A&Ox4.  VSS.  Denies pain.  Denies CP or chest pressure.  Denies nausea or dizziness.  SB on telemetry.  Tolerating regular diet.  IVF infusing to right forearm.  Up independently in room.  Plan:  MRI prior to discharge per MD note, monitor on telemetry, monitor for nausea, dizziness and pain.  Will continue to monitor.

## 2018-05-08 NOTE — DISCHARGE SUMMARY
[facility] Outpatient / Observation Unit  Discharge Summary        Fouzia Arenas MRN# [id number]   YOB: 1965 Age: 52 year old     Date of Admission:  5/7/2018  Date of Discharge:  5/8/2018  4:21 PM  Admitting Physician:  Cuca Nelson MD  Discharge Physician: Nadya Jay PA-C  Discharging Service: Hospitalist      Primary Provider: Weiler, Karen  Primary Care Physician Phone Number: [phone]         Primary Discharge Diagnoses:    Fouzia Arenas was admitted on 5/7/2018 with complaints of vertigo.     1. Acute vertigo--suspect BPPV. Symptoms now improved. CT and MRI imaging of the head negative. Symptoms improved with meclizine. Gave script of prn meclizine and referral to outpatient vestibular therapy. Recommended outpatient follow up with PCP.    2. Episode of chest pressure: This occurred shortly after her vertigo symptoms started, possibly related to anxiety. Low suspicion for cardiac etiology. EKG demonstrated NSR w/ T wave inversions in anterior lateral leads. Negative serial troponins ruling out MI. No recurrence of chest pressure, pain, or shortness of breath. Recommend outpatient follow up with PCP to discuss if patient should undergo outpatient stress test.     Pain Assessment:  Current Pain Score 5/8/2018   Patient currently in pain? yes   Pain score (0-10) 3   Pain location Head   Pain descriptors Headache   - Fouzia is experiencing pain due to migraine headache. Pain management was discussed and the plan was created in a collaborative fashion.  Fouzia's response to the current recommendations: engaged  - Prn excedrin per PTA regimen              Secondary Discharge Diagnoses:     Past Medical History:   Diagnosis Date     Classic migraine      Complex endometrial hyperplasia     without atypia     Hypertension      STORMY (obstructive sleep apnea)      Palpitations      PONV (postoperative nausea and vomiting)      SVT (supraventricular tachycardia) (H) 9/2015                Code Status:      Full  Code        Brief Hospital Summary:        Reason for your hospital stay       You were evaluated in the hospital for dizziness. Your head imaging did   not show evidence for stroke. Your symptoms improved with an   anti-dizziness medication called meclizine. You likely have a self-limited   type of vertigo. You can take meclizine as needed every 6 hours for   dizziness. We will also give you an outpatient referral to see vestibular   therapy outpatient. Regarding your chest pressure you had yesterday, your   lab work did not show any evidence of heart attack. You can follow up   outpatient with your primary care clinician within 1 week. They may   consider getting an outpatient stress test for further cardiac work up if   they feel it is necessary. Get a repeat urinalysis done to recheck your   urine for infection (last sample looked possibly contaminated).                    Please refer to initial admission history and physical for further details.   Briefly, Fouzia Arenas was admitted on 5/7/2018 for complaints of dizziness and spinning sensation consistent with Vertigo.   Initial work up in the ED revealed VSS, Negative CT of the head and CT negative. Further imaging was performed and showed: MRI of brain negative for acute pathology.  EKG did not reveal evidence of significant cardiac arrhythmias or ischemia.  Pt was registered to the Observation Unit for further evaluation.     Pt was initiated on oral meclizine and IVF. Other supportive measures including anti-emetics, benzodiazepines were offered. PT was consulted for vestibular rehab.   On the day of discharge, patient symptoms improved, vitals remained stable and pt was deemed safe for discharge. Medications were reviewed and adjustments made as necessary. Pt is instructed to follow up as below.           Significant Labs & Imaging During Hospitalization:        Results for orders placed or performed during the hospital encounter of 05/07/18   Head CT w/o  contrast    Narrative    CT OF THE HEAD WITHOUT CONTRAST  5/7/2018 11:07 PM     COMPARISON: None.    HISTORY:  Dizzy.     TECHNIQUE: Axial CT images of the head from the skull base to the  vertex were acquired without IV contrast.    FINDINGS:  The ventricles and basal cisterns are within normal limits  in configuration. There is no midline shift. There are no extra-axial  fluid collections.  Gray-white differentiation is well maintained.     No intracranial hemorrhage, mass or recent infarct.    The visualized paranasal sinuses are well aerated. There is no  mastoiditis. There are no fractures of the visualized bones.       Impression    IMPRESSION:  Normal head CT.     Radiation dose for this scan was reduced using automated exposure  control, adjustment of the mA and/or kV according to patient size, or  iterative reconstruction technique.    VIPIN OLIVARES MD   Head/neck angiogram CT  w & w/o contrast    Narrative    CT ANGIOGRAM OF THE HEAD AND NECK WITHOUT AND WITH CONTRAST  5/7/2018  11:07 PM     COMPARISON: None.    HISTORY: Dizziness.     TECHNIQUE:  Precontrast localizing scans were followed by CT  angiography with an injection of 70 mL Isovue-370 nonionic intravenous  contrast material with scans through the head and neck.  Images were  transferred to a separate 3-D workstation where multiplanar  reformations and 3-D images were created.  Estimates of carotid  stenoses are made relative to the distal internal carotid artery  diameters except as noted.      FINDINGS:   Neck CTA: The bilateral common carotid, bilateral cervical internal  carotid, dominant left vertebral and small right vertebral arteries  are patent without stenosis.    Head CTA: The basilar, bilateral distal internal carotid, bilateral  anterior cerebral, bilateral middle cerebral and bilateral posterior  cerebral arteries are patent and unremarkable. The anterior  communicating artery is patent and unremarkable.      Impression     IMPRESSION: Normal neck and head CTA.     I concur with the preliminary report provided to the ordering provider  by the overnight Neuroradiologist from [facility].    Radiation dose for this scan was reduced using automated exposure  control, adjustment of the mA and/or kV according to patient size, or  iterative reconstruction technique.    VIPIN OLIVARES MD   XR Chest 2 Views    Narrative    XR CHEST 2 VW  5/7/2018 11:09 PM     HISTORY: Chest pain.    COMPARISON: 9/28/2017.    FINDINGS: The heart size is normal. The lungs are clear. No  pneumothorax or pleural effusion.      Impression    IMPRESSION: No acute abnormality.   MR Brain w/o Contrast    Narrative    MRI OF THE BRAIN WITHOUT CONTRAST May 8, 2018 2:16 PM     HISTORY: 52 year-old with acute vertigo. Rule out occult CVA.    TECHNIQUE:   Brain: Axial diffusion-weighted with ADC map, T2-weighted with fat  saturation, T1-weighted and turboFLAIR and coronal T1-weighted images  of the brain were obtained without intravenous contrast.     COMPARISON: Head CT 5/7/2018.    FINDINGS: There is mild diffuse cerebral volume loss. There are a few  tiny scattered focal areas of abnormal T2 signal hyperintensity in the  cerebral white matter bilaterally that are consistent with sequela of  chronic small vessel ischemic disease.     The ventricles and basal cisterns are within normal limits in  configuration given the degree of cerebral volume loss. There is no  midline shift. There are no extra-axial fluid collections. There is no  evidence for stroke or acute intracranial hemorrhage.    There is no sinusitis or mastoiditis.       Impression    IMPRESSION: Diffuse cerebral volume loss and cerebral white matter  changes consistent with chronic small vessel ischemic disease. No  evidence for acute intracranial pathology.    VIPIN OLIVARES MD   CBC with platelets differential   Result Value Ref Range    WBC 8.6 4.0 - 11.0 10e9/L    RBC Count 4.92 3.8 - 5.2 10e12/L     Hemoglobin 13.8 11.7 - 15.7 g/dL    Hematocrit 42.5 35.0 - 47.0 %    MCV 86 78 - 100 fl    MCH 28.0 26.5 - 33.0 pg    MCHC 32.5 31.5 - 36.5 g/dL    RDW 12.5 10.0 - 15.0 %    Platelet Count 261 150 - 450 10e9/L    Diff Method Automated Method     % Neutrophils 65.7 %    % Lymphocytes 26.0 %    % Monocytes 6.1 %    % Eosinophils 1.0 %    % Basophils 0.9 %    % Immature Granulocytes 0.3 %    Nucleated RBCs 0 0 /100    Absolute Neutrophil 5.6 1.6 - 8.3 10e9/L    Absolute Lymphocytes 2.2 0.8 - 5.3 10e9/L    Absolute Monocytes 0.5 0.0 - 1.3 10e9/L    Absolute Eosinophils 0.1 0.0 - 0.7 10e9/L    Absolute Basophils 0.1 0.0 - 0.2 10e9/L    Abs Immature Granulocytes 0.0 0 - 0.4 10e9/L    Absolute Nucleated RBC 0.0    Basic metabolic panel   Result Value Ref Range    Sodium 139 133 - 144 mmol/L    Potassium 4.0 3.4 - 5.3 mmol/L    Chloride 106 94 - 109 mmol/L    Carbon Dioxide 30 20 - 32 mmol/L    Anion Gap 3 3 - 14 mmol/L    Glucose 108 (H) 70 - 99 mg/dL    Urea Nitrogen 9 7 - 30 mg/dL    Creatinine 0.82 0.52 - 1.04 mg/dL    GFR Estimate 73 >60 mL/min/1.7m2    GFR Estimate If Black 89 >60 mL/min/1.7m2    Calcium 8.5 8.5 - 10.1 mg/dL   D dimer quantitative   Result Value Ref Range    D Dimer <0.3 0.0 - 0.50 ug/ml FEU   Troponin I   Result Value Ref Range    Troponin I ES <0.015 0.000 - 0.045 ug/L   Troponin I   Result Value Ref Range    Troponin I ES <0.015 0.000 - 0.045 ug/L   EKG 12 lead   Result Value Ref Range    Interpretation ECG Click View Image link to view waveform and result            Pending Results:        Unresulted Labs Ordered in the Past 30 Days of this Admission     No orders found for last 61 day(s).              Consultations This Hospital Stay:      No consultations were requested during this admission         Discharge Instructions and Follow-Up:      Follow-up Appointments     Follow-up and recommended labs and tests        Follow up with primary care provider, Karen Weiler, within 1 week, for    hospital follow- up. Repeat urinalysis at your follow up appointment.                  Pt instructed to follow up with PCP within 7 days of discharge.    Follow-up Labs: Get a repeat UA, had a urine culture with moderate beta hemolytic streptococcus B suspicious for contaminated sample.        Discharge Disposition:      Discharged to home         Discharge Medications:        Discharge Medication List as of 5/8/2018  4:06 PM      START taking these medications    Details   meclizine (ANTIVERT) 25 MG tablet Take 1 tablet (25 mg) by mouth 3 times daily as needed for dizziness, Disp-25 tablet, R-0, E-Prescribe         CONTINUE these medications which have NOT CHANGED    Details   Aspirin-Acetaminophen-Caffeine (EXCEDRIN EXTRA STRENGTH PO) Take 2 tablets by mouth every 6 hours as needed, Historical      lisinopril (PRINIVIL/ZESTRIL) 20 MG tablet Take 1 tablet (20 mg) by mouth 2 times daily, Disp-60 tablet, R-11, E-Prescribe      nebivolol (BYSTOLIC) 10 MG tablet Take 1 tablet (10 mg) by mouth daily, Disp-90 tablet, R-3, E-Prescribe      spironolactone (ALDACTONE) 25 MG tablet Take 1 tablet (25 mg) by mouth daily, Disp-90 tablet, R-3, E-Prescribe      order for DME DREAMSTATION  9-12 CM/H20  FF MIRAGE QUATTROHistorical                 Allergies:         Allergies   Allergen Reactions     Amoxicillin Nausea and Vomiting     Percocet [Oxycodone-Acetaminophen] Nausea and Vomiting           Condition and Physical on Discharge:      Discharge condition: Stable   Vitals: Blood pressure 161/77, pulse 56, temperature 98.1  F (36.7  C), temperature source Oral, resp. rate 16, height 1.829 m (6'), weight 132.5 kg (292 lb 3.2 oz), SpO2 95 %, not currently breastfeeding.  292 lbs 3.2 oz      GENERAL:  Comfortable.  PSYCH: pleasant, oriented, No acute distress.  HEENT:  PERRLA. Normal conjunctiva, normal hearing, nasal mucosa and Oropharynx are normal.  NECK:  Supple, no neck vein distention, adenopathy or bruits, normal  thyroid.  HEART:  Normal S1, S2 with no murmur, no pericardial rub, gallops or S3 or S4.  LUNGS:  Clear to auscultation, normal Respiratory effort. No wheezing, rales or ronchi.  ABDOMEN:  Soft, no hepatosplenomegaly, normal bowel sounds. Non-tender, non distended.   EXTREMITIES:  No pedal edema, +2 pulses bilateral and equal.  SKIN:  Dry to touch, No rash, wound or ulcerations.  NEUROLOGIC:  CN 2-12 intact, BL 5/5 symmetric upper and lower extremity strength, sensation is intact with no focal deficits.

## 2018-05-08 NOTE — PHARMACY-ADMISSION MEDICATION HISTORY
Admission medication history interview status for this patient is complete. See [facility] admission navigator for allergy information, prior to admission medications and immunization status.     Medication history interview source(s):Patient  Medication history resources (including written lists, pill bottles, clinic record):None  Primary pharmacy:[facility]    Changes made to PTA medication list:  Added:   Deleted: aspirin, ibuprofen  Changed: excedrin    Actions taken by pharmacist (provider contacted, etc):sticky note for md     Additional medication history information:None    Medication reconciliation/reorder completed by provider prior to medication history? Yes    Do you take OTC medications (eg tylenol, ibuprofen, fish oil, eye/ear drops, etc)? Y(Y/N)    For patients on insulin therapy: NA (Y/N)      Prior to Admission medications    Medication Sig Last Dose Taking? Auth Provider   Aspirin-Acetaminophen-Caffeine (EXCEDRIN EXTRA STRENGTH PO) Take 2 tablets by mouth every 6 hours as needed Past Week at Unknown time Yes Unknown, Entered By History   lisinopril (PRINIVIL/ZESTRIL) 20 MG tablet Take 1 tablet (20 mg) by mouth 2 times daily 5/7/2018 at 0800 Yes Peggy Edmond PA-C   nebivolol (BYSTOLIC) 10 MG tablet Take 1 tablet (10 mg) by mouth daily 5/6/2018 at Unknown time Yes Ruthie Phipps PA-C   spironolactone (ALDACTONE) 25 MG tablet Take 1 tablet (25 mg) by mouth daily 5/7/2018 at Unknown time Yes Peggy Edmond PA-C   order for DME DREAMSTATION  9-12 CM/H20  FF MIRAGE QUATTRO Unknown at Unknown time  Reported, Patient

## 2018-05-08 NOTE — ED NOTES
Patient states chest pressure and nausea continue even after laying down on cart. Denies Headache or SOB. Spinning of the room lastest several minutes but currently is resolved.

## 2018-05-09 ENCOUNTER — TELEPHONE (OUTPATIENT)
Dept: FAMILY MEDICINE | Facility: CLINIC | Age: 53
End: 2018-05-09

## 2018-05-09 NOTE — TELEPHONE ENCOUNTER
"    Hospital/TCU/ED for chronic condition Discharge Protocol    \"Hi, my name is Amber Manzo, a registered nurse, and I am calling from [facility].  I am calling to follow up and see how things are going for you after your recent emergency visit/hospital/TCU stay.\"    Tell me how you are doing now that you are home?\" Feeling dizzy and lightheaded still, taking meclizine which helps somewhat       Discharge Instructions    \"Let's review your discharge instructions.  What is/are the follow-up recommendations?  Pt. Response: Fouzia has not questions, we reviewed via phone    \"Has an appointment with your primary care provider been scheduled?\"   Yes. (confirm) But patient wanted to schedule for 5/17    \"When you see the provider, I would recommend that you bring your medications with you.\"    Medications    \"Tell me what changed about your medicines when you discharged?\"    Changes to chronic med's?    0-1    \"What questions do you have about your medications?\"    None     New diagnoses of heart failure, COPD, diabetes, or MI?    No              Medication reconciliation completed? Yes  Was MTM referral placed (*Make sure to put transitions as reason for referral)?   No    Call Summary    \"What questions or concerns do you have about your recent visit and your follow-up care?\"     none    \"If you have questions or things don't continue to improve, we encourage you contact us through the main clinic number (give number).  Even if the clinic is not open, triage nurses are available 24/7 to help you.     We would like you to know that our clinic has extended hours (provide information).  We also have urgent care (provide details on closest location and hours/contact info)\"      \"Thank you for your time and take care!\"             "

## 2018-05-09 NOTE — TELEPHONE ENCOUNTER
Pt was DC'd from [facility] on 5/8/2018 after being treated for Nausea, Vertigo.  Next scheduled appt with PCP is not scheduled.  Please call patient.  Thank you!  Wanda Vora

## 2018-05-17 ENCOUNTER — OFFICE VISIT (OUTPATIENT)
Dept: FAMILY MEDICINE | Facility: CLINIC | Age: 53
End: 2018-05-17
Payer: COMMERCIAL

## 2018-05-17 VITALS
SYSTOLIC BLOOD PRESSURE: 131 MMHG | HEIGHT: 72 IN | OXYGEN SATURATION: 95 % | HEART RATE: 65 BPM | DIASTOLIC BLOOD PRESSURE: 70 MMHG | TEMPERATURE: 99.4 F

## 2018-05-17 DIAGNOSIS — H81.10 BENIGN PAROXYSMAL POSITIONAL VERTIGO, UNSPECIFIED LATERALITY: Primary | ICD-10-CM

## 2018-05-17 PROCEDURE — 99495 TRANSJ CARE MGMT MOD F2F 14D: CPT | Performed by: FAMILY MEDICINE

## 2018-05-17 NOTE — MR AVS SNAPSHOT
"              After Visit Summary   2018    Fouzia Arenas    MRN: [id number]           Patient Information     Date Of Birth          1965        Visit Information        Provider Department      2018 1:00 PM Carmelo Clifton MD [facility]         Follow-ups after your visit        Who to contact     If you have questions or need follow up information about today's clinic visit or your schedule please contact [facility] at [phone].  Normal or non-critical lab and imaging results will be communicated to you by MyChart, letter or phone within 4 business days after the clinic has received the results. If you do not hear from us within 7 days, please contact the clinic through MyChart or phone. If you have a critical or abnormal lab result, we will notify you by phone as soon as possible.  Submit refill requests through [employer] or call your pharmacy and they will forward the refill request to us. Please allow 3 business days for your refill to be completed.          Additional Information About Your Visit        MyChart Information     [employer] lets you send messages to your doctor, view your test results, renew your prescriptions, schedule appointments and more. To sign up, go to [email or web address] . Click on \"Log in\" on the left side of the screen, which will take you to the Welcome page. Then click on \"Sign up Now\" on the right side of the page.     You will be asked to enter the access code listed below, as well as some personal information. Please follow the directions to create your username and password.     Your access code is: [email or web address]  Expires: 2018  3:36 PM     Your access code will  in 90 days. If you need help or a new code, please call your [facility] or [phone].        Care EveryWhere ID     This is your Care EveryWhere ID. This could be used by other organizations to access your [place] medical records  [id number]      "   Your Vitals Were     Pulse Temperature Height Pulse Oximetry          65 99.4  F (37.4  C) (Oral) 6' (1.829 m) 95%         Blood Pressure from Last 3 Encounters:   05/17/18 131/70   05/08/18 161/77   04/25/18 120/78    Weight from Last 3 Encounters:   05/08/18 292 lb 3.2 oz (132.5 kg)   04/25/18 320 lb (145.2 kg)   03/29/18 324 lb 9 oz (147.2 kg)              Today, you had the following     No orders found for display         Today's Medication Changes          These changes are accurate as of 5/17/18  1:36 PM.  If you have any questions, ask your nurse or doctor.               Stop taking these medicines if you haven't already. Please contact your care team if you have questions.     meclizine 25 MG tablet   Commonly known as:  ANTIVERT   Stopped by:  Carmelo Clifton MD                    Primary Care Provider Office Phone # Fax #    Ruthie Phipps PA-C [phone] [address] [place] [address]        Equal Access to Services     [facility]: Hadii hudson ku hadasho Soomaali, waaxda luqadaha, qaybta kaalmada adeegyada, lili gerber . So [facility] [phone].    ATENCIÓN: Si habla español, tiene a hay disposición servicios gratuitos de asistencia lingüística. [facility] [phone].    We comply with applicable federal civil rights laws and [place] laws. We do not discriminate on the basis of race, color, national origin, age, disability, sex, sexual orientation, or gender identity.            Thank you!     Thank you for choosing [facility]  for your care. Our goal is always to provide you with excellent care. Hearing back from our patients is one way we can continue to improve our services. Please take a few minutes to complete the written survey that you may receive in the mail after your visit with us. Thank you!             Your Updated Medication List - Protect others around you: Learn how to safely use, store and throw away  your medicines at www.disposemymeds.org.          This list is accurate as of 5/17/18  1:36 PM.  Always use your most recent med list.                   Brand Name Dispense Instructions for use Diagnosis    EXCEDRIN EXTRA STRENGTH PO      Take 2 tablets by mouth every 6 hours as needed        lisinopril 20 MG tablet    PRINIVIL/ZESTRIL    60 tablet    Take 1 tablet (20 mg) by mouth 2 times daily    HTN (hypertension)       nebivolol 10 MG tablet    BYSTOLIC    90 tablet    Take 1 tablet (10 mg) by mouth daily    Hypertension goal BP (blood pressure) < 130/80       order for DME      DREAMSTATION 9-12 CM/H20 FF MIRAGE QUATTRO        spironolactone 25 MG tablet    ALDACTONE    90 tablet    Take 1 tablet (25 mg) by mouth daily    Benign essential hypertension

## 2018-05-17 NOTE — PROGRESS NOTES
SUBJECTIVE:   Fouzia Arenas is a 52 year old female who presents to clinic today for the following health issues:      Hospital Follow-up Visit:    Hospital/Nursing Home/IP Rehab Facility: [facility]  Date of Admission: 5/7/18  Date of Discharge: 5/8/18  Reason(s) for Admission: Vertigo            Problems taking medications regularly:  None       Medication changes since discharge: Meclizine was given to pt but she has not taken it because it makes her too sleepy.       Problems adhering to non-medication therapy:  None    Summary of hospitalization:  [facility] discharge summary reviewed  Diagnostic Tests/Treatments reviewed.  Follow up needed: none  Other Healthcare Providers Involved in Patient s Care:         None  Update since discharge: improved.     Post Discharge Medication Reconciliation: discharge medications reconciled, continue medications without change.  Plan of care communicated with patient     Coding guidelines for this visit:  Type of Medical   Decision Making Face-to-Face Visit       within 7 Days of discharge Face-to-Face Visit        within 14 days of discharge   Moderate Complexity 70907 23006   High Complexity 52527 40365          /70 (BP Location: Right arm, Patient Position: Sitting, Cuff Size: Adult Regular)  Pulse 65  Temp 99.4  F (37.4  C) (Oral)  Ht 6' (1.829 m)  SpO2 95%     GENERAL: healthy, alert and no distress  EYES: Eyes grossly normal to inspection, PERRL and conjunctivae and sclerae normal  RESP: lungs clear to auscultation - no rales, rhonchi or wheezes  CV: regular rate and rhythm, normal S1 S2, no S3 or S4, no murmur, click or rub, no peripheral edema and peripheral pulses strong  MS: no gross musculoskeletal defects noted, no edema  NEURO: Normal strength and tone, mentation intact and speech normal  PSYCH: mentation appears normal, affect normal/bright     ASSESSMENT:  1. Benign paroxysmal positional vertigo, unspecified laterality  Reassure     To PT if persists   Meclizine prn but causes sedation   Fluids and rest   No driving if dizzy

## 2018-06-19 DIAGNOSIS — I10 HTN (HYPERTENSION): ICD-10-CM

## 2018-06-19 RX ORDER — LISINOPRIL 20 MG/1
20 TABLET ORAL 2 TIMES DAILY
Qty: 180 TABLET | Refills: 0 | Status: SHIPPED | OUTPATIENT
Start: 2018-06-19 | End: 2018-09-28

## 2018-07-23 ENCOUNTER — OFFICE VISIT (OUTPATIENT)
Dept: OBGYN | Facility: CLINIC | Age: 53
End: 2018-07-23
Payer: COMMERCIAL

## 2018-07-23 VITALS
DIASTOLIC BLOOD PRESSURE: 90 MMHG | BODY MASS INDEX: 39.68 KG/M2 | HEART RATE: 64 BPM | SYSTOLIC BLOOD PRESSURE: 132 MMHG | HEIGHT: 72 IN | WEIGHT: 293 LBS

## 2018-07-23 DIAGNOSIS — N85.01 ENDOMETRIAL HYPERPLASIA WITHOUT ATYPIA, COMPLEX: Primary | ICD-10-CM

## 2018-07-23 DIAGNOSIS — N39.3 SUI (STRESS URINARY INCONTINENCE, FEMALE): ICD-10-CM

## 2018-07-23 PROCEDURE — 99213 OFFICE O/P EST LOW 20 MIN: CPT | Performed by: OBSTETRICS & GYNECOLOGY

## 2018-07-23 NOTE — PROGRESS NOTES
SUBJECTIVE:                                                   Fouzia Arenas is a 52 year old female who presents to clinic today for the following health issue(s):  Patient presents with:  Consult: hysterectomy        HPI:  Patient is seen about scheduling surgery.  In March this year she had a hysteroscopy with resection which showed complex endometrial hyperplasia without atypia.  She also has a history of stress urinary incontinence.  She would like to proceed with a hysterectomy and also with a sling procedure.  Patient has recently noted some cramping again.    No LMP recorded. Patient is postmenopausal..   Patient is not sexually active, .  Using menopause for contraception.    reports that she has never smoked. She has never used smokeless tobacco.    STD testing offered?  Declined    Health maintenance updated:  yes    Today's PHQ-2 Score:   PHQ-2 (  [employer]) 2017   Q1: Little interest or pleasure in doing things 0   Q2: Feeling down, depressed or hopeless 0   PHQ-2 Score 0     Today's PHQ-9 Score:   PHQ-9 SCORE 3/22/2018   Total Score 2     Today's CHANNING-7 Score:   CHANNING-7 SCORE 3/22/2018   Total Score 1       Problem list and histories reviewed & adjusted, as indicated.  Additional history: as documented.    Patient Active Problem List   Diagnosis     Esophageal reflux     CARDIOVASCULAR SCREENING; LDL GOAL LESS THAN 160     Palpitations     STORMY (obstructive sleep apnea)     Atypical chest pain     BPPV (benign paroxysmal positional vertigo)     Other disorder of menstruation and other abnormal bleeding from female genital tract     Morbid obesity (H)     Hypertension goal BP (blood pressure) < 130/80     Vertigo     Past Surgical History:   Procedure Laterality Date     CHOLECYSTECTOMY, LAPOROSCOPIC      Cholecystectomy, Laparoscopic     COLONOSCOPY N/A 2014    Procedure: COLONOSCOPY;  Surgeon: Wenceslao Galo MD;  Location:  GI     D & C      X2-3 for abnormal bleeding      ESOPHAGOSCOPY, GASTROSCOPY, DUODENOSCOPY (EGD), COMBINED N/A 11/25/2014    Procedure: COMBINED ESOPHAGOSCOPY, GASTROSCOPY, DUODENOSCOPY (EGD), BIOPSY SINGLE OR MULTIPLE;  Surgeon: Wenceslao Galo MD;  Location: [facility]     LAPAROSCOPY      For endometriosis     OPERATIVE HYSTEROSCOPY WITH MORCELLATOR N/A 3/29/2018    Procedure: OPERATIVE HYSTEROSCOPY WITH MORCELLATOR (MYOSURE);  OPERATIVE HYSTEROSCOPY WITH MORCELLATOR ;  Surgeon: Urban Elena MD;  Location: [facility]      Social History   Substance Use Topics     Smoking status: Never Smoker     Smokeless tobacco: Never Used     Alcohol use 1.0 oz/week      Problem (# of Occurrences) Relation (Name,Age of Onset)    Breast Cancer (1) Maternal Aunt    C.A.D. (2) Maternal Grandmother, Paternal Grandfather    Cancer (1) Maternal Grandfather: lung    Cancer - colorectal (1) Other: cousin maternal     Cerebrovascular Disease (1) Paternal Grandmother    Colon Cancer (2) Cousin, Cousin    Gallbladder Disease (1) Maternal Grandmother    Hypertension (3) Mother, Father, Maternal Grandmother    Lipids (2) Mother, Father    Prostate Cancer (1) Father       Negative family history of: Diabetes            Current Outpatient Prescriptions   Medication Sig     Aspirin-Acetaminophen-Caffeine (EXCEDRIN EXTRA STRENGTH PO) Take 2 tablets by mouth every 6 hours as needed     lisinopril (PRINIVIL/ZESTRIL) 20 MG tablet Take 1 tablet (20 mg) by mouth 2 times daily     nebivolol (BYSTOLIC) 10 MG tablet Take 1 tablet (10 mg) by mouth daily     order for DME DREAMSTATION  9-12 CM/H20  FF [employer]IRAGE QUATTRO     spironolactone (ALDACTONE) 25 MG tablet Take 1 tablet (25 mg) by mouth daily     No current facility-administered medications for this visit.      Allergies   Allergen Reactions     Amoxicillin Nausea and Vomiting     Percocet [Oxycodone-Acetaminophen] Nausea and Vomiting       ROS:  12 point review of systems negative other than symptoms noted below.  Cardiovascular:  Lightheadedness  Gastrointestinal: Heartburn  Genitourinary: Cramps, Night Sweats and No Periods  Neurologic: Dizziness and Headaches    OBJECTIVE:     /90  Pulse 64  Ht 6' (1.829 m)  Wt 322 lb 6.4 oz (146.2 kg)  Breastfeeding? No  BMI 43.73 kg/m2  Body mass index is 43.73 kg/(m^2).    Exam:  Constitutional:  Appearance: Well nourished, well developed alert, in no acute distress  Chest:  Respiratory Effort:  Breathing unlabored  Neurologic/Psychiatric:  Mental Status:  Oriented X3   No Pelvic Exam performed     In-Clinic Test Results:  No results found for this or any previous visit (from the past 24 hour(s)).    ASSESSMENT/PLAN:                                                        ICD-10-CM    1. Endometrial hyperplasia without atypia, complex N85.01 Mohini-Operative Worksheet GYN   2. JAYNE (stress urinary incontinence, female) N39.3 Mohini-Operative Worksheet GYN           N: The patient will be scheduling a laparoscopic subtotal hysterectomy bilateral salpingectomy and suburethral sling after August 17.  I explained the operation to the patient.  I included a discussion of the risks and complications.  These include the potential for reaction anesthesia, infection, bleeding and injury to other organs.  Patient understands that she should feel progressively better after surgery and contact me if she notices any issue.  She will be seeing her primary care physician for preoperative exam.    Urban Elena MD  [facility]

## 2018-07-23 NOTE — MR AVS SNAPSHOT
"              After Visit Summary   2018    Fouzia Arenas    MRN: [id number]           Patient Information     Date Of Birth          1965        Visit Information        Provider Department      2018 3:45 PM Urban Elena MD [facility]        Today's Diagnoses     Endometrial hyperplasia without atypia, complex    -  1    JAYNE (stress urinary incontinence, female)           Follow-ups after your visit        Who to contact     If you have questions or need follow up information about today's clinic visit or your schedule please contact [facility] directly at [phone].  Normal or non-critical lab and imaging results will be communicated to you by MyChart, letter or phone within 4 business days after the clinic has received the results. If you do not hear from us within 7 days, please contact the clinic through [employer] or phone. If you have a critical or abnormal lab result, we will notify you by phone as soon as possible.  Submit refill requests through [employer] or call your pharmacy and they will forward the refill request to us. Please allow 3 business days for your refill to be completed.          Additional Information About Your Visit        MyChart Information     [employer] lets you send messages to your doctor, view your test results, renew your prescriptions, schedule appointments and more. To sign up, go to [email or web address] . Click on \"Log in\" on the left side of the screen, which will take you to the Welcome page. Then click on \"Sign up Now\" on the right side of the page.     You will be asked to enter the access code listed below, as well as some personal information. Please follow the directions to create your username and password.     Your access code is: [email or web address]  Expires: 10/21/2018  3:50 PM     Your access code will  in 90 days. If you need help or a new code, please call your [place] clinic or [phone].      "   Care EveryWhere ID     This is your Care EveryWhere ID. This could be used by other organizations to access your [place] medical records  [id number]        Your Vitals Were     Pulse Height Breastfeeding? BMI (Body Mass Index)          64 6' (1.829 m) No 43.73 kg/m2         Blood Pressure from Last 3 Encounters:   07/23/18 132/90   05/17/18 131/70   05/08/18 161/77    Weight from Last 3 Encounters:   07/23/18 322 lb 6.4 oz (146.2 kg)   05/08/18 292 lb 3.2 oz (132.5 kg)   04/25/18 320 lb (145.2 kg)              We Performed the Following     Mohini-Operative Worksheet GYN        Primary Care Provider Office Phone # Fax #    Ruthie Phipps PA-C [phone] [address] [place] [address]        Equal Access to Services     [facility]: Hadii aad ku hadasho Soomaali, waaxda luqadaha, qaybta kaalmada adeegyada, waxay idiin hayaan darya maureraramartha gerber . So [facility] [phone].    ATENCIÓN: Si habla español, tiene a hay disposición servicios gratuitos de asistencia lingüística. [facility] [phone].    We comply with applicable federal civil rights laws and [place] laws. We do not discriminate on the basis of race, color, national origin, age, disability, sex, sexual orientation, or gender identity.            Thank you!     Thank you for choosing [facility]  for your care. Our goal is always to provide you with excellent care. Hearing back from our patients is one way we can continue to improve our services. Please take a few minutes to complete the written survey that you may receive in the mail after your visit with us. Thank you!             Your Updated Medication List - Protect others around you: Learn how to safely use, store and throw away your medicines at www.disposemymeds.org.          This list is accurate as of 7/23/18  4:20 PM.  Always use your most recent med list.                   Brand Name Dispense Instructions for use Diagnosis     EXCEDRIN EXTRA STRENGTH PO      Take 2 tablets by mouth every 6 hours as needed        lisinopril 20 MG tablet    PRINIVIL/ZESTRIL    180 tablet    Take 1 tablet (20 mg) by mouth 2 times daily    HTN (hypertension)       nebivolol 10 MG tablet    BYSTOLIC    90 tablet    Take 1 tablet (10 mg) by mouth daily    Hypertension goal BP (blood pressure) < 130/80       order for DME      DREAMSTATION 9-12 CM/H20 FF MIRAGE QUATTRO        spironolactone 25 MG tablet    ALDACTONE    90 tablet    Take 1 tablet (25 mg) by mouth daily    Benign essential hypertension

## 2018-07-24 ENCOUNTER — TELEPHONE (OUTPATIENT)
Dept: OBGYN | Facility: CLINIC | Age: 53
End: 2018-07-24

## 2018-07-24 NOTE — TELEPHONE ENCOUNTER
Type of surgery: LSH BS SUB SLING CYSTO  Location of surgery: [facility] OR  Date and time of surgery: 8/20/2018 8:45am ARRIVAL 6:45  Surgeon: Ulices  Pre-Op Appt Date: PRIMARY  Post-Op Appt Date: TBD   Packet sent out: HANDED 7/23/2018  Pre-cert/Authorization completed:  TBD  Date: 7/24/2018 Manish naidu/Juanita Johnson  Surgery Scheduler      Order Questions      Question Answer Comment     Procedure name(s) - multi select Laparoscopic subtotal hysterectomy with bilateral salpingectomy, suburethral sling, cystoscopy      Reason for procedure Complex endometrial hyperplasia without atypia, urinary stress incontinence      Is this a multi surgeon case? No      Laterality Bilateral      Request for additional equipment Other (see comments) None     Anesthesia General      Initiate Pre-op orders for above procedure: Yes, as ordered in Epic Additional orders noted there also     Location of Case: [facility] OR      Surgeon Procedure Time (incision to closure) in minutes (per procedure as applicable) 60      Note:  Surgical Case Time Needed (in minutes)     Patient Class (for admit prior to surgery, specify number of days in comments): Same day (hospital outpatient)      Why can t this outpatient surgery be done at the [facility] or [facility]? na      Vendor Needed? No

## 2018-08-13 ENCOUNTER — OFFICE VISIT (OUTPATIENT)
Dept: FAMILY MEDICINE | Facility: CLINIC | Age: 53
End: 2018-08-13
Payer: COMMERCIAL

## 2018-08-13 VITALS
OXYGEN SATURATION: 96 % | DIASTOLIC BLOOD PRESSURE: 74 MMHG | HEART RATE: 63 BPM | SYSTOLIC BLOOD PRESSURE: 118 MMHG | HEIGHT: 72 IN | BODY MASS INDEX: 39.68 KG/M2 | TEMPERATURE: 99 F | WEIGHT: 293 LBS

## 2018-08-13 DIAGNOSIS — I10 HYPERTENSION GOAL BP (BLOOD PRESSURE) < 130/80: ICD-10-CM

## 2018-08-13 DIAGNOSIS — Z01.818 PREOP GENERAL PHYSICAL EXAM: Primary | ICD-10-CM

## 2018-08-13 DIAGNOSIS — N85.00 ENDOMETRIAL HYPERPLASIA: ICD-10-CM

## 2018-08-13 DIAGNOSIS — G47.33 OSA (OBSTRUCTIVE SLEEP APNEA): ICD-10-CM

## 2018-08-13 DIAGNOSIS — E66.01 MORBID OBESITY (H): ICD-10-CM

## 2018-08-13 PROCEDURE — 99215 OFFICE O/P EST HI 40 MIN: CPT | Performed by: PHYSICIAN ASSISTANT

## 2018-08-13 NOTE — PROGRESS NOTES
[facility]  [address]  [facility] [address]  [phone]  Dept: [phone]    PRE-OP EVALUATION:  Today's date: 2018    Fouzia Arenas (: 1965) presents for pre-operative evaluation assessment as requested by Dr. Elena.  She requires evaluation and anesthesia risk assessment prior to undergoing surgery/procedure for treatment of Complex endometrial hyperplasia without atypia, urinary stress incontinence .    Proposed Surgery/ Procedure: LAPAROSCOPIC SUBTOATAL HYSTERECTOMY, BILATERAL SALPINGECTOMY, SUBURETHRAL SLING AND CYSTOSCOPY  Date of Surgery/ Procedure: 2018  Time of Surgery/ Procedure: 830 am  Hospital/Surgical Facility: [facility]  Fax number for surgical facility:   Primary Physician: Ruthie Phipps  Type of Anesthesia Anticipated: General    Patient has a Health Care Directive or Living Will:  NO    1. NO - Do you have a history of heart attack, stroke, stent, bypass or surgery on an artery in the head, neck, heart or legs?  2. YES - Do you ever have any pain or discomfort in your chest? Has had intermittent CP in the past, but has had worked up by cardiology multiple times and been normal. No issues for about 1 yr now.     3. NO - Do you have a history of  Heart Failure?  4. NO - Are you troubled by shortness of breath when: walking on the level, up a slight hill or at night?  5. NO - Do you currently have a cold, bronchitis or other respiratory infection?  6. NO - Do you have a cough, shortness of breath or wheezing?  7. NO - Do you sometimes get pains in the calves of your legs when you walk?  8. NO - Do you or anyone in your family have previous history of blood clots?  9. NO - Do you or does anyone in your family have a serious bleeding problem such as prolonged bleeding following surgeries or cuts?  10. NO - Have you ever had problems with anemia or been told to take iron pills?  11. NO - Have you had any abnormal blood loss  such as black, tarry or bloody stools, or abnormal vaginal bleeding?  12. NO - Have you ever had a blood transfusion?  13. NO - Have you or any of your relatives ever had problems with anesthesia?  14. YES - Do you have sleep apnea, excessive snoring or daytime drowsiness? Has STORMY  15. NO - Do you have any prosthetic heart valves?  16. NO - Do you have prosthetic joints?  17. NO - Is there any chance that you may be pregnant?      HPI:     HPI related to upcoming procedure: Fouzia is a 54 yo female here today for pre-op. In March she had a hysteroscopy with resection which showed complex endometrial hyperplasia without atypia. She also had problems with cyst causing cramping - this was removed in March as well. She also suffers with urinary incontinence. Was evaluated by OB/GYN and told that she would require endometrial biopsies every 6 months as has the potential for cancer or could proceed with hysterectomy. Elected to proceed with hysterectomy and will be having a bladder sling as well.        See problem list for active medical problems.  Problems all longstanding and stable, except as noted/documented.  See ROS for pertinent symptoms related to these conditions.                                                                                                                                                          .    MEDICAL HISTORY:     Patient Active Problem List    Diagnosis Date Noted     Vertigo 05/08/2018     Priority: Medium     Hypertension goal BP (blood pressure) < 130/80 03/26/2018     Priority: Medium     Morbid obesity (H) 02/06/2018     Priority: Medium     Atypical chest pain 04/05/2017     Priority: Medium     STORMY (obstructive sleep apnea) 09/30/2016     Priority: Medium     Palpitations      Priority: Medium     Esophageal reflux 12/07/2013     Priority: Medium     CARDIOVASCULAR SCREENING; LDL GOAL LESS THAN 160 12/07/2013     Priority: Medium     BPPV (benign paroxysmal positional vertigo)  09/12/2012     Priority: Medium     Other disorder of menstruation and other abnormal bleeding from female genital tract 02/24/2011     Priority: Medium      Past Medical History:   Diagnosis Date     Classic migraine      Complex endometrial hyperplasia     without atypia     Hypertension      STORMY (obstructive sleep apnea)      Palpitations      PONV (postoperative nausea and vomiting)      SVT (supraventricular tachycardia) (H) 9/2015     Past Surgical History:   Procedure Laterality Date     CHOLECYSTECTOMY, LAPOROSCOPIC      Cholecystectomy, Laparoscopic     COLONOSCOPY N/A 11/25/2014    Procedure: COLONOSCOPY;  Surgeon: Wenceslao Galo MD;  Location:  GI     D & C      X2-3 for abnormal bleeding     ESOPHAGOSCOPY, GASTROSCOPY, DUODENOSCOPY (EGD), COMBINED N/A 11/25/2014    Procedure: COMBINED ESOPHAGOSCOPY, GASTROSCOPY, DUODENOSCOPY (EGD), BIOPSY SINGLE OR MULTIPLE;  Surgeon: Wenceslao Galo MD;  Location: [facility]     LAPAROSCOPY      For endometriosis     OPERATIVE HYSTEROSCOPY WITH MORCELLATOR N/A 3/29/2018    Procedure: OPERATIVE HYSTEROSCOPY WITH MORCELLATOR (MYOSURE);  OPERATIVE HYSTEROSCOPY WITH MORCELLATOR ;  Surgeon: Urban Elena MD;  Location: [facility]     Current Outpatient Prescriptions   Medication Sig Dispense Refill     lisinopril (PRINIVIL/ZESTRIL) 20 MG tablet Take 1 tablet (20 mg) by mouth 2 times daily 180 tablet 0     nebivolol (BYSTOLIC) 10 MG tablet Take 1 tablet (10 mg) by mouth daily 90 tablet 3     spironolactone (ALDACTONE) 25 MG tablet Take 1 tablet (25 mg) by mouth daily 90 tablet 3     Aspirin-Acetaminophen-Caffeine (EXCEDRIN EXTRA STRENGTH PO) Take 2 tablets by mouth every 6 hours as needed       order for DME DREAMSTATION  9-12 CM/H20  FF MIRAGE QUATTRO       OTC products: None, except as noted above    Allergies   Allergen Reactions     Amoxicillin Nausea and Vomiting     Percocet [Oxycodone-Acetaminophen] Nausea and Vomiting      Latex Allergy: NO    Social History    Substance Use Topics     Smoking status: Never Smoker     Smokeless tobacco: Never Used     Alcohol use 1.0 oz/week     History   Drug Use No       REVIEW OF SYSTEMS:   CONSTITUTIONAL: NEGATIVE for fever, chills, change in weight  INTEGUMENTARY/SKIN: NEGATIVE for worrisome rashes, moles or lesions  EYES: NEGATIVE for vision changes or irritation  ENT/MOUTH: NEGATIVE for ear, mouth and throat problems  RESP: NEGATIVE for significant cough or SOB  BREAST: NEGATIVE for masses, tenderness or discharge  CV: NEGATIVE for chest pain, palpitations or peripheral edema  GI: NEGATIVE for nausea, abdominal pain, heartburn, or change in bowel habits   female: + for pelvic cramping - see notes in HPI.   MUSCULOSKELETAL: NEGATIVE for significant arthralgias or myalgia  NEURO: + for resolution of prior dizzy episodes after decreasing bystolic down to 10mg. NEGATIVE for weakness, dizziness or paresthesias  ENDOCRINE: NEGATIVE for temperature intolerance, skin/hair changes  HEME: NEGATIVE for bleeding problems  PSYCHIATRIC: NEGATIVE for changes in mood or affect    EXAM:   /74 (BP Location: Right arm, Cuff Size: Adult Large)  Pulse 63  Temp 99  F (37.2  C) (Oral)  Ht 6' (1.829 m)  Wt 320 lb (145.2 kg)  SpO2 96%  BMI 43.4 kg/m2    GENERAL APPEARANCE: healthy, alert and no distress     EYES: EOMI, PERRL     HENT: ear canals and TM's normal and nose and mouth without ulcers or lesions     NECK: no adenopathy, no asymmetry, masses, or scars and thyroid normal to palpation     RESP: lungs clear to auscultation - no rales, rhonchi or wheezes     CV: regular rates and rhythm, normal S1 S2, no S3 or S4 and no murmur, click or rub     ABDOMEN:  soft, nontender, no HSM or masses and bowel sounds normal     MS: extremities normal- no gross deformities noted, no evidence of inflammation in joints, FROM in all extremities.     SKIN: no suspicious lesions or rashes     NEURO: Normal strength and tone, sensory exam grossly normal,  mentation intact and speech normal     PSYCH: mentation appears normal. and affect normal/bright     LYMPHATICS: No cervical adenopathy    DIAGNOSTICS:   Previous EKG's reviewed EKG: personal reading shows sinus bradycardia resolved on EKG from May - pt had decreased her bystolic down to 10mg and dizzy episodes resolved.  Non-specific ST depression and T-abnormality is noted in anterolateral leads. Prior EKG review shows this has been present previously from EKG in 9/2017.  Pt did under go NM stress testing 5/2017 which no ischemia or infarct from test in 2014. Study was considered limited though by pt's body habitus.       Recent Labs   Lab Test  05/07/18   2112  04/25/18   0918  03/26/18   1153  02/06/18   0854   HGB  13.8  13.7  13.9  13.8   PLT  261   --   233  234   NA  139   --   141   --    POTASSIUM  4.0   --   3.9   --    CR  0.82   --   0.78   --    A1C   --    --    --   5.6        IMPRESSION:   Reason for surgery/procedure: LAPAROSCOPIC SUBTOATAL HYSTERECTOMY, BILATERAL SALPINGECTOMY, SUBURETHRAL SLING AND CYSTOSCOPY  Diagnosis/reason for consult: pre-op, endometrial hyperplasia, HTN, morbid obesity, STORMY    The proposed surgical procedure is considered INTERMEDIATE risk.    REVISED CARDIAC RISK INDEX  The patient has the following serious cardiovascular risks for perioperative complications such as (MI, PE, VFib and 3  AV Block):  No serious cardiac risks  INTERPRETATION: 0 risks: Class I (very low risk - 0.4% complication rate)    The patient has the following additional risks for perioperative complications:  No identified additional risks      ICD-10-CM    1. Preop general physical exam Z01.818    2. Endometrial hyperplasia N85.00    3. STORMY (obstructive sleep apnea) G47.33    4. Morbid obesity (H) E66.01    5. Hypertension goal BP (blood pressure) < 130/80 I10        RECOMMENDATIONS:       Obstructive Sleep Apnea (or suspected sleep apnea)  Patient is clearly advised to use their home CPAP when  released from surgery      --Patient is to take all scheduled medications on the day of surgery EXCEPT for modifications listed below.    ACE Inhibitor or Angiotensin Receptor Blocker (ARB) Use  Ace inhibitor or Angiotensin Receptor Blocker (ARB) and should HOLD this medication for the 24 hours prior to surgery.      APPROVAL GIVEN to proceed with proposed procedure, without further diagnostic evaluation.       Signed Electronically by: Ruthie Phipps PA-C    Copy of this evaluation report is provided to requesting physician.    [place] Preop Guidelines    Revised Cardiac Risk Index

## 2018-08-13 NOTE — MR AVS SNAPSHOT
After Visit Summary   8/13/2018    Fouzia Arenas    MRN: [id number]           Patient Information     Date Of Birth          1965        Visit Information        Provider Department      8/13/2018 11:00 AM Ruthie Phipps PA-C [facility]        Today's Diagnoses     Preop general physical exam    -  1    Endometrial hyperplasia        STORMY (obstructive sleep apnea)        Morbid obesity (H)        Hypertension goal BP (blood pressure) < 130/80          Care Instructions      Before Your Surgery      Call your surgeon if there is any change in your health. This includes signs of a cold or flu (such as a sore throat, runny nose, cough, rash or fever).    Do not smoke, drink alcohol or take over the counter medicine (unless your surgeon or primary care doctor tells you to) for the 24 hours before and after surgery.    If you take prescribed drugs: Follow your doctor s orders about which medicines to take and which to stop until after surgery.    Eating and drinking prior to surgery: follow the instructions from your surgeon    Take a shower or bath the night before surgery. Use the soap your surgeon gave you to gently clean your skin. If you do not have soap from your surgeon, use your regular soap. Do not shave or scrub the surgery site.  Wear clean pajamas and have clean sheets on your bed.           Follow-ups after your visit        Follow-up notes from your care team     Return in about 7 days (around 8/20/2018) for for surgery as planned.      Your next 10 appointments already scheduled     Aug 20, 2018   Procedure with Urban Elena MD   [facility] (--)    [address]  [facility] [id number] [phone]              Who to contact     If you have questions or need follow up information about today's clinic visit or your schedule please contact [facility] directly at [phone].  Normal or non-critical lab  "and imaging results will be communicated to you by MyChart, letter or phone within 4 business days after the clinic has received the results. If you do not hear from us within 7 days, please contact the clinic through [employer] or phone. If you have a critical or abnormal lab result, we will notify you by phone as soon as possible.  Submit refill requests through [employer] or call your pharmacy and they will forward the refill request to us. Please allow 3 business days for your refill to be completed.          Additional Information About Your Visit        [employer] Information     [employer] lets you send messages to your doctor, view your test results, renew your prescriptions, schedule appointments and more. To sign up, go to [email or web address] . Click on \"Log in\" on the left side of the screen, which will take you to the Welcome page. Then click on \"Sign up Now\" on the right side of the page.     You will be asked to enter the access code listed below, as well as some personal information. Please follow the directions to create your username and password.     Your access code is: [email or web address]  Expires: 10/21/2018  3:50 PM     Your access code will  in 90 days. If you need help or a new code, please call your [place] clinic or [phone].        Care EveryWhere ID     This is your Care EveryWhere ID. This could be used by other organizations to access your [facility] medical records  [id number]        Your Vitals Were     Pulse Temperature Height Pulse Oximetry BMI (Body Mass Index)       63 99  F (37.2  C) (Oral) 6' (1.829 m) 96% 43.4 kg/m2        Blood Pressure from Last 3 Encounters:   18 118/74   18 132/90   18 131/70    Weight from Last 3 Encounters:   18 320 lb (145.2 kg)   18 322 lb 6.4 oz (146.2 kg)   18 292 lb 3.2 oz (132.5 kg)              Today, you had the following     No orders found for display       Primary Care Provider Office Phone # Fax #    Ruthie " Gemma Phipps PA-C [phone]       [address] [place] [address]        Equal Access to Services     ETSSA GUERRA : Hadii hudson martin hadsamo Sogaleali, waaxda luqadaha, qaybta kaalmada adelolis, lili delunageorgina josef. So [facility] [phone].    ATENCIÓN: Si habla español, tiene a hay disposición servicios gratuitos de asistencia lingüística. Llame al [phone].    We comply with applicable federal civil rights laws and [place] laws. We do not discriminate on the basis of race, color, national origin, age, disability, sex, sexual orientation, or gender identity.            Thank you!     Thank you for choosing [facility]  for your care. Our goal is always to provide you with excellent care. Hearing back from our patients is one way we can continue to improve our services. Please take a few minutes to complete the written survey that you may receive in the mail after your visit with us. Thank you!             Your Updated Medication List - Protect others around you: Learn how to safely use, store and throw away your medicines at www.disposemymeds.org.          This list is accurate as of 8/13/18 12:04 PM.  Always use your most recent med list.                   Brand Name Dispense Instructions for use Diagnosis    EXCEDRIN EXTRA STRENGTH PO      Take 2 tablets by mouth every 6 hours as needed        lisinopril 20 MG tablet    PRINIVIL/ZESTRIL    180 tablet    Take 1 tablet (20 mg) by mouth 2 times daily    HTN (hypertension)       nebivolol 10 MG tablet    BYSTOLIC    90 tablet    Take 1 tablet (10 mg) by mouth daily    Hypertension goal BP (blood pressure) < 130/80       order for DME      DREAMSTATION 9-12 CM/H20 FF MIRAGE QUATTRO        spironolactone 25 MG tablet    ALDACTONE    90 tablet    Take 1 tablet (25 mg) by mouth daily    Benign essential hypertension

## 2018-08-15 NOTE — H&P (VIEW-ONLY)
[facility]  [address]  [facility] [place] [address]  [phone]  Dept: [phone]    PRE-OP EVALUATION:  Today's date: 2018    Fouzia Arenas (: 1965) presents for pre-operative evaluation assessment as requested by Dr. Elena.  She requires evaluation and anesthesia risk assessment prior to undergoing surgery/procedure for treatment of Complex endometrial hyperplasia without atypia, urinary stress incontinence .    Proposed Surgery/ Procedure: LAPAROSCOPIC SUBTOATAL HYSTERECTOMY, BILATERAL SALPINGECTOMY, SUBURETHRAL SLING AND CYSTOSCOPY  Date of Surgery/ Procedure: 2018  Time of Surgery/ Procedure: 830 am  Hospital/Surgical Facility: [facility]  Fax number for surgical facility:   Primary Physician: Ruthie Phipps  Type of Anesthesia Anticipated: General    Patient has a Health Care Directive or Living Will:  NO    1. NO - Do you have a history of heart attack, stroke, stent, bypass or surgery on an artery in the head, neck, heart or legs?  2. YES - Do you ever have any pain or discomfort in your chest? Has had intermittent CP in the past, but has had worked up by cardiology multiple times and been normal. No issues for about 1 yr now.     3. NO - Do you have a history of  Heart Failure?  4. NO - Are you troubled by shortness of breath when: walking on the level, up a slight hill or at night?  5. NO - Do you currently have a cold, bronchitis or other respiratory infection?  6. NO - Do you have a cough, shortness of breath or wheezing?  7. NO - Do you sometimes get pains in the calves of your legs when you walk?  8. NO - Do you or anyone in your family have previous history of blood clots?  9. NO - Do you or does anyone in your family have a serious bleeding problem such as prolonged bleeding following surgeries or cuts?  10. NO - Have you ever had problems with anemia or been told to take iron pills?  11. NO - Have you had any abnormal blood loss  such as black, tarry or bloody stools, or abnormal vaginal bleeding?  12. NO - Have you ever had a blood transfusion?  13. NO - Have you or any of your relatives ever had problems with anesthesia?  14. YES - Do you have sleep apnea, excessive snoring or daytime drowsiness? Has STORMY  15. NO - Do you have any prosthetic heart valves?  16. NO - Do you have prosthetic joints?  17. NO - Is there any chance that you may be pregnant?      HPI:     HPI related to upcoming procedure: Fouzia is a 52 yo female here today for pre-op. In March she had a hysteroscopy with resection which showed complex endometrial hyperplasia without atypia. She also had problems with cyst causing cramping - this was removed in March as well. She also suffers with urinary incontinence. Was evaluated by OB/GYN and told that she would require endometrial biopsies every 6 months as has the potential for cancer or could proceed with hysterectomy. Elected to proceed with hysterectomy and will be having a bladder sling as well.        See problem list for active medical problems.  Problems all longstanding and stable, except as noted/documented.  See ROS for pertinent symptoms related to these conditions.                                                                                                                                                          .    MEDICAL HISTORY:     Patient Active Problem List    Diagnosis Date Noted     Vertigo 05/08/2018     Priority: Medium     Hypertension goal BP (blood pressure) < 130/80 03/26/2018     Priority: Medium     Morbid obesity (H) 02/06/2018     Priority: Medium     Atypical chest pain 04/05/2017     Priority: Medium     STORMY (obstructive sleep apnea) 09/30/2016     Priority: Medium     Palpitations      Priority: Medium     Esophageal reflux 12/07/2013     Priority: Medium     CARDIOVASCULAR SCREENING; LDL GOAL LESS THAN 160 12/07/2013     Priority: Medium     BPPV (benign paroxysmal positional vertigo)  09/12/2012     Priority: Medium     Other disorder of menstruation and other abnormal bleeding from female genital tract 02/24/2011     Priority: Medium      Past Medical History:   Diagnosis Date     Classic migraine      Complex endometrial hyperplasia     without atypia     Hypertension      STORMY (obstructive sleep apnea)      Palpitations      PONV (postoperative nausea and vomiting)      SVT (supraventricular tachycardia) (H) 9/2015     Past Surgical History:   Procedure Laterality Date     CHOLECYSTECTOMY, LAPOROSCOPIC      Cholecystectomy, Laparoscopic     COLONOSCOPY N/A 11/25/2014    Procedure: COLONOSCOPY;  Surgeon: Wenceslao Galo MD;  Location:  GI     D & C      X2-3 for abnormal bleeding     ESOPHAGOSCOPY, GASTROSCOPY, DUODENOSCOPY (EGD), COMBINED N/A 11/25/2014    Procedure: COMBINED ESOPHAGOSCOPY, GASTROSCOPY, DUODENOSCOPY (EGD), BIOPSY SINGLE OR MULTIPLE;  Surgeon: Wenceslao Galo MD;  Location: [facility]     LAPAROSCOPY      For endometriosis     OPERATIVE HYSTEROSCOPY WITH MORCELLATOR N/A 3/29/2018    Procedure: OPERATIVE HYSTEROSCOPY WITH MORCELLATOR (MYOSURE);  OPERATIVE HYSTEROSCOPY WITH MORCELLATOR ;  Surgeon: Urban Elena MD;  Location: [facility]     Current Outpatient Prescriptions   Medication Sig Dispense Refill     lisinopril (PRINIVIL/ZESTRIL) 20 MG tablet Take 1 tablet (20 mg) by mouth 2 times daily 180 tablet 0     nebivolol (BYSTOLIC) 10 MG tablet Take 1 tablet (10 mg) by mouth daily 90 tablet 3     spironolactone (ALDACTONE) 25 MG tablet Take 1 tablet (25 mg) by mouth daily 90 tablet 3     Aspirin-Acetaminophen-Caffeine (EXCEDRIN EXTRA STRENGTH PO) Take 2 tablets by mouth every 6 hours as needed       order for DME DREAMSTATION  9-12 CM/H20  FF [employer]IR[employer] QUATTRO       OTC products: None, except as noted above    Allergies   Allergen Reactions     Amoxicillin Nausea and Vomiting     Percocet [Oxycodone-Acetaminophen] Nausea and Vomiting      Latex Allergy: NO    Social History    Substance Use Topics     Smoking status: Never Smoker     Smokeless tobacco: Never Used     Alcohol use 1.0 oz/week     History   Drug Use No       REVIEW OF SYSTEMS:   CONSTITUTIONAL: NEGATIVE for fever, chills, change in weight  INTEGUMENTARY/SKIN: NEGATIVE for worrisome rashes, moles or lesions  EYES: NEGATIVE for vision changes or irritation  ENT/MOUTH: NEGATIVE for ear, mouth and throat problems  RESP: NEGATIVE for significant cough or SOB  BREAST: NEGATIVE for masses, tenderness or discharge  CV: NEGATIVE for chest pain, palpitations or peripheral edema  GI: NEGATIVE for nausea, abdominal pain, heartburn, or change in bowel habits   female: + for pelvic cramping - see notes in HPI.   MUSCULOSKELETAL: NEGATIVE for significant arthralgias or myalgia  NEURO: + for resolution of prior dizzy episodes after decreasing bystolic down to 10mg. NEGATIVE for weakness, dizziness or paresthesias  ENDOCRINE: NEGATIVE for temperature intolerance, skin/hair changes  HEME: NEGATIVE for bleeding problems  PSYCHIATRIC: NEGATIVE for changes in mood or affect    EXAM:   /74 (BP Location: Right arm, Cuff Size: Adult Large)  Pulse 63  Temp 99  F (37.2  C) (Oral)  Ht 6' (1.829 m)  Wt 320 lb (145.2 kg)  SpO2 96%  BMI 43.4 kg/m2    GENERAL APPEARANCE: healthy, alert and no distress     EYES: EOMI, PERRL     HENT: ear canals and TM's normal and nose and mouth without ulcers or lesions     NECK: no adenopathy, no asymmetry, masses, or scars and thyroid normal to palpation     RESP: lungs clear to auscultation - no rales, rhonchi or wheezes     CV: regular rates and rhythm, normal S1 S2, no S3 or S4 and no murmur, click or rub     ABDOMEN:  soft, nontender, no HSM or masses and bowel sounds normal     MS: extremities normal- no gross deformities noted, no evidence of inflammation in joints, FROM in all extremities.     SKIN: no suspicious lesions or rashes     NEURO: Normal strength and tone, sensory exam grossly normal,  mentation intact and speech normal     PSYCH: mentation appears normal. and affect normal/bright     LYMPHATICS: No cervical adenopathy    DIAGNOSTICS:   Previous EKG's reviewed EKG: personal reading shows sinus bradycardia resolved on EKG from May - pt had decreased her bystolic down to 10mg and dizzy episodes resolved.  Non-specific ST depression and T-abnormality is noted in anterolateral leads. Prior EKG review shows this has been present previously from EKG in 9/2017.  Pt did under go NM stress testing 5/2017 which no ischemia or infarct from test in 2014. Study was considered limited though by pt's body habitus.       Recent Labs   Lab Test  05/07/18   2112  04/25/18   0918  03/26/18   1153  02/06/18   0854   HGB  13.8  13.7  13.9  13.8   PLT  261   --   233  234   NA  139   --   141   --    POTASSIUM  4.0   --   3.9   --    CR  0.82   --   0.78   --    A1C   --    --    --   5.6        IMPRESSION:   Reason for surgery/procedure: LAPAROSCOPIC SUBTOATAL HYSTERECTOMY, BILATERAL SALPINGECTOMY, SUBURETHRAL SLING AND CYSTOSCOPY  Diagnosis/reason for consult: pre-op, endometrial hyperplasia, HTN, morbid obesity, STORMY    The proposed surgical procedure is considered INTERMEDIATE risk.    REVISED CARDIAC RISK INDEX  The patient has the following serious cardiovascular risks for perioperative complications such as (MI, PE, VFib and 3  AV Block):  No serious cardiac risks  INTERPRETATION: 0 risks: Class I (very low risk - 0.4% complication rate)    The patient has the following additional risks for perioperative complications:  No identified additional risks      ICD-10-CM    1. Preop general physical exam Z01.818    2. Endometrial hyperplasia N85.00    3. STORMY (obstructive sleep apnea) G47.33    4. Morbid obesity (H) E66.01    5. Hypertension goal BP (blood pressure) < 130/80 I10        RECOMMENDATIONS:       Obstructive Sleep Apnea (or suspected sleep apnea)  Patient is clearly advised to use their home CPAP when  released from surgery      --Patient is to take all scheduled medications on the day of surgery EXCEPT for modifications listed below.    ACE Inhibitor or Angiotensin Receptor Blocker (ARB) Use  Ace inhibitor or Angiotensin Receptor Blocker (ARB) and should HOLD this medication for the 24 hours prior to surgery.      APPROVAL GIVEN to proceed with proposed procedure, without further diagnostic evaluation.       Signed Electronically by: Ruthie Phipps PA-C    Copy of this evaluation report is provided to requesting physician.    [place] Preop Guidelines    Revised Cardiac Risk Index

## 2018-08-20 ENCOUNTER — ANESTHESIA (OUTPATIENT)
Dept: SURGERY | Facility: CLINIC | Age: 53
End: 2018-08-20
Payer: COMMERCIAL

## 2018-08-20 ENCOUNTER — ANESTHESIA EVENT (OUTPATIENT)
Dept: SURGERY | Facility: CLINIC | Age: 53
End: 2018-08-20
Payer: COMMERCIAL

## 2018-08-20 ENCOUNTER — HOSPITAL ENCOUNTER (OUTPATIENT)
Facility: CLINIC | Age: 53
Discharge: HOME | End: 2018-08-20
Attending: OBSTETRICS & GYNECOLOGY | Admitting: OBSTETRICS & GYNECOLOGY
Payer: COMMERCIAL

## 2018-08-20 ENCOUNTER — SURGERY (OUTPATIENT)
Age: 53
End: 2018-08-20
Payer: COMMERCIAL

## 2018-08-20 VITALS
DIASTOLIC BLOOD PRESSURE: 74 MMHG | RESPIRATION RATE: 14 BRPM | BODY MASS INDEX: 39.68 KG/M2 | TEMPERATURE: 96.8 F | HEIGHT: 72 IN | SYSTOLIC BLOOD PRESSURE: 128 MMHG | WEIGHT: 293 LBS | OXYGEN SATURATION: 94 %

## 2018-08-20 DIAGNOSIS — K59.00 CONSTIPATION, UNSPECIFIED CONSTIPATION TYPE: ICD-10-CM

## 2018-08-20 DIAGNOSIS — R30.0 DYSURIA: ICD-10-CM

## 2018-08-20 DIAGNOSIS — R33.9 URINE RETENTION: ICD-10-CM

## 2018-08-20 DIAGNOSIS — R11.0 NAUSEA: ICD-10-CM

## 2018-08-20 DIAGNOSIS — G89.18 ACUTE POST-OPERATIVE PAIN: Primary | ICD-10-CM

## 2018-08-20 LAB
ABO + RH BLD: NORMAL
ABO + RH BLD: NORMAL
BLD GP AB SCN SERPL QL: NORMAL
BLOOD BANK CMNT PATIENT-IMP: NORMAL
HGB BLD-MCNC: 14 G/DL (ref 11.7–15.7)
POTASSIUM SERPL-SCNC: 4.1 MMOL/L (ref 3.4–5.3)
SPECIMEN EXP DATE BLD: NORMAL

## 2018-08-20 PROCEDURE — 88305 TISSUE EXAM BY PATHOLOGIST: CPT | Mod: 26 | Performed by: OBSTETRICS & GYNECOLOGY

## 2018-08-20 PROCEDURE — 25000128 H RX IP 250 OP 636

## 2018-08-20 PROCEDURE — 37000009 ZZH ANESTHESIA TECHNICAL FEE, EACH ADDTL 15 MIN: Performed by: OBSTETRICS & GYNECOLOGY

## 2018-08-20 PROCEDURE — 25000125 ZZHC RX 250

## 2018-08-20 PROCEDURE — 36415 COLL VENOUS BLD VENIPUNCTURE: CPT | Performed by: OBSTETRICS & GYNECOLOGY

## 2018-08-20 PROCEDURE — 71000013 ZZH RECOVERY PHASE 1 LEVEL 1 EA ADDTL HR: Performed by: OBSTETRICS & GYNECOLOGY

## 2018-08-20 PROCEDURE — 85018 HEMOGLOBIN: CPT | Performed by: OBSTETRICS & GYNECOLOGY

## 2018-08-20 PROCEDURE — 25000128 H RX IP 250 OP 636: Performed by: OBSTETRICS & GYNECOLOGY

## 2018-08-20 PROCEDURE — 88305 TISSUE EXAM BY PATHOLOGIST: CPT | Performed by: OBSTETRICS & GYNECOLOGY

## 2018-08-20 PROCEDURE — 25000125 ZZHC RX 250: Performed by: OBSTETRICS & GYNECOLOGY

## 2018-08-20 PROCEDURE — C1771 REP DEV, URINARY, W/SLING: HCPCS | Performed by: OBSTETRICS & GYNECOLOGY

## 2018-08-20 PROCEDURE — 71000012 ZZH RECOVERY PHASE 1 LEVEL 1 FIRST HR: Performed by: OBSTETRICS & GYNECOLOGY

## 2018-08-20 PROCEDURE — 36000093 ZZH SURGERY LEVEL 4 1ST 30 MIN: Performed by: OBSTETRICS & GYNECOLOGY

## 2018-08-20 PROCEDURE — 58542 LSH W/T/O UT 250 G OR LESS: CPT | Performed by: OBSTETRICS & GYNECOLOGY

## 2018-08-20 PROCEDURE — 40000170 ZZH STATISTIC PRE-PROCEDURE ASSESSMENT II: Performed by: OBSTETRICS & GYNECOLOGY

## 2018-08-20 PROCEDURE — 88309 TISSUE EXAM BY PATHOLOGIST: CPT | Mod: 26 | Performed by: OBSTETRICS & GYNECOLOGY

## 2018-08-20 PROCEDURE — 25000566 ZZH SEVOFLURANE, EA 15 MIN: Performed by: OBSTETRICS & GYNECOLOGY

## 2018-08-20 PROCEDURE — 84132 ASSAY OF SERUM POTASSIUM: CPT | Performed by: OBSTETRICS & GYNECOLOGY

## 2018-08-20 PROCEDURE — 25000128 H RX IP 250 OP 636: Performed by: ANESTHESIOLOGY

## 2018-08-20 PROCEDURE — 25000132 ZZH RX MED GY IP 250 OP 250 PS 637: Performed by: OBSTETRICS & GYNECOLOGY

## 2018-08-20 PROCEDURE — 27210794 ZZH OR GENERAL SUPPLY STERILE: Performed by: OBSTETRICS & GYNECOLOGY

## 2018-08-20 PROCEDURE — 86900 BLOOD TYPING SEROLOGIC ABO: CPT | Performed by: OBSTETRICS & GYNECOLOGY

## 2018-08-20 PROCEDURE — 25000125 ZZHC RX 250: Performed by: ANESTHESIOLOGY

## 2018-08-20 PROCEDURE — 86850 RBC ANTIBODY SCREEN: CPT | Performed by: OBSTETRICS & GYNECOLOGY

## 2018-08-20 PROCEDURE — 71000027 ZZH RECOVERY PHASE 2 EACH 15 MINS: Performed by: OBSTETRICS & GYNECOLOGY

## 2018-08-20 PROCEDURE — 36000063 ZZH SURGERY LEVEL 4 EA 15 ADDTL MIN: Performed by: OBSTETRICS & GYNECOLOGY

## 2018-08-20 PROCEDURE — 88309 TISSUE EXAM BY PATHOLOGIST: CPT | Performed by: OBSTETRICS & GYNECOLOGY

## 2018-08-20 PROCEDURE — 86901 BLOOD TYPING SEROLOGIC RH(D): CPT | Performed by: OBSTETRICS & GYNECOLOGY

## 2018-08-20 PROCEDURE — 37000008 ZZH ANESTHESIA TECHNICAL FEE, 1ST 30 MIN: Performed by: OBSTETRICS & GYNECOLOGY

## 2018-08-20 PROCEDURE — 27210995 ZZH RX 272: Performed by: OBSTETRICS & GYNECOLOGY

## 2018-08-20 PROCEDURE — 57288 REPAIR BLADDER DEFECT: CPT | Mod: 51 | Performed by: OBSTETRICS & GYNECOLOGY

## 2018-08-20 DEVICE — SLING SOLYX TRANSVAGINAL MESH M0068507000: Type: IMPLANTABLE DEVICE | Site: VAGINA | Status: FUNCTIONAL

## 2018-08-20 RX ORDER — NALOXONE HYDROCHLORIDE 0.4 MG/ML
.1-.4 INJECTION, SOLUTION INTRAMUSCULAR; INTRAVENOUS; SUBCUTANEOUS
Status: DISCONTINUED | OUTPATIENT
Start: 2018-08-20 | End: 2018-08-20 | Stop reason: HOSPADM

## 2018-08-20 RX ORDER — HYDROCODONE BITARTRATE AND ACETAMINOPHEN 5; 325 MG/1; MG/1
2 TABLET ORAL
Status: COMPLETED | OUTPATIENT
Start: 2018-08-20 | End: 2018-08-20

## 2018-08-20 RX ORDER — PHENAZOPYRIDINE HYDROCHLORIDE 200 MG/1
200 TABLET, FILM COATED ORAL ONCE
Status: COMPLETED | OUTPATIENT
Start: 2018-08-20 | End: 2018-08-20

## 2018-08-20 RX ORDER — IBUPROFEN 600 MG/1
600 TABLET, FILM COATED ORAL EVERY 6 HOURS PRN
Qty: 40 TABLET | Refills: 0 | Status: SHIPPED | OUTPATIENT
Start: 2018-08-20 | End: 2018-11-30

## 2018-08-20 RX ORDER — MAGNESIUM HYDROXIDE 1200 MG/15ML
LIQUID ORAL PRN
Status: DISCONTINUED | OUTPATIENT
Start: 2018-08-20 | End: 2018-08-20 | Stop reason: HOSPADM

## 2018-08-20 RX ORDER — FENTANYL CITRATE 50 UG/ML
25-50 INJECTION, SOLUTION INTRAMUSCULAR; INTRAVENOUS
Status: DISCONTINUED | OUTPATIENT
Start: 2018-08-20 | End: 2018-08-20 | Stop reason: HOSPADM

## 2018-08-20 RX ORDER — CLINDAMYCIN PHOSPHATE 900 MG/50ML
900 INJECTION, SOLUTION INTRAVENOUS
Status: COMPLETED | OUTPATIENT
Start: 2018-08-20 | End: 2018-08-20

## 2018-08-20 RX ORDER — NEOSTIGMINE METHYLSULFATE 1 MG/ML
VIAL (ML) INJECTION PRN
Status: DISCONTINUED | OUTPATIENT
Start: 2018-08-20 | End: 2018-08-20

## 2018-08-20 RX ORDER — SODIUM CHLORIDE, SODIUM LACTATE, POTASSIUM CHLORIDE, AND CALCIUM CHLORIDE .6; .31; .03; .02 G/100ML; G/100ML; G/100ML; G/100ML
IRRIGANT IRRIGATION PRN
Status: DISCONTINUED | OUTPATIENT
Start: 2018-08-20 | End: 2018-08-20 | Stop reason: HOSPADM

## 2018-08-20 RX ORDER — BUPIVACAINE HYDROCHLORIDE AND EPINEPHRINE 2.5; 5 MG/ML; UG/ML
INJECTION, SOLUTION INFILTRATION; PERINEURAL PRN
Status: DISCONTINUED | OUTPATIENT
Start: 2018-08-20 | End: 2018-08-20 | Stop reason: HOSPADM

## 2018-08-20 RX ORDER — DEXAMETHASONE SODIUM PHOSPHATE 4 MG/ML
INJECTION, SOLUTION INTRA-ARTICULAR; INTRALESIONAL; INTRAMUSCULAR; INTRAVENOUS; SOFT TISSUE PRN
Status: DISCONTINUED | OUTPATIENT
Start: 2018-08-20 | End: 2018-08-20

## 2018-08-20 RX ORDER — ONDANSETRON 2 MG/ML
INJECTION INTRAMUSCULAR; INTRAVENOUS PRN
Status: DISCONTINUED | OUTPATIENT
Start: 2018-08-20 | End: 2018-08-20

## 2018-08-20 RX ORDER — GLYCOPYRROLATE 0.2 MG/ML
INJECTION, SOLUTION INTRAMUSCULAR; INTRAVENOUS PRN
Status: DISCONTINUED | OUTPATIENT
Start: 2018-08-20 | End: 2018-08-20

## 2018-08-20 RX ORDER — IBUPROFEN 600 MG/1
600 TABLET, FILM COATED ORAL
Status: DISCONTINUED | OUTPATIENT
Start: 2018-08-20 | End: 2018-08-20 | Stop reason: HOSPADM

## 2018-08-20 RX ORDER — BUPIVACAINE HYDROCHLORIDE AND EPINEPHRINE 5; 5 MG/ML; UG/ML
INJECTION, SOLUTION EPIDURAL; INTRACAUDAL; PERINEURAL
Status: DISCONTINUED
Start: 2018-08-20 | End: 2018-08-20 | Stop reason: HOSPADM

## 2018-08-20 RX ORDER — EPHEDRINE SULFATE 50 MG/ML
INJECTION, SOLUTION INTRAMUSCULAR; INTRAVENOUS; SUBCUTANEOUS PRN
Status: DISCONTINUED | OUTPATIENT
Start: 2018-08-20 | End: 2018-08-20

## 2018-08-20 RX ORDER — FENTANYL CITRATE 50 UG/ML
INJECTION, SOLUTION INTRAMUSCULAR; INTRAVENOUS PRN
Status: DISCONTINUED | OUTPATIENT
Start: 2018-08-20 | End: 2018-08-20

## 2018-08-20 RX ORDER — ONDANSETRON 2 MG/ML
4 INJECTION INTRAMUSCULAR; INTRAVENOUS EVERY 30 MIN PRN
Status: DISCONTINUED | OUTPATIENT
Start: 2018-08-20 | End: 2018-08-20 | Stop reason: HOSPADM

## 2018-08-20 RX ORDER — SODIUM CHLORIDE, SODIUM LACTATE, POTASSIUM CHLORIDE, CALCIUM CHLORIDE 600; 310; 30; 20 MG/100ML; MG/100ML; MG/100ML; MG/100ML
INJECTION, SOLUTION INTRAVENOUS CONTINUOUS
Status: DISCONTINUED | OUTPATIENT
Start: 2018-08-20 | End: 2018-08-20 | Stop reason: HOSPADM

## 2018-08-20 RX ORDER — ONDANSETRON 4 MG/1
4-8 TABLET, ORALLY DISINTEGRATING ORAL EVERY 8 HOURS PRN
Qty: 24 TABLET | Refills: 0 | Status: SHIPPED | OUTPATIENT
Start: 2018-08-20 | End: 2018-09-11

## 2018-08-20 RX ORDER — PROPOFOL 10 MG/ML
INJECTION, EMULSION INTRAVENOUS PRN
Status: DISCONTINUED | OUTPATIENT
Start: 2018-08-20 | End: 2018-08-20

## 2018-08-20 RX ORDER — PROPOFOL 10 MG/ML
INJECTION, EMULSION INTRAVENOUS CONTINUOUS PRN
Status: DISCONTINUED | OUTPATIENT
Start: 2018-08-20 | End: 2018-08-20

## 2018-08-20 RX ORDER — CLINDAMYCIN PHOSPHATE 900 MG/50ML
900 INJECTION, SOLUTION INTRAVENOUS SEE ADMIN INSTRUCTIONS
Status: DISCONTINUED | OUTPATIENT
Start: 2018-08-20 | End: 2018-08-20 | Stop reason: HOSPADM

## 2018-08-20 RX ORDER — LIDOCAINE HYDROCHLORIDE 20 MG/ML
INJECTION, SOLUTION INFILTRATION; PERINEURAL PRN
Status: DISCONTINUED | OUTPATIENT
Start: 2018-08-20 | End: 2018-08-20

## 2018-08-20 RX ORDER — LIDOCAINE HYDROCHLORIDE 10 MG/ML
INJECTION, SOLUTION EPIDURAL; INFILTRATION; INTRACAUDAL; PERINEURAL
Status: DISCONTINUED
Start: 2018-08-20 | End: 2018-08-20 | Stop reason: HOSPADM

## 2018-08-20 RX ORDER — HYDROMORPHONE HYDROCHLORIDE 1 MG/ML
.3-.5 INJECTION, SOLUTION INTRAMUSCULAR; INTRAVENOUS; SUBCUTANEOUS EVERY 5 MIN PRN
Status: DISCONTINUED | OUTPATIENT
Start: 2018-08-20 | End: 2018-08-20 | Stop reason: HOSPADM

## 2018-08-20 RX ORDER — HYDROCODONE BITARTRATE AND ACETAMINOPHEN 5; 325 MG/1; MG/1
1-2 TABLET ORAL EVERY 4 HOURS PRN
Qty: 40 TABLET | Refills: 0 | Status: SHIPPED | OUTPATIENT
Start: 2018-08-20 | End: 2018-09-11

## 2018-08-20 RX ORDER — ONDANSETRON 4 MG/1
4 TABLET, ORALLY DISINTEGRATING ORAL EVERY 30 MIN PRN
Status: DISCONTINUED | OUTPATIENT
Start: 2018-08-20 | End: 2018-08-20 | Stop reason: HOSPADM

## 2018-08-20 RX ORDER — FUROSEMIDE 10 MG/ML
INJECTION INTRAMUSCULAR; INTRAVENOUS PRN
Status: DISCONTINUED | OUTPATIENT
Start: 2018-08-20 | End: 2018-08-20

## 2018-08-20 RX ORDER — AMOXICILLIN 250 MG
1-2 CAPSULE ORAL 2 TIMES DAILY
Qty: 90 TABLET | Refills: 0 | Status: SHIPPED | OUTPATIENT
Start: 2018-08-20 | End: 2018-09-11

## 2018-08-20 RX ORDER — SCOLOPAMINE TRANSDERMAL SYSTEM 1 MG/1
1 PATCH, EXTENDED RELEASE TRANSDERMAL
Status: COMPLETED | OUTPATIENT
Start: 2018-08-20 | End: 2018-08-20

## 2018-08-20 RX ORDER — SODIUM CHLORIDE, SODIUM LACTATE, POTASSIUM CHLORIDE, CALCIUM CHLORIDE 600; 310; 30; 20 MG/100ML; MG/100ML; MG/100ML; MG/100ML
INJECTION, SOLUTION INTRAVENOUS CONTINUOUS PRN
Status: DISCONTINUED | OUTPATIENT
Start: 2018-08-20 | End: 2018-08-20

## 2018-08-20 RX ORDER — NITROFURANTOIN 25; 75 MG/1; MG/1
100 CAPSULE ORAL 2 TIMES DAILY
Qty: 10 CAPSULE | Refills: 0 | Status: SHIPPED | OUTPATIENT
Start: 2018-08-20 | End: 2018-09-11

## 2018-08-20 RX ORDER — BUPIVACAINE HYDROCHLORIDE AND EPINEPHRINE 2.5; 5 MG/ML; UG/ML
INJECTION, SOLUTION EPIDURAL; INFILTRATION; INTRACAUDAL; PERINEURAL
Status: DISCONTINUED
Start: 2018-08-20 | End: 2018-08-20 | Stop reason: HOSPADM

## 2018-08-20 RX ORDER — KETOROLAC TROMETHAMINE 30 MG/ML
30 INJECTION, SOLUTION INTRAMUSCULAR; INTRAVENOUS ONCE
Status: COMPLETED | OUTPATIENT
Start: 2018-08-20 | End: 2018-08-20

## 2018-08-20 RX ADMIN — FUROSEMIDE 10 MG: 10 INJECTION, SOLUTION INTRAVENOUS at 10:21

## 2018-08-20 RX ADMIN — FENTANYL CITRATE 50 MCG: 50 INJECTION, SOLUTION INTRAMUSCULAR; INTRAVENOUS at 11:55

## 2018-08-20 RX ADMIN — ROCURONIUM BROMIDE 10 MG: 10 INJECTION INTRAVENOUS at 09:26

## 2018-08-20 RX ADMIN — GLYCOPYRROLATE 0.8 MG: 0.2 INJECTION, SOLUTION INTRAMUSCULAR; INTRAVENOUS at 10:43

## 2018-08-20 RX ADMIN — PROPOFOL 180 MCG/KG/MIN: 10 INJECTION, EMULSION INTRAVENOUS at 09:04

## 2018-08-20 RX ADMIN — FENTANYL CITRATE 50 MCG: 50 INJECTION, SOLUTION INTRAMUSCULAR; INTRAVENOUS at 09:55

## 2018-08-20 RX ADMIN — ROCURONIUM BROMIDE 10 MG: 10 INJECTION INTRAVENOUS at 10:01

## 2018-08-20 RX ADMIN — Medication 10 MG: at 09:21

## 2018-08-20 RX ADMIN — SODIUM CHLORIDE, SODIUM LACTATE, POTASSIUM CHLORIDE, CALCIUM CHLORIDE 700 ML: 600; 310; 30; 20 IRRIGANT IRRIGATION at 10:36

## 2018-08-20 RX ADMIN — MIDAZOLAM 2 MG: 1 INJECTION INTRAMUSCULAR; INTRAVENOUS at 08:58

## 2018-08-20 RX ADMIN — ROCURONIUM BROMIDE 50 MG: 10 INJECTION INTRAVENOUS at 09:02

## 2018-08-20 RX ADMIN — FENTANYL CITRATE 100 MCG: 50 INJECTION, SOLUTION INTRAMUSCULAR; INTRAVENOUS at 09:02

## 2018-08-20 RX ADMIN — DEXAMETHASONE SODIUM PHOSPHATE 4 MG: 4 INJECTION, SOLUTION INTRA-ARTICULAR; INTRALESIONAL; INTRAMUSCULAR; INTRAVENOUS; SOFT TISSUE at 09:10

## 2018-08-20 RX ADMIN — ONDANSETRON 4 MG: 2 INJECTION INTRAMUSCULAR; INTRAVENOUS at 10:37

## 2018-08-20 RX ADMIN — FENTANYL CITRATE 50 MCG: 50 INJECTION, SOLUTION INTRAMUSCULAR; INTRAVENOUS at 11:43

## 2018-08-20 RX ADMIN — WATER 700 ML: 1 IRRIGANT IRRIGATION at 10:48

## 2018-08-20 RX ADMIN — PROPOFOL 200 MG: 10 INJECTION, EMULSION INTRAVENOUS at 09:02

## 2018-08-20 RX ADMIN — SCOPALAMINE 1 PATCH: 1 PATCH, EXTENDED RELEASE TRANSDERMAL at 07:34

## 2018-08-20 RX ADMIN — DEXMEDETOMIDINE HYDROCHLORIDE 8 MCG: 100 INJECTION, SOLUTION INTRAVENOUS at 10:01

## 2018-08-20 RX ADMIN — Medication 5 MG: at 09:26

## 2018-08-20 RX ADMIN — HYDROCODONE BITARTRATE AND ACETAMINOPHEN 1 TABLET: 5; 325 TABLET ORAL at 12:40

## 2018-08-20 RX ADMIN — SODIUM CHLORIDE, POTASSIUM CHLORIDE, SODIUM LACTATE AND CALCIUM CHLORIDE: 600; 310; 30; 20 INJECTION, SOLUTION INTRAVENOUS at 08:57

## 2018-08-20 RX ADMIN — Medication 10 MG: at 09:13

## 2018-08-20 RX ADMIN — PHENAZOPYRIDINE HYDROCHLORIDE 200 MG: 200 TABLET ORAL at 06:54

## 2018-08-20 RX ADMIN — KETOROLAC TROMETHAMINE 30 MG: 30 INJECTION, SOLUTION INTRAMUSCULAR at 11:55

## 2018-08-20 RX ADMIN — FENTANYL CITRATE 50 MCG: 50 INJECTION, SOLUTION INTRAMUSCULAR; INTRAVENOUS at 09:48

## 2018-08-20 RX ADMIN — BUPIVACAINE HYDROCHLORIDE AND EPINEPHRINE BITARTRATE 30 ML: 2.5; .005 INJECTION, SOLUTION EPIDURAL; INFILTRATION; INTRACAUDAL; PERINEURAL at 10:30

## 2018-08-20 RX ADMIN — LIDOCAINE HYDROCHLORIDE 80 MG: 20 INJECTION, SOLUTION INFILTRATION; PERINEURAL at 09:02

## 2018-08-20 RX ADMIN — SODIUM CHLORIDE 1000 ML: 900 IRRIGANT IRRIGATION at 09:00

## 2018-08-20 RX ADMIN — NEOSTIGMINE METHYLSULFATE 5 MG: 1 INJECTION, SOLUTION INTRAVENOUS at 10:43

## 2018-08-20 RX ADMIN — SODIUM CHLORIDE, POTASSIUM CHLORIDE, SODIUM LACTATE AND CALCIUM CHLORIDE: 600; 310; 30; 20 INJECTION, SOLUTION INTRAVENOUS at 10:36

## 2018-08-20 RX ADMIN — LIDOCAINE HYDROCHLORIDE 20 MG: 20 INJECTION, SOLUTION INFILTRATION; PERINEURAL at 10:52

## 2018-08-20 RX ADMIN — GENTAMICIN SULFATE 200 MG: 40 INJECTION, SOLUTION INTRAMUSCULAR; INTRAVENOUS at 09:10

## 2018-08-20 RX ADMIN — CLINDAMYCIN PHOSPHATE 900 MG: 18 INJECTION, SOLUTION INTRAVENOUS at 09:20

## 2018-08-20 ASSESSMENT — ENCOUNTER SYMPTOMS: DYSRHYTHMIAS: 1

## 2018-08-20 NOTE — OR NURSING
1335  Voided 100 ml's of urine post bladder fill and blas removal.  1349  PVR 297ml's per bladder scan.  Patient drinking water and will retry voiding when ready.  1425  Voided 175 ml's of urine.

## 2018-08-20 NOTE — DISCHARGE INSTRUCTIONS
Information for Patients Discharging with a Transderm Scopolamine Patch       Dry mouth is a common side effect.    Drowsiness is another common side effect especially when combined with pain medication.  Please avoid activities that require mental alertness such as driving a car or making important legal decisions.    Since Scopolamine can cause temporary dilation of the pupils and blurred vision if it comes in contact with the eyes; be sure to wash your hands thoroughly with soap and water immediately after handling the patch.   When you remove your patch, please stick it to a tissue or paper towel for disposal.      Remove the patch immediately and contact a physician in the unlikely event that you experience symptoms of acute glaucoma (pain and reddening of the eyes, accompanied by dilated pupils).    Remove the patch if you develop any difficulties urinating.  If you cannot urinate after removing your patch, please notify your surgeon.  Remove the patch 24 hours after surgery.  *    *Because you had anesthesia today and your history of sleep apnea, it is extremely important that you use your CPAP machine for the next 24 hours while napping or sleeping    .**While you were at the hospital today you were given a medication called Pyridium.  This is used to treat pain, burning, increased urination, and increased urge to urinate.Pyridium will most likely darken the color of your urine to an orange or red color. Darkened urine may also cause stains to your underwear, which may or may not be removed by laundering.  Same Day Surgery Discharge Instructions for  Sedation and General Anesthesia       It's not unusual to feel dizzy, light-headed or faint for up to 24 hours after surgery or while taking pain medication.  If you have these symptoms: sit for a few minutes before standing and have someone assist you when you get up to walk or use the bathroom.      You should rest and relax for the next 24 hours. We  recommend you make arrangements to have an adult stay with you for at least 24 hours after your discharge.  Avoid hazardous and strenuous activity.      DO NOT DRIVE any vehicle or operate mechanical equipment for 24 hours following the end of your surgery.  Even though you may feel normal, your reactions may be affected by the medication you have received.      Do not drink alcoholic beverages for 24 hours following surgery.       Slowly progress to your regular diet as you feel able. It's not unusual to feel nauseated and/or vomit after receiving anesthesia.  If you develop these symptoms, drink clear liquids (apple juice, ginger ale, broth, 7-up, etc. ) until you feel better.  If your nausea and vomiting persists for 24 hours, please notify your surgeon.        All narcotic pain medications, along with inactivity and anesthesia, can cause constipation. Drinking plenty of liquids and increasing fiber intake will help.      For any questions of a medical nature, call your surgeon.      Do not make important decisions for 24 hours.      If you had general anesthesia, you may have a sore throat for a couple of days related to the breathing tube used during surgery.  You may use Cepacol lozenges to help with this discomfort.  If it worsens or if you develop a fever, contact your surgeon.       If you feel your pain is not well managed with the pain medications prescribed by your surgeon, please contact your surgeon's office to let them know so they can address your concerns.       Discharge Instructions for Laparoscopic or Davinci Hysterectomy    Incisional Care  A small amount of drainage from your incisions is normal. You may wear Band Aids until the drainage stops. The day after surgery, if your incisions are dry, remove the Band Aids. Leave the Steri-Strips (small pieces of tape) in place. Let them fall off on their own, or gently remove them after 7 days.  Once your have removed your Band Aids, you may shower as  usual. Wash your incisions with mild soap and water. Pat dry.  Don't use oils, powders, or lotions on your incisions.  General Care  Take your medications exactly as directed by your doctor.    You may have vaginal bleeding that can last for several weeks. Use pads only. Don't put anything in your vagina (tampons, douches or have sexual intercourse) until your doctor says it's safe to do so.  Avoid constipation.  Eat fruits, vegetables, and whole grains.  Drink 6-8 glasses of water a day, unless told to do otherwise.  Use a laxative or a mild stool softener if your doctor says it's okay.  Activity  Don't drive while you are still taking narcotic pain medications.  Don t do strenuous activities until the doctor says it's okay.  Walk as often as you feel able.    Pain  Your incisions may be tender or sore. You may also have pain in your upper back or shoulders. This is from the gas used to enlarge your abdomen to allow your doctor to see inside your pelvis and perform the procedure. This pain usually goes away in a day or two.   When to Call Your Doctor  Call your doctor right away if you have any of the following:  Fever above 100.4 F (38 C) or chills  Vaginal bleeding that soaks more than one sanitary pad per hour  A foul smelling discharge from the vagina  Difficulty urinating  Severe pain   Redness, swelling, or drainage at your incision sites  Follow-Up  Make a follow-up appointment as directed by your surgeon.      Discharge instructions for Bladder Surgery    General Care:      Drink plenty of fluids.  It is important to stay hydrated      You will have some blood/discharge from your vagina.  You can expect to have discharge up to 3-4 days. No tampons, douches or intercourse until instructed by your surgeon.       Monitor your urine output.  If you have not urinated in 6 hours after leaving the hospital, please contact your surgeon.      You may have two incisions on your groin after bladder sling surgery.  These incisions usually closed with glue called dermabond. Follow surgeon's orders for bathing and showering.    Reasons to Contact Your Surgeon:      Large amount of drainage or bleeding (saturating more than 1 pad per hour).      Fever.      Inability or difficulty with urination      Pain with urination      **If you have questions or concerns about your procedure,  call Dr. Elena at [phone]**      Today you received Toradol, an antiinflammatory medication similar to Ibuprofen.  You should not take other antiinflammatory medication, such as Ibuprofen, Motrin, Advil, Aleve, Naprosyn, etc until 6:00pm.

## 2018-08-20 NOTE — IP AVS SNAPSHOT
MRN:[id number]                      After Visit Summary   8/20/2018    Fouzia Arenas    MRN: [id number]           Thank you!     Thank you for choosing [facility] for your care. Our goal is always to provide you with excellent care. Hearing back from our patients is one way we can continue to improve our services. Please take a few minutes to complete the written survey that you may receive in the mail after you visit with us. Thank you!        Patient Information     Date Of Birth          1965        About your hospital stay     You were admitted on:  August 20, 2018 You last received care in the:  [facility]    You were discharged on:  August 20, 2018       Who to Call     For medical emergencies, please call 911.  For non-urgent questions about your medical care, please call your primary care provider or clinic, [phone]  For questions related to your surgery, please call your surgery clinic        Attending Provider     Provider Specialty    Urban Elena MD OB/Gyn       Primary Care Provider Office Phone # Fax #    Ruthie Phipps PA-C [phone]      After Care Instructions     Discharge Instructions       Patient to arrange follow up appointment in 2-3  weeks                  Future tests that were ordered for you     INSERT BLADDER CATH, TEMP INDWELL SIMPLE (PUENTES)       When ambulatory fill bladder with 300 ml sterile water through puentes and remove puentes,  Have patient void and if she voids > 150 ml leave puentes out.  If she voids < 150 ml recheck.                  Further instructions from your care team         Information for Patients Discharging with a Transderm Scopolamine Patch       Dry mouth is a common side effect.    Drowsiness is another common side effect especially when combined with pain medication.  Please avoid activities that require mental alertness such as driving a car or making important legal  decisions.    Since Scopolamine can cause temporary dilation of the pupils and blurred vision if it comes in contact with the eyes; be sure to wash your hands thoroughly with soap and water immediately after handling the patch.   When you remove your patch, please stick it to a tissue or paper towel for disposal.      Remove the patch immediately and contact a physician in the unlikely event that you experience symptoms of acute glaucoma (pain and reddening of the eyes, accompanied by dilated pupils).    Remove the patch if you develop any difficulties urinating.  If you cannot urinate after removing your patch, please notify your surgeon.  Remove the patch 24 hours after surgery.  *    *Because you had anesthesia today and your history of sleep apnea, it is extremely important that you use your CPAP machine for the next 24 hours while napping or sleeping    .**While you were at the hospital today you were given a medication called Pyridium.  This is used to treat pain, burning, increased urination, and increased urge to urinate.Pyridium will most likely darken the color of your urine to an orange or red color. Darkened urine may also cause stains to your underwear, which may or may not be removed by laundering.  Same Day Surgery Discharge Instructions for  Sedation and General Anesthesia       It's not unusual to feel dizzy, light-headed or faint for up to 24 hours after surgery or while taking pain medication.  If you have these symptoms: sit for a few minutes before standing and have someone assist you when you get up to walk or use the bathroom.      You should rest and relax for the next 24 hours. We recommend you make arrangements to have an adult stay with you for at least 24 hours after your discharge.  Avoid hazardous and strenuous activity.      DO NOT DRIVE any vehicle or operate mechanical equipment for 24 hours following the end of your surgery.  Even though you may feel normal, your reactions may be  affected by the medication you have received.      Do not drink alcoholic beverages for 24 hours following surgery.       Slowly progress to your regular diet as you feel able. It's not unusual to feel nauseated and/or vomit after receiving anesthesia.  If you develop these symptoms, drink clear liquids (apple juice, ginger ale, broth, 7-up, etc. ) until you feel better.  If your nausea and vomiting persists for 24 hours, please notify your surgeon.        All narcotic pain medications, along with inactivity and anesthesia, can cause constipation. Drinking plenty of liquids and increasing fiber intake will help.      For any questions of a medical nature, call your surgeon.      Do not make important decisions for 24 hours.      If you had general anesthesia, you may have a sore throat for a couple of days related to the breathing tube used during surgery.  You may use Cepacol lozenges to help with this discomfort.  If it worsens or if you develop a fever, contact your surgeon.       If you feel your pain is not well managed with the pain medications prescribed by your surgeon, please contact your surgeon's office to let them know so they can address your concerns.       Discharge Instructions for Laparoscopic or Davinci Hysterectomy    Incisional Care  A small amount of drainage from your incisions is normal. You may wear Band Aids until the drainage stops. The day after surgery, if your incisions are dry, remove the Band Aids. Leave the Steri-Strips (small pieces of tape) in place. Let them fall off on their own, or gently remove them after 7 days.  Once your have removed your Band Aids, you may shower as usual. Wash your incisions with mild soap and water. Pat dry.  Don't use oils, powders, or lotions on your incisions.  General Care  Take your medications exactly as directed by your doctor.    You may have vaginal bleeding that can last for several weeks. Use pads only. Don't put anything in your vagina  (tampons, douches or have sexual intercourse) until your doctor says it's safe to do so.  Avoid constipation.  Eat fruits, vegetables, and whole grains.  Drink 6-8 glasses of water a day, unless told to do otherwise.  Use a laxative or a mild stool softener if your doctor says it's okay.  Activity  Don't drive while you are still taking narcotic pain medications.  Don t do strenuous activities until the doctor says it's okay.  Walk as often as you feel able.    Pain  Your incisions may be tender or sore. You may also have pain in your upper back or shoulders. This is from the gas used to enlarge your abdomen to allow your doctor to see inside your pelvis and perform the procedure. This pain usually goes away in a day or two.   When to Call Your Doctor  Call your doctor right away if you have any of the following:  Fever above 100.4 F (38 C) or chills  Vaginal bleeding that soaks more than one sanitary pad per hour  A foul smelling discharge from the vagina  Difficulty urinating  Severe pain   Redness, swelling, or drainage at your incision sites  Follow-Up  Make a follow-up appointment as directed by your surgeon.      Discharge instructions for Bladder Surgery    General Care:      Drink plenty of fluids.  It is important to stay hydrated      You will have some blood/discharge from your vagina.  You can expect to have discharge up to 3-4 days. No tampons, douches or intercourse until instructed by your surgeon.       Monitor your urine output.  If you have not urinated in 6 hours after leaving the hospital, please contact your surgeon.      You may have two incisions on your groin after bladder sling surgery. These incisions usually closed with glue called dermabond. Follow surgeon's orders for bathing and showering.    Reasons to Contact Your Surgeon:      Large amount of drainage or bleeding (saturating more than 1 pad per hour).      Fever.      Inability or difficulty with urination      Pain with  "urination      **If you have questions or concerns about your procedure,  call Dr. Elena at [phone]**      Today you received Toradol, an antiinflammatory medication similar to Ibuprofen.  You should not take other antiinflammatory medication, such as Ibuprofen, Motrin, Advil, Aleve, Naprosyn, etc until 6:00pm.           Pending Results     Date and Time Order Name Status Description    2018 1016 Surgical pathology exam In process             Admission Information     Date & Time Provider Department Dept. Phone    2018 Urban Elena MD [facility] [phone]      Your Vitals Were     Blood Pressure Temperature Respirations Height Weight Pulse Oximetry    122/84 96.8  F (36  C) 13 1.829 m (6') 145.1 kg (319 lb 12.8 oz) 95%    BMI (Body Mass Index)                   43.37 kg/m2           MyChart Information     SMART lets you send messages to your doctor, view your test results, renew your prescriptions, schedule appointments and more. To sign up, go to [email or web address] . Click on \"Log in\" on the left side of the screen, which will take you to the Welcome page. Then click on \"Sign up Now\" on the right side of the page.     You will be asked to enter the access code listed below, as well as some personal information. Please follow the directions to create your username and password.     Your access code is: [email or web address]  Expires: 10/21/2018  3:50 PM     Your access code will  in 90 days. If you need help or a new code, please call your [facility] clinic or [phone].        Care EveryWhere ID     This is your Care EveryWhere ID. This could be used by other organizations to access your [facility] medical records  [id number]        Equal Access to Services     TESSA GUERRA : Carla Choudhury, esme crook, qaybta kalili gomez. [facility] [phone].    ATENCIÓN: Si scarlettla espluis, tiene a hay " disposición servicios gratuitos de asistencia lingüística. Genny blanco [phone].    We comply with applicable federal civil rights laws and [place] laws. We do not discriminate on the basis of race, color, national origin, age, disability, sex, sexual orientation, or gender identity.               Review of your medicines      START taking        Dose / Directions    HYDROcodone-acetaminophen 5-325 MG per tablet   Commonly known as:  NORCO   Used for:  Acute post-operative pain   Notes to Patient:  One tablet given at 12:40pm        Dose:  1-2 tablet   Take 1-2 tablets by mouth every 4 hours as needed for other (Moderate to Severe Pain)   Quantity:  40 tablet   Refills:  0       ibuprofen 600 MG tablet   Commonly known as:  ADVIL/MOTRIN   Used for:  Acute post-operative pain   Notes to Patient:  May take at 6:00pm        Dose:  600 mg   Take 1 tablet (600 mg) by mouth every 6 hours as needed for pain (mild)   Quantity:  40 tablet   Refills:  0       nitroFURantoin (macrocrystal-monohydrate) 100 MG capsule   Commonly known as:  MACROBID   Used for:  Dysuria        Dose:  100 mg   Take 1 capsule (100 mg) by mouth 2 times daily   Quantity:  10 capsule   Refills:  0       ondansetron 4 MG ODT tab   Commonly known as:  ZOFRAN-ODT   Used for:  Nausea        Dose:  4-8 mg   Take 1-2 tablets (4-8 mg) by mouth every 8 hours as needed for nausea Dissolve ON the tongue.   Quantity:  24 tablet   Refills:  0       senna-docusate 8.6-50 MG per tablet   Commonly known as:  SENOKOT-S;PERICOLACE   Used for:  Constipation, unspecified constipation type        Dose:  1-2 tablet   Take 1-2 tablets by mouth 2 times daily Take while on oral narcotics to prevent or treat constipation.   Quantity:  90 tablet   Refills:  0         CONTINUE these medicines which have NOT CHANGED        Dose / Directions    EXCEDRIN EXTRA STRENGTH PO        Dose:  2 tablet   Take 2 tablets by mouth every 6 hours as needed   Refills:  0       lisinopril 20  MG tablet   Commonly known as:  PRINIVIL/ZESTRIL   Used for:  HTN (hypertension)        Dose:  20 mg   Take 1 tablet (20 mg) by mouth 2 times daily   Quantity:  180 tablet   Refills:  0       nebivolol 10 MG tablet   Commonly known as:  BYSTOLIC   Used for:  Hypertension goal BP (blood pressure) < 130/80        Dose:  10 mg   Take 1 tablet (10 mg) by mouth daily   Quantity:  90 tablet   Refills:  3       order for DME        DREAMSTATION 9-12 CM/H20 FF MIRAGE QUATTRO   Refills:  0       spironolactone 25 MG tablet   Commonly known as:  ALDACTONE   Used for:  Benign essential hypertension        Dose:  25 mg   Take 1 tablet (25 mg) by mouth daily   Quantity:  90 tablet   Refills:  3            Where to get your medicines      These medications were sent to [facility] - [address]  [address], [place] [address]     Phone:  [phone]     ibuprofen 600 MG tablet    nitroFURantoin (macrocrystal-monohydrate) 100 MG capsule    ondansetron 4 MG ODT tab    senna-docusate 8.6-50 MG per tablet         Some of these will need a paper prescription and others can be bought over the counter. Ask your nurse if you have questions.     Bring a paper prescription for each of these medications     HYDROcodone-acetaminophen 5-325 MG per tablet                Protect others around you: Learn how to safely use, store and throw away your medicines at www.disposemymeds.org.        Information about OPIOIDS     PRESCRIPTION OPIOIDS: WHAT YOU NEED TO KNOW   We gave you an opioid (narcotic) pain medicine. It is important to manage your pain, but opioids are not always the best choice. You should first try all the other options your care team gave you. Take this medicine for as short a time (and as few doses) as possible.    Some activities can increase your pain, such as bandage changes or therapy sessions. It may help to take your pain medicine 30 to 60 minutes before these activities.  Reduce your stress by getting enough sleep, working on hobbies you enjoy and practicing relaxation or meditation. Talk to your care team about ways to manage your pain beyond prescription opioids.    These medicines have risks:    DO NOT drive when on new or higher doses of pain medicine. These medicines can affect your alertness and reaction times, and you could be arrested for driving under the influence (DUI). If you need to use opioids long-term, talk to your care team about driving.    DO NOT operate heavy machinery    DO NOT do any other dangerous activities while taking these medicines.    DO NOT drink any alcohol while taking these medicines.     If the opioid prescribed includes acetaminophen, DO NOT take with any other medicines that contain acetaminophen. Read all labels carefully. Look for the word  acetaminophen  or  Tylenol.  Ask your pharmacist if you have questions or are unsure.    You can get addicted to pain medicines, especially if you have a history of addiction (chemical, alcohol or substance dependence). Talk to your care team about ways to reduce this risk.    All opioids tend to cause constipation. Drink plenty of water and eat foods that have a lot of fiber, such as fruits, vegetables, prune juice, apple juice and high-fiber cereal. Take a laxative (Miralax, milk of magnesia, Colace, Senna) if you don t move your bowels at least every other day. Other side effects include upset stomach, sleepiness, dizziness, throwing up, tolerance (needing more of the medicine to have the same effect), physical dependence and slowed breathing.    Store your pills in a secure place, locked if possible. We will not replace any lost or stolen medicine. If you don t finish your medicine, please throw away (dispose) as directed by your pharmacist. The [place] Pollution [employer] has more information about safe disposal: https://www.pca.[facility].mn.us/living-green/managing-unwanted-medications              Medication List: This is a list of all your medications and when to take them. Check marks below indicate your daily home schedule. Keep this list as a reference.      Medications           Morning Afternoon Evening Bedtime As Needed    EXCEDRIN EXTRA STRENGTH PO   Take 2 tablets by mouth every 6 hours as needed                                HYDROcodone-acetaminophen 5-325 MG per tablet   Commonly known as:  NORCO   Take 1-2 tablets by mouth every 4 hours as needed for other (Moderate to Severe Pain)   Last time this was given:  1 tablet on 8/20/2018 12:40 PM   Notes to Patient:  One tablet given at 12:40pm                1 tablet given at 12:40 pm                   ibuprofen 600 MG tablet   Commonly known as:  ADVIL/MOTRIN   Take 1 tablet (600 mg) by mouth every 6 hours as needed for pain (mild)   Notes to Patient:  May take at 6:00pm                    May take next dose after 6:00 pm               lisinopril 20 MG tablet   Commonly known as:  PRINIVIL/ZESTRIL   Take 1 tablet (20 mg) by mouth 2 times daily                                nebivolol 10 MG tablet   Commonly known as:  BYSTOLIC   Take 1 tablet (10 mg) by mouth daily                                nitroFURantoin (macrocrystal-monohydrate) 100 MG capsule   Commonly known as:  MACROBID   Take 1 capsule (100 mg) by mouth 2 times daily                                ondansetron 4 MG ODT tab   Commonly known as:  ZOFRAN-ODT   Take 1-2 tablets (4-8 mg) by mouth every 8 hours as needed for nausea Dissolve ON the tongue.                                order for DME   DREAMSTATION 9-12 CM/H20 FF MIRAGE QUATTRO                                senna-docusate 8.6-50 MG per tablet   Commonly known as:  SENOKOT-S;PERICOLACE   Take 1-2 tablets by mouth 2 times daily Take while on oral narcotics to prevent or treat constipation.                                spironolactone 25 MG tablet   Commonly known as:  ALDACTONE   Take 1 tablet (25 mg) by mouth daily

## 2018-08-20 NOTE — IP AVS SNAPSHOT
[facility]    [address]    [place] [address]    Phone:  [phone]    Fax:  [phone]                                       After Visit Summary   8/20/2018    Fouzia Arenas    MRN: [id number]           After Visit Summary Signature Page     I have received my discharge instructions, and my questions have been answered. I have discussed any challenges I see with this plan with the nurse or doctor.    ..........................................................................................................................................  Patient/Patient Representative Signature      ..........................................................................................................................................  Patient Representative Print Name and Relationship to Patient    ..................................................               ................................................  Date                                            Time    ..........................................................................................................................................  Reviewed by Signature/Title    ...................................................              ..............................................  Date                                                            Time

## 2018-08-20 NOTE — ANESTHESIA PREPROCEDURE EVALUATION
Anesthesia Evaluation     . Pt has had prior anesthetic. Type: General    History of anesthetic complications   - PONV        ROS/MED HX    ENT/Pulmonary:     (+)sleep apnea, , . .    Neurologic: Comment: vertigo    (+)migraines,     Cardiovascular:     (+) hypertension----. : . . . :. dysrhythmias Other, Irregular Heartbeat/Palpitations, .       METS/Exercise Tolerance:     Hematologic:         Musculoskeletal:         GI/Hepatic:     (+) GERD Asymptomatic on medication,       Renal/Genitourinary:         Endo:     (+) Obesity, .      Psychiatric:         Infectious Disease:         Malignancy:         Other:                     Physical Exam  Normal systems: cardiovascular, pulmonary and dental    Airway   Mallampati: II  TM distance: >3 FB  Neck ROM: full    Dental     Cardiovascular   Rhythm and rate: regular and normal      Pulmonary    breath sounds clear to auscultation                        Anesthesia Plan      History & Physical Review  History and physical reviewed and following examination; no interval change.    ASA Status:  3 .    NPO Status:  > 8 hours    Plan for General and ETT with Propofol induction. Maintenance will be Balanced.    PONV prophylaxis:  Ondansetron (or other 5HT-3), Dexamethasone or Solumedrol and Scopolamine patch       Postoperative Care  Postoperative pain management:  IV analgesics and Oral pain medications.      Consents                          .

## 2018-08-20 NOTE — OR NURSING
PNDS met, po per I&O sheet. Pt dressed, up in recliner and transported to Phase 2. IV left in place due to patient needing to void

## 2018-08-20 NOTE — OP NOTE
Procedure Date: 2018      PREOPERATIVE STATUS:  the patient is a 52-year-old white female,  3, para 1 who on recent hysteroscopy was noted to have complex endometrial hyperplasia without atypia.  She also has a history of stress incontinence with a hypermobile urethra.  The patient would like to proceed with hysterectomy and a sling procedure.      PREOPERATIVE DIAGNOSIS:  Complex endometrial hyperplasia without atypia and urinary stress incontinence with hypermobile urethra.      POSTOPERATIVE DIAGNOSIS:  Complex endometrial hyperplasia without atypia and urinary stress incontinence with hypermobile urethra.      OPERATION:  Laparoscopic subtotal hysterectomy, bilateral salpingectomy, suburethral sling and cystoscopy.      SURGEON:  Urban Elena MD      ANESTHESIA:  General.      OPERATIVE FINDINGS:  On laparoscopic examination, the patient's uterus was slightly enlarged.  Tubes, ovaries, peritoneal surfaces and upper abdomen appeared normal.  The uterus was taken down using bipolar cutter forceps.  The bladder was dissected off the lower uterine segment and cervix and the uterus was amputated.  The cervix was oversewn with 2-0 V-Loc suture.  The uterus was removed by morcellation.  Tubes were removed separately.      The sling was placed by dissecting underneath the mid urethra and towards the posterior surface of the inferior pubic rami bilaterally.  The sling was first attached to the patient's right obturator muscle membrane fascial complex and then the left obturator muscle membrane fascial complex.  A cystoscopy confirmed the integrity of the bladder and bilateral ureteral jets.  The incision was closed with running 3-0 Vicryl.  The uterus had been previously removed by morcellation.      DESCRIPTION OF PROCEDURE:  Under general endotracheal anesthesia, the patient was placed in lithotomy position and prepped and draped in the usual fashion.  A speculum was placed in the vagina, anterior lip  of cervix grasped with a tenaculum and the FALGUNI manipulator placed in the cavity of the uterus.  A Rivera catheter was placed in the bladder.  A pneumoperitoneum was created with a Veress needle.  The laparoscopic trocar and sheath were then introduced through an infraumbilical incision.  Assisting trocars and sheaths were introduced through lower abdomen and lateral incisions.  The pelvis and upper abdomen were inspected with the findings as noted above.        Using the Olympus bipolar cutting forceps, the fallopian tubes, suspensory ligament of the ovary, round ligament and broad ligament were taken down bilaterally.  The bladder peritoneum was incised and the bladder was dissected off the lower uterine segment and cervix.  The uterine arteries were then coagulated and transected.  The uterus was amputated from the cervix with the bipolar spatula.  The cervix was superficially fulgurated as was the endocervical canal.  The cervix was then closed with a running 2-0 V-Loc suture.  The fallopian tubes were then removed separately starting at the fimbriated end and coming across the mesosalpinx.  The uterus was then removed by morcellation.  The abdomen was thoroughly irrigated.  There was excellent hemostasis.      The Rivera catheter was removed.  A cystoscopy was performed after patient received 10 mg of Lasix IV.  Bilateral ureteral jets were noted and the bladder was intact.  The abdominal incisions were then closed using 2-0 Vicryl in the fascia, subcuticular 4-0 Vicryl sutures and Steri-Strips on the skin.      The patient was then repositioned with the legs higher in dorsal lithotomy position.  Rivera catheter was removed.  The Lone Star retractor was placed and the suburethral vaginal mucosa and lateral vaginal mucosa was injected with 0.25% Marcaine solution.  An incision was made starting 1 cm proximal to the urethra for approximately 1.5 cm.  Dissection was then carried out submucosally to the posterior  surface of the inferior pubic rami bilaterally.  Using the Solyx sling, the sling was placed first in the patient's right obturator muscle membrane fascial complex and then into the left obturator muscle membrane fascial complex.  The Rivera catheter was removed and a cystoscopy performed with normal bladder findings.  The Rivera catheter was then repositioned and the incision was closed with a running 3-0 Vicryl suture.      Total estimated blood loss for the case was 10 mL.  The patient tolerated the procedure well and was taken to recovery in good condition.  Final pathology pending.         JAZLYN LICEA MD             D: 2018   T: 2018   MT: ALFONSO      Name:     EYAD TORRES   MRN:      [id number]        Account:        [id number]   :      1965           Procedure Date: 2018      Document: [id number]       cc: Ruthie NUNEZ

## 2018-08-20 NOTE — BRIEF OP NOTE
[facility] Brief Operative Note    Pre-operative diagnosis: endometrial hyperplasia, incontinence   Post-operative diagnosis SAME   Procedure: Procedure(s):  LAPAROSCOPIC SUBTOATAL HYSTERECTOMY, BILATERAL SALPINGECTOMY, SUBURETHRAL SLING AND CYSTOSCOPY - Wound Class: II-Clean Contaminated   - Wound Class: II-Clean Contaminated   - Wound Class: II-Clean Contaminated   Surgeon(s): Surgeon(s) and Role:  Panel 1:     * Urban Elena MD - Primary    Panel 2:     * Urban Elena MD - Primary   Estimated blood loss: 10 mL    Specimens:   ID Type Source Tests Collected by Time Destination   A : BILATERAL FALLOPIAN TUBES Tissue Fallopian Tube, Bilateral SURGICAL PATHOLOGY EXAM Urban Elena MD 8/20/2018 10:16 AM    B : UTERUS Tissue Uterus SURGICAL PATHOLOGY EXAM Urban Elena MD 8/20/2018 10:17 AM       Findings: Uterus slightly enlarge, tubes and ovaries normal with normal upper abdomen.  Uterus and tubes removed.  Cervix oversewn.  Mid urethral sling placed.  Cystoscopy normal with bilateral ureteral jets.

## 2018-08-20 NOTE — ANESTHESIA CARE TRANSFER NOTE
Patient: Fouzia Arenas    Procedure(s):  LAPAROSCOPIC SUBTOATAL HYSTERECTOMY, BILATERAL SALPINGECTOMY, SUBURETHRAL SLING AND CYSTOSCOPY - Wound Class: II-Clean Contaminated   - Wound Class: II-Clean Contaminated   - Wound Class: II-Clean Contaminated    Diagnosis: endometrial hyperplasia, incontinence  Diagnosis Additional Information: No value filed.    Anesthesia Type:   General, ETT     Note:  Airway :Face Mask  Patient transferred to:PACU  Handoff Report: Identifed the Patient, Identified the Reponsible Provider, Reviewed the pertinent medical history, Discussed the surgical course, Reviewed Intra-OP anesthesia mangement and issues during anesthesia, Set expectations for post-procedure period and Allowed opportunity for questions and acknowledgement of understanding    Awake, responding to commands, encouraged to breath deep. Report to RN  Vitals: (Last set prior to Anesthesia Care Transfer)    CRNA VITALS  8/20/2018 1035 - 8/20/2018 1111      8/20/2018             Pulse: 65    SpO2: 92 %    Resp Rate (set): 10        BP: 113/75  P:63  SAO2 ;93%        Electronically Signed By: KOKI Fontenot CRNA  August 20, 2018  11:11 AM

## 2018-08-20 NOTE — ANESTHESIA POSTPROCEDURE EVALUATION
Patient: Fouzia Arenas    Procedure(s):  LAPAROSCOPIC SUBTOATAL HYSTERECTOMY, BILATERAL SALPINGECTOMY, SUBURETHRAL SLING AND CYSTOSCOPY - Wound Class: II-Clean Contaminated   - Wound Class: II-Clean Contaminated   - Wound Class: II-Clean Contaminated    Diagnosis:endometrial hyperplasia, incontinence  Diagnosis Additional Information: No value filed.    Anesthesia Type:  General, ETT    Note:  Anesthesia Post Evaluation    Patient location during evaluation: PACU  Patient participation: Able to fully participate in evaluation  Level of consciousness: awake  Pain management: adequate  Cardiovascular status: acceptable  Respiratory status: acceptable  Hydration status: acceptable  PONV: none     Anesthetic complications: None          Last vitals:  Vitals:    08/20/18 1230 08/20/18 1245 08/20/18 1300   BP: (!) 119/96 124/89 122/84   Resp: 19 12 13   Temp: 35.9  C (96.6  F) 36  C (96.8  F) 36  C (96.8  F)   SpO2: 90% 95% 95%         Electronically Signed By: LILIAN GALICIA  August 20, 2018  2:25 PM

## 2018-08-21 LAB — COPATH REPORT: NORMAL

## 2018-09-11 ENCOUNTER — OFFICE VISIT (OUTPATIENT)
Dept: OBGYN | Facility: CLINIC | Age: 53
End: 2018-09-11
Payer: COMMERCIAL

## 2018-09-11 VITALS
SYSTOLIC BLOOD PRESSURE: 116 MMHG | BODY MASS INDEX: 39.68 KG/M2 | DIASTOLIC BLOOD PRESSURE: 70 MMHG | HEIGHT: 72 IN | WEIGHT: 293 LBS | HEART RATE: 74 BPM

## 2018-09-11 DIAGNOSIS — Z48.816 AFTERCARE FOLLOWING SURGERY OF THE GENITOURINARY SYSTEM: Primary | ICD-10-CM

## 2018-09-11 DIAGNOSIS — Z48.816 AFTERCARE FOLLOWING SURGERY OF THE GENITOURINARY SYSTEM: ICD-10-CM

## 2018-09-11 LAB — HGB BLD-MCNC: 14.2 G/DL (ref 11.7–15.7)

## 2018-09-11 PROCEDURE — 36415 COLL VENOUS BLD VENIPUNCTURE: CPT | Performed by: OBSTETRICS & GYNECOLOGY

## 2018-09-11 PROCEDURE — 99024 POSTOP FOLLOW-UP VISIT: CPT | Performed by: OBSTETRICS & GYNECOLOGY

## 2018-09-11 PROCEDURE — 85018 HEMOGLOBIN: CPT | Performed by: OBSTETRICS & GYNECOLOGY

## 2018-09-11 NOTE — MR AVS SNAPSHOT
"              After Visit Summary   9/11/2018    Fouzia Arenas    MRN: [id number]           Patient Information     Date Of Birth          1965        Visit Information        Provider Department      9/11/2018 1:45 PM Urban Elena MD [facility]         Follow-ups after your visit        Your next 10 appointments already scheduled     Oct 09, 2018  1:00 PM CDT   SHORT with Urban Elena MD   [facility]    [address] [place] [address] [id number] [phone]              Who to contact     If you have questions or need follow up information about today's clinic visit or your schedule please contact [facility] directly at [phone].  Normal or non-critical lab and imaging results will be communicated to you by MyChart, letter or phone within 4 business days after the clinic has received the results. If you do not hear from us within 7 days, please contact the clinic through MyChart or phone. If you have a critical or abnormal lab result, we will notify you by phone as soon as possible.  Submit refill requests through Metaboli or call your pharmacy and they will forward the refill request to us. Please allow 3 business days for your refill to be completed.          Additional Information About Your Visit        MyChart Information     Met[employer] lets you send messages to your doctor, view your test results, renew your prescriptions, schedule appointments and more. To sign up, go to [email or web address] . Click on \"Log in\" on the left side of the screen, which will take you to the Welcome page. Then click on \"Sign up Now\" on the right side of the page.     You will be asked to enter the access code listed below, as well as some personal information. Please follow the directions to create your username and password.     Your access code is: [email or web address]  Expires: 12/10/2018  1:35 " PM     Your access code will  in 90 days. If you need help or a new code, please call your [place] clinic or [phone].        Care EveryWhere ID     This is your Care EveryWhere ID. This could be used by other organizations to access your [place] medical records          Your Vitals Were     Pulse Height BMI (Body Mass Index)             74 6' (1.829 m) 43.26 kg/m2          Blood Pressure from Last 3 Encounters:   18 116/70   18 128/74   18 118/74    Weight from Last 3 Encounters:   18 319 lb (144.7 kg)   18 319 lb 12.8 oz (145.1 kg)   18 320 lb (145.2 kg)              Today, you had the following     No orders found for display       Primary Care Provider Office Phone # Fax #    Ruthie Xiong PA-C [phone]       [address] [place] [address]        Equal Access to Services     [facility] : Hadii aad ku hadasho Soomaali, waaxda luqadaha, qaybta kaalmada adeegyada, waxay idiin haypamelan darya gerber . So [facility] [phone].    ATENCIÓN: Si habla español, tiene a hay disposición servicios gratuitos de asistencia lingüística. Llame al [phone].    We comply with applicable federal civil rights laws and [place] laws. We do not discriminate on the basis of race, color, national origin, age, disability, sex, sexual orientation, or gender identity.            Thank you!     Thank you for choosing [facility]  for your care. Our goal is always to provide you with excellent care. Hearing back from our patients is one way we can continue to improve our services. Please take a few minutes to complete the written survey that you may receive in the mail after your visit with us. Thank you!             Your Updated Medication List - Protect others around you: Learn how to safely use, store and throw away your medicines at www.disposemymeds.org.          This list is accurate as of 18  2:11 PM.   Always use your most recent med list.                   Brand Name Dispense Instructions for use Diagnosis    EXCEDRIN EXTRA STRENGTH PO      Take 2 tablets by mouth every 6 hours as needed        ibuprofen 600 MG tablet    ADVIL/MOTRIN    40 tablet    Take 1 tablet (600 mg) by mouth every 6 hours as needed for pain (mild)    Acute post-operative pain       lisinopril 20 MG tablet    PRINIVIL/ZESTRIL    180 tablet    Take 1 tablet (20 mg) by mouth 2 times daily    HTN (hypertension)       nebivolol 10 MG tablet    BYSTOLIC    90 tablet    Take 1 tablet (10 mg) by mouth daily    Hypertension goal BP (blood pressure) < 130/80       order for DME      DREAMSTATION 9-12 CM/H20 FF MIRAGE QUATTRO        spironolactone 25 MG tablet    ALDACTONE    90 tablet    Take 1 tablet (25 mg) by mouth daily    Benign essential hypertension

## 2018-09-11 NOTE — PROGRESS NOTES
Patient returns for her postop check.  She is now 3 weeks post laparoscopic subtotal hysterectomy with bilateral salpingectomy, suburethral sling and cystoscopy.  She is generally doing well.  Her energy levels improving.  She still has urinary incontinence.  Her symptoms are mainly urge incontinence.  She does not leak with coughing.  She is voiding slower.  A bladder scan today shows residual urine of 29 cc.  She does not have symptoms of a UTI.    Pathology: Complex endometrial hyperplasia without atypia.    Exam: Abdominal exam: Incisions well-healed, nontender.  Pelvic examination: External genitalia normal, vaginal exam: Sling is in place, no unusual tenderness,    Impression: 3 weeks post surgery doing well.  Still has overactive bladder.    Plan: We will have the patient return in 1 month for another postop exam.  At that time she should have a urine culture and urinalysis done.  She may require additional treatment for overactive bladder if that has not improved.    Urban Elena MD

## 2018-09-28 DIAGNOSIS — I10 HTN (HYPERTENSION): ICD-10-CM

## 2018-09-28 RX ORDER — LISINOPRIL 20 MG/1
20 TABLET ORAL 2 TIMES DAILY
Qty: 180 TABLET | Refills: 0 | Status: SHIPPED | OUTPATIENT
Start: 2018-09-28 | End: 2018-11-29

## 2018-10-09 ENCOUNTER — OFFICE VISIT (OUTPATIENT)
Dept: OBGYN | Facility: CLINIC | Age: 53
End: 2018-10-09
Payer: COMMERCIAL

## 2018-10-09 VITALS
WEIGHT: 293 LBS | SYSTOLIC BLOOD PRESSURE: 114 MMHG | HEART RATE: 74 BPM | HEIGHT: 72 IN | BODY MASS INDEX: 39.68 KG/M2 | DIASTOLIC BLOOD PRESSURE: 74 MMHG

## 2018-10-09 DIAGNOSIS — Z87.898 HISTORY OF DYSURIA: ICD-10-CM

## 2018-10-09 DIAGNOSIS — N30.00 ACUTE CYSTITIS WITHOUT HEMATURIA: Primary | ICD-10-CM

## 2018-10-09 DIAGNOSIS — R82.90 NONSPECIFIC FINDING ON EXAMINATION OF URINE: ICD-10-CM

## 2018-10-09 LAB
ALBUMIN UR-MCNC: ABNORMAL MG/DL
APPEARANCE UR: ABNORMAL
BILIRUB UR QL STRIP: NEGATIVE
COLOR UR AUTO: YELLOW
GLUCOSE UR STRIP-MCNC: NEGATIVE MG/DL
HGB UR QL STRIP: ABNORMAL
KETONES UR STRIP-MCNC: NEGATIVE MG/DL
LEUKOCYTE ESTERASE UR QL STRIP: ABNORMAL
NITRATE UR QL: NEGATIVE
PH UR STRIP: 5.5 PH (ref 5–7)
SOURCE: ABNORMAL
SP GR UR STRIP: >1.03 (ref 1–1.03)
UROBILINOGEN UR STRIP-ACNC: 0.2 EU/DL (ref 0.2–1)

## 2018-10-09 PROCEDURE — 81003 URINALYSIS AUTO W/O SCOPE: CPT | Performed by: OBSTETRICS & GYNECOLOGY

## 2018-10-09 PROCEDURE — 87088 URINE BACTERIA CULTURE: CPT | Performed by: OBSTETRICS & GYNECOLOGY

## 2018-10-09 PROCEDURE — 99213 OFFICE O/P EST LOW 20 MIN: CPT | Mod: 24 | Performed by: OBSTETRICS & GYNECOLOGY

## 2018-10-09 PROCEDURE — 87086 URINE CULTURE/COLONY COUNT: CPT | Performed by: OBSTETRICS & GYNECOLOGY

## 2018-10-09 RX ORDER — CIPROFLOXACIN 500 MG/1
500 TABLET, FILM COATED ORAL 2 TIMES DAILY
Qty: 14 TABLET | Refills: 0 | Status: SHIPPED | OUTPATIENT
Start: 2018-10-09 | End: 2018-12-21

## 2018-10-09 NOTE — MR AVS SNAPSHOT
"              After Visit Summary   10/9/2018    Fouzia Arenas    MRN: [id number]           Patient Information     Date Of Birth          1965        Visit Information        Provider Department      10/9/2018 1:00 PM Urban Elean MD [facility]        Today's Diagnoses     Acute cystitis without hematuria    -  1    History of dysuria        Nonspecific finding on examination of urine           Follow-ups after your visit        Who to contact     If you have questions or need follow up information about today's clinic visit or your schedule please contact [facility] directly at [phone].  Normal or non-critical lab and imaging results will be communicated to you by MyChart, letter or phone within 4 business days after the clinic has received the results. If you do not hear from us within 7 days, please contact the clinic through DDNhart or phone. If you have a critical or abnormal lab result, we will notify you by phone as soon as possible.  Submit refill requests through [employer] or call your pharmacy and they will forward the refill request to us. Please allow 3 business days for your refill to be completed.          Additional Information About Your Visit        MyChart Information     [employer] lets you send messages to your doctor, view your test results, renew your prescriptions, schedule appointments and more. To sign up, go to [email or web address] . Click on \"Log in\" on the left side of the screen, which will take you to the Welcome page. Then click on \"Sign up Now\" on the right side of the page.     You will be asked to enter the access code listed below, as well as some personal information. Please follow the directions to create your username and password.     Your access code is: [email or web address]  Expires: 12/10/2018  1:35 PM     Your access code will  in 90 days. If you need help or a new code, please call your [place] clinic or " [phone].        Care EveryWhere ID     This is your Care EveryWhere ID. This could be used by other organizations to access your [place] medical records  [id number]        Your Vitals Were     Pulse Height BMI (Body Mass Index)             74 6' (1.829 m) 44.21 kg/m2          Blood Pressure from Last 3 Encounters:   10/09/18 114/74   09/11/18 116/70   08/20/18 128/74    Weight from Last 3 Encounters:   10/09/18 326 lb (147.9 kg)   09/11/18 319 lb (144.7 kg)   08/20/18 319 lb 12.8 oz (145.1 kg)              We Performed the Following     UA without Microscopic     Urine Culture Aerobic Bacterial          Today's Medication Changes          These changes are accurate as of 10/9/18  1:50 PM.  If you have any questions, ask your nurse or doctor.               Start taking these medicines.        Dose/Directions    ciprofloxacin 500 MG tablet   Commonly known as:  CIPRO   Used for:  Acute cystitis without hematuria   Started by:  Urban Elena MD        Dose:  500 mg   Take 1 tablet (500 mg) by mouth 2 times daily   Quantity:  14 tablet   Refills:  0            Where to get your medicines      [facility] [address] [place] [address] AT [facility] [address] [address]    Hours:  24-hours Phone:  [phone]     ciprofloxacin 500 MG tablet                Primary Care Provider Office Phone # Fax #    Ruthie Xiong PA-C [phone]        [address] [place] [address]        Equal Access to Services     [facility] AH: Hadii [place] hadasho Soomaali, waaxda luqadaha, qaybta kaalmada alexaegyada, lili bahena. So [facility] [phone].    ATENCIÓN: Si habla español, tiene a hay disposición servicios gratuitos de asistencia lingüística. Llame al [phone].    We comply with applicable federal civil rights laws and [place] laws. We do not discriminate on the  basis of race, color, national origin, age, disability, sex, sexual orientation, or gender identity.            Thank you!     Thank you for choosing [facility]  for your care. Our goal is always to provide you with excellent care. Hearing back from our patients is one way we can continue to improve our services. Please take a few minutes to complete the written survey that you may receive in the mail after your visit with us. Thank you!             Your Updated Medication List - Protect others around you: Learn how to safely use, store and throw away your medicines at www.disposemymeds.org.          This list is accurate as of 10/9/18  1:50 PM.  Always use your most recent med list.                   Brand Name Dispense Instructions for use Diagnosis    ciprofloxacin 500 MG tablet    CIPRO    14 tablet    Take 1 tablet (500 mg) by mouth 2 times daily    Acute cystitis without hematuria       EXCEDRIN EXTRA STRENGTH PO      Take 2 tablets by mouth every 6 hours as needed        ibuprofen 600 MG tablet    ADVIL/MOTRIN    40 tablet    Take 1 tablet (600 mg) by mouth every 6 hours as needed for pain (mild)    Acute post-operative pain       lisinopril 20 MG tablet    PRINIVIL/ZESTRIL    180 tablet    Take 1 tablet (20 mg) by mouth 2 times daily    HTN (hypertension)       nebivolol 10 MG tablet    BYSTOLIC    90 tablet    Take 1 tablet (10 mg) by mouth daily    Hypertension goal BP (blood pressure) < 130/80       order for DME      DREAMSTATION 9-12 CM/H20 FF MIRAGE QUATTRO        spironolactone 25 MG tablet    ALDACTONE    90 tablet    Take 1 tablet (25 mg) by mouth daily    Benign essential hypertension

## 2018-10-09 NOTE — PROGRESS NOTES
SUBJECTIVE:                                                   Fouzia Arenas is a 53 year old female who presents to clinic today for the following health issue(s):  Patient presents with:  RECHECK: f/u to hystoscopy and bladder sling- no new issues. Left UA      HPI:  Patient is seen today for follow-up on her urge incontinence.  On  she did have a laparoscopic supracervical hysterectomy with bilateral salpingectomy and a suburethral sling.  She is not leaking with any physical activity but she does have urgency and some urge incontinence.  Her urinalysis today appears positive for UTI.    No LMP recorded. Patient is postmenopausal..   Patient is not sexually active, .  Using not sexually active for contraception.    reports that she has never smoked. She has never used smokeless tobacco.    STD testing offered?  Declined    Health maintenance updated:  yes    Problem list and histories reviewed & adjusted, as indicated.  Additional history: as documented.    Patient Active Problem List   Diagnosis     Esophageal reflux     CARDIOVASCULAR SCREENING; LDL GOAL LESS THAN 160     Palpitations     STORMY (obstructive sleep apnea)     Atypical chest pain     BPPV (benign paroxysmal positional vertigo)     Other disorder of menstruation and other abnormal bleeding from female genital tract     Morbid obesity (H)     Hypertension goal BP (blood pressure) < 130/80     Vertigo     Past Surgical History:   Procedure Laterality Date     CHOLECYSTECTOMY, LAPOROSCOPIC      Cholecystectomy, Laparoscopic     COLONOSCOPY N/A 2014    Procedure: COLONOSCOPY;  Surgeon: Wenceslao Galo MD;  Location:  GI     CYSTOSCOPY, SLING TRANSVAGINAL N/A 2018    Procedure: CYSTOSCOPY, SLING TRANSVAGINAL;;  Surgeon: Urban Elena MD;  Location: [facility]     D & C      X2-3 for abnormal bleeding     ESOPHAGOSCOPY, GASTROSCOPY, DUODENOSCOPY (EGD), COMBINED N/A 2014    Procedure: COMBINED ESOPHAGOSCOPY,  GASTROSCOPY, DUODENOSCOPY (EGD), BIOPSY SINGLE OR MULTIPLE;  Surgeon: Wenceslao Glao MD;  Location: [facility]     LAPAROSCOPIC HYSTERECTOMY SUPRACERVICAL N/A 8/20/2018    Procedure: LAPAROSCOPIC HYSTERECTOMY SUPRACERVICAL;  LAPAROSCOPIC SUBTOATAL HYSTERECTOMY, BILATERAL SALPINGECTOMY, SUBURETHRAL SLING AND CYSTOSCOPY;  Surgeon: Urban Elena MD;  Location: [facility]     LAPAROSCOPIC SALPINGECTOMY Bilateral 8/20/2018    Procedure: LAPAROSCOPIC SALPINGECTOMY;;  Surgeon: Urban Elena MD;  Location: [facility]     LAPAROSCOPY      For endometriosis     OPERATIVE HYSTEROSCOPY WITH MORCELLATOR N/A 3/29/2018    Procedure: OPERATIVE HYSTEROSCOPY WITH MORCELLATOR (MYOSURE);  OPERATIVE HYSTEROSCOPY WITH MORCELLATOR ;  Surgeon: Urban Elena MD;  Location: [facility]      Social History   Substance Use Topics     Smoking status: Never Smoker     Smokeless tobacco: Never Used     Alcohol use 1.0 oz/week      Problem (# of Occurrences) Relation (Name,Age of Onset)    Breast Cancer (1) Maternal Aunt    C.A.D. (2) Maternal Grandmother, Paternal Grandfather    Cancer (1) Maternal Grandfather: lung    Cancer - colorectal (1) Other: cousin maternal     Cerebrovascular Disease (1) Paternal Grandmother    Colon Cancer (2) Cousin, Cousin    Gallbladder Disease (1) Maternal Grandmother    Hypertension (3) Mother, Father, Maternal Grandmother    Lipids (2) Mother, Father    Prostate Cancer (1) Father       Negative family history of: Diabetes            Current Outpatient Prescriptions   Medication Sig     Aspirin-Acetaminophen-Caffeine (EXCEDRIN EXTRA STRENGTH PO) Take 2 tablets by mouth every 6 hours as needed     ciprofloxacin (CIPRO) 500 MG tablet Take 1 tablet (500 mg) by mouth 2 times daily     ibuprofen (ADVIL/MOTRIN) 600 MG tablet Take 1 tablet (600 mg) by mouth every 6 hours as needed for pain (mild)     lisinopril (PRINIVIL/ZESTRIL) 20 MG tablet Take 1 tablet (20 mg) by mouth 2 times daily     nebivolol (BYSTOLIC)  10 MG tablet Take 1 tablet (10 mg) by mouth daily     order for DME DREAMSTATION  9-12 CM/H20  FF [employer]IR[employer] QUATTRO     spironolactone (ALDACTONE) 25 MG tablet Take 1 tablet (25 mg) by mouth daily     No current facility-administered medications for this visit.      Allergies   Allergen Reactions     Amoxicillin Nausea and Vomiting     Percocet [Oxycodone-Acetaminophen] Nausea and Vomiting       ROS:  12 point review of systems negative other than symptoms noted below.    OBJECTIVE:     /74  Pulse 74  Ht 6' (1.829 m)  Wt 326 lb (147.9 kg)  BMI 44.21 kg/m2  Body mass index is 44.21 kg/(m^2).    Exam:  Constitutional:  Appearance: Well nourished, well developed alert, in no acute distress  Chest:  Respiratory Effort:  Breathing unlabored  Cardiovascular: noo edema  Neurologic/Psychiatric:  Mental Status:  Oriented X3      In-Clinic Test Results:  Results for orders placed or performed in visit on 10/09/18 (from the past 24 hour(s))   UA without Microscopic   Result Value Ref Range    Color Urine Yellow     Appearance Urine Slightly Cloudy     Glucose Urine Negative NEG^Negative mg/dL    Bilirubin Urine Negative NEG^Negative    Ketones Urine Negative NEG^Negative mg/dL    Specific Gravity Urine >1.030 1.003 - 1.035    Blood Urine 1+ (A) NEG^Negative    pH Urine 5.5 5.0 - 7.0 pH    Protein Albumin Urine Trace (A) NEG^Negative mg/dL    Urobilinogen Urine 0.2 0.2 - 1.0 EU/dL    Nitrite Urine Negative NEG^Negative    Leukocyte Esterase Urine 3+ (A) NEG^Negative    Source Midstream Urine        ASSESSMENT/PLAN:                                                        ICD-10-CM    1. Acute cystitis without hematuria N30.00 ciprofloxacin (CIPRO) 500 MG tablet   2. History of dysuria Z87.898 UA without Microscopic     Urine Culture Aerobic Bacterial   3. Nonspecific finding on examination of urine R82.90 Urine Culture Aerobic Bacterial           Plan: Patient was instructed to see how the antibiotics work.  We will also  await the culture results.  If her urge symptoms are improved or eliminated by the antibiotics we will continue to follow.  If her urge symptoms do not improve with the antibiotic she will return to clinic for evaluation and treatment of the urgency and urge incontinence.    Urban Elena MD  [facility] [place]

## 2018-10-10 DIAGNOSIS — I10 BENIGN ESSENTIAL HYPERTENSION: ICD-10-CM

## 2018-10-10 LAB
BACTERIA SPEC CULT: ABNORMAL
Lab: ABNORMAL
SPECIMEN SOURCE: ABNORMAL

## 2018-10-10 RX ORDER — SPIRONOLACTONE 25 MG/1
25 TABLET ORAL DAILY
Qty: 90 TABLET | Refills: 0 | Status: SHIPPED | OUTPATIENT
Start: 2018-10-10 | End: 2018-11-29

## 2018-10-23 ENCOUNTER — TELEPHONE (OUTPATIENT)
Dept: OBGYN | Facility: CLINIC | Age: 53
End: 2018-10-23

## 2018-10-23 DIAGNOSIS — R30.0 DYSURIA: Primary | ICD-10-CM

## 2018-10-24 ENCOUNTER — TELEPHONE (OUTPATIENT)
Dept: NURSING | Facility: CLINIC | Age: 53
End: 2018-10-24

## 2018-10-24 RX ORDER — AMPICILLIN TRIHYDRATE 500 MG
500 CAPSULE ORAL 3 TIMES DAILY
Qty: 21 CAPSULE | Refills: 0 | Status: SHIPPED | OUTPATIENT
Start: 2018-10-24 | End: 2018-12-21

## 2018-10-24 NOTE — TELEPHONE ENCOUNTER
Called patient about UC results.  She stated the cipro did not completely get rid of symptons.  Sent a new prescription for ampicillin, according to UC results and sensitivies testing.  If symptoms persist patient will return for UA/UC  CHACORTA EsparzaC

## 2018-10-24 NOTE — TELEPHONE ENCOUNTER
Talked with pharmacist, when I discussed prescription with patient this am, she stated she had no allergies, but does tend to get a upset stomach on amoxicillin, but will try taking the ampicillin.  Pharmacist is going to confirm with patient.  CHACORTA EsparzaC

## 2018-10-24 NOTE — TELEPHONE ENCOUNTER
Erick the pharmacist calling from [facility]: the Ampicillin rx.  Pt has pcn allergy. Would like dr to cb with new rx or to discuss current med request.   [facility] @ [phone]    Routing to Heather DA SILVA to advise

## 2018-11-29 ENCOUNTER — RADIANT APPOINTMENT (OUTPATIENT)
Dept: GENERAL RADIOLOGY | Facility: CLINIC | Age: 53
End: 2018-11-29
Attending: PHYSICIAN ASSISTANT
Payer: COMMERCIAL

## 2018-11-29 ENCOUNTER — OFFICE VISIT (OUTPATIENT)
Dept: CARDIOLOGY | Facility: CLINIC | Age: 53
End: 2018-11-29
Payer: COMMERCIAL

## 2018-11-29 ENCOUNTER — OFFICE VISIT (OUTPATIENT)
Dept: URGENT CARE | Facility: URGENT CARE | Age: 53
End: 2018-11-29
Payer: COMMERCIAL

## 2018-11-29 VITALS
TEMPERATURE: 98.6 F | BODY MASS INDEX: 43.81 KG/M2 | HEART RATE: 60 BPM | SYSTOLIC BLOOD PRESSURE: 126 MMHG | WEIGHT: 293 LBS | OXYGEN SATURATION: 97 % | DIASTOLIC BLOOD PRESSURE: 86 MMHG

## 2018-11-29 VITALS
BODY MASS INDEX: 43.81 KG/M2 | HEART RATE: 58 BPM | SYSTOLIC BLOOD PRESSURE: 124 MMHG | WEIGHT: 293 LBS | DIASTOLIC BLOOD PRESSURE: 72 MMHG

## 2018-11-29 DIAGNOSIS — S89.91XA INJURY OF RIGHT KNEE, INITIAL ENCOUNTER: Primary | ICD-10-CM

## 2018-11-29 DIAGNOSIS — I10 BENIGN ESSENTIAL HYPERTENSION: Primary | ICD-10-CM

## 2018-11-29 DIAGNOSIS — S89.91XA INJURY OF RIGHT KNEE, INITIAL ENCOUNTER: ICD-10-CM

## 2018-11-29 DIAGNOSIS — I10 HYPERTENSION GOAL BP (BLOOD PRESSURE) < 130/80: ICD-10-CM

## 2018-11-29 DIAGNOSIS — I10 ESSENTIAL HYPERTENSION: ICD-10-CM

## 2018-11-29 PROCEDURE — 99213 OFFICE O/P EST LOW 20 MIN: CPT | Performed by: PHYSICIAN ASSISTANT

## 2018-11-29 PROCEDURE — 73562 X-RAY EXAM OF KNEE 3: CPT | Mod: RT

## 2018-11-29 PROCEDURE — 99214 OFFICE O/P EST MOD 30 MIN: CPT | Performed by: PHYSICIAN ASSISTANT

## 2018-11-29 RX ORDER — NAPROXEN 500 MG/1
500 TABLET ORAL 2 TIMES DAILY PRN
Qty: 30 TABLET | Refills: 1 | Status: SHIPPED | OUTPATIENT
Start: 2018-11-29 | End: 2018-11-30

## 2018-11-29 RX ORDER — SPIRONOLACTONE 25 MG/1
25 TABLET ORAL DAILY
Qty: 90 TABLET | Refills: 3 | Status: SHIPPED | OUTPATIENT
Start: 2018-11-29 | End: 2019-12-16

## 2018-11-29 RX ORDER — LISINOPRIL 20 MG/1
20 TABLET ORAL 2 TIMES DAILY
Qty: 180 TABLET | Refills: 3 | Status: SHIPPED | OUTPATIENT
Start: 2018-11-29 | End: 2019-12-16

## 2018-11-29 RX ORDER — NEBIVOLOL 10 MG/1
10 TABLET ORAL DAILY
Qty: 90 TABLET | Refills: 3 | Status: SHIPPED | OUTPATIENT
Start: 2018-11-29 | End: 2019-12-16

## 2018-11-29 ASSESSMENT — ENCOUNTER SYMPTOMS
FEVER: 0
COUGH: 0
NUMBNESS: 0
SHORTNESS OF BREATH: 0
WEAKNESS: 0
FATIGUE: 0
CHILLS: 0

## 2018-11-29 NOTE — LETTER
11/29/2018    Ruthie Xiong PA-C  [address] [place] [address]    RE: Fouzia Arenas       Dear Colleague,    I had the pleasure of seeing Fouzia Arenas in the [facility].    HPI:   I had the pleasure of seeing Fouzia when she came for follow up of hypertension and palpitations noted to be atrial tachycardia. She sees Dr. Au and Dr. Hensley for her history of:    1.  Hypertension with reduction in Bystolic dose 3/2018 due to lightheaded episodes (subsequently resolved)  2.  Palpitations, with event monitor 10/2015 showing episodes of atrial tachycardia previously on carvedilol and now taking by Bystolic  3.  Right lower extremity lipoma dermatosclerosis for which Dr. Au saw her 8/2017.  They discussed radiofrequency ablation of the right GS V and SSV with foam sclerotherapy, though patient opted to attempt weight loss first.    She last saw Dr. Au to establish care 8/2017.  In 9/2017 she was in the ER for an episode of chest discomfort.  This was atypical, and troponin was negative.  They reviewed an exercise stress test on 5/2017 showing no evidence of ischemia, and routine follow-up was recommended.  She then  underwent removal of a vaginal cyst 3/2018.  She told her primary, at her preop, that she has had brief episodes of dizziness.  Bystolic was decreased from 20-10 mg daily.  Since that time, blood pressures have been excellent at home in the 1 teens to 120s over 60s-70s.  She has had no further episodes of dizziness.  In 5/2018 she was back in the ER for vertigo.  This is not recurred.  Finally, an 8/2018, she underwent laparoscopic subtotal hysterectomy with BSO.     From a cardiac perspective, she really feels like she is done well.  She has had no problems with hypertension or dizziness.  She denies any palpitations.  Overall, she has no complaints.  She thinks that her right leg symptoms have not worsened at all since she saw Dr. Au.  She does wear her  compression stockings more often.  She works as a hairdresser, and is on her feet a lot.    Stress test 5/2017 showed no evidence of ischemia, with breast attenuation artifact.  Echocardiogram done 1/2016 showed a low normal ejection fraction.  She had no significant valvular abnormalities noted.  Blood work 8/2018 showed a potassium of 4.1.    Assessment & Plan:    1.  Hypertension    Despite reduction in Bystolic, blood pressure still looks to be under good control    She is tolerating lisinopril and Bystolic and Spironolactone without issues    BMP on these medications look good    PLAN:    Continue current medications    Continue efforts at weight loss      2.  Atrial tachycardia    Really appears to be under good control on the lower dose of Bystolic    PLAN:    Continue Bystolic 10 mg daily    See me in 1 year with EKG, but call if issues in the interim.      3.  Right lower extremity lipoma dermatosclerosis    She enjoyed meeting Dr. Au, but at this time feels like compression stockings are working well enough    On exam today, has no evidence of significant skin breakdown or ulceration    PLAN:    Continue efforts at weight loss    Continue compression stockings    Contact us if she wants to meet with Dr. Au to proceed with therapy      Jaqui Edmond PA-C, MSPAS      Orders Placed This Encounter   Procedures     Follow-Up with Cardiac Advanced Practice Provider     Orders Placed This Encounter   Medications     spironolactone (ALDACTONE) 25 MG tablet     Sig: Take 1 tablet (25 mg) by mouth daily     Dispense:  90 tablet     Refill:  3     lisinopril (PRINIVIL/ZESTRIL) 20 MG tablet     Sig: Take 1 tablet (20 mg) by mouth 2 times daily     Dispense:  180 tablet     Refill:  3     nebivolol (BYSTOLIC) 10 MG tablet     Sig: Take 1 tablet (10 mg) by mouth daily     Dispense:  90 tablet     Refill:  3     Medications Discontinued During This Encounter   Medication Reason     spironolactone (ALDACTONE) 25 MG tablet  Reorder     lisinopril (PRINIVIL/ZESTRIL) 20 MG tablet Reorder     nebivolol (BYSTOLIC) 10 MG tablet Reorder         Encounter Diagnoses   Name Primary?     Benign essential hypertension Yes     Essential hypertension      Hypertension goal BP (blood pressure) < 130/80        CURRENT MEDICATIONS:  Current Outpatient Prescriptions   Medication Sig Dispense Refill     ampicillin (PRINCIPEN) 500 MG capsule Take 1 capsule (500 mg) by mouth 3 times daily 21 capsule 0     Aspirin-Acetaminophen-Caffeine (EXCEDRIN EXTRA STRENGTH PO) Take 2 tablets by mouth every 6 hours as needed       ciprofloxacin (CIPRO) 500 MG tablet Take 1 tablet (500 mg) by mouth 2 times daily 14 tablet 0     ibuprofen (ADVIL/MOTRIN) 600 MG tablet Take 1 tablet (600 mg) by mouth every 6 hours as needed for pain (mild) 40 tablet 0     lisinopril (PRINIVIL/ZESTRIL) 20 MG tablet Take 1 tablet (20 mg) by mouth 2 times daily 180 tablet 3     nebivolol (BYSTOLIC) 10 MG tablet Take 1 tablet (10 mg) by mouth daily 90 tablet 3     order for DME DREAMSTATION  9-12 CM/H20  FF [employer]IR[employer] QUATTRO       spironolactone (ALDACTONE) 25 MG tablet Take 1 tablet (25 mg) by mouth daily 90 tablet 3     [DISCONTINUED] lisinopril (PRINIVIL/ZESTRIL) 20 MG tablet Take 1 tablet (20 mg) by mouth 2 times daily 180 tablet 0     [DISCONTINUED] nebivolol (BYSTOLIC) 10 MG tablet Take 1 tablet (10 mg) by mouth daily 90 tablet 3     [DISCONTINUED] spironolactone (ALDACTONE) 25 MG tablet Take 1 tablet (25 mg) by mouth daily 90 tablet 0       ALLERGIES     Allergies   Allergen Reactions     Amoxicillin Nausea and Vomiting     Percocet [Oxycodone-Acetaminophen] Nausea and Vomiting       PAST MEDICAL HISTORY:  Past Medical History:   Diagnosis Date     Classic migraine      Complex endometrial hyperplasia     without atypia     Hypertension      Motion sickness      Obese      STORMY (obstructive sleep apnea)      Palpitations      PONV (postoperative nausea and vomiting)      SVT  (supraventricular tachycardia) (H) 9/2015       PAST SURGICAL HISTORY:  Past Surgical History:   Procedure Laterality Date     CHOLECYSTECTOMY, LAPOROSCOPIC      Cholecystectomy, Laparoscopic     COLONOSCOPY N/A 11/25/2014    Procedure: COLONOSCOPY;  Surgeon: Wenceslao Galo MD;  Location:  GI     CYSTOSCOPY, SLING TRANSVAGINAL N/A 8/20/2018    Procedure: CYSTOSCOPY, SLING TRANSVAGINAL;;  Surgeon: Urban Elena MD;  Location: [facility]     D & C      X2-3 for abnormal bleeding     ESOPHAGOSCOPY, GASTROSCOPY, DUODENOSCOPY (EGD), COMBINED N/A 11/25/2014    Procedure: COMBINED ESOPHAGOSCOPY, GASTROSCOPY, DUODENOSCOPY (EGD), BIOPSY SINGLE OR MULTIPLE;  Surgeon: Wenceslao Galo MD;  Location: [facility]     LAPAROSCOPIC HYSTERECTOMY SUPRACERVICAL N/A 8/20/2018    Procedure: LAPAROSCOPIC HYSTERECTOMY SUPRACERVICAL;  LAPAROSCOPIC SUBTOATAL HYSTERECTOMY, BILATERAL SALPINGECTOMY, SUBURETHRAL SLING AND CYSTOSCOPY;  Surgeon: Urban Elena MD;  Location: [facility]     LAPAROSCOPIC SALPINGECTOMY Bilateral 8/20/2018    Procedure: LAPAROSCOPIC SALPINGECTOMY;;  Surgeon: Urban Elena MD;  Location: [facility]     LAPAROSCOPY      For endometriosis     OPERATIVE HYSTEROSCOPY WITH MORCELLATOR N/A 3/29/2018    Procedure: OPERATIVE HYSTEROSCOPY WITH MORCELLATOR (MYOSURE);  OPERATIVE HYSTEROSCOPY WITH MORCELLATOR ;  Surgeon: Urban Elena MD;  Location: [facility]       FAMILY HISTORY:  Family History   Problem Relation Age of Onset     Hypertension Mother      Lipids Mother      Hypertension Father      Prostate Cancer Father      Lipids Father      Breast Cancer Maternal Aunt      Hypertension Maternal Grandmother      C.A.D. Maternal Grandmother      Gallbladder Disease Maternal Grandmother      Cancer Maternal Grandfather      lung     Cerebrovascular Disease Paternal Grandmother      C.A.D. Paternal Grandfather      Cancer - colorectal Other      cousin maternal      Colon Cancer Cousin      Colon Cancer  Cousin      Diabetes No family hx of        SOCIAL HISTORY:  Social History     Social History     Marital status: Single     Spouse name: N/A     Number of children: N/A     Years of education: N/A     Social History Main Topics     Smoking status: Never Smoker     Smokeless tobacco: Never Used     Alcohol use 1.0 oz/week     Drug use: No     Sexual activity: No     Other Topics Concern     Caffeine Concern Yes     1-2 cans qd     Special Diet Yes     started mediterranian      Exercise Yes     20 minutes walking 3-4 days weekly      Seat Belt Yes     Parent/Sibling W/ Cabg, Mi Or Angioplasty Before 65f 55m? No     Social History Narrative       Review of Systems:  Skin:  Positive for itching;bruising   Eyes:  Positive for glasses  ENT:  Negative    Respiratory:  Negative for dyspnea on exertion;shortness of breath  Cardiovascular:  Negative for;syncope or near-syncope;cyanosis;chest pain;palpitations lower extremity symptoms;edema  Gastroenterology: Positive for heartburn  Genitourinary:  Positive for    Musculoskeletal:  Positive for back pain  Neurologic:  Positive for migraine headaches  Psychiatric:  Negative    Heme/Lymph/Imm:  Negative night sweats  Endocrine:  Negative      Physical Exam:  Vitals: /72  Pulse 58  Wt 146.5 kg (323 lb)  BMI 43.81 kg/m2    Constitutional:  cooperative, alert and oriented, well developed, well nourished, in no acute distress obese      Skin:  warm and dry to the touch, no apparent skin lesions or masses noted        Head:  normocephalic, no masses or lesions        Eyes:  pupils equal and round;conjunctivae and lids unremarkable;sclera white;no xanthalasma        ENT:  no pallor or cyanosis, dentition good        Neck:  JVP normal;no carotid bruit        Chest:  normal respiratory excursion   no reproducible pain    Cardiac: regular rhythm;no murmurs, gallops or rubs detected                  Abdomen:  abdomen soft obese      Vascular: pulses full and equal                                       Extremities and Back:  no deformities, clubbing, cyanosis, erythema observed stasis pigmentation;erythema R medial malleolus erythema, no open wound    Neurological:  no gross motor deficits          Recent Lab Results:  LIPID RESULTS:  Lab Results   Component Value Date    CHOL 168 02/06/2018    HDL 53 02/06/2018     (H) 02/06/2018    TRIG 66 02/06/2018    CHOLHDLRATIO 4.0 07/07/2014       LIVER ENZYME RESULTS:  Lab Results   Component Value Date    AST 19 11/13/2014    ALT 27 11/13/2014       CBC RESULTS:  Lab Results   Component Value Date    WBC 8.6 05/07/2018    RBC 4.92 05/07/2018    HGB 14.2 09/11/2018    HCT 42.5 05/07/2018    MCV 86 05/07/2018    MCH 28.0 05/07/2018    MCHC 32.5 05/07/2018    RDW 12.5 05/07/2018     05/07/2018       BMP RESULTS:  Lab Results   Component Value Date     05/07/2018    POTASSIUM 4.1 08/20/2018    CHLORIDE 106 05/07/2018    CO2 30 05/07/2018    ANIONGAP 3 05/07/2018     (H) 05/07/2018    BUN 9 05/07/2018    CR 0.82 05/07/2018    GFRESTIMATED 73 05/07/2018    GFRESTBLACK 89 05/07/2018    ZACH 8.5 05/07/2018        A1C RESULTS:  Lab Results   Component Value Date    A1C 5.6 02/06/2018       INR RESULTS:  No results found for: INR                    Thank you for allowing me to participate in the care of your patient.    Sincerely,     Peggy Edmond PA-C     [facility]

## 2018-11-29 NOTE — MR AVS SNAPSHOT
After Visit Summary   11/29/2018    Fouzia Arenas    MRN: [id number]           Patient Information     Date Of Birth          1965        Visit Information        Provider Department      11/29/2018 1:10 PM Peggy Edmond PA-C [facility]        Today's Diagnoses     Benign essential hypertension    -  1    Essential hypertension        Hypertension goal BP (blood pressure) < 130/80          Care Instructions    1. So glad you are doing so well!     2. Continue to monitor R leg swelling - Dr. Au can proceed with procedure for this    3. See me back in 1 year but CALL if issues prior!    [phone] (My nurse Carmen)          Follow-ups after your visit        Additional Services     Follow-Up with Cardiac Advanced Practice Provider                 Future tests that were ordered for you today     Open Future Orders        Priority Expected Expires Ordered    Follow-Up with Cardiac Advanced Practice Provider Routine 11/29/2019 11/30/2019 11/29/2018            Who to contact     If you have questions or need follow up information about today's clinic visit or your schedule please contact [facility] directly at [phone].  Normal or non-critical lab and imaging results will be communicated to you by MyChart, letter or phone within 4 business days after the clinic has received the results. If you do not hear from us within 7 days, please contact the clinic through MyChart or phone. If you have a critical or abnormal lab result, we will notify you by phone as soon as possible.  Submit refill requests through [employer] or call your pharmacy and they will forward the refill request to us. Please allow 3 business days for your refill to be completed.          Additional Information About Your Visit        Care EveryWhere ID     This is your Care EveryWhere ID. This could be used by other organizations to access your  [place] medical records  [id number]        Your Vitals Were     Pulse BMI (Body Mass Index)                58 43.81 kg/m2           Blood Pressure from Last 3 Encounters:   11/29/18 124/72   10/09/18 114/74   09/11/18 116/70    Weight from Last 3 Encounters:   11/29/18 146.5 kg (323 lb)   10/09/18 147.9 kg (326 lb)   09/11/18 144.7 kg (319 lb)                 Where to get your medicines      These medications were sent to [facility] [address] [facility] [address] AT [facility] OF [facility] [address]  [address], [place] [address]    Hours:  24-hours Phone:  [phone]     lisinopril 20 MG tablet    nebivolol 10 MG tablet    spironolactone 25 MG tablet          Primary Care Provider Office Phone # Fax #    Ruthie Xiong PA-C [phone]       [address] [place] [address]        Equal Access to Services     [facility] AH: Hadii aad ku hadasho Soomaali, waaxda luqadaha, qaybta kaalmada adeegyada, waxay idiin haymontez gerber . So [facility] [phone].    ATENCIÓN: Si habla español, tiene a hay disposición servicios gratuitos de asistencia lingüística. Genny al [phone].    We comply with applicable federal civil rights laws and [place] laws. We do not discriminate on the basis of race, color, national origin, age, disability, sex, sexual orientation, or gender identity.            Thank you!     Thank you for choosing [facility]  for your care. Our goal is always to provide you with excellent care. Hearing back from our patients is one way we can continue to improve our services. Please take a few minutes to complete the written survey that you may receive in the mail after your visit with us. Thank you!             Your Updated Medication List - Protect others around you: Learn how to safely use, store and throw away your medicines at www.disposemymeds.org.          This list is accurate as of  11/29/18  1:39 PM.  Always use your most recent med list.                   Brand Name Dispense Instructions for use Diagnosis    ampicillin 500 MG capsule    PRINCIPEN    21 capsule    Take 1 capsule (500 mg) by mouth 3 times daily    Dysuria       ciprofloxacin 500 MG tablet    CIPRO    14 tablet    Take 1 tablet (500 mg) by mouth 2 times daily    Acute cystitis without hematuria       EXCEDRIN EXTRA STRENGTH PO      Take 2 tablets by mouth every 6 hours as needed        ibuprofen 600 MG tablet    ADVIL/MOTRIN    40 tablet    Take 1 tablet (600 mg) by mouth every 6 hours as needed for pain (mild)    Acute post-operative pain       lisinopril 20 MG tablet    PRINIVIL/ZESTRIL    180 tablet    Take 1 tablet (20 mg) by mouth 2 times daily    Essential hypertension       nebivolol 10 MG tablet    BYSTOLIC    90 tablet    Take 1 tablet (10 mg) by mouth daily    Hypertension goal BP (blood pressure) < 130/80       order for DME      DREAMSTATION 9-12 CM/H20 FF MIRAGE QUATTRO        spironolactone 25 MG tablet    ALDACTONE    90 tablet    Take 1 tablet (25 mg) by mouth daily    Benign essential hypertension

## 2018-11-29 NOTE — PROGRESS NOTES
HPI:   I had the pleasure of seeing Fouzia when she came for follow up of hypertension and palpitations noted to be atrial tachycardia. She sees Dr. Au and Dr. Hensley for her history of:    1.  Hypertension with reduction in Bystolic dose 3/2018 due to lightheaded episodes (subsequently resolved)  2.  Palpitations, with event monitor 10/2015 showing episodes of atrial tachycardia previously on carvedilol and now taking by Bystolic  3.  Right lower extremity lipoma dermatosclerosis for which Dr. Au saw her 8/2017.  They discussed radiofrequency ablation of the right GS V and SSV with foam sclerotherapy, though patient opted to attempt weight loss first.    She last saw Dr. Au to establish care 8/2017.  In 9/2017 she was in the ER for an episode of chest discomfort.  This was atypical, and troponin was negative.  They reviewed an exercise stress test on 5/2017 showing no evidence of ischemia, and routine follow-up was recommended.  She then  underwent removal of a vaginal cyst 3/2018.  She told her primary, at her preop, that she has had brief episodes of dizziness.  Bystolic was decreased from 20-10 mg daily.  Since that time, blood pressures have been excellent at home in the 1 teens to 120s over 60s-70s.  She has had no further episodes of dizziness.  In 5/2018 she was back in the ER for vertigo.  This is not recurred.  Finally, an 8/2018, she underwent laparoscopic subtotal hysterectomy with BSO.     From a cardiac perspective, she really feels like she is done well.  She has had no problems with hypertension or dizziness.  She denies any palpitations.  Overall, she has no complaints.  She thinks that her right leg symptoms have not worsened at all since she saw Dr. Au.  She does wear her compression stockings more often.  She works as a hairdresser, and is on her feet a lot.    Stress test 5/2017 showed no evidence of ischemia, with breast attenuation artifact.  Echocardiogram done 1/2016 showed a low normal  ejection fraction.  She had no significant valvular abnormalities noted.  Blood work 8/2018 showed a potassium of 4.1.    Assessment & Plan:    1.  Hypertension    Despite reduction in Bystolic, blood pressure still looks to be under good control    She is tolerating lisinopril and Bystolic and Spironolactone without issues    BMP on these medications look good    PLAN:    Continue current medications    Continue efforts at weight loss      2.  Atrial tachycardia    Really appears to be under good control on the lower dose of Bystolic    PLAN:    Continue Bystolic 10 mg daily    See me in 1 year with EKG, but call if issues in the interim.      3.  Right lower extremity lipoma dermatosclerosis    She enjoyed meeting Dr. Au, but at this time feels like compression stockings are working well enough    On exam today, has no evidence of significant skin breakdown or ulceration    PLAN:    Continue efforts at weight loss    Continue compression stockings    Contact us if she wants to meet with Dr. Au to proceed with therapy      Jaqui Edmond PA-C, MSPAS      Orders Placed This Encounter   Procedures     Follow-Up with Cardiac Advanced Practice Provider     Orders Placed This Encounter   Medications     spironolactone (ALDACTONE) 25 MG tablet     Sig: Take 1 tablet (25 mg) by mouth daily     Dispense:  90 tablet     Refill:  3     lisinopril (PRINIVIL/ZESTRIL) 20 MG tablet     Sig: Take 1 tablet (20 mg) by mouth 2 times daily     Dispense:  180 tablet     Refill:  3     nebivolol (BYSTOLIC) 10 MG tablet     Sig: Take 1 tablet (10 mg) by mouth daily     Dispense:  90 tablet     Refill:  3     Medications Discontinued During This Encounter   Medication Reason     spironolactone (ALDACTONE) 25 MG tablet Reorder     lisinopril (PRINIVIL/ZESTRIL) 20 MG tablet Reorder     nebivolol (BYSTOLIC) 10 MG tablet Reorder         Encounter Diagnoses   Name Primary?     Benign essential hypertension Yes     Essential hypertension       Hypertension goal BP (blood pressure) < 130/80        CURRENT MEDICATIONS:  Current Outpatient Prescriptions   Medication Sig Dispense Refill     ampicillin (PRINCIPEN) 500 MG capsule Take 1 capsule (500 mg) by mouth 3 times daily 21 capsule 0     Aspirin-Acetaminophen-Caffeine (EXCEDRIN EXTRA STRENGTH PO) Take 2 tablets by mouth every 6 hours as needed       ciprofloxacin (CIPRO) 500 MG tablet Take 1 tablet (500 mg) by mouth 2 times daily 14 tablet 0     ibuprofen (ADVIL/MOTRIN) 600 MG tablet Take 1 tablet (600 mg) by mouth every 6 hours as needed for pain (mild) 40 tablet 0     lisinopril (PRINIVIL/ZESTRIL) 20 MG tablet Take 1 tablet (20 mg) by mouth 2 times daily 180 tablet 3     nebivolol (BYSTOLIC) 10 MG tablet Take 1 tablet (10 mg) by mouth daily 90 tablet 3     order for DME DREAMSTATION  9-12 CM/H20  FF MIRAGE QUATTRO       spironolactone (ALDACTONE) 25 MG tablet Take 1 tablet (25 mg) by mouth daily 90 tablet 3     [DISCONTINUED] lisinopril (PRINIVIL/ZESTRIL) 20 MG tablet Take 1 tablet (20 mg) by mouth 2 times daily 180 tablet 0     [DISCONTINUED] nebivolol (BYSTOLIC) 10 MG tablet Take 1 tablet (10 mg) by mouth daily 90 tablet 3     [DISCONTINUED] spironolactone (ALDACTONE) 25 MG tablet Take 1 tablet (25 mg) by mouth daily 90 tablet 0       ALLERGIES     Allergies   Allergen Reactions     Amoxicillin Nausea and Vomiting     Percocet [Oxycodone-Acetaminophen] Nausea and Vomiting       PAST MEDICAL HISTORY:  Past Medical History:   Diagnosis Date     Classic migraine      Complex endometrial hyperplasia     without atypia     Hypertension      Motion sickness      Obese      STORMY (obstructive sleep apnea)      Palpitations      PONV (postoperative nausea and vomiting)      SVT (supraventricular tachycardia) (H) 9/2015       PAST SURGICAL HISTORY:  Past Surgical History:   Procedure Laterality Date     CHOLECYSTECTOMY, LAPOROSCOPIC      Cholecystectomy, Laparoscopic     COLONOSCOPY N/A 11/25/2014    Procedure:  COLONOSCOPY;  Surgeon: Wenceslao Galo MD;  Location:  GI     CYSTOSCOPY, SLING TRANSVAGINAL N/A 8/20/2018    Procedure: CYSTOSCOPY, SLING TRANSVAGINAL;;  Surgeon: Urban Elena MD;  Location: [facility]     D & C      X2-3 for abnormal bleeding     ESOPHAGOSCOPY, GASTROSCOPY, DUODENOSCOPY (EGD), COMBINED N/A 11/25/2014    Procedure: COMBINED ESOPHAGOSCOPY, GASTROSCOPY, DUODENOSCOPY (EGD), BIOPSY SINGLE OR MULTIPLE;  Surgeon: Wenceslao Galo MD;  Location:  GI     LAPAROSCOPIC HYSTERECTOMY SUPRACERVICAL N/A 8/20/2018    Procedure: LAPAROSCOPIC HYSTERECTOMY SUPRACERVICAL;  LAPAROSCOPIC SUBTOATAL HYSTERECTOMY, BILATERAL SALPINGECTOMY, SUBURETHRAL SLING AND CYSTOSCOPY;  Surgeon: Urban Elena MD;  Location: [facility]     LAPAROSCOPIC SALPINGECTOMY Bilateral 8/20/2018    Procedure: LAPAROSCOPIC SALPINGECTOMY;;  Surgeon: Urban Elena MD;  Location: [facility]     LAPAROSCOPY      For endometriosis     OPERATIVE HYSTEROSCOPY WITH MORCELLATOR N/A 3/29/2018    Procedure: OPERATIVE HYSTEROSCOPY WITH MORCELLATOR (MYOSURE);  OPERATIVE HYSTEROSCOPY WITH MORCELLATOR ;  Surgeon: Urban Elena MD;  Location: [facility]       FAMILY HISTORY:  Family History   Problem Relation Age of Onset     Hypertension Mother      Lipids Mother      Hypertension Father      Prostate Cancer Father      Lipids Father      Breast Cancer Maternal Aunt      Hypertension Maternal Grandmother      C.A.D. Maternal Grandmother      Gallbladder Disease Maternal Grandmother      Cancer Maternal Grandfather      lung     Cerebrovascular Disease Paternal Grandmother      C.A.D. Paternal Grandfather      Cancer - colorectal Other      cousin maternal      Colon Cancer Cousin      Colon Cancer Cousin      Diabetes No family hx of        SOCIAL HISTORY:  Social History     Social History     Marital status: Single     Spouse name: N/A     Number of children: N/A     Years of education: N/A     Social History Main Topics     Smoking  status: Never Smoker     Smokeless tobacco: Never Used     Alcohol use 1.0 oz/week     Drug use: No     Sexual activity: No     Other Topics Concern     Caffeine Concern Yes     1-2 cans qd     Special Diet Yes     started mediterranian      Exercise Yes     20 minutes walking 3-4 days weekly      Seat Belt Yes     Parent/Sibling W/ Cabg, Mi Or Angioplasty Before 65f 55m? No     Social History Narrative       Review of Systems:  Skin:  Positive for itching;bruising   Eyes:  Positive for glasses  ENT:  Negative    Respiratory:  Negative for dyspnea on exertion;shortness of breath  Cardiovascular:  Negative for;syncope or near-syncope;cyanosis;chest pain;palpitations lower extremity symptoms;edema  Gastroenterology: Positive for heartburn  Genitourinary:  Positive for    Musculoskeletal:  Positive for back pain  Neurologic:  Positive for migraine headaches  Psychiatric:  Negative    Heme/Lymph/Imm:  Negative night sweats  Endocrine:  Negative      Physical Exam:  Vitals: /72  Pulse 58  Wt 146.5 kg (323 lb)  BMI 43.81 kg/m2    Constitutional:  cooperative, alert and oriented, well developed, well nourished, in no acute distress obese      Skin:  warm and dry to the touch, no apparent skin lesions or masses noted        Head:  normocephalic, no masses or lesions        Eyes:  pupils equal and round;conjunctivae and lids unremarkable;sclera white;no xanthalasma        ENT:  no pallor or cyanosis, dentition good        Neck:  JVP normal;no carotid bruit        Chest:  normal respiratory excursion   no reproducible pain    Cardiac: regular rhythm;no murmurs, gallops or rubs detected                  Abdomen:  abdomen soft obese      Vascular: pulses full and equal                                      Extremities and Back:  no deformities, clubbing, cyanosis, erythema observed stasis pigmentation;erythema R medial malleolus erythema, no open wound    Neurological:  no gross motor deficits          Recent Lab  Results:  LIPID RESULTS:  Lab Results   Component Value Date    CHOL 168 02/06/2018    HDL 53 02/06/2018     (H) 02/06/2018    TRIG 66 02/06/2018    CHOLHDLRATIO 4.0 07/07/2014       LIVER ENZYME RESULTS:  Lab Results   Component Value Date    AST 19 11/13/2014    ALT 27 11/13/2014       CBC RESULTS:  Lab Results   Component Value Date    WBC 8.6 05/07/2018    RBC 4.92 05/07/2018    HGB 14.2 09/11/2018    HCT 42.5 05/07/2018    MCV 86 05/07/2018    MCH 28.0 05/07/2018    MCHC 32.5 05/07/2018    RDW 12.5 05/07/2018     05/07/2018       BMP RESULTS:  Lab Results   Component Value Date     05/07/2018    POTASSIUM 4.1 08/20/2018    CHLORIDE 106 05/07/2018    CO2 30 05/07/2018    ANIONGAP 3 05/07/2018     (H) 05/07/2018    BUN 9 05/07/2018    CR 0.82 05/07/2018    GFRESTIMATED 73 05/07/2018    GFRESTBLACK 89 05/07/2018    ZACH 8.5 05/07/2018        A1C RESULTS:  Lab Results   Component Value Date    A1C 5.6 02/06/2018       INR RESULTS:  No results found for: INR

## 2018-11-29 NOTE — LETTER
11/29/2018    Ruthie Xiong PA-C  [address] [place] [address]    RE: Fouzia Arenas       Dear Colleague,    I had the pleasure of seeing Fouzia Arenas in the [facility].    HPI:   I had the pleasure of seeing Fouzia when she came for follow up of hypertension and palpitations noted to be atrial tachycardia. She sees Dr. Au and Dr. Hensley for her history of:    1.  Hypertension with reduction in Bystolic dose 3/2018 due to lightheaded episodes (subsequently resolved)  2.  Palpitations, with event monitor 10/2015 showing episodes of atrial tachycardia previously on carvedilol and now taking by Bystolic  3.  Right lower extremity lipoma dermatosclerosis for which Dr. Au saw her 8/2017.  They discussed radiofrequency ablation of the right GS V and SSV with foam sclerotherapy, though patient opted to attempt weight loss first.    She last saw Dr. Au to establish care 8/2017.  In 9/2017 she was in the ER for an episode of chest discomfort.  This was atypical, and troponin was negative.  They reviewed an exercise stress test on 5/2017 showing no evidence of ischemia, and routine follow-up was recommended.  She then  underwent removal of a vaginal cyst 3/2018.  She told her primary, at her preop, that she has had brief episodes of dizziness.  Bystolic was decreased from 20-10 mg daily.  Since that time, blood pressures have been excellent at home in the 1 teens to 120s over 60s-70s.  She has had no further episodes of dizziness.  In 5/2018 she was back in the ER for vertigo.  This is not recurred.  Finally, an 8/2018, she underwent laparoscopic subtotal hysterectomy with BSO.     From a cardiac perspective, she really feels like she is done well.  She has had no problems with hypertension or dizziness.  She denies any palpitations.  Overall, she has no complaints.  She thinks that her right leg symptoms have not worsened at all since she saw Dr. Au.  She does wear her  compression stockings more often.  She works as a hairdresser, and is on her feet a lot.    Stress test 5/2017 showed no evidence of ischemia, with breast attenuation artifact.  Echocardiogram done 1/2016 showed a low normal ejection fraction.  She had no significant valvular abnormalities noted.  Blood work 8/2018 showed a potassium of 4.1.    Assessment & Plan:    1.  Hypertension    Despite reduction in Bystolic, blood pressure still looks to be under good control    She is tolerating lisinopril and Bystolic and Spironolactone without issues    BMP on these medications look good    PLAN:    Continue current medications    Continue efforts at weight loss      2.  Atrial tachycardia    Really appears to be under good control on the lower dose of Bystolic    PLAN:    Continue Bystolic 10 mg daily    See me in 1 year with EKG, but call if issues in the interim.      3.  Right lower extremity lipoma dermatosclerosis    She enjoyed meeting Dr. Au, but at this time feels like compression stockings are working well enough    On exam today, has no evidence of significant skin breakdown or ulceration    PLAN:    Continue efforts at weight loss    Continue compression stockings    Contact us if she wants to meet with Dr. Au to proceed with therapy      Jaqui Edmond PA-C, MSPAS      Orders Placed This Encounter   Procedures     Follow-Up with Cardiac Advanced Practice Provider     Orders Placed This Encounter   Medications     spironolactone (ALDACTONE) 25 MG tablet     Sig: Take 1 tablet (25 mg) by mouth daily     Dispense:  90 tablet     Refill:  3     lisinopril (PRINIVIL/ZESTRIL) 20 MG tablet     Sig: Take 1 tablet (20 mg) by mouth 2 times daily     Dispense:  180 tablet     Refill:  3     nebivolol (BYSTOLIC) 10 MG tablet     Sig: Take 1 tablet (10 mg) by mouth daily     Dispense:  90 tablet     Refill:  3     Medications Discontinued During This Encounter   Medication Reason     spironolactone (ALDACTONE) 25 MG tablet  Reorder     lisinopril (PRINIVIL/ZESTRIL) 20 MG tablet Reorder     nebivolol (BYSTOLIC) 10 MG tablet Reorder         Encounter Diagnoses   Name Primary?     Benign essential hypertension Yes     Essential hypertension      Hypertension goal BP (blood pressure) < 130/80        CURRENT MEDICATIONS:  Current Outpatient Prescriptions   Medication Sig Dispense Refill     ampicillin (PRINCIPEN) 500 MG capsule Take 1 capsule (500 mg) by mouth 3 times daily 21 capsule 0     Aspirin-Acetaminophen-Caffeine (EXCEDRIN EXTRA STRENGTH PO) Take 2 tablets by mouth every 6 hours as needed       ciprofloxacin (CIPRO) 500 MG tablet Take 1 tablet (500 mg) by mouth 2 times daily 14 tablet 0     ibuprofen (ADVIL/MOTRIN) 600 MG tablet Take 1 tablet (600 mg) by mouth every 6 hours as needed for pain (mild) 40 tablet 0     lisinopril (PRINIVIL/ZESTRIL) 20 MG tablet Take 1 tablet (20 mg) by mouth 2 times daily 180 tablet 3     nebivolol (BYSTOLIC) 10 MG tablet Take 1 tablet (10 mg) by mouth daily 90 tablet 3     order for DME DREAMSTATION  9-12 CM/H20  FF [employer]IR[employer] QUATTRO       spironolactone (ALDACTONE) 25 MG tablet Take 1 tablet (25 mg) by mouth daily 90 tablet 3     [DISCONTINUED] lisinopril (PRINIVIL/ZESTRIL) 20 MG tablet Take 1 tablet (20 mg) by mouth 2 times daily 180 tablet 0     [DISCONTINUED] nebivolol (BYSTOLIC) 10 MG tablet Take 1 tablet (10 mg) by mouth daily 90 tablet 3     [DISCONTINUED] spironolactone (ALDACTONE) 25 MG tablet Take 1 tablet (25 mg) by mouth daily 90 tablet 0       ALLERGIES     Allergies   Allergen Reactions     Amoxicillin Nausea and Vomiting     Percocet [Oxycodone-Acetaminophen] Nausea and Vomiting       PAST MEDICAL HISTORY:  Past Medical History:   Diagnosis Date     Classic migraine      Complex endometrial hyperplasia     without atypia     Hypertension      Motion sickness      Obese      STORMY (obstructive sleep apnea)      Palpitations      PONV (postoperative nausea and vomiting)      SVT  (supraventricular tachycardia) (H) 9/2015       PAST SURGICAL HISTORY:  Past Surgical History:   Procedure Laterality Date     CHOLECYSTECTOMY, LAPOROSCOPIC      Cholecystectomy, Laparoscopic     COLONOSCOPY N/A 11/25/2014    Procedure: COLONOSCOPY;  Surgeon: Wenceslao Galo MD;  Location:  GI     CYSTOSCOPY, SLING TRANSVAGINAL N/A 8/20/2018    Procedure: CYSTOSCOPY, SLING TRANSVAGINAL;;  Surgeon: Urban Elena MD;  Location: [facility]     D & C      X2-3 for abnormal bleeding     ESOPHAGOSCOPY, GASTROSCOPY, DUODENOSCOPY (EGD), COMBINED N/A 11/25/2014    Procedure: COMBINED ESOPHAGOSCOPY, GASTROSCOPY, DUODENOSCOPY (EGD), BIOPSY SINGLE OR MULTIPLE;  Surgeon: Wenceslao Galo MD;  Location: [facility]     LAPAROSCOPIC HYSTERECTOMY SUPRACERVICAL N/A 8/20/2018    Procedure: LAPAROSCOPIC HYSTERECTOMY SUPRACERVICAL;  LAPAROSCOPIC SUBTOATAL HYSTERECTOMY, BILATERAL SALPINGECTOMY, SUBURETHRAL SLING AND CYSTOSCOPY;  Surgeon: Urban Elena MD;  Location: [facility]     LAPAROSCOPIC SALPINGECTOMY Bilateral 8/20/2018    Procedure: LAPAROSCOPIC SALPINGECTOMY;;  Surgeon: Urban Elena MD;  Location: [facility]     LAPAROSCOPY      For endometriosis     OPERATIVE HYSTEROSCOPY WITH MORCELLATOR N/A 3/29/2018    Procedure: OPERATIVE HYSTEROSCOPY WITH MORCELLATOR (MYOSURE);  OPERATIVE HYSTEROSCOPY WITH MORCELLATOR ;  Surgeon: Urban Elena MD;  Location: [facility]       FAMILY HISTORY:  Family History   Problem Relation Age of Onset     Hypertension Mother      Lipids Mother      Hypertension Father      Prostate Cancer Father      Lipids Father      Breast Cancer Maternal Aunt      Hypertension Maternal Grandmother      C.A.D. Maternal Grandmother      Gallbladder Disease Maternal Grandmother      Cancer Maternal Grandfather      lung     Cerebrovascular Disease Paternal Grandmother      C.A.D. Paternal Grandfather      Cancer - colorectal Other      cousin maternal      Colon Cancer Cousin      Colon Cancer  Cousin      Diabetes No family hx of        SOCIAL HISTORY:  Social History     Social History     Marital status: Single     Spouse name: N/A     Number of children: N/A     Years of education: N/A     Social History Main Topics     Smoking status: Never Smoker     Smokeless tobacco: Never Used     Alcohol use 1.0 oz/week     Drug use: No     Sexual activity: No     Other Topics Concern     Caffeine Concern Yes     1-2 cans qd     Special Diet Yes     started mediterranian      Exercise Yes     20 minutes walking 3-4 days weekly      Seat Belt Yes     Parent/Sibling W/ Cabg, Mi Or Angioplasty Before 65f 55m? No     Social History Narrative       Review of Systems:  Skin:  Positive for itching;bruising   Eyes:  Positive for glasses  ENT:  Negative    Respiratory:  Negative for dyspnea on exertion;shortness of breath  Cardiovascular:  Negative for;syncope or near-syncope;cyanosis;chest pain;palpitations lower extremity symptoms;edema  Gastroenterology: Positive for heartburn  Genitourinary:  Positive for    Musculoskeletal:  Positive for back pain  Neurologic:  Positive for migraine headaches  Psychiatric:  Negative    Heme/Lymph/Imm:  Negative night sweats  Endocrine:  Negative      Physical Exam:  Vitals: /72  Pulse 58  Wt 146.5 kg (323 lb)  BMI 43.81 kg/m2    Constitutional:  cooperative, alert and oriented, well developed, well nourished, in no acute distress obese      Skin:  warm and dry to the touch, no apparent skin lesions or masses noted        Head:  normocephalic, no masses or lesions        Eyes:  pupils equal and round;conjunctivae and lids unremarkable;sclera white;no xanthalasma        ENT:  no pallor or cyanosis, dentition good        Neck:  JVP normal;no carotid bruit        Chest:  normal respiratory excursion   no reproducible pain    Cardiac: regular rhythm;no murmurs, gallops or rubs detected                  Abdomen:  abdomen soft obese      Vascular: pulses full and equal                                       Extremities and Back:  no deformities, clubbing, cyanosis, erythema observed stasis pigmentation;erythema R medial malleolus erythema, no open wound    Neurological:  no gross motor deficits          Recent Lab Results:  LIPID RESULTS:  Lab Results   Component Value Date    CHOL 168 02/06/2018    HDL 53 02/06/2018     (H) 02/06/2018    TRIG 66 02/06/2018    CHOLHDLRATIO 4.0 07/07/2014       LIVER ENZYME RESULTS:  Lab Results   Component Value Date    AST 19 11/13/2014    ALT 27 11/13/2014       CBC RESULTS:  Lab Results   Component Value Date    WBC 8.6 05/07/2018    RBC 4.92 05/07/2018    HGB 14.2 09/11/2018    HCT 42.5 05/07/2018    MCV 86 05/07/2018    MCH 28.0 05/07/2018    MCHC 32.5 05/07/2018    RDW 12.5 05/07/2018     05/07/2018       BMP RESULTS:  Lab Results   Component Value Date     05/07/2018    POTASSIUM 4.1 08/20/2018    CHLORIDE 106 05/07/2018    CO2 30 05/07/2018    ANIONGAP 3 05/07/2018     (H) 05/07/2018    BUN 9 05/07/2018    CR 0.82 05/07/2018    GFRESTIMATED 73 05/07/2018    GFRESTBLACK 89 05/07/2018    ZACH 8.5 05/07/2018        A1C RESULTS:  Lab Results   Component Value Date    A1C 5.6 02/06/2018       INR RESULTS:  No results found for: INR                    Thank you for allowing me to participate in the care of your patient.      Sincerely,     Peggy Edmond PA-C     [facility]    cc:   No referring provider defined for this encounter.

## 2018-11-29 NOTE — PATIENT INSTRUCTIONS
1. So glad you are doing so well!     2. Continue to monitor R leg swelling - Dr. Au can proceed with procedure for this    3. See me back in 1 year but CALL if issues prior!    [phone] (My nurse Carmen)

## 2018-11-29 NOTE — MR AVS SNAPSHOT
After Visit Summary   11/29/2018    Fouzia Arenas    MRN: [id number]           Patient Information     Date Of Birth          1965        Visit Information        Provider Department      11/29/2018 5:45 PM Mila Pascal PA-C [facility]        Today's Diagnoses     Injury of right knee, initial encounter    -  1      Care Instructions      Knee Pain  Knee pain is very common. It s especially common in active people who put a lot of pressure on their knees, like runners. It affects women more often than men.  Your kneecap (patella) is a thick, round bone. It covers and protects the front portion of your knee joint. It moves along a groove in your thighbone (femur) as part of the patellofemoral joint. A layer of cartilage surrounds the underside of your kneecap. This layer protects it from grinding against your femur.  When this cartilage softens and breaks down, it can cause knee pain. This is partly because of repetitive stress. The stress irritates the lining of the joint. This causes pain in the underlying bone.  What causes knee pain?  Many things can cause knee pain. You may have more than one cause. Some of these include:    Overuse of the knee joint    The kneecap doesn t line up with the tissue around it    Damage to small nerves in the area    Damage to the ligament-like structure that holds the kneecap in place (retinaculum)    Breakdown of the bone under the cartilage    Swelling in the soft tissues around the kneecap    Injury  You might be more likely to have knee pain if you:    Exercise a lot    Recently increased the intensity of your workouts    Have a body mass index (BMI) greater than 25    Have poor alignment of your kneecap    Walk with your feet turned overly outward or inward    Have weakness in surrounding muscle groups (inner quad or hip adductor muscles)    Have too much tightness in surrounding muscle groups (hamstrings or iliotibial band)    Have a  recent history of injury to the area    Are female  Symptoms of knee pain  This type of knee pain is a dull, aching pain in the front of the knee in the area under and around the kneecap. This pain may start quickly or slowly. Your pain might be worse when you squat, run, or sit for a long time. You might also sometimes feel like your knee is giving out. You may have symptoms in one or both of your knees.  Diagnosing knee pain  Your healthcare provider will ask about your medical history and your symptoms. Be sure to describe any activities that make your knee pain worse. He or she will look at your knee. This will include tests of your range of motion, strength, and areas of pain of your knee. Your knee alignment will be checked.  Your healthcare provider will need to rule out other causes of your knee pain, such as arthritis. You may need an imaging test, such as an X-ray or MRI.  Treatment for knee pain  Treatments that can help ease your symptoms may include:    Avoiding activities for a while that make your pain worse, returning to activity over time    Icing the outside of your knee when it causes you pain    Taking over-the-counter pain medicine    Wearing a knee brace or taping your knee to support it    Wearing special shoe inserts to help keep your feet in the proper alignment    Doing special exercises to stretch and strengthen the muscles around your hip and your knee  These steps help most people manage knee pain. But some cases of knee pain need to be treated with surgery. You may need surgery right away. Or you may need it later if other treatments don t work. Your healthcare provider may refer you to an orthopedic surgeon. He or she will talk with you about your choices.  Preventing knee pain  Losing weight and correcting excess muscle tightness or muscle weakness may help lower your risk.  In some cases, you can prevent knee pain. To help prevent a flare-up of knee pain, you do these  things:    Regularly do all the exercises your doctor or physical therapist advises    Support your knee as advised by your doctor or physical therapist    Increase training gradually, and ease up on training when needed    Have an expert check your gait for running or other sporting activities    Stretch properly before and after exercise    Replace your running shoes regularly    Lose excess weight     When to call your healthcare provider  Call your healthcare provider right away if:    Your symptoms don t get better after a few weeks of treatment    You have any new symptoms   Date Last Reviewed: 4/1/2017 2000-2018 [employer]. [address] [place] [address]. All rights reserved. This information is not intended as a substitute for professional medical care. Always follow your healthcare professional's instructions.        Knee Pain  Knee pain is very common. It s especially common in active people who put a lot of pressure on their knees, like runners. It affects women more often than men.  Your kneecap (patella) is a thick, round bone. It covers and protects the front portion of your knee joint. It moves along a groove in your thighbone (femur) as part of the patellofemoral joint. A layer of cartilage surrounds the underside of your kneecap. This layer protects it from grinding against your femur.  When this cartilage softens and breaks down, it can cause knee pain. This is partly because of repetitive stress. The stress irritates the lining of the joint. This causes pain in the underlying bone.  What causes knee pain?  Many things can cause knee pain. You may have more than one cause. Some of these include:    Overuse of the knee joint    The kneecap doesn t line up with the tissue around it    Damage to small nerves in the area    Damage to the ligament-like structure that holds the kneecap in place (retinaculum)    Breakdown of the bone under the cartilage    Swelling in the soft  tissues around the kneecap    Injury  You might be more likely to have knee pain if you:    Exercise a lot    Recently increased the intensity of your workouts    Have a body mass index (BMI) greater than 25    Have poor alignment of your kneecap    Walk with your feet turned overly outward or inward    Have weakness in surrounding muscle groups (inner quad or hip adductor muscles)    Have too much tightness in surrounding muscle groups (hamstrings or iliotibial band)    Have a recent history of injury to the area    Are female  Symptoms of knee pain  This type of knee pain is a dull, aching pain in the front of the knee in the area under and around the kneecap. This pain may start quickly or slowly. Your pain might be worse when you squat, run, or sit for a long time. You might also sometimes feel like your knee is giving out. You may have symptoms in one or both of your knees.  Diagnosing knee pain  Your healthcare provider will ask about your medical history and your symptoms. Be sure to describe any activities that make your knee pain worse. He or she will look at your knee. This will include tests of your range of motion, strength, and areas of pain of your knee. Your knee alignment will be checked.  Your healthcare provider will need to rule out other causes of your knee pain, such as arthritis. You may need an imaging test, such as an X-ray or MRI.  Treatment for knee pain  Treatments that can help ease your symptoms may include:    Avoiding activities for a while that make your pain worse, returning to activity over time    Icing the outside of your knee when it causes you pain    Taking over-the-counter pain medicine    Wearing a knee brace or taping your knee to support it    Wearing special shoe inserts to help keep your feet in the proper alignment    Doing special exercises to stretch and strengthen the muscles around your hip and your knee  These steps help most people manage knee pain. But some cases  of knee pain need to be treated with surgery. You may need surgery right away. Or you may need it later if other treatments don t work. Your healthcare provider may refer you to an orthopedic surgeon. He or she will talk with you about your choices.  Preventing knee pain  Losing weight and correcting excess muscle tightness or muscle weakness may help lower your risk.  In some cases, you can prevent knee pain. To help prevent a flare-up of knee pain, you do these things:    Regularly do all the exercises your doctor or physical therapist advises    Support your knee as advised by your doctor or physical therapist    Increase training gradually, and ease up on training when needed    Have an expert check your gait for running or other sporting activities    Stretch properly before and after exercise    Replace your running shoes regularly    Lose excess weight     When to call your healthcare provider  Call your healthcare provider right away if:    Your symptoms don t get better after a few weeks of treatment    You have any new symptoms   Date Last Reviewed: 4/1/2017 2000-2018 [employer]. [address] [place] [address]. All rights reserved. This information is not intended as a substitute for professional medical care. Always follow your healthcare professional's instructions.                Follow-ups after your visit        Additional Services     ORTHO  REFERRAL       [facility] is referring you to the Orthopedic  Services at [facility].       The  Representative will assist you in the coordination of your Orthopedic and Musculoskeletal Care as prescribed by your physician.    The  Representative will call you within 1 business day to help schedule your appointment, or you may contact the  Representative at:    All areas ~ [phone]     Type of Referral : Non Surgical / Sport Medicine        Timeframe requested: 1 - 2 days    Coverage of these services is subject to the terms and limitations of your health insurance plan.  Please call member services at your health plan with any benefit or coverage questions.      If X-rays, CT or MRI's have been performed, please contact the facility where they were done to arrange for , prior to your scheduled appointment.  Please bring this referral request to your appointment and present it to your specialist.                  Future tests that were ordered for you today     Open Future Orders        Priority Expected Expires Ordered    Follow-Up with Cardiac Advanced Practice Provider Routine 11/29/2019 11/30/2019 11/29/2018            Who to contact     If you have questions or need follow up information about today's clinic visit or your schedule please contact [facility] directly at [phone].  Normal or non-critical lab and imaging results will be communicated to you by MyChart, letter or phone within 4 business days after the clinic has received the results. If you do not hear from us within 7 days, please contact the clinic through MyChart or phone. If you have a critical or abnormal lab result, we will notify you by phone as soon as possible.  Submit refill requests through [employer] or call your pharmacy and they will forward the refill request to us. Please allow 3 business days for your refill to be completed.          Additional Information About Your Visit        Care EveryWhere ID     This is your Care EveryWhere ID. This could be used by other organizations to access your [facility] medical records  [id number]        Your Vitals Were     Pulse Temperature Pulse Oximetry BMI (Body Mass Index)          60 98.6  F (37  C) (Oral) 97% 43.81 kg/m2         Blood Pressure from Last 3 Encounters:   11/29/18 126/86   11/29/18 124/72   10/09/18 114/74    Weight from Last 3 Encounters:   11/29/18 323 lb (146.5 kg)   11/29/18 323 lb  (146.5 kg)   10/09/18 326 lb (147.9 kg)              We Performed the Following     ORTHO  REFERRAL          Where to get your medicines      These medications were sent to [id number] - [place], [place] - [address] [place] [address] [address] AT [facility] OF [facility] [address] [facility]  [address] [place] [address] [address], [place] [address]    Hours:  24-hours Phone:  [phone]     lisinopril 20 MG tablet    nebivolol 10 MG tablet    spironolactone 25 MG tablet          Primary Care Provider Office Phone # Fax #    Ruthie Xiong PA-C [phone] [address] [place] [address]        Equal Access to Services     TESSA GUERRA AH: Hadii hudson ku hadasho Soomaali, waaxda luqadaha, qaybta kaalmada adeegyada, waxay krishanin haypamelan darya bahena. So [facility] [phone].    ATENCIÓN: Si habla español, tiene a hay disposición servicios gratuitos de asistencia lingüística. [facility] [phone].    We comply with applicable federal civil rights laws and [place] laws. We do not discriminate on the basis of race, color, national origin, age, disability, sex, sexual orientation, or gender identity.            Thank you!     Thank you for choosing [facility]  for your care. Our goal is always to provide you with excellent care. Hearing back from our patients is one way we can continue to improve our services. Please take a few minutes to complete the written survey that you may receive in the mail after your visit with us. Thank you!             Your Updated Medication List - Protect others around you: Learn how to safely use, store and throw away your medicines at www.disposemymeds.org.          This list is accurate as of 11/29/18  7:41 PM.  Always use your most recent med list.                   Brand Name Dispense Instructions for use Diagnosis    ampicillin 500 MG capsule    PRINCIPEN    21 capsule    Take 1 capsule (500 mg) by mouth 3 times daily    Dysuria       ciprofloxacin 500  MG tablet    CIPRO    14 tablet    Take 1 tablet (500 mg) by mouth 2 times daily    Acute cystitis without hematuria       EXCEDRIN EXTRA STRENGTH PO      Take 2 tablets by mouth every 6 hours as needed        ibuprofen 600 MG tablet    ADVIL/MOTRIN    40 tablet    Take 1 tablet (600 mg) by mouth every 6 hours as needed for pain (mild)    Acute post-operative pain       lisinopril 20 MG tablet    PRINIVIL/ZESTRIL    180 tablet    Take 1 tablet (20 mg) by mouth 2 times daily    Essential hypertension       nebivolol 10 MG tablet    BYSTOLIC    90 tablet    Take 1 tablet (10 mg) by mouth daily    Hypertension goal BP (blood pressure) < 130/80       order for DME      DREAMSTATION 9-12 CM/H20 FF MIRAGE QUATTRO        spironolactone 25 MG tablet    ALDACTONE    90 tablet    Take 1 tablet (25 mg) by mouth daily    Benign essential hypertension

## 2018-11-30 ENCOUNTER — OFFICE VISIT (OUTPATIENT)
Dept: ORTHOPEDICS | Facility: CLINIC | Age: 53
End: 2018-11-30
Payer: COMMERCIAL

## 2018-11-30 VITALS
DIASTOLIC BLOOD PRESSURE: 86 MMHG | SYSTOLIC BLOOD PRESSURE: 126 MMHG | HEIGHT: 72 IN | WEIGHT: 293 LBS | BODY MASS INDEX: 39.68 KG/M2

## 2018-11-30 DIAGNOSIS — M25.561 ACUTE PAIN OF RIGHT KNEE: Primary | ICD-10-CM

## 2018-11-30 PROCEDURE — 99204 OFFICE O/P NEW MOD 45 MIN: CPT | Performed by: FAMILY MEDICINE

## 2018-11-30 RX ORDER — TRAMADOL HYDROCHLORIDE 50 MG/1
50 TABLET ORAL EVERY 6 HOURS PRN
Qty: 10 TABLET | Refills: 0 | Status: SHIPPED | OUTPATIENT
Start: 2018-11-30 | End: 2018-12-21

## 2018-11-30 RX ORDER — IBUPROFEN 600 MG/1
600 TABLET, FILM COATED ORAL EVERY 6 HOURS PRN
Qty: 40 TABLET | Refills: 0 | Status: SHIPPED | OUTPATIENT
Start: 2018-11-30 | End: 2020-12-01

## 2018-11-30 NOTE — PROGRESS NOTES
SUBJECTIVE:   Fouzia Arenas is a 53 year old female presenting with a chief complaint of right knee pain from fall.   Chief Complaint   Patient presents with     Urgent Care     Knee Injury     Fell yesterday on left knee and today on the right knee, heard something snap, painful behind the right knee and feels unstable        She is an established patient of [facility].    MS Injury/Pain  Patient fell yesterday and today. She is concerned about her today's fall when she injured her right knee. She fell on ice yesterday and hurt her left knee. However, she is more concerned about her today's fall and  Right knee pain.   Onset of symptoms was 1 hour(s) ago.  Location: right knee. Non radiating, localized pain.   Context:       The injury happened while while walking. She fell on her knees and heard a pop. She has pain at the back of her knee and has difficulty moving the right knee.        Mechanism: fall on ice. She fell on her right knee flexed at 90 degree. She experienced immediate pain and heard a pop. There was no bleeding.  Course of symptoms is same since she fell.    Severity moderate  Current and Associated symptoms: Pain and Swelling and decreased range of motion.   Aggravating Factors: walking, weight-bearing, movement, twisting, flexion/extension and standing.       Review of Systems   Constitutional: Negative for chills, fatigue and fever.   Respiratory: Negative for cough and shortness of breath.    Musculoskeletal: Gait problem: right knee swollen, tender and decresed ROM.   Neurological: Negative for weakness and numbness.       Past Medical History:   Diagnosis Date     Classic migraine      Complex endometrial hyperplasia     without atypia     Hypertension      Motion sickness      Obese      STORMY (obstructive sleep apnea)      Palpitations      PONV (postoperative nausea and vomiting)      SVT (supraventricular tachycardia) (H) 9/2015     Family History   Problem Relation Age of Onset      Hypertension Mother      Lipids Mother      Hypertension Father      Prostate Cancer Father      Lipids Father      Breast Cancer Maternal Aunt      Hypertension Maternal Grandmother      C.A.D. Maternal Grandmother      Gallbladder Disease Maternal Grandmother      Cancer Maternal Grandfather      lung     Cerebrovascular Disease Paternal Grandmother      C.A.D. Paternal Grandfather      Cancer - colorectal Other      cousin maternal      Colon Cancer Cousin      Colon Cancer Cousin      Diabetes No family hx of      Current Outpatient Prescriptions   Medication Sig Dispense Refill     naproxen (NAPROSYN) 500 MG tablet Take 1 tablet (500 mg) by mouth 2 times daily as needed for moderate pain 30 tablet 1     ampicillin (PRINCIPEN) 500 MG capsule Take 1 capsule (500 mg) by mouth 3 times daily 21 capsule 0     Aspirin-Acetaminophen-Caffeine (EXCEDRIN EXTRA STRENGTH PO) Take 2 tablets by mouth every 6 hours as needed       ciprofloxacin (CIPRO) 500 MG tablet Take 1 tablet (500 mg) by mouth 2 times daily 14 tablet 0     ibuprofen (ADVIL/MOTRIN) 600 MG tablet Take 1 tablet (600 mg) by mouth every 6 hours as needed for pain (mild) 40 tablet 0     lisinopril (PRINIVIL/ZESTRIL) 20 MG tablet Take 1 tablet (20 mg) by mouth 2 times daily 180 tablet 3     nebivolol (BYSTOLIC) 10 MG tablet Take 1 tablet (10 mg) by mouth daily 90 tablet 3     order for DME DREAMSTATION  9-12 CM/H20  FF [employer]       spironolactone (ALDACTONE) 25 MG tablet Take 1 tablet (25 mg) by mouth daily 90 tablet 3     [DISCONTINUED] lisinopril (PRINIVIL/ZESTRIL) 20 MG tablet Take 1 tablet (20 mg) by mouth 2 times daily 180 tablet 0     [DISCONTINUED] nebivolol (BYSTOLIC) 10 MG tablet Take 1 tablet (10 mg) by mouth daily 90 tablet 3     [DISCONTINUED] spironolactone (ALDACTONE) 25 MG tablet Take 1 tablet (25 mg) by mouth daily 90 tablet 0     Social History   Substance Use Topics     Smoking status: Never Smoker     Smokeless tobacco: Never Used      Alcohol use 1.0 oz/week       OBJECTIVE  /86  Pulse 60  Temp 98.6  F (37  C) (Oral)  Wt 323 lb (146.5 kg)  SpO2 97%  BMI 43.81 kg/m2    Physical Exam   Constitutional: She appears well-developed.   Musculoskeletal: She exhibits edema. She exhibits no deformity. Tenderness: right and left knee.        Right knee: She exhibits decreased range of motion, swelling and effusion. She exhibits no ecchymosis, no deformity, no laceration, no erythema, normal alignment, no LCL laxity, no bony tenderness, normal meniscus and no MCL laxity. Tenderness found. Lateral joint line tenderness noted. No patellar tendon tenderness noted. Medial joint line tenderness: Positive Jenifer's test.         Left knee: She exhibits decreased range of motion, swelling and erythema. She exhibits no effusion, no ecchymosis, no deformity, no laceration, normal alignment and no LCL laxity. Tenderness found. Medial joint line and MCL tenderness noted. No lateral joint line and no patellar tendon tenderness noted.        Right lower leg: She exhibits edema. She exhibits no tenderness, no bony tenderness, no deformity and no laceration.        Left lower leg: She exhibits edema. She exhibits no tenderness, no bony tenderness, no deformity and no laceration.        Legs:  Decreased range of motion in right knee. Positive valgus test on right knee. Right Popliteal fossa tender on palpation. No bursa noted.. No sensory deficit noted on bilateral lower limbs.          Feet:   Right Foot:   Skin Integrity: Positive for dry skin. Negative for ulcer, blister, skin breakdown or warmth.   Left Foot:   Skin Integrity: Positive for dry skin. Negative for ulcer, blister, skin breakdown or warmth.   Neurological: No sensory deficit.   No sensory deficit in bilateral lower limbs.    Vitals reviewed.      Labs:  X-ray right knee 3 view was done. No fracture.     ASSESSMENT:      ICD-10-CM    1. Injury of right knee, initial encounter S89.91XA XR Knee  Right 3 Views     ORTHO  REFERRAL     naproxen (NAPROSYN) 500 MG tablet        Medical Decision Making:    Differential Diagnosis:  MS Injury Pain: sprain, fracture, muscle strain, ACL/ MCL/ PCL tear, meniscal tear, patellar fracture, patello femoral dislocation, contusion, penetrating trauma, cellulitis osteomyelitis, tibial fracture, fibular head or neck fracture.      Patient was evaluated at the  for the right knee pain after a fall this evening.No  skin leison or open wound noted over the injured area. No neurovascular compromise bilaterally in lower limbs.( please see details in physical exam finding).  X-ray showed no obvious fractures of her right knee. Orthopedic referral is given  for further evaluation of any soft tissue injury We have discussed about the possible imaging option of performing and MRI of her knee, which will be decided by the orthopedic specialist.  If her pain is severe or her symptoms worsens, she is advised to go to the ER.     Serious Comorbid Conditions:  Adult:  HTN, obesity    PLAN:    MS Injury/Pain  ice, heat, elevate, rest and Rx: Naproxen.   Knee brace.   Orthopedics referral given for further evaluation of soft tissue injury.   If pain worsens in 12 hours, visit ER.      Followup:    If not improving or if conditions worsens over the next 12-24 hours, go to the Emergency Department,   In 1-2  day(s) follow up with  Ortho for further evaluation of soft tissue injury      All questions answered.  Patient was understanding and in agreement with today's assessment and plan.      Patient Instructions       Knee Pain  Knee pain is very common. It s especially common in active people who put a lot of pressure on their knees, like runners. It affects women more often than men.  Your kneecap (patella) is a thick, round bone. It covers and protects the front portion of your knee joint. It moves along a groove in your thighbone (femur) as part of the patellofemoral joint. A  layer of cartilage surrounds the underside of your kneecap. This layer protects it from grinding against your femur.  When this cartilage softens and breaks down, it can cause knee pain. This is partly because of repetitive stress. The stress irritates the lining of the joint. This causes pain in the underlying bone.  What causes knee pain?  Many things can cause knee pain. You may have more than one cause. Some of these include:    Overuse of the knee joint    The kneecap doesn t line up with the tissue around it    Damage to small nerves in the area    Damage to the ligament-like structure that holds the kneecap in place (retinaculum)    Breakdown of the bone under the cartilage    Swelling in the soft tissues around the kneecap    Injury  You might be more likely to have knee pain if you:    Exercise a lot    Recently increased the intensity of your workouts    Have a body mass index (BMI) greater than 25    Have poor alignment of your kneecap    Walk with your feet turned overly outward or inward    Have weakness in surrounding muscle groups (inner quad or hip adductor muscles)    Have too much tightness in surrounding muscle groups (hamstrings or iliotibial band)    Have a recent history of injury to the area    Are female  Symptoms of knee pain  This type of knee pain is a dull, aching pain in the front of the knee in the area under and around the kneecap. This pain may start quickly or slowly. Your pain might be worse when you squat, run, or sit for a long time. You might also sometimes feel like your knee is giving out. You may have symptoms in one or both of your knees.  Diagnosing knee pain  Your healthcare provider will ask about your medical history and your symptoms. Be sure to describe any activities that make your knee pain worse. He or she will look at your knee. This will include tests of your range of motion, strength, and areas of pain of your knee. Your knee alignment will be checked.  Your  healthcare provider will need to rule out other causes of your knee pain, such as arthritis. You may need an imaging test, such as an X-ray or MRI.  Treatment for knee pain  Treatments that can help ease your symptoms may include:    Avoiding activities for a while that make your pain worse, returning to activity over time    Icing the outside of your knee when it causes you pain    Taking over-the-counter pain medicine    Wearing a knee brace or taping your knee to support it    Wearing special shoe inserts to help keep your feet in the proper alignment    Doing special exercises to stretch and strengthen the muscles around your hip and your knee  These steps help most people manage knee pain. But some cases of knee pain need to be treated with surgery. You may need surgery right away. Or you may need it later if other treatments don t work. Your healthcare provider may refer you to an orthopedic surgeon. He or she will talk with you about your choices.  Preventing knee pain  Losing weight and correcting excess muscle tightness or muscle weakness may help lower your risk.  In some cases, you can prevent knee pain. To help prevent a flare-up of knee pain, you do these things:    Regularly do all the exercises your doctor or physical therapist advises    Support your knee as advised by your doctor or physical therapist    Increase training gradually, and ease up on training when needed    Have an expert check your gait for running or other sporting activities    Stretch properly before and after exercise    Replace your running shoes regularly    Lose excess weight     When to call your healthcare provider  Call your healthcare provider right away if:    Your symptoms don t get better after a few weeks of treatment    You have any new symptoms   Date Last Reviewed: 4/1/2017 2000-2018 [employer]. [address] [place] [address], [place] [address]. All rights reserved. This information is not intended as a  substitute for professional medical care. Always follow your healthcare professional's instructions.        Knee Pain  Knee pain is very common. It s especially common in active people who put a lot of pressure on their knees, like runners. It affects women more often than men.  Your kneecap (patella) is a thick, round bone. It covers and protects the front portion of your knee joint. It moves along a groove in your thighbone (femur) as part of the patellofemoral joint. A layer of cartilage surrounds the underside of your kneecap. This layer protects it from grinding against your femur.  When this cartilage softens and breaks down, it can cause knee pain. This is partly because of repetitive stress. The stress irritates the lining of the joint. This causes pain in the underlying bone.  What causes knee pain?  Many things can cause knee pain. You may have more than one cause. Some of these include:    Overuse of the knee joint    The kneecap doesn t line up with the tissue around it    Damage to small nerves in the area    Damage to the ligament-like structure that holds the kneecap in place (retinaculum)    Breakdown of the bone under the cartilage    Swelling in the soft tissues around the kneecap    Injury  You might be more likely to have knee pain if you:    Exercise a lot    Recently increased the intensity of your workouts    Have a body mass index (BMI) greater than 25    Have poor alignment of your kneecap    Walk with your feet turned overly outward or inward    Have weakness in surrounding muscle groups (inner quad or hip adductor muscles)    Have too much tightness in surrounding muscle groups (hamstrings or iliotibial band)    Have a recent history of injury to the area    Are female  Symptoms of knee pain  This type of knee pain is a dull, aching pain in the front of the knee in the area under and around the kneecap. This pain may start quickly or slowly. Your pain might be worse when you squat, run, or  sit for a long time. You might also sometimes feel like your knee is giving out. You may have symptoms in one or both of your knees.  Diagnosing knee pain  Your healthcare provider will ask about your medical history and your symptoms. Be sure to describe any activities that make your knee pain worse. He or she will look at your knee. This will include tests of your range of motion, strength, and areas of pain of your knee. Your knee alignment will be checked.  Your healthcare provider will need to rule out other causes of your knee pain, such as arthritis. You may need an imaging test, such as an X-ray or MRI.  Treatment for knee pain  Treatments that can help ease your symptoms may include:    Avoiding activities for a while that make your pain worse, returning to activity over time    Icing the outside of your knee when it causes you pain    Taking over-the-counter pain medicine    Wearing a knee brace or taping your knee to support it    Wearing special shoe inserts to help keep your feet in the proper alignment    Doing special exercises to stretch and strengthen the muscles around your hip and your knee  These steps help most people manage knee pain. But some cases of knee pain need to be treated with surgery. You may need surgery right away. Or you may need it later if other treatments don t work. Your healthcare provider may refer you to an orthopedic surgeon. He or she will talk with you about your choices.  Preventing knee pain  Losing weight and correcting excess muscle tightness or muscle weakness may help lower your risk.  In some cases, you can prevent knee pain. To help prevent a flare-up of knee pain, you do these things:    Regularly do all the exercises your doctor or physical therapist advises    Support your knee as advised by your doctor or physical therapist    Increase training gradually, and ease up on training when needed    Have an expert check your gait for running or other sporting  activities    Stretch properly before and after exercise    Replace your running shoes regularly    Lose excess weight     When to call your healthcare provider  Call your healthcare provider right away if:    Your symptoms don t get better after a few weeks of treatment    You have any new symptoms   Date Last Reviewed: 4/1/2017 2000-2018 [employer]. [address] [place] [id number]. All rights reserved. This information is not intended as a substitute for professional medical care. Always follow your healthcare professional's instructions.

## 2018-11-30 NOTE — MR AVS SNAPSHOT
After Visit Summary   11/30/2018    Fouzia Arenas    MRN: [id number]           Patient Information     Date Of Birth          1965        Visit Information        Provider Department      11/30/2018 4:00 PM Waqas Waterman MD [facility] [place]        Today's Diagnoses     Acute pain of right knee    -  1       Follow-ups after your visit        Who to contact     If you have questions or need follow up information about today's clinic visit or your schedule please contact [facility] [place] directly at [phone].  Normal or non-critical lab and imaging results will be communicated to you by MyChart, letter or phone within 4 business days after the clinic has received the results. If you do not hear from us within 7 days, please contact the clinic through MyChart or phone. If you have a critical or abnormal lab result, we will notify you by phone as soon as possible.  Submit refill requests through [employer] or call your pharmacy and they will forward the refill request to us. Please allow 3 business days for your refill to be completed.          Additional Information About Your Visit        Care EveryWhere ID     This is your Care EveryWhere ID. This could be used by other organizations to access your [facility] medical records  [id number]        Your Vitals Were     Height BMI (Body Mass Index)                6' (1.829 m) 43.81 kg/m2           Blood Pressure from Last 3 Encounters:   11/30/18 126/86   11/29/18 126/86   11/29/18 124/72    Weight from Last 3 Encounters:   11/30/18 323 lb (146.5 kg)   11/29/18 323 lb (146.5 kg)   11/29/18 323 lb (146.5 kg)                 Today's Medication Changes          These changes are accurate as of 11/30/18 11:59 PM.  If you have any questions, ask your nurse or doctor.               Start taking these medicines.        Dose/Directions    traMADol 50 MG tablet   Commonly known as:  ULTRAM    Used for:  Acute pain of right knee   Started by:  Waqas Waterman MD        Dose:  50 mg   Take 1 tablet (50 mg) by mouth every 6 hours as needed for severe pain   Quantity:  10 tablet   Refills:  0         Stop taking these medicines if you haven't already. Please contact your care team if you have questions.     naproxen 500 MG tablet   Commonly known as:  NAPROSYN   Stopped by:  Waqas Waterman MD                Where to get your medicines      These medications were sent to [facility] [place] - [address]  [address], [place] [address]     Phone:  [phone]     ibuprofen 600 MG tablet         Some of these will need a paper prescription and others can be bought over the counter.  Ask your nurse if you have questions.     Bring a paper prescription for each of these medications     traMADol 50 MG tablet               Information about OPIOIDS     PRESCRIPTION OPIOIDS: WHAT YOU NEED TO KNOW   We gave you an opioid (narcotic) pain medicine. It is important to manage your pain, but opioids are not always the best choice. You should first try all the other options your care team gave you. Take this medicine for as short a time (and as few doses) as possible.    Some activities can increase your pain, such as bandage changes or therapy sessions. It may help to take your pain medicine 30 to 60 minutes before these activities. Reduce your stress by getting enough sleep, working on hobbies you enjoy and practicing relaxation or meditation. Talk to your care team about ways to manage your pain beyond prescription opioids.    These medicines have risks:    DO NOT drive when on new or higher doses of pain medicine. These medicines can affect your alertness and reaction times, and you could be arrested for driving under the influence (DUI). If you need to use opioids long-term, talk to your care team about driving.    DO NOT operate heavy  machinery    DO NOT do any other dangerous activities while taking these medicines.    DO NOT drink any alcohol while taking these medicines.     If the opioid prescribed includes acetaminophen, DO NOT take with any other medicines that contain acetaminophen. Read all labels carefully. Look for the word  acetaminophen  or  Tylenol.  Ask your pharmacist if you have questions or are unsure.    You can get addicted to pain medicines, especially if you have a history of addiction (chemical, alcohol or substance dependence). Talk to your care team about ways to reduce this risk.    All opioids tend to cause constipation. Drink plenty of water and eat foods that have a lot of fiber, such as fruits, vegetables, prune juice, apple juice and high-fiber cereal. Take a laxative (Miralax, milk of magnesia, Colace, Senna) if you don t move your bowels at least every other day. Other side effects include upset stomach, sleepiness, dizziness, throwing up, tolerance (needing more of the medicine to have the same effect), physical dependence and slowed breathing.    Store your pills in a secure place, locked if possible. We will not replace any lost or stolen medicine. If you don t finish your medicine, please throw away (dispose) as directed by your pharmacist. The [place] Pollution Control Agency has more information about safe disposal: [email or web address]://[email or web address].[email or web address].us/living-green/managing-unwanted-medications         Primary Care Provider Office Phone # Fax #    Ruthie Xiong PA-C [phone] [address] [place] [address]        Equal Access to Services     TESSA GUERRA : Hadii aad ku hadasho Soomaali, waaxda luqadaha, qaybta kaalmada adelolis, lili gerber . So [facility] [phone].    ATENCIÓN: Si habla español, tiene a hay disposición servicios gratuitos de asistencia lingüística. Llame al [phone].    We comply with applicable federal civil rights laws and  [place] laws. We do not discriminate on the basis of race, color, national origin, age, disability, sex, sexual orientation, or gender identity.            Thank you!     Thank you for choosing [facility] [place]  for your care. Our goal is always to provide you with excellent care. Hearing back from our patients is one way we can continue to improve our services. Please take a few minutes to complete the written survey that you may receive in the mail after your visit with us. Thank you!             Your Updated Medication List - Protect others around you: Learn how to safely use, store and throw away your medicines at www.disposemymeds.org.          This list is accurate as of 11/30/18 11:59 PM.  Always use your most recent med list.                   Brand Name Dispense Instructions for use Diagnosis    ampicillin 500 MG capsule    PRINCIPEN    21 capsule    Take 1 capsule (500 mg) by mouth 3 times daily    Dysuria       ciprofloxacin 500 MG tablet    CIPRO    14 tablet    Take 1 tablet (500 mg) by mouth 2 times daily    Acute cystitis without hematuria       EXCEDRIN EXTRA STRENGTH PO      Take 2 tablets by mouth every 6 hours as needed        ibuprofen 600 MG tablet    ADVIL/MOTRIN    40 tablet    Take 1 tablet (600 mg) by mouth every 6 hours as needed for pain (mild)    Acute pain of right knee       lisinopril 20 MG tablet    PRINIVIL/ZESTRIL    180 tablet    Take 1 tablet (20 mg) by mouth 2 times daily    Essential hypertension       nebivolol 10 MG tablet    BYSTOLIC    90 tablet    Take 1 tablet (10 mg) by mouth daily    Hypertension goal BP (blood pressure) < 130/80       order for DME      DREAMSTATION 9-12 CM/H20 FF MIRAGE QUATTRO        spironolactone 25 MG tablet    ALDACTONE    90 tablet    Take 1 tablet (25 mg) by mouth daily    Benign essential hypertension       traMADol 50 MG tablet    ULTRAM    10 tablet    Take 1 tablet (50 mg) by mouth every 6 hours as needed  for severe pain    Acute pain of right knee

## 2018-11-30 NOTE — PROGRESS NOTES
[facility]   Clinic Visit s Nov 30, 2018    PCP: Ruthie Xiong      Fouzia is a 53 year old female who is seen as self referral for   Chief Complaint   Patient presents with     Right Knee - Pain       Injury: Patient describes injury as slipped on ice and felt a pop anteriorly.        Location of Pain: right knee anterior , posterior and medial, nonradiating   Duration of Pain: 1 day(s)  Rating of Pain at worst: 7/10  Rating of Pain Currently: 6/10  Pain is better with: activity avoidance   Pain is worse with: elevation and walking    Treatment so far consists of: knee immobilizer, ice and rest  Associated symptoms: feeling of instability and swelling Moderate  Recent imaging completed: X-rays completed 11/29/1/8.  Prior History of related problems: none    Social History: is employed as a/an computer worker      Past Medical History:   Diagnosis Date     Classic migraine      Complex endometrial hyperplasia     without atypia     Hypertension      Motion sickness      Obese      STORMY (obstructive sleep apnea)      Palpitations      PONV (postoperative nausea and vomiting)      SVT (supraventricular tachycardia) (H) 9/2015       Patient Active Problem List    Diagnosis Date Noted     Vertigo 05/08/2018     Priority: Medium     Hypertension goal BP (blood pressure) < 130/80 03/26/2018     Priority: Medium     Morbid obesity (H) 02/06/2018     Priority: Medium     Atypical chest pain 04/05/2017     Priority: Medium     STORMY (obstructive sleep apnea) 09/30/2016     Priority: Medium     Palpitations      Priority: Medium     Esophageal reflux 12/07/2013     Priority: Medium     CARDIOVASCULAR SCREENING; LDL GOAL LESS THAN 160 12/07/2013     Priority: Medium     BPPV (benign paroxysmal positional vertigo) 09/12/2012     Priority: Medium     Other disorder of menstruation and other abnormal bleeding from female genital tract 02/24/2011     Priority: Medium       Family History    Problem Relation Age of Onset     Hypertension Mother      Lipids Mother      Hypertension Father      Prostate Cancer Father      Lipids Father      Breast Cancer Maternal Aunt      Hypertension Maternal Grandmother      C.A.D. Maternal Grandmother      Gallbladder Disease Maternal Grandmother      Cancer Maternal Grandfather      lung     Cerebrovascular Disease Paternal Grandmother      C.A.D. Paternal Grandfather      Cancer - colorectal Other      cousin maternal      Colon Cancer Cousin      Colon Cancer Cousin      Diabetes No family hx of        Social History     Social History     Marital status: Single     Spouse name: N/A     Number of children: N/A     Years of education: N/A     Social History Main Topics     Smoking status: Never Smoker     Smokeless tobacco: Never Used     Alcohol use 1.0 oz/week       Past Surgical History:   Procedure Laterality Date     CHOLECYSTECTOMY, LAPOROSCOPIC      Cholecystectomy, Laparoscopic     COLONOSCOPY N/A 11/25/2014    Procedure: COLONOSCOPY;  Surgeon: Wenceslao Galo MD;  Location:  GI     CYSTOSCOPY, SLING TRANSVAGINAL N/A 8/20/2018    Procedure: CYSTOSCOPY, SLING TRANSVAGINAL;;  Surgeon: Urban Elena MD;  Location: [facility]     D & C      X2-3 for abnormal bleeding     ESOPHAGOSCOPY, GASTROSCOPY, DUODENOSCOPY (EGD), COMBINED N/A 11/25/2014    Procedure: COMBINED ESOPHAGOSCOPY, GASTROSCOPY, DUODENOSCOPY (EGD), BIOPSY SINGLE OR MULTIPLE;  Surgeon: Wenceslao Galo MD;  Location:  GI     LAPAROSCOPIC HYSTERECTOMY SUPRACERVICAL N/A 8/20/2018    Procedure: LAPAROSCOPIC HYSTERECTOMY SUPRACERVICAL;  LAPAROSCOPIC SUBTOATAL HYSTERECTOMY, BILATERAL SALPINGECTOMY, SUBURETHRAL SLING AND CYSTOSCOPY;  Surgeon: Urban Elena MD;  Location: [facility]     LAPAROSCOPIC SALPINGECTOMY Bilateral 8/20/2018    Procedure: LAPAROSCOPIC SALPINGECTOMY;;  Surgeon: Urban Elena MD;  Location: [facility]     LAPAROSCOPY      For endometriosis     OPERATIVE  HYSTEROSCOPY WITH MORCELLATOR N/A 3/29/2018    Procedure: OPERATIVE HYSTEROSCOPY WITH MORCELLATOR (MYOSURE);  OPERATIVE HYSTEROSCOPY WITH MORCELLATOR ;  Surgeon: rUban Elena MD;  Location: [facility]             Review of Systems   Musculoskeletal: Positive for joint pain.   All other systems reviewed and are negative.        Physical Exam   Musculoskeletal:        Right knee: Medial joint line tenderness noted.     /86  Ht 6' (1.829 m)  Wt 323 lb (146.5 kg)  BMI 43.81 kg/m2  Constitutional:well-developed, well-nourished, and in no distress.   Cardiovascular: Intact distal pulses.    Neurological: alert. Gait Abnormal:   Antalgic gait  Skin: Skin is warm and dry.   Psychiatric: Mood and affect normal.   Respiratory: unlabored, speaks in full sentences  Lymph: no LAD, no lymphangitis      Right Knee Exam   Swelling: Moderate  Effusion: Yes    Tenderness   The patient is experiencing tenderness in the medial joint line, posteriorly also.    Range of Motion   Extension: Normal  Flexion:     90    Tests   McMurrays:  Medial - Positive      Lateral - Negative  Lachman:  Anterior - Positive    Posterior - n/t  Drawer:       Anterior - Positive    Posterior - Negative  Varus:  Negative  Valgus: Negative  Pivot Shift: Positive  Patellar Apprehension: No          X-ray images Previously done and independently reviewed by me in the office today with the patient. X-ray shows: mild djd.    Recent Results (from the past 744 hour(s))   XR Knee Right 3 Views    Narrative    XR KNEE RT 3 VW 11/29/2018 7:00 PM    COMPARISON: None.    HISTORY: RIGHT knee injury.      Impression    IMPRESSION: No fractures are seen in the RIGHT knee. Minimal  osteophytosis with preserved joint space. No knee effusion.    PRUDENCIO BASS MD     ASSESSMENT/PLAN    ICD-10-CM    1. Acute pain of right knee M25.561 MR Knee Right w/o Contrast     traMADol (ULTRAM) 50 MG tablet     ibuprofen (ADVIL/MOTRIN) 600 MG tablet     Acute pain, swelling  and exam suspicious of ACL and meniscus tear; mechanism consistent with ACL tear.     Struggling with pain, unable to sleep. Short term use of Tramadol ok for acute pain control. Continue icing 20 min per hour. Given Ace for compression. Advised to attempt ROM as tolerated.     Will call with results, suspect surgical mgmt will be needed. She does engage in regular activity requiring knee stability.

## 2018-11-30 NOTE — LETTER
11/30/2018         RE: Fouzia Arenas  [address] [place] [facility] [address]        Dear Colleague,    Thank you for referring your patient, Fouzia Arenas, to the [facility] [place]. Please see a copy of my visit note below.    HPI   [facility]   Clinic Visit s Nov 30, 2018    PCP: Ruthie Xiong      Fouzia is a 53 year old female who is seen as self referral for   Chief Complaint   Patient presents with     Right Knee - Pain       Injury: Patient describes injury as slipped on ice and felt a pop anteriorly.        Location of Pain: right knee anterior , posterior and medial, nonradiating   Duration of Pain: 1 day(s)  Rating of Pain at worst: 7/10  Rating of Pain Currently: 6/10  Pain is better with: activity avoidance   Pain is worse with: elevation and walking    Treatment so far consists of: knee immobilizer, ice and rest  Associated symptoms: feeling of instability and swelling Moderate  Recent imaging completed: X-rays completed 11/29/1/8.  Prior History of related problems: none    Social History: is employed as a/an computer worker      Past Medical History:   Diagnosis Date     Classic migraine      Complex endometrial hyperplasia     without atypia     Hypertension      Motion sickness      Obese      STORMY (obstructive sleep apnea)      Palpitations      PONV (postoperative nausea and vomiting)      SVT (supraventricular tachycardia) (H) 9/2015       Patient Active Problem List    Diagnosis Date Noted     Vertigo 05/08/2018     Priority: Medium     Hypertension goal BP (blood pressure) < 130/80 03/26/2018     Priority: Medium     Morbid obesity (H) 02/06/2018     Priority: Medium     Atypical chest pain 04/05/2017     Priority: Medium     STORMY (obstructive sleep apnea) 09/30/2016     Priority: Medium     Palpitations      Priority: Medium     Esophageal reflux 12/07/2013     Priority: Medium     CARDIOVASCULAR SCREENING; LDL GOAL  LESS THAN 160 12/07/2013     Priority: Medium     BPPV (benign paroxysmal positional vertigo) 09/12/2012     Priority: Medium     Other disorder of menstruation and other abnormal bleeding from female genital tract 02/24/2011     Priority: Medium       Family History   Problem Relation Age of Onset     Hypertension Mother      Lipids Mother      Hypertension Father      Prostate Cancer Father      Lipids Father      Breast Cancer Maternal Aunt      Hypertension Maternal Grandmother      C.A.D. Maternal Grandmother      Gallbladder Disease Maternal Grandmother      Cancer Maternal Grandfather      lung     Cerebrovascular Disease Paternal Grandmother      C.A.D. Paternal Grandfather      Cancer - colorectal Other      cousin maternal      Colon Cancer Cousin      Colon Cancer Cousin      Diabetes No family hx of        Social History     Social History     Marital status: Single     Spouse name: N/A     Number of children: N/A     Years of education: N/A     Social History Main Topics     Smoking status: Never Smoker     Smokeless tobacco: Never Used     Alcohol use 1.0 oz/week       Past Surgical History:   Procedure Laterality Date     CHOLECYSTECTOMY, LAPOROSCOPIC      Cholecystectomy, Laparoscopic     COLONOSCOPY N/A 11/25/2014    Procedure: COLONOSCOPY;  Surgeon: Wenceslao Galo MD;  Location:  GI     CYSTOSCOPY, SLING TRANSVAGINAL N/A 8/20/2018    Procedure: CYSTOSCOPY, SLING TRANSVAGINAL;;  Surgeon: Urban Elena MD;  Location: [facility]     D & C      X2-3 for abnormal bleeding     ESOPHAGOSCOPY, GASTROSCOPY, DUODENOSCOPY (EGD), COMBINED N/A 11/25/2014    Procedure: COMBINED ESOPHAGOSCOPY, GASTROSCOPY, DUODENOSCOPY (EGD), BIOPSY SINGLE OR MULTIPLE;  Surgeon: Wenceslao Galo MD;  Location: [facility]     LAPAROSCOPIC HYSTERECTOMY SUPRACERVICAL N/A 8/20/2018    Procedure: LAPAROSCOPIC HYSTERECTOMY SUPRACERVICAL;  LAPAROSCOPIC SUBTOATAL HYSTERECTOMY, BILATERAL SALPINGECTOMY, SUBURETHRAL SLING AND  CYSTOSCOPY;  Surgeon: Urban Elena MD;  Location: [facility]     LAPAROSCOPIC SALPINGECTOMY Bilateral 8/20/2018    Procedure: LAPAROSCOPIC SALPINGECTOMY;;  Surgeon: Urban Elena MD;  Location: [facility]     LAPAROSCOPY      For endometriosis     OPERATIVE HYSTEROSCOPY WITH MORCELLATOR N/A 3/29/2018    Procedure: OPERATIVE HYSTEROSCOPY WITH MORCELLATOR (MYOSURE);  OPERATIVE HYSTEROSCOPY WITH MORCELLATOR ;  Surgeon: Urban Elena MD;  Location: [facility]             Review of Systems   Musculoskeletal: Positive for joint pain.   All other systems reviewed and are negative.        Physical Exam   Musculoskeletal:        Right knee: Medial joint line tenderness noted.     /86  Ht 6' (1.829 m)  Wt 323 lb (146.5 kg)  BMI 43.81 kg/m2  Constitutional:well-developed, well-nourished, and in no distress.   Cardiovascular: Intact distal pulses.    Neurological: alert. Gait Abnormal:   Antalgic gait  Skin: Skin is warm and dry.   Psychiatric: Mood and affect normal.   Respiratory: unlabored, speaks in full sentences  Lymph: no LAD, no lymphangitis      Right Knee Exam   Swelling: Moderate  Effusion: Yes    Tenderness   The patient is experiencing tenderness in the medial joint line, posteriorly also.    Range of Motion   Extension: Normal  Flexion:     90    Tests   McMurrays:  Medial - Positive      Lateral - Negative  Lachman:  Anterior - Positive    Posterior - n/t  Drawer:       Anterior - Positive    Posterior - Negative  Varus:  Negative  Valgus: Negative  Pivot Shift: Positive  Patellar Apprehension: No          X-ray images Previously done and independently reviewed by me in the office today with the patient. X-ray shows: mild djd.    Recent Results (from the past 744 hour(s))   XR Knee Right 3 Views    Narrative    XR KNEE RT 3 VW 11/29/2018 7:00 PM    COMPARISON: None.    HISTORY: RIGHT knee injury.      Impression    IMPRESSION: No fractures are seen in the RIGHT knee. Minimal  osteophytosis  with preserved joint space. No knee effusion.    PRUDENCIO BASS MD     ASSESSMENT/PLAN    ICD-10-CM    1. Acute pain of right knee M25.561 MR Knee Right w/o Contrast     traMADol (ULTRAM) 50 MG tablet     ibuprofen (ADVIL/MOTRIN) 600 MG tablet     Acute pain, swelling and exam suspicious of ACL and meniscus tear; mechanism consistent with ACL tear.     Struggling with pain, unable to sleep. Short term use of Tramadol ok for acute pain control. Continue icing 20 min per hour. Given Ace for compression. Advised to attempt ROM as tolerated.     Will call with results, suspect surgical mgmt will be needed. She does engage in regular activity requiring knee stability.         Again, thank you for allowing me to participate in the care of your patient.        Sincerely,        Waqas Waterman MD

## 2018-11-30 NOTE — PATIENT INSTRUCTIONS
Knee Pain  Knee pain is very common. It s especially common in active people who put a lot of pressure on their knees, like runners. It affects women more often than men.  Your kneecap (patella) is a thick, round bone. It covers and protects the front portion of your knee joint. It moves along a groove in your thighbone (femur) as part of the patellofemoral joint. A layer of cartilage surrounds the underside of your kneecap. This layer protects it from grinding against your femur.  When this cartilage softens and breaks down, it can cause knee pain. This is partly because of repetitive stress. The stress irritates the lining of the joint. This causes pain in the underlying bone.  What causes knee pain?  Many things can cause knee pain. You may have more than one cause. Some of these include:    Overuse of the knee joint    The kneecap doesn t line up with the tissue around it    Damage to small nerves in the area    Damage to the ligament-like structure that holds the kneecap in place (retinaculum)    Breakdown of the bone under the cartilage    Swelling in the soft tissues around the kneecap    Injury  You might be more likely to have knee pain if you:    Exercise a lot    Recently increased the intensity of your workouts    Have a body mass index (BMI) greater than 25    Have poor alignment of your kneecap    Walk with your feet turned overly outward or inward    Have weakness in surrounding muscle groups (inner quad or hip adductor muscles)    Have too much tightness in surrounding muscle groups (hamstrings or iliotibial band)    Have a recent history of injury to the area    Are female  Symptoms of knee pain  This type of knee pain is a dull, aching pain in the front of the knee in the area under and around the kneecap. This pain may start quickly or slowly. Your pain might be worse when you squat, run, or sit for a long time. You might also sometimes feel like your knee is giving out. You may have symptoms in  one or both of your knees.  Diagnosing knee pain  Your healthcare provider will ask about your medical history and your symptoms. Be sure to describe any activities that make your knee pain worse. He or she will look at your knee. This will include tests of your range of motion, strength, and areas of pain of your knee. Your knee alignment will be checked.  Your healthcare provider will need to rule out other causes of your knee pain, such as arthritis. You may need an imaging test, such as an X-ray or MRI.  Treatment for knee pain  Treatments that can help ease your symptoms may include:    Avoiding activities for a while that make your pain worse, returning to activity over time    Icing the outside of your knee when it causes you pain    Taking over-the-counter pain medicine    Wearing a knee brace or taping your knee to support it    Wearing special shoe inserts to help keep your feet in the proper alignment    Doing special exercises to stretch and strengthen the muscles around your hip and your knee  These steps help most people manage knee pain. But some cases of knee pain need to be treated with surgery. You may need surgery right away. Or you may need it later if other treatments don t work. Your healthcare provider may refer you to an orthopedic surgeon. He or she will talk with you about your choices.  Preventing knee pain  Losing weight and correcting excess muscle tightness or muscle weakness may help lower your risk.  In some cases, you can prevent knee pain. To help prevent a flare-up of knee pain, you do these things:    Regularly do all the exercises your doctor or physical therapist advises    Support your knee as advised by your doctor or physical therapist    Increase training gradually, and ease up on training when needed    Have an expert check your gait for running or other sporting activities    Stretch properly before and after exercise    Replace your running shoes regularly    Lose excess  weight     When to call your healthcare provider  Call your healthcare provider right away if:    Your symptoms don t get better after a few weeks of treatment    You have any new symptoms   Date Last Reviewed: 4/1/2017 2000-2018 [employer]. [address] [place] [address], [place] [address]. All rights reserved. This information is not intended as a substitute for professional medical care. Always follow your healthcare professional's instructions.        Knee Pain  Knee pain is very common. It s especially common in active people who put a lot of pressure on their knees, like runners. It affects women more often than men.  Your kneecap (patella) is a thick, round bone. It covers and protects the front portion of your knee joint. It moves along a groove in your thighbone (femur) as part of the patellofemoral joint. A layer of cartilage surrounds the underside of your kneecap. This layer protects it from grinding against your femur.  When this cartilage softens and breaks down, it can cause knee pain. This is partly because of repetitive stress. The stress irritates the lining of the joint. This causes pain in the underlying bone.  What causes knee pain?  Many things can cause knee pain. You may have more than one cause. Some of these include:    Overuse of the knee joint    The kneecap doesn t line up with the tissue around it    Damage to small nerves in the area    Damage to the ligament-like structure that holds the kneecap in place (retinaculum)    Breakdown of the bone under the cartilage    Swelling in the soft tissues around the kneecap    Injury  You might be more likely to have knee pain if you:    Exercise a lot    Recently increased the intensity of your workouts    Have a body mass index (BMI) greater than 25    Have poor alignment of your kneecap    Walk with your feet turned overly outward or inward    Have weakness in surrounding muscle groups (inner quad or hip adductor muscles)    Have too  much tightness in surrounding muscle groups (hamstrings or iliotibial band)    Have a recent history of injury to the area    Are female  Symptoms of knee pain  This type of knee pain is a dull, aching pain in the front of the knee in the area under and around the kneecap. This pain may start quickly or slowly. Your pain might be worse when you squat, run, or sit for a long time. You might also sometimes feel like your knee is giving out. You may have symptoms in one or both of your knees.  Diagnosing knee pain  Your healthcare provider will ask about your medical history and your symptoms. Be sure to describe any activities that make your knee pain worse. He or she will look at your knee. This will include tests of your range of motion, strength, and areas of pain of your knee. Your knee alignment will be checked.  Your healthcare provider will need to rule out other causes of your knee pain, such as arthritis. You may need an imaging test, such as an X-ray or MRI.  Treatment for knee pain  Treatments that can help ease your symptoms may include:    Avoiding activities for a while that make your pain worse, returning to activity over time    Icing the outside of your knee when it causes you pain    Taking over-the-counter pain medicine    Wearing a knee brace or taping your knee to support it    Wearing special shoe inserts to help keep your feet in the proper alignment    Doing special exercises to stretch and strengthen the muscles around your hip and your knee  These steps help most people manage knee pain. But some cases of knee pain need to be treated with surgery. You may need surgery right away. Or you may need it later if other treatments don t work. Your healthcare provider may refer you to an orthopedic surgeon. He or she will talk with you about your choices.  Preventing knee pain  Losing weight and correcting excess muscle tightness or muscle weakness may help lower your risk.  In some cases, you can  prevent knee pain. To help prevent a flare-up of knee pain, you do these things:    Regularly do all the exercises your doctor or physical therapist advises    Support your knee as advised by your doctor or physical therapist    Increase training gradually, and ease up on training when needed    Have an expert check your gait for running or other sporting activities    Stretch properly before and after exercise    Replace your running shoes regularly    Lose excess weight     When to call your healthcare provider  Call your healthcare provider right away if:    Your symptoms don t get better after a few weeks of treatment    You have any new symptoms   Date Last Reviewed: 4/1/2017 2000-2018 [employer]. [address] [place] [address], [place] [address]. All rights reserved. This information is not intended as a substitute for professional medical care. Always follow your healthcare professional's instructions.

## 2018-12-04 ENCOUNTER — HOSPITAL ENCOUNTER (OUTPATIENT)
Dept: MRI IMAGING | Facility: CLINIC | Age: 53
Discharge: HOME | End: 2018-12-04
Attending: FAMILY MEDICINE | Admitting: FAMILY MEDICINE
Payer: COMMERCIAL

## 2018-12-04 DIAGNOSIS — M25.561 ACUTE PAIN OF RIGHT KNEE: ICD-10-CM

## 2018-12-04 PROCEDURE — 73721 MRI JNT OF LWR EXTRE W/O DYE: CPT | Mod: RT

## 2018-12-07 ENCOUNTER — RADIANT APPOINTMENT (OUTPATIENT)
Dept: GENERAL RADIOLOGY | Facility: CLINIC | Age: 53
End: 2018-12-07
Attending: PHYSICIAN ASSISTANT
Payer: COMMERCIAL

## 2018-12-07 ENCOUNTER — HOSPITAL ENCOUNTER (EMERGENCY)
Facility: CLINIC | Age: 53
End: 2018-12-07
Attending: PHYSICIAN ASSISTANT
Payer: COMMERCIAL

## 2018-12-07 ENCOUNTER — TELEPHONE (OUTPATIENT)
Dept: ORTHOPEDICS | Facility: CLINIC | Age: 53
End: 2018-12-07

## 2018-12-07 ENCOUNTER — OFFICE VISIT (OUTPATIENT)
Dept: URGENT CARE | Facility: URGENT CARE | Age: 53
End: 2018-12-07
Payer: COMMERCIAL

## 2018-12-07 ENCOUNTER — HOSPITAL ENCOUNTER (OUTPATIENT)
Dept: ULTRASOUND IMAGING | Facility: CLINIC | Age: 53
Discharge: HOME | End: 2018-12-07
Attending: PHYSICIAN ASSISTANT | Admitting: PHYSICIAN ASSISTANT
Payer: COMMERCIAL

## 2018-12-07 VITALS
WEIGHT: 293 LBS | SYSTOLIC BLOOD PRESSURE: 109 MMHG | OXYGEN SATURATION: 100 % | DIASTOLIC BLOOD PRESSURE: 77 MMHG | TEMPERATURE: 98.5 F | RESPIRATION RATE: 20 BRPM | BODY MASS INDEX: 39.68 KG/M2 | HEIGHT: 72 IN

## 2018-12-07 VITALS
TEMPERATURE: 98.3 F | BODY MASS INDEX: 43.81 KG/M2 | SYSTOLIC BLOOD PRESSURE: 130 MMHG | OXYGEN SATURATION: 100 % | WEIGHT: 293 LBS | DIASTOLIC BLOOD PRESSURE: 80 MMHG | HEART RATE: 55 BPM

## 2018-12-07 DIAGNOSIS — S89.92XA KNEE INJURY, LEFT, INITIAL ENCOUNTER: Primary | ICD-10-CM

## 2018-12-07 DIAGNOSIS — S89.92XA KNEE INJURY, LEFT, INITIAL ENCOUNTER: ICD-10-CM

## 2018-12-07 DIAGNOSIS — M79.89 LEFT LEG SWELLING: ICD-10-CM

## 2018-12-07 PROCEDURE — 93971 EXTREMITY STUDY: CPT | Mod: LT

## 2018-12-07 PROCEDURE — 73562 X-RAY EXAM OF KNEE 3: CPT | Mod: LT

## 2018-12-07 PROCEDURE — 99214 OFFICE O/P EST MOD 30 MIN: CPT | Performed by: PHYSICIAN ASSISTANT

## 2018-12-07 ASSESSMENT — ENCOUNTER SYMPTOMS
DIARRHEA: 0
VOMITING: 0
FEVER: 0
CHILLS: 0

## 2018-12-07 NOTE — PROGRESS NOTES
SUBJECTIVE:   Fouzia Arenas is a 53 year old female presenting with a chief complaint of   Chief Complaint   Patient presents with     Urgent Care     Knee Pain     Left leg and knee pain        She is an established patient of [facility].    MS Injury/Pain    Onset of symptoms was 9 day(s) ago 11/28/18.  Location: left knee  Context:       The injury happened while walking      Mechanism: fall      Patient experienced immediate pain  Course of symptoms is worsening.    Severity moderate  Current and Associated symptoms: Pain, Swelling and Bruising. Patient is worried about a possible DVT given worsening pain and swelling.   Denies  Warmth and Redness  Aggravating Factors: walking and movement  Therapies to improve symptoms include: ice, ibuprofen and elevation. Has tried wearing compression stockings to help with swelling but was very uncomfortable.   This is the first time this type of problem has occurred for this patient.   Additionally, was also evaluated for right knee injury last week. Had MRI done on right knee which showed posterior rim fracture. Following up with Orthopedic.      Review of Systems   Constitutional: Negative for chills and fever.   Cardiovascular: Positive for leg swelling.   Gastrointestinal: Negative for diarrhea and vomiting.   Musculoskeletal:        Left lower leg swelling, pain, ecchymosis       Past Medical History:   Diagnosis Date     Classic migraine      Complex endometrial hyperplasia     without atypia     Hypertension      Motion sickness      Obese      STORMY (obstructive sleep apnea)      Palpitations      PONV (postoperative nausea and vomiting)      SVT (supraventricular tachycardia) (H) 9/2015     Family History   Problem Relation Age of Onset     Hypertension Mother      Lipids Mother      Hypertension Father      Prostate Cancer Father      Lipids Father      Breast Cancer Maternal Aunt      Hypertension Maternal Grandmother      C.A.D. Maternal Grandmother      Gallbladder  Disease Maternal Grandmother      Cancer Maternal Grandfather      lung     Cerebrovascular Disease Paternal Grandmother      DANIAL.A.D. Paternal Grandfather      Cancer - colorectal Other      cousin maternal      Colon Cancer Cousin      Colon Cancer Cousin      Diabetes No family hx of      Current Outpatient Prescriptions   Medication Sig Dispense Refill     ampicillin (PRINCIPEN) 500 MG capsule Take 1 capsule (500 mg) by mouth 3 times daily (Patient not taking: Reported on 11/30/2018) 21 capsule 0     Aspirin-Acetaminophen-Caffeine (EXCEDRIN EXTRA STRENGTH PO) Take 2 tablets by mouth every 6 hours as needed       ciprofloxacin (CIPRO) 500 MG tablet Take 1 tablet (500 mg) by mouth 2 times daily (Patient not taking: Reported on 11/30/2018) 14 tablet 0     ibuprofen (ADVIL/MOTRIN) 600 MG tablet Take 1 tablet (600 mg) by mouth every 6 hours as needed for pain (mild) 40 tablet 0     lisinopril (PRINIVIL/ZESTRIL) 20 MG tablet Take 1 tablet (20 mg) by mouth 2 times daily 180 tablet 3     nebivolol (BYSTOLIC) 10 MG tablet Take 1 tablet (10 mg) by mouth daily 90 tablet 3     order for DME D[facility]STATION  9-12 CM/H20  FF [employer] [facility]       spironolactone (ALDACTONE) 25 MG tablet Take 1 tablet (25 mg) by mouth daily 90 tablet 3     traMADol (ULTRAM) 50 MG tablet Take 1 tablet (50 mg) by mouth every 6 hours as needed for severe pain 10 tablet 0     Social History   Substance Use Topics     Smoking status: Never Smoker     Smokeless tobacco: Never Used     Alcohol use 1.0 oz/week       OBJECTIVE  /80  Pulse 55  Temp 98.3  F (36.8  C) (Oral)  Wt 323 lb (146.5 kg)  SpO2 100%  BMI 43.81 kg/m2    Physical Exam   Constitutional: She appears well-developed and well-nourished. No distress.   HENT:   Head: Normocephalic and atraumatic.   Eyes: Conjunctivae are normal.   Neck: Normal range of motion.   Pulmonary/Chest: Effort normal and breath sounds normal. No respiratory distress.   Musculoskeletal: She exhibits edema and  tenderness. She exhibits no deformity.   Left lower extremity with moderate ecchymosis and swelling from the knee down to the left foot.  Diffusely tender to palpation below the patella over the prepatellar bursa.  No tenderness in the calf.  Tender to palpation posterior left knee in the popliteal fossa.  Range of motion of the left foot is appropriate.   Neurological: She is alert.   Skin: Skin is warm and dry.   Psychiatric: She has a normal mood and affect.       Labs:  No results found for this or any previous visit (from the past 24 hour(s)).    X-Ray was done, my findings are: no acute fracture or dislocation    ASSESSMENT:      ICD-10-CM    1. Knee injury, left, initial encounter S89.92XA XR Knee Left 3 Views        Medical Decision Making:    Differential Diagnosis:  MS Injury Pain: sprain, fracture, tendonitis, muscle strain, contusion and dislocation, DVT, etc....    Serious Comorbid Conditions:  none    PLAN:    Left knee injury: X-ray negative for acute fracture or dislocation today.  She has moderate swelling and ecchymosis noted left lower extremity.  Recommended ultrasound to rule out a DVT.  We were able to get her scheduled today. Ultrasound is negative for DVT. Recommend ace wrapping, to help with swelling as patient could not tolerate the compression stockings in the past few days. They feel too tight. Continue elevation.  Follow up with orthopedic as needed. She agrees.     Followup:    If not improving or if condition worsens, follow up with your Primary Care Provider

## 2018-12-07 NOTE — TELEPHONE ENCOUNTER
----- Message from Cassandra Valle sent at 12/7/2018  9:52 AM CST -----  Regarding: MRI results  Contact: [phone]  Pt called to get her MRI results from 12/4.

## 2018-12-07 NOTE — TELEPHONE ENCOUNTER
Discussed results with patient.  Noted posterior rim fracture, not involving weightbearing surfaces.  Reassurance, okay to weight-bear as tolerated, use of crutches as needed.  Elevation when resting to reduce swelling.  She notes swelling extending down the calf and into the ankle but no Specific area of pain.  Tramadol helps but she is using on a very limited basis.  Continues ibuprofen.  Wearing compression stocking.  Recommended recheck appointment in 1 week's time, but if Specific pain were to worsen, further evaluation would be recommended.  Patient indicated understanding.

## 2018-12-07 NOTE — TELEPHONE ENCOUNTER
Recent Results (from the past 744 hour(s))   XR Knee Right 3 Views    Narrative    XR KNEE RT 3 VW 11/29/2018 7:00 PM    COMPARISON: None.    HISTORY: RIGHT knee injury.      Impression    IMPRESSION: No fractures are seen in the RIGHT knee. Minimal  osteophytosis with preserved joint space. No knee effusion.    PRUDENCIO BASS MD   MR Knee Right w/o Contrast    Narrative    MR KNEE RIGHT WITHOUT CONTRAST   12/4/2018 5:43 PM    HISTORY: Acute knee pain and swelling, evaluate for ACL tear. Acute  pain of right knee.    TECHNIQUE: Sagittal proton density and T2, coronal T1, and coronal and  transverse fat suppressed T2 weighted images.    FINDINGS:   Medial Meniscus: No tear, displaced fragment, or extrusion.       Lateral Meniscus: No tear, displaced fragment, or extrusion.       Anterior Cruciate Ligament: Intact. No thickening or intrasubstance  signal abnormality.     Posterior Cruciate Ligament: Intact. No thickening or intrasubstance  signal abnormality.     Medial Collateral Ligament: No sprain or tear identified.    Lateral Collateral Ligament Complex, Popliteus Tendon: The fibular  collateral ligament, biceps femoris tendon, popliteal tendon, and  iliotibial band are intact.    Osseous Structures and Cartilaginous Surfaces: There is an impaction  fracture involving the posterior rim of the lateral tibial plateau.  Fracture enters the joint approximately 1 cm from the posterior rim.  Although there may be slight posterior depression, there is no  step-off of the articular surface. Adjacent marrow edema is noted.  Chondral surfaces in the medial, lateral, and patellofemoral  compartments appear to be grossly intact.    Extensor Mechanism: The quadriceps and infrapatellar tendons are  intact. The medial and lateral patellar retinacula appear  unremarkable.    Joint Space: There is a small to moderate joint effusion.    Additional Findings: No semimembranosus-tibial collateral ligament or  pes anserine bursitis.       Impression    IMPRESSION:   1. Lateral tibial plateau posterior impaction fracture involving the  posterior rim of the articular surface. While this may be minimally  depressed at the posterior margin, there is no step-off of the  articular surface. Adjacent marrow edema is noted.  2. No apparent cruciate ligament or collateral ligament tear. No  meniscal tear.    PRATIMA DAS MD

## 2018-12-07 NOTE — ED TRIAGE NOTES
Recent fall injurying left lower extremity on Wednesday 11/28/2018. Pain and swelling worsening and was sent to ED to R/O DVT in left LE. Patient alert and oriented x3.  Airway, breathing and circulation intact.

## 2018-12-11 NOTE — PROGRESS NOTES
[facility]   Follow-up Visit s Dec 14, 2018    PCP: Ruthie Xiong      Subjective:  Fouzia is a 53 year old female who is seen in follow up for evaluation of   Chief Complaint   Patient presents with     Left Knee - Pain     Right Knee - Pain     Her last visit was on 11/30/2018.  Since that time, symptoms have been unchanged. Fouzia Arenas is accompanied today by self.     Patient had a knee MRI since last visit.  .  Patient has noticed a stable course of symptoms with rest and imaging treatment.  Patient has marked swelling  Pain is located bilateral knees, anterior, persistent.  Patient is using no aids.    Patient denies any new injuries.    She is actually having more severe pain in her left knee today.  This came on gradually since her last visit presumably due to staying off her right knee and putting more stress on the left knee.  Seen in urgent care and had x-rays and ultrasound done which was negative for DVT or other findings.  Left knee is worse than right today.    Patient's past medical, surgical, social and family histories are reviewed today.    History from previous visit on 11/30/2018  Injury: Patient describes injury as slipped on ice and felt a pop anteriorly.     Location of Pain: right knee anterior , posterior and medial, nonradiating   Duration of Pain: 1 day(s)  Rating of Pain at worst: 7/10  Rating of Pain Currently: 6/10  Pain is better with: activity avoidance   Pain is worse with: elevation and walking    Treatment so far consists of: knee immobilizer, ice and rest  Associated symptoms: feeling of instability and swelling Moderate  Recent imaging completed: X-rays completed 11/29/1/8.  Prior History of related problems: none    Social History: is employed as a/an computer worker      Past Medical History:   Diagnosis Date     Classic migraine      Complex endometrial hyperplasia     without atypia     Hypertension      Motion sickness      Obese       STORMY (obstructive sleep apnea)      Palpitations      PONV (postoperative nausea and vomiting)      SVT (supraventricular tachycardia) (H) 9/2015       Patient Active Problem List    Diagnosis Date Noted     Vertigo 05/08/2018     Priority: Medium     Hypertension goal BP (blood pressure) < 130/80 03/26/2018     Priority: Medium     Morbid obesity (H) 02/06/2018     Priority: Medium     Atypical chest pain 04/05/2017     Priority: Medium     STORMY (obstructive sleep apnea) 09/30/2016     Priority: Medium     Palpitations      Priority: Medium     Esophageal reflux 12/07/2013     Priority: Medium     CARDIOVASCULAR SCREENING; LDL GOAL LESS THAN 160 12/07/2013     Priority: Medium     BPPV (benign paroxysmal positional vertigo) 09/12/2012     Priority: Medium     Other disorder of menstruation and other abnormal bleeding from female genital tract 02/24/2011     Priority: Medium       Family History   Problem Relation Age of Onset     Hypertension Mother      Lipids Mother      Hypertension Father      Prostate Cancer Father      Lipids Father      Breast Cancer Maternal Aunt      Hypertension Maternal Grandmother      C.A.D. Maternal Grandmother      Gallbladder Disease Maternal Grandmother      Cancer Maternal Grandfather         lung     Cerebrovascular Disease Paternal Grandmother      C.A.D. Paternal Grandfather      Cancer - colorectal Other         cousin maternal      Colon Cancer Cousin      Colon Cancer Cousin      Diabetes No family hx of        Social History     Social History     Marital status: Single     Spouse name: N/A     Number of children: N/A     Years of education: N/A     Social History Main Topics     Smoking status: Never Smoker     Smokeless tobacco: Never Used     Alcohol use 1.0 oz/week       Past Surgical History:   Procedure Laterality Date     CHOLECYSTECTOMY, LAPOROSCOPIC      Cholecystectomy, Laparoscopic     COLONOSCOPY N/A 11/25/2014    Procedure: COLONOSCOPY;  Surgeon: Iraj  Wenceslao SOMERS MD;  Location:  GI     CYSTOSCOPY, SLING TRANSVAGINAL N/A 8/20/2018    Procedure: CYSTOSCOPY, SLING TRANSVAGINAL;;  Surgeon: Urban Elena MD;  Location: [facility]     D & C      X2-3 for abnormal bleeding     ESOPHAGOSCOPY, GASTROSCOPY, DUODENOSCOPY (EGD), COMBINED N/A 11/25/2014    Procedure: COMBINED ESOPHAGOSCOPY, GASTROSCOPY, DUODENOSCOPY (EGD), BIOPSY SINGLE OR MULTIPLE;  Surgeon: Wenceslao Galo MD;  Location:  GI     LAPAROSCOPIC HYSTERECTOMY SUPRACERVICAL N/A 8/20/2018    Procedure: LAPAROSCOPIC HYSTERECTOMY SUPRACERVICAL;  LAPAROSCOPIC SUBTOATAL HYSTERECTOMY, BILATERAL SALPINGECTOMY, SUBURETHRAL SLING AND CYSTOSCOPY;  Surgeon: Urban Elena MD;  Location: [facility]     LAPAROSCOPIC SALPINGECTOMY Bilateral 8/20/2018    Procedure: LAPAROSCOPIC SALPINGECTOMY;;  Surgeon: Urban Elena MD;  Location: [facility]     LAPAROSCOPY      For endometriosis     OPERATIVE HYSTEROSCOPY WITH MORCELLATOR N/A 3/29/2018    Procedure: OPERATIVE HYSTEROSCOPY WITH MORCELLATOR (MYOSURE);  OPERATIVE HYSTEROSCOPY WITH MORCELLATOR ;  Surgeon: Urban Elena MD;  Location: [facility]             Review of Systems   Musculoskeletal: Positive for joint pain.   All other systems reviewed and are negative.        Physical Exam   Musculoskeletal:        Right knee: Medial joint line tenderness noted.     /77   Ht 1.829 m (6')   Wt 147.9 kg (326 lb)   BMI 44.21 kg/m    Constitutional:well-developed, well-nourished, and in no distress.   Cardiovascular: Intact distal pulses.    Neurological: alert. Gait Abnormal:   Antalgic gait  Skin: Skin is warm and dry.   Psychiatric: Mood and affect normal.   Respiratory: unlabored, speaks in full sentences  Lymph: no LAD, no lymphangitis      Right Knee Exam   Swelling: Mild  Effusion: Yes    Tenderness   The patient is experiencing tenderness in the medial joint line.    Range of Motion   Extension: Normal  Flexion:     120    Tests   McMurrays:  Medial -  Positive      Lateral - Negative  Lachman:  Anterior - Negative    Posterior - n/t  Drawer:       Anterior - Negative    Posterior - Negative  Varus:  Negative  Valgus: Negative  Pivot Shift: Negative  Patellar Apprehension: No            Recent Results (from the past 744 hour(s))   XR Knee Right 3 Views    Narrative    XR KNEE RT 3 VW 11/29/2018 7:00 PM    COMPARISON: None.    HISTORY: RIGHT knee injury.      Impression    IMPRESSION: No fractures are seen in the RIGHT knee. Minimal  osteophytosis with preserved joint space. No knee effusion.    PRUDENCIO BASS MD   MR Knee Right w/o Contrast    Narrative    MR KNEE RIGHT WITHOUT CONTRAST   12/4/2018 5:43 PM    HISTORY: Acute knee pain and swelling, evaluate for ACL tear. Acute  pain of right knee.    TECHNIQUE: Sagittal proton density and T2, coronal T1, and coronal and  transverse fat suppressed T2 weighted images.    FINDINGS:   Medial Meniscus: No tear, displaced fragment, or extrusion.       Lateral Meniscus: No tear, displaced fragment, or extrusion.       Anterior Cruciate Ligament: Intact. No thickening or intrasubstance  signal abnormality.     Posterior Cruciate Ligament: Intact. No thickening or intrasubstance  signal abnormality.     Medial Collateral Ligament: No sprain or tear identified.    Lateral Collateral Ligament Complex, Popliteus Tendon: The fibular  collateral ligament, biceps femoris tendon, popliteal tendon, and  iliotibial band are intact.    Osseous Structures and Cartilaginous Surfaces: There is an impaction  fracture involving the posterior rim of the lateral tibial plateau.  Fracture enters the joint approximately 1 cm from the posterior rim.  Although there may be slight posterior depression, there is no  step-off of the articular surface. Adjacent marrow edema is noted.  Chondral surfaces in the medial, lateral, and patellofemoral  compartments appear to be grossly intact.    Extensor Mechanism: The quadriceps and infrapatellar tendons  are  intact. The medial and lateral patellar retinacula appear  unremarkable.    Joint Space: There is a small to moderate joint effusion.    Additional Findings: No semimembranosus-tibial collateral ligament or  pes anserine bursitis.      Impression    IMPRESSION:   1. Lateral tibial plateau posterior impaction fracture involving the  posterior rim of the articular surface. While this may be minimally  depressed at the posterior margin, there is no step-off of the  articular surface. Adjacent marrow edema is noted.  2. No apparent cruciate ligament or collateral ligament tear. No  meniscal tear.    PRATIMA DAS MD   XR Knee Left 3 Views    Narrative    KNEE THREE VIEWS LEFT  12/7/2018 5:18 PM     HISTORY: ; Knee injury, left, initial encounter    COMPARISON: None.    FINDINGS: There is normal osseous alignment.  No fractures are  identified.      Impression    IMPRESSION: Osseous structures appear intact.    DERICK GUADARRAMA MD   US Lower Extremity Venous Duplex Left    Narrative    VENOUS ULTRASOUND LEFT LEG  12/7/2018 6:35 PM     HISTORY: ; Left leg swelling    COMPARISON: None.    FINDINGS:  Examination of the deep veins with graded compression and  color flow Doppler with spectral wave form analysis shows no evidence  of thrombus in the common femoral vein, femoral vein, popliteal vein  or calf veins.  There is no venous insufficiency.      Impression    IMPRESSION: No evidence of deep venous thrombosis in the left leg.    DERICK GUADARRAMA MD         X-ray images Ordered and independently reviewed by me in the office today with the patient. X-ray shows:   Recent Results (from the past 48 hour(s))   XR Knee Right 3 Views    Narrative    12/14/2018    No fracture, dislocation and or lesion. Normal alignment.  Joint space   maintained no significant arthritis. No appreciable soft tissue   abnormality       ASSESSMENT/PLAN    ICD-10-CM    1. Acute pain of right knee M25.561 XR Knee Right 3 Views   2. Closed fracture of  posterior aspect of right tibial plateau with routine healing, subsequent encounter S82.141D    3. Acute pain of left knee M25.562    4. Effusion of bursa of left knee M25.462    5. Effusion of knee joint, left M25.462        Reassurance, x-rays of right knee today are normal, nondisplaced posterior tibial plateau impaction fracture may take several weeks to heal.    Left knee pain, effusion, and acute bursitis related to fall and to increase stresses on the left knee.  X-ray negative for arthritis or fracture, suspect underlying chondromalacia as well.  Continue ibuprofen, cold packs, weightbearing as tolerated, and offered cortisone injection into the knee joint for acute pain control to which patient readily agreed.    Large Joint Injection/Arthocentesis: L knee joint  Date/Time: 12/14/2018 11:16 AM  Performed by: Waqas Waterman MD  Authorized by: Waqas Waterman MD     Indications:  Pain and osteoarthritis  Needle Size:  25 G  Guidance: landmark guided    Approach:  Superolateral  Location:  Knee  Site:  L knee joint  Medications:  4 mL lidocaine 1 %; 40 mg triamcinolone 40 MG/ML  Outcome:  Tolerated well, no immediate complications  Procedure discussed: discussed risks, benefits, and alternatives    Consent Given by:  Patient  Timeout: timeout called immediately prior to procedure    Prep: patient was prepped and draped in usual sterile fashion     Lot: [id number] EXP: 05/2020

## 2018-12-14 ENCOUNTER — OFFICE VISIT (OUTPATIENT)
Dept: ORTHOPEDICS | Facility: CLINIC | Age: 53
End: 2018-12-14
Payer: COMMERCIAL

## 2018-12-14 ENCOUNTER — ANCILLARY PROCEDURE (OUTPATIENT)
Dept: GENERAL RADIOLOGY | Facility: CLINIC | Age: 53
End: 2018-12-14
Attending: FAMILY MEDICINE
Payer: COMMERCIAL

## 2018-12-14 VITALS
SYSTOLIC BLOOD PRESSURE: 109 MMHG | DIASTOLIC BLOOD PRESSURE: 77 MMHG | WEIGHT: 293 LBS | BODY MASS INDEX: 39.68 KG/M2 | HEIGHT: 72 IN

## 2018-12-14 DIAGNOSIS — M25.462 EFFUSION OF BURSA OF LEFT KNEE: ICD-10-CM

## 2018-12-14 DIAGNOSIS — M25.562 ACUTE PAIN OF LEFT KNEE: ICD-10-CM

## 2018-12-14 DIAGNOSIS — M25.561 ACUTE PAIN OF RIGHT KNEE: ICD-10-CM

## 2018-12-14 DIAGNOSIS — M25.561 ACUTE PAIN OF RIGHT KNEE: Primary | ICD-10-CM

## 2018-12-14 DIAGNOSIS — S82.141D: ICD-10-CM

## 2018-12-14 DIAGNOSIS — M25.462 EFFUSION OF KNEE JOINT, LEFT: ICD-10-CM

## 2018-12-14 PROCEDURE — 73562 X-RAY EXAM OF KNEE 3: CPT | Mod: RT | Performed by: FAMILY MEDICINE

## 2018-12-14 PROCEDURE — 99214 OFFICE O/P EST MOD 30 MIN: CPT | Mod: 25 | Performed by: FAMILY MEDICINE

## 2018-12-14 PROCEDURE — 20610 DRAIN/INJ JOINT/BURSA W/O US: CPT | Mod: LT | Performed by: FAMILY MEDICINE

## 2018-12-14 RX ORDER — TRIAMCINOLONE ACETONIDE 40 MG/ML
40 INJECTION, SUSPENSION INTRA-ARTICULAR; INTRAMUSCULAR
Status: SHIPPED | OUTPATIENT
Start: 2018-12-14

## 2018-12-14 RX ORDER — LIDOCAINE HYDROCHLORIDE 10 MG/ML
4 INJECTION, SOLUTION INFILTRATION; PERINEURAL
Status: SHIPPED | OUTPATIENT
Start: 2018-12-14

## 2018-12-14 RX ADMIN — LIDOCAINE HYDROCHLORIDE 4 ML: 10 INJECTION, SOLUTION INFILTRATION; PERINEURAL at 11:16

## 2018-12-14 RX ADMIN — TRIAMCINOLONE ACETONIDE 40 MG: 40 INJECTION, SUSPENSION INTRA-ARTICULAR; INTRAMUSCULAR at 11:16

## 2018-12-14 ASSESSMENT — MIFFLIN-ST. JEOR: SCORE: 2195.73

## 2018-12-14 NOTE — LETTER
12/14/2018         RE: Fouzia Arenas  [address] [place] [address]        Dear Colleague,    Thank you for referring your patient, Fouzia Arenas, to the [facility]. Please see a copy of my visit note below.    HPI   [facility]   Follow-up Visit s Dec 14, 2018    PCP: Ruthie Xiong      Subjective:  Fouzia is a 53 year old female who is seen in follow up for evaluation of   Chief Complaint   Patient presents with     Left Knee - Pain     Right Knee - Pain     Her last visit was on 11/30/2018.  Since that time, symptoms have been unchanged. Fouzia Arenas is accompanied today by self.     Patient had a knee MRI since last visit.  .  Patient has noticed a stable course of symptoms with rest and imaging treatment.  Patient has marked swelling  Pain is located bilateral knees, anterior, persistent.  Patient is using no aids.    Patient denies any new injuries.    She is actually having more severe pain in her left knee today.  This came on gradually since her last visit presumably due to staying off her right knee and putting more stress on the left knee.  Seen in urgent care and had x-rays and ultrasound done which was negative for DVT or other findings.  Left knee is worse than right today.    Patient's past medical, surgical, social and family histories are reviewed today.    History from previous visit on 11/30/2018  Injury: Patient describes injury as slipped on ice and felt a pop anteriorly.     Location of Pain: right knee anterior , posterior and medial, nonradiating   Duration of Pain: 1 day(s)  Rating of Pain at worst: 7/10  Rating of Pain Currently: 6/10  Pain is better with: activity avoidance   Pain is worse with: elevation and walking    Treatment so far consists of: knee immobilizer, ice and rest  Associated symptoms: feeling of instability and swelling Moderate  Recent imaging completed: X-rays completed  11/29/1/8.  Prior History of related problems: none    Social History: is employed as a/an computer worker      Past Medical History:   Diagnosis Date     Classic migraine      Complex endometrial hyperplasia     without atypia     Hypertension      Motion sickness      Obese      STORMY (obstructive sleep apnea)      Palpitations      PONV (postoperative nausea and vomiting)      SVT (supraventricular tachycardia) (H) 9/2015       Patient Active Problem List    Diagnosis Date Noted     Vertigo 05/08/2018     Priority: Medium     Hypertension goal BP (blood pressure) < 130/80 03/26/2018     Priority: Medium     Morbid obesity (H) 02/06/2018     Priority: Medium     Atypical chest pain 04/05/2017     Priority: Medium     STORMY (obstructive sleep apnea) 09/30/2016     Priority: Medium     Palpitations      Priority: Medium     Esophageal reflux 12/07/2013     Priority: Medium     CARDIOVASCULAR SCREENING; LDL GOAL LESS THAN 160 12/07/2013     Priority: Medium     BPPV (benign paroxysmal positional vertigo) 09/12/2012     Priority: Medium     Other disorder of menstruation and other abnormal bleeding from female genital tract 02/24/2011     Priority: Medium       Family History   Problem Relation Age of Onset     Hypertension Mother      Lipids Mother      Hypertension Father      Prostate Cancer Father      Lipids Father      Breast Cancer Maternal Aunt      Hypertension Maternal Grandmother      C.A.D. Maternal Grandmother      Gallbladder Disease Maternal Grandmother      Cancer Maternal Grandfather         lung     Cerebrovascular Disease Paternal Grandmother      C.A.D. Paternal Grandfather      Cancer - colorectal Other         cousin maternal      Colon Cancer Cousin      Colon Cancer Cousin      Diabetes No family hx of        Social History     Social History     Marital status: Single     Spouse name: N/A     Number of children: N/A     Years of education: N/A     Social History Main Topics     Smoking status:  Never Smoker     Smokeless tobacco: Never Used     Alcohol use 1.0 oz/week       Past Surgical History:   Procedure Laterality Date     CHOLECYSTECTOMY, LAPOROSCOPIC      Cholecystectomy, Laparoscopic     COLONOSCOPY N/A 11/25/2014    Procedure: COLONOSCOPY;  Surgeon: Wenceslao Galo MD;  Location:  GI     CYSTOSCOPY, SLING TRANSVAGINAL N/A 8/20/2018    Procedure: CYSTOSCOPY, SLING TRANSVAGINAL;;  Surgeon: Urban Elena MD;  Location: [facility]     D & C      X2-3 for abnormal bleeding     ESOPHAGOSCOPY, GASTROSCOPY, DUODENOSCOPY (EGD), COMBINED N/A 11/25/2014    Procedure: COMBINED ESOPHAGOSCOPY, GASTROSCOPY, DUODENOSCOPY (EGD), BIOPSY SINGLE OR MULTIPLE;  Surgeon: Wenceslao Galo MD;  Location: [facility]     LAPAROSCOPIC HYSTERECTOMY SUPRACERVICAL N/A 8/20/2018    Procedure: LAPAROSCOPIC HYSTERECTOMY SUPRACERVICAL;  LAPAROSCOPIC SUBTOATAL HYSTERECTOMY, BILATERAL SALPINGECTOMY, SUBURETHRAL SLING AND CYSTOSCOPY;  Surgeon: Urban Elena MD;  Location: [facility]     LAPAROSCOPIC SALPINGECTOMY Bilateral 8/20/2018    Procedure: LAPAROSCOPIC SALPINGECTOMY;;  Surgeon: Urban Elena MD;  Location: [facility]     LAPAROSCOPY      For endometriosis     OPERATIVE HYSTEROSCOPY WITH MORCELLATOR N/A 3/29/2018    Procedure: OPERATIVE HYSTEROSCOPY WITH MORCELLATOR (MYOSURE);  OPERATIVE HYSTEROSCOPY WITH MORCELLATOR ;  Surgeon: Urban Elena MD;  Location: [facility]             Review of Systems   Musculoskeletal: Positive for joint pain.   All other systems reviewed and are negative.        Physical Exam   Musculoskeletal:        Right knee: Medial joint line tenderness noted.     /77   Ht 1.829 m (6')   Wt 147.9 kg (326 lb)   BMI 44.21 kg/m     Constitutional:well-developed, well-nourished, and in no distress.   Cardiovascular: Intact distal pulses.    Neurological: alert. Gait Abnormal:   Antalgic gait  Skin: Skin is warm and dry.   Psychiatric: Mood and affect normal.   Respiratory: unlabored,  speaks in full sentences  Lymph: no LAD, no lymphangitis      Right Knee Exam   Swelling: Mild  Effusion: Yes    Tenderness   The patient is experiencing tenderness in the medial joint line.    Range of Motion   Extension: Normal  Flexion:     120    Tests   McMurrays:  Medial - Positive      Lateral - Negative  Lachman:  Anterior - Negative    Posterior - n/t  Drawer:       Anterior - Negative    Posterior - Negative  Varus:  Negative  Valgus: Negative  Pivot Shift: Negative  Patellar Apprehension: No            Recent Results (from the past 744 hour(s))   XR Knee Right 3 Views    Narrative    XR KNEE RT 3 VW 11/29/2018 7:00 PM    COMPARISON: None.    HISTORY: RIGHT knee injury.      Impression    IMPRESSION: No fractures are seen in the RIGHT knee. Minimal  osteophytosis with preserved joint space. No knee effusion.    PRUDENCIO BASS MD   MR Knee Right w/o Contrast    Narrative    MR KNEE RIGHT WITHOUT CONTRAST   12/4/2018 5:43 PM    HISTORY: Acute knee pain and swelling, evaluate for ACL tear. Acute  pain of right knee.    TECHNIQUE: Sagittal proton density and T2, coronal T1, and coronal and  transverse fat suppressed T2 weighted images.    FINDINGS:   Medial Meniscus: No tear, displaced fragment, or extrusion.       Lateral Meniscus: No tear, displaced fragment, or extrusion.       Anterior Cruciate Ligament: Intact. No thickening or intrasubstance  signal abnormality.     Posterior Cruciate Ligament: Intact. No thickening or intrasubstance  signal abnormality.     Medial Collateral Ligament: No sprain or tear identified.    Lateral Collateral Ligament Complex, Popliteus Tendon: The fibular  collateral ligament, biceps femoris tendon, popliteal tendon, and  iliotibial band are intact.    Osseous Structures and Cartilaginous Surfaces: There is an impaction  fracture involving the posterior rim of the lateral tibial plateau.  Fracture enters the joint approximately 1 cm from the posterior rim.  Although there may  be slight posterior depression, there is no  step-off of the articular surface. Adjacent marrow edema is noted.  Chondral surfaces in the medial, lateral, and patellofemoral  compartments appear to be grossly intact.    Extensor Mechanism: The quadriceps and infrapatellar tendons are  intact. The medial and lateral patellar retinacula appear  unremarkable.    Joint Space: There is a small to moderate joint effusion.    Additional Findings: No semimembranosus-tibial collateral ligament or  pes anserine bursitis.      Impression    IMPRESSION:   1. Lateral tibial plateau posterior impaction fracture involving the  posterior rim of the articular surface. While this may be minimally  depressed at the posterior margin, there is no step-off of the  articular surface. Adjacent marrow edema is noted.  2. No apparent cruciate ligament or collateral ligament tear. No  meniscal tear.    PRATIMA DAS MD   XR Knee Left 3 Views    Narrative    KNEE THREE VIEWS LEFT  12/7/2018 5:18 PM     HISTORY: ; Knee injury, left, initial encounter    COMPARISON: None.    FINDINGS: There is normal osseous alignment.  No fractures are  identified.      Impression    IMPRESSION: Osseous structures appear intact.    DERICK GUADARRAMA MD   US Lower Extremity Venous Duplex Left    Narrative    VENOUS ULTRASOUND LEFT LEG  12/7/2018 6:35 PM     HISTORY: ; Left leg swelling    COMPARISON: None.    FINDINGS:  Examination of the deep veins with graded compression and  color flow Doppler with spectral wave form analysis shows no evidence  of thrombus in the common femoral vein, femoral vein, popliteal vein  or calf veins.  There is no venous insufficiency.      Impression    IMPRESSION: No evidence of deep venous thrombosis in the left leg.    DERICK GUADARRAMA MD         X-ray images Ordered and independently reviewed by me in the office today with the patient. X-ray shows:   Recent Results (from the past 48 hour(s))   XR Knee Right 3 Views    Narrative     12/14/2018    No fracture, dislocation and or lesion. Normal alignment.  Joint space   maintained no significant arthritis. No appreciable soft tissue   abnormality       ASSESSMENT/PLAN    ICD-10-CM    1. Acute pain of right knee M25.561 XR Knee Right 3 Views   2. Closed fracture of posterior aspect of right tibial plateau with routine healing, subsequent encounter S82.141D    3. Acute pain of left knee M25.562    4. Effusion of bursa of left knee M25.462    5. Effusion of knee joint, left M25.462        Reassurance, x-rays of right knee today are normal, nondisplaced posterior tibial plateau impaction fracture may take several weeks to heal.    Left knee pain, effusion, and acute bursitis related to fall and to increase stresses on the left knee.  X-ray negative for arthritis or fracture, suspect underlying chondromalacia as well.  Continue ibuprofen, cold packs, weightbearing as tolerated, and offered cortisone injection into the knee joint for acute pain control to which patient readily agreed.    Large Joint Injection/Arthocentesis: L knee joint  Date/Time: 12/14/2018 11:16 AM  Performed by: Waqas Waterman MD  Authorized by: Waqas Waterman MD     Indications:  Pain and osteoarthritis  Needle Size:  25 G  Guidance: landmark guided    Approach:  Superolateral  Location:  Knee  Site:  L knee joint  Medications:  4 mL lidocaine 1 %; 40 mg triamcinolone 40 MG/ML  Outcome:  Tolerated well, no immediate complications  Procedure discussed: discussed risks, benefits, and alternatives    Consent Given by:  Patient  Timeout: timeout called immediately prior to procedure    Prep: patient was prepped and draped in usual sterile fashion     Lot: [id number] EXP: 05/2020              Again, thank you for allowing me to participate in the care of your patient.        Sincerely,        Waqas Waterman MD

## 2018-12-18 ENCOUNTER — TELEPHONE (OUTPATIENT)
Dept: ORTHOPEDICS | Facility: CLINIC | Age: 53
End: 2018-12-18

## 2018-12-18 NOTE — TELEPHONE ENCOUNTER
"Name of caller: Fouzia  Relationship of Patient: Self    Reason for Call: PT called in and stated she had a cortisone injection last Friday with Dr. Waterman and she is now experiencing radiating pain running down her shin. \"like a painful pulling sensation\"     Best phone number to reach pt at is: [phone]  Ok to leave a message with medical info? Yes    Maria Del Carmen Rahman  Patient Representative - [facility]      "

## 2018-12-20 NOTE — TELEPHONE ENCOUNTER
Patient left voicemail asking to speak with Dr. Waterman.   She can be reached at: [phone]    Per chart review, patient had L knee cortisone injection on 12/14/18 by Dr. Waterman.     Left voicemail for patient to return call.     MARÍA [facility] RN

## 2018-12-21 ENCOUNTER — OFFICE VISIT (OUTPATIENT)
Dept: ORTHOPEDICS | Facility: CLINIC | Age: 53
End: 2018-12-21
Payer: COMMERCIAL

## 2018-12-21 VITALS
DIASTOLIC BLOOD PRESSURE: 80 MMHG | WEIGHT: 293 LBS | HEIGHT: 72 IN | BODY MASS INDEX: 39.68 KG/M2 | SYSTOLIC BLOOD PRESSURE: 120 MMHG

## 2018-12-21 DIAGNOSIS — R31.9 HEMATURIA, UNSPECIFIED TYPE: ICD-10-CM

## 2018-12-21 DIAGNOSIS — M25.562 ACUTE PAIN OF LEFT KNEE: Primary | ICD-10-CM

## 2018-12-21 PROCEDURE — 99214 OFFICE O/P EST MOD 30 MIN: CPT | Performed by: FAMILY MEDICINE

## 2018-12-21 RX ORDER — TRAMADOL HYDROCHLORIDE 50 MG/1
50 TABLET ORAL EVERY 6 HOURS PRN
Qty: 10 TABLET | Refills: 0 | Status: SHIPPED | OUTPATIENT
Start: 2018-12-21 | End: 2019-09-23

## 2018-12-21 RX ORDER — SULFAMETHOXAZOLE AND TRIMETHOPRIM 400; 80 MG/1; MG/1
1 TABLET ORAL 2 TIMES DAILY
Qty: 14 TABLET | Refills: 0 | Status: SHIPPED | OUTPATIENT
Start: 2018-12-21 | End: 2018-12-28

## 2018-12-21 RX ORDER — HYDROXYZINE HYDROCHLORIDE 25 MG/1
TABLET, FILM COATED ORAL
Qty: 30 TABLET | Refills: 0 | Status: SHIPPED | OUTPATIENT
Start: 2018-12-21 | End: 2019-09-23

## 2018-12-21 RX ORDER — NAPROXEN 500 MG/1
TABLET ORAL
Refills: 1 | COMMUNITY
Start: 2018-12-11 | End: 2021-01-29

## 2018-12-21 ASSESSMENT — MIFFLIN-ST. JEOR: SCORE: 2195.73

## 2018-12-21 ASSESSMENT — ENCOUNTER SYMPTOMS
HEMATURIA: 1
BRUISES/BLEEDS EASILY: 1

## 2018-12-21 NOTE — TELEPHONE ENCOUNTER
Patient left voicemail yesterday and states she has been playing phone tag trying to reach Dr. Waterman.   She states her knee doesn't have a lot of pain, but has extreme pain on the front of her leg on her shin. Even the blanket touching it causes pain. Leg is swollen also. Doesn't want to go through the holidays in this much pain. Would like to get an MRI as previously discussed. She will try to keep phone with her.     Phone call to patient.   She has a bulge of swelling just below her knee that is unchanged from when she had the injection.   Pain runs from just below the knee down to her ankle along her shin on the top and side of her left leg.   Feels like it pulling when she bears weight or when puts a shoe on.   Pain is constant when she bears weight. Elevation helps relieve it.   Will have throbbing pain in leg when first bears weight.   Denies calf pain or redness.  Previously had bruising on leg from fall that had faded. After cortisone injection, it returned. Looks bruised from below knee down shin.   Had facial flushing that started the day after the injection that lasted 2-3 days.   Also got a headache evening of injection that lasted 2 days.   However, is prone to headaches and migraines.     Tentative appointment scheduled today with Dr. Waterman. Will discuss with him if he agrees patient should be seen and get back with her either way.     Please advise.     MARÍA Montgomery RN

## 2018-12-21 NOTE — PROGRESS NOTES
[facility]   Follow-up Visit s Dec 21, 2018    PCP: Ruthie Xiong      Subjective:  Fouzia is a 53 year old female who is seen in follow up for evaluation of   Chief Complaint   Patient presents with     Left Knee - Pain     Her last visit was on 12/18/2018.  Since that time, symptoms have been worse than before. Fouzia Arenas is accompanied today by self.     Patient had a left knee Cortisone injection on 12/14/2018 that provided 100% relief.     Patient has noticed a fluctuating course of symptoms with injection treatment. Patient now has marked briusing and some swelling along her anterior left knee and into her medial lower leg.   Patient has marked swelling in lower left leg. Pain with palpation with blankets and putting on her shoes  Pain is located left lower leg medial, waxing and waning.  Patient is using no aids.    Patient denies any new injuries.    Patient's past medical, surgical, social and family histories are reviewed today.    Social History: is employed as a/an computer worker      Past Medical History:   Diagnosis Date     Classic migraine      Complex endometrial hyperplasia     without atypia     Hypertension      Motion sickness      Obese      STORMY (obstructive sleep apnea)      Palpitations      PONV (postoperative nausea and vomiting)      SVT (supraventricular tachycardia) (H) 9/2015       Patient Active Problem List    Diagnosis Date Noted     Vertigo 05/08/2018     Priority: Medium     Hypertension goal BP (blood pressure) < 130/80 03/26/2018     Priority: Medium     Morbid obesity (H) 02/06/2018     Priority: Medium     Atypical chest pain 04/05/2017     Priority: Medium     STORMY (obstructive sleep apnea) 09/30/2016     Priority: Medium     Palpitations      Priority: Medium     Esophageal reflux 12/07/2013     Priority: Medium     CARDIOVASCULAR SCREENING; LDL GOAL LESS THAN 160 12/07/2013     Priority: Medium     BPPV (benign paroxysmal  positional vertigo) 09/12/2012     Priority: Medium     Other disorder of menstruation and other abnormal bleeding from female genital tract 02/24/2011     Priority: Medium       Family History   Problem Relation Age of Onset     Hypertension Mother      Lipids Mother      Hypertension Father      Prostate Cancer Father      Lipids Father      Breast Cancer Maternal Aunt      Hypertension Maternal Grandmother      C.A.D. Maternal Grandmother      Gallbladder Disease Maternal Grandmother      Cancer Maternal Grandfather         lung     Cerebrovascular Disease Paternal Grandmother      C.A.D. Paternal Grandfather      Cancer - colorectal Other         cousin maternal      Colon Cancer Cousin      Colon Cancer Cousin      Diabetes No family hx of        Social History     Social History     Marital status: Single     Spouse name: N/A     Number of children: N/A     Years of education: N/A     Social History Main Topics     Smoking status: Never Smoker     Smokeless tobacco: Never Used     Alcohol use 1.0 oz/week       Past Surgical History:   Procedure Laterality Date     CHOLECYSTECTOMY, LAPOROSCOPIC      Cholecystectomy, Laparoscopic     COLONOSCOPY N/A 11/25/2014    Procedure: COLONOSCOPY;  Surgeon: Wenceslao Galo MD;  Location:  GI     CYSTOSCOPY, SLING TRANSVAGINAL N/A 8/20/2018    Procedure: CYSTOSCOPY, SLING TRANSVAGINAL;;  Surgeon: Urban Elena MD;  Location: [facility]     D & C      X2-3 for abnormal bleeding     ESOPHAGOSCOPY, GASTROSCOPY, DUODENOSCOPY (EGD), COMBINED N/A 11/25/2014    Procedure: COMBINED ESOPHAGOSCOPY, GASTROSCOPY, DUODENOSCOPY (EGD), BIOPSY SINGLE OR MULTIPLE;  Surgeon: Wenceslao Galo MD;  Location: [facility]     LAPAROSCOPIC HYSTERECTOMY SUPRACERVICAL N/A 8/20/2018    Procedure: LAPAROSCOPIC HYSTERECTOMY SUPRACERVICAL;  LAPAROSCOPIC SUBTOATAL HYSTERECTOMY, BILATERAL SALPINGECTOMY, SUBURETHRAL SLING AND CYSTOSCOPY;  Surgeon: Urban Elena MD;  Location: [facility]      LAPAROSCOPIC SALPINGECTOMY Bilateral 8/20/2018    Procedure: LAPAROSCOPIC SALPINGECTOMY;;  Surgeon: Urban Elena MD;  Location: [facility]     LAPAROSCOPY      For endometriosis     OPERATIVE HYSTEROSCOPY WITH MORCELLATOR N/A 3/29/2018    Procedure: OPERATIVE HYSTEROSCOPY WITH MORCELLATOR (MYOSURE);  OPERATIVE HYSTEROSCOPY WITH MORCELLATOR ;  Surgeon: Urban Elena MD;  Location: [facility]             Review of Systems   Genitourinary: Positive for hematuria.   Musculoskeletal: Positive for joint pain.   Endo/Heme/Allergies: Bruises/bleeds easily.   All other systems reviewed and are negative.        Physical Exam   Musculoskeletal:        Left knee: Medial joint line and lateral joint line tenderness noted.     /80   Ht 1.829 m (6')   Wt 147.9 kg (326 lb)   BMI 44.21 kg/m    Constitutional:well-developed, well-nourished, and in no distress.   Cardiovascular: Intact distal pulses.    Neurological: alert. Gait Abnormal:   Antalgic gait  Skin: Skin is warm and dry.   Psychiatric: Mood and affect normal.   Respiratory: unlabored, speaks in full sentences  Lymph: no LAD, no lymphangitis      Left Knee Exam   Swelling: Mild  Effusion: Yes    Tenderness   The patient is experiencing tenderness in the medial joint line, lateral joint line.    Range of Motion   Extension: Normal  Flexion:     120    Tests   Lachman:  Anterior - Negative      Drawer:       Anterior - Negative     Posterior - Negative  Varus:  Negative  Valgus: Negative    Comments:  Circular bruising over anterior and lateral patella approximating injection site.  Extensive bruising along medial aspect of lower leg from the base of the tibial plateau to the medial malleolus.  There is no calf tenderness, no cords, negative Homans sign, good strength about the foot and ankle.          XR Knee Left 3 Views    Narrative    KNEE THREE VIEWS LEFT  12/7/2018 5:18 PM     HISTORY: ; Knee injury, left, initial encounter    COMPARISON:  None.    FINDINGS: There is normal osseous alignment.  No fractures are  identified.      Impression    IMPRESSION: Osseous structures appear intact.    DERICK GUADARRAMA MD   US Lower Extremity Venous Duplex Left    Narrative    VENOUS ULTRASOUND LEFT LEG  12/7/2018 6:35 PM     HISTORY: ; Left leg swelling    COMPARISON: None.    FINDINGS:  Examination of the deep veins with graded compression and  color flow Doppler with spectral wave form analysis shows no evidence  of thrombus in the common femoral vein, femoral vein, popliteal vein  or calf veins.  There is no venous insufficiency.      Impression    IMPRESSION: No evidence of deep venous thrombosis in the left leg.    DERICK GUADARRAMA MD   XR Knee Right 3 Views    Narrative    12/14/2018    No fracture, dislocation and or lesion. Normal alignment.  Joint space   maintained no significant arthritis. No appreciable soft tissue   abnormality       ASSESSMENT/PLAN    ICD-10-CM    1. Acute pain of left knee M25.562 naproxen (NAPROSYN) 500 MG tablet     MR Knee Left w/o Contrast     traMADol (ULTRAM) 50 MG tablet     hydrOXYzine (ATARAX) 25 MG tablet   2. Hematuria, unspecified type R31.9 sulfamethoxazole-trimethoprim (BACTRIM/SEPTRA) 400-80 MG tablet     Right knee for which she was originally seen and diagnosed with a posterior peripheral tibial plateau fracture is doing well    Left knee continues to be significantly painful, presumed osteoarthritis flare from increased stressors following right knee pain, mild pain relief from cortisone injection at last visit but now has extensive bruising, etiology uncertain.  Differential includes DVT but this seems clinically unlikely.  Instead, subcutaneous bruising, perhaps from hemarthrosis or injection itself, which was not notable for any complications itself.    Will proceed with MR of the left knee to evaluate for intra-articular pathology there.  Refill tramadol, though she has been using this minimally as it makes her feel  disoriented.  There is an anxiety component and therefore hydroxyzine is added as well.    She does note some mild hematuria.  States he gets this since her hysterectomy in the summer.  With knee pain it is extremely difficult for her to get up to the bathroom.  Empiric short course of Bactrim ordered, if not resolved will require further investigation elsewhere.  Procedures

## 2018-12-21 NOTE — LETTER
12/21/2018         RE: Fouzia Arenas  [address] [place] [address]        Dear Colleague,    Thank you for referring your patient, Fouzia Arenas, to the [facility]. Please see a copy of my visit note below.    HPI   [facility]   Follow-up Visit s Dec 21, 2018    PCP: Ruthie Xiong      Subjective:  Fouzia is a 53 year old female who is seen in follow up for evaluation of   Chief Complaint   Patient presents with     Left Knee - Pain     Her last visit was on 12/18/2018.  Since that time, symptoms have been worse than before. Fouzia Arenas is accompanied today by self.     Patient had a left knee Cortisone injection on 12/14/2018 that provided 100% relief.     Patient has noticed a fluctuating course of symptoms with injection treatment. Patient now has marked briusing and some swelling along her anterior left knee and into her medial lower leg.   Patient has marked swelling in lower left leg. Pain with palpation with blankets and putting on her shoes  Pain is located left lower leg medial, waxing and waning.  Patient is using no aids.    Patient denies any new injuries.    Patient's past medical, surgical, social and family histories are reviewed today.    Social History: is employed as a/an computer worker      Past Medical History:   Diagnosis Date     Classic migraine      Complex endometrial hyperplasia     without atypia     Hypertension      Motion sickness      Obese      STORMY (obstructive sleep apnea)      Palpitations      PONV (postoperative nausea and vomiting)      SVT (supraventricular tachycardia) (H) 9/2015       Patient Active Problem List    Diagnosis Date Noted     Vertigo 05/08/2018     Priority: Medium     Hypertension goal BP (blood pressure) < 130/80 03/26/2018     Priority: Medium     Morbid obesity (H) 02/06/2018     Priority: Medium     Atypical chest pain 04/05/2017     Priority: Medium     STORMY  (obstructive sleep apnea) 09/30/2016     Priority: Medium     Palpitations      Priority: Medium     Esophageal reflux 12/07/2013     Priority: Medium     CARDIOVASCULAR SCREENING; LDL GOAL LESS THAN 160 12/07/2013     Priority: Medium     BPPV (benign paroxysmal positional vertigo) 09/12/2012     Priority: Medium     Other disorder of menstruation and other abnormal bleeding from female genital tract 02/24/2011     Priority: Medium       Family History   Problem Relation Age of Onset     Hypertension Mother      Lipids Mother      Hypertension Father      Prostate Cancer Father      Lipids Father      Breast Cancer Maternal Aunt      Hypertension Maternal Grandmother      C.A.D. Maternal Grandmother      Gallbladder Disease Maternal Grandmother      Cancer Maternal Grandfather         lung     Cerebrovascular Disease Paternal Grandmother      C.A.D. Paternal Grandfather      Cancer - colorectal Other         cousin maternal      Colon Cancer Cousin      Colon Cancer Cousin      Diabetes No family hx of        Social History     Social History     Marital status: Single     Spouse name: N/A     Number of children: N/A     Years of education: N/A     Social History Main Topics     Smoking status: Never Smoker     Smokeless tobacco: Never Used     Alcohol use 1.0 oz/week       Past Surgical History:   Procedure Laterality Date     CHOLECYSTECTOMY, LAPOROSCOPIC      Cholecystectomy, Laparoscopic     COLONOSCOPY N/A 11/25/2014    Procedure: COLONOSCOPY;  Surgeon: Wenceslao Galo MD;  Location: [facility]     CYSTOSCOPY, SLING TRANSVAGINAL N/A 8/20/2018    Procedure: CYSTOSCOPY, SLING TRANSVAGINAL;;  Surgeon: Urban Elena MD;  Location: [facility]     D & C      X2-3 for abnormal bleeding     ESOPHAGOSCOPY, GASTROSCOPY, DUODENOSCOPY (EGD), COMBINED N/A 11/25/2014    Procedure: COMBINED ESOPHAGOSCOPY, GASTROSCOPY, DUODENOSCOPY (EGD), BIOPSY SINGLE OR MULTIPLE;  Surgeon: Wenceslao Galo MD;  Location:  GI      LAPAROSCOPIC HYSTERECTOMY SUPRACERVICAL N/A 8/20/2018    Procedure: LAPAROSCOPIC HYSTERECTOMY SUPRACERVICAL;  LAPAROSCOPIC SUBTOATAL HYSTERECTOMY, BILATERAL SALPINGECTOMY, SUBURETHRAL SLING AND CYSTOSCOPY;  Surgeon: Urban Elena MD;  Location: [facility]     LAPAROSCOPIC SALPINGECTOMY Bilateral 8/20/2018    Procedure: LAPAROSCOPIC SALPINGECTOMY;;  Surgeon: Urban Elena MD;  Location: [facility]     LAPAROSCOPY      For endometriosis     OPERATIVE HYSTEROSCOPY WITH MORCELLATOR N/A 3/29/2018    Procedure: OPERATIVE HYSTEROSCOPY WITH MORCELLATOR (MYOSURE);  OPERATIVE HYSTEROSCOPY WITH MORCELLATOR ;  Surgeon: Urban Elena MD;  Location: [facility]             Review of Systems   Genitourinary: Positive for hematuria.   Musculoskeletal: Positive for joint pain.   Endo/Heme/Allergies: Bruises/bleeds easily.   All other systems reviewed and are negative.        Physical Exam   Musculoskeletal:        Left knee: Medial joint line and lateral joint line tenderness noted.     /80   Ht 1.829 m (6')   Wt 147.9 kg (326 lb)   BMI 44.21 kg/m     Constitutional:well-developed, well-nourished, and in no distress.   Cardiovascular: Intact distal pulses.    Neurological: alert. Gait Abnormal:   Antalgic gait  Skin: Skin is warm and dry.   Psychiatric: Mood and affect normal.   Respiratory: unlabored, speaks in full sentences  Lymph: no LAD, no lymphangitis      Left Knee Exam   Swelling: Mild  Effusion: Yes    Tenderness   The patient is experiencing tenderness in the medial joint line, lateral joint line.    Range of Motion   Extension: Normal  Flexion:     120    Tests   Lachman:  Anterior - Negative      Drawer:       Anterior - Negative     Posterior - Negative  Varus:  Negative  Valgus: Negative    Comments:  Circular bruising over anterior and lateral patella approximating injection site.  Extensive bruising along medial aspect of lower leg from the base of the tibial plateau to the medial malleolus.   There is no calf tenderness, no cords, negative Homans sign, good strength about the foot and ankle.          XR Knee Left 3 Views    Narrative    KNEE THREE VIEWS LEFT  12/7/2018 5:18 PM     HISTORY: ; Knee injury, left, initial encounter    COMPARISON: None.    FINDINGS: There is normal osseous alignment.  No fractures are  identified.      Impression    IMPRESSION: Osseous structures appear intact.    DERICK GUADARRAMA MD   US Lower Extremity Venous Duplex Left    Narrative    VENOUS ULTRASOUND LEFT LEG  12/7/2018 6:35 PM     HISTORY: ; Left leg swelling    COMPARISON: None.    FINDINGS:  Examination of the deep veins with graded compression and  color flow Doppler with spectral wave form analysis shows no evidence  of thrombus in the common femoral vein, femoral vein, popliteal vein  or calf veins.  There is no venous insufficiency.      Impression    IMPRESSION: No evidence of deep venous thrombosis in the left leg.    DERICK GUADARRAMA MD   XR Knee Right 3 Views    Narrative    12/14/2018    No fracture, dislocation and or lesion. Normal alignment.  Joint space   maintained no significant arthritis. No appreciable soft tissue   abnormality       ASSESSMENT/PLAN    ICD-10-CM    1. Acute pain of left knee M25.562 naproxen (NAPROSYN) 500 MG tablet     MR Knee Left w/o Contrast     traMADol (ULTRAM) 50 MG tablet     hydrOXYzine (ATARAX) 25 MG tablet   2. Hematuria, unspecified type R31.9 sulfamethoxazole-trimethoprim (BACTRIM/SEPTRA) 400-80 MG tablet     Right knee for which she was originally seen and diagnosed with a posterior peripheral tibial plateau fracture is doing well    Left knee continues to be significantly painful, presumed osteoarthritis flare from increased stressors following right knee pain, mild pain relief from cortisone injection at last visit but now has extensive bruising, etiology uncertain.  Differential includes DVT but this seems clinically unlikely.  Instead, subcutaneous bruising, perhaps from  hemarthrosis or injection itself, which was not notable for any complications itself.    Will proceed with MR of the left knee to evaluate for intra-articular pathology there.  Refill tramadol, though she has been using this minimally as it makes her feel disoriented.  There is an anxiety component and therefore hydroxyzine is added as well.    She does note some mild hematuria.  States he gets this since her hysterectomy in the summer.  With knee pain it is extremely difficult for her to get up to the bathroom.  Empiric short course of Bactrim ordered, if not resolved will require further investigation elsewhere.  Procedures        Again, thank you for allowing me to participate in the care of your patient.        Sincerely,        Waqas Waterman MD

## 2018-12-21 NOTE — TELEPHONE ENCOUNTER
Discussed with GM. He would like to see patient in clinic.     Informed patient. She agrees.     Closing encounter. No further action required.

## 2019-04-01 ENCOUNTER — TELEPHONE (OUTPATIENT)
Dept: FAMILY MEDICINE | Facility: CLINIC | Age: 54
End: 2019-04-01

## 2019-04-01 ENCOUNTER — OFFICE VISIT (OUTPATIENT)
Dept: FAMILY MEDICINE | Facility: CLINIC | Age: 54
End: 2019-04-01
Payer: COMMERCIAL

## 2019-04-01 VITALS
DIASTOLIC BLOOD PRESSURE: 60 MMHG | HEIGHT: 72 IN | WEIGHT: 293 LBS | SYSTOLIC BLOOD PRESSURE: 102 MMHG | BODY MASS INDEX: 39.68 KG/M2 | OXYGEN SATURATION: 98 % | HEART RATE: 61 BPM | TEMPERATURE: 97.5 F

## 2019-04-01 DIAGNOSIS — E66.01 MORBID OBESITY (H): ICD-10-CM

## 2019-04-01 DIAGNOSIS — R19.7 ACUTE DIARRHEA: Primary | ICD-10-CM

## 2019-04-01 LAB
BASOPHILS # BLD AUTO: 0 10E9/L (ref 0–0.2)
BASOPHILS NFR BLD AUTO: 0.4 %
DIFFERENTIAL METHOD BLD: ABNORMAL
EOSINOPHIL # BLD AUTO: 0.1 10E9/L (ref 0–0.7)
EOSINOPHIL NFR BLD AUTO: 1.3 %
ERYTHROCYTE [DISTWIDTH] IN BLOOD BY AUTOMATED COUNT: 12.7 % (ref 10–15)
HCT VFR BLD AUTO: 45 % (ref 35–47)
HGB BLD-MCNC: 15.2 G/DL (ref 11.7–15.7)
LYMPHOCYTES # BLD AUTO: 2.8 10E9/L (ref 0.8–5.3)
LYMPHOCYTES NFR BLD AUTO: 34.3 %
MCH RBC QN AUTO: 27.6 PG (ref 26.5–33)
MCHC RBC AUTO-ENTMCNC: 33.8 G/DL (ref 31.5–36.5)
MCV RBC AUTO: 82 FL (ref 78–100)
MONOCYTES # BLD AUTO: 0.9 10E9/L (ref 0–1.3)
MONOCYTES NFR BLD AUTO: 10.7 %
NEUTROPHILS # BLD AUTO: 4.4 10E9/L (ref 1.6–8.3)
NEUTROPHILS NFR BLD AUTO: 53.3 %
PLATELET # BLD AUTO: 318 10E9/L (ref 150–450)
RBC # BLD AUTO: 5.5 10E12/L (ref 3.8–5.2)
WBC # BLD AUTO: 8.3 10E9/L (ref 4–11)

## 2019-04-01 PROCEDURE — 85025 COMPLETE CBC W/AUTO DIFF WBC: CPT | Performed by: PHYSICIAN ASSISTANT

## 2019-04-01 PROCEDURE — 99213 OFFICE O/P EST LOW 20 MIN: CPT | Performed by: PHYSICIAN ASSISTANT

## 2019-04-01 PROCEDURE — 80053 COMPREHEN METABOLIC PANEL: CPT | Performed by: PHYSICIAN ASSISTANT

## 2019-04-01 PROCEDURE — 36415 COLL VENOUS BLD VENIPUNCTURE: CPT | Performed by: PHYSICIAN ASSISTANT

## 2019-04-01 ASSESSMENT — MIFFLIN-ST. JEOR: SCORE: 2136.76

## 2019-04-01 NOTE — PROGRESS NOTES
"  SUBJECTIVE:   Fouzia Arenas is a 53 year old female who presents to clinic today for the following health issues:    Additional notes added to original RN note at time of appt:    Acute Illness   Acute illness concerns: diarrhea  Called RN line another day last week just to see if it was food poisoning versus viral.  Called again today and she was advised to be seen because today is just feeling very tired, dizzy.  Does feel overall severity of diarrhea is better. Had been going more than 1x per hour.  Now is down to 1x every 3 hours.  No blood, but does appear mucousy.  Drinking well.  Water, ginger ale, fruit pops and celery.  Diffuse gurgling/cramping abdominal pain that does improve with passing a stool, but that does come back.    Onset: Wednesday    Fever: no    Chills/Sweats: YES    Headache (location?): no    Sinus Pressure:no    Conjunctivitis:  no    Ear Pain: no    Rhinorrhea: no     Congestion: no     Sore Throat: no       Cough: no    Wheeze: no     Decreased Appetite: YES    Nausea: YES    Vomiting: no - probably could have, but \"I did everything I could not to.\"    Diarrhea:  YES- Every 2 hours, watery - it was previously a lot more frequently last week it actually in improving yesterday and today. More liquid just with little bits of stuff in it.     Dysuria/Freq.: no     Fatigue/Achiness: YES, feels very tired and worn out    Sick/Strep Exposure: not that she is aware of, no recent travel, no new restaurants.      Therapies Tried and outcome: Has been increasing water intake and mostly been eating fruit bars.     Patient reports just feeling really wiped out and fatigued. Given length of illness, advised an appointment today. Patient verbalized understanding and agrees with plan.     Will call back if further questions or concerns.     Maria Del Carmen Herman RN, BSN      Problem list and histories reviewed & adjusted, as indicated.  Additional history: as documented    Patient Active Problem List   Diagnosis "     Esophageal reflux     CARDIOVASCULAR SCREENING; LDL GOAL LESS THAN 160     Palpitations     STORMY (obstructive sleep apnea)     Atypical chest pain     BPPV (benign paroxysmal positional vertigo)     Other disorder of menstruation and other abnormal bleeding from female genital tract     Morbid obesity (H)     Hypertension goal BP (blood pressure) < 130/80     Vertigo     Past Surgical History:   Procedure Laterality Date     CHOLECYSTECTOMY, LAPOROSCOPIC      Cholecystectomy, Laparoscopic     COLONOSCOPY N/A 11/25/2014    Procedure: COLONOSCOPY;  Surgeon: Wenceslao Galo MD;  Location:  GI     CYSTOSCOPY, SLING TRANSVAGINAL N/A 8/20/2018    Procedure: CYSTOSCOPY, SLING TRANSVAGINAL;;  Surgeon: Urban Elena MD;  Location: [facility]     D & C      X2-3 for abnormal bleeding     ESOPHAGOSCOPY, GASTROSCOPY, DUODENOSCOPY (EGD), COMBINED N/A 11/25/2014    Procedure: COMBINED ESOPHAGOSCOPY, GASTROSCOPY, DUODENOSCOPY (EGD), BIOPSY SINGLE OR MULTIPLE;  Surgeon: Wenceslao Galo MD;  Location: [facility]     LAPAROSCOPIC HYSTERECTOMY SUPRACERVICAL N/A 8/20/2018    Procedure: LAPAROSCOPIC HYSTERECTOMY SUPRACERVICAL;  LAPAROSCOPIC SUBTOATAL HYSTERECTOMY, BILATERAL SALPINGECTOMY, SUBURETHRAL SLING AND CYSTOSCOPY;  Surgeon: Urban Elena MD;  Location: [facility]     LAPAROSCOPIC SALPINGECTOMY Bilateral 8/20/2018    Procedure: LAPAROSCOPIC SALPINGECTOMY;;  Surgeon: Urban Elena MD;  Location: [facility]     LAPAROSCOPY      For endometriosis     OPERATIVE HYSTEROSCOPY WITH MORCELLATOR N/A 3/29/2018    Procedure: OPERATIVE HYSTEROSCOPY WITH MORCELLATOR (MYOSURE);  OPERATIVE HYSTEROSCOPY WITH MORCELLATOR ;  Surgeon: Urban Elena MD;  Location: [facility]       Social History     Tobacco Use     Smoking status: Never Smoker     Smokeless tobacco: Never Used   Substance Use Topics     Alcohol use: Yes     Alcohol/week: 1.0 oz     Family History   Problem Relation Age of Onset     Hypertension Mother       Lipids Mother      Hypertension Father      Prostate Cancer Father      Lipids Father      Breast Cancer Maternal Aunt      Hypertension Maternal Grandmother      C.A.D. Maternal Grandmother      Gallbladder Disease Maternal Grandmother      Cancer Maternal Grandfather         lung     Cerebrovascular Disease Paternal Grandmother      C.A.D. Paternal Grandfather      Cancer - colorectal Other         cousin maternal      Colon Cancer Cousin      Colon Cancer Cousin      Diabetes No family hx of          Current Outpatient Medications   Medication Sig Dispense Refill     Aspirin-Acetaminophen-Caffeine (EXCEDRIN EXTRA STRENGTH PO) Take 2 tablets by mouth every 6 hours as needed       hydrOXYzine (ATARAX) 25 MG tablet Take 1/2 to 1 tablet at night for pain affecting sleep 30 tablet 0     ibuprofen (ADVIL/MOTRIN) 600 MG tablet Take 1 tablet (600 mg) by mouth every 6 hours as needed for pain (mild) 40 tablet 0     lisinopril (PRINIVIL/ZESTRIL) 20 MG tablet Take 1 tablet (20 mg) by mouth 2 times daily 180 tablet 3     naproxen (NAPROSYN) 500 MG tablet   1     nebivolol (BYSTOLIC) 10 MG tablet Take 1 tablet (10 mg) by mouth daily 90 tablet 3     order for DME DREAMSTATION  9-12 CM/H20  FF MIRAGE QUATTRO       spironolactone (ALDACTONE) 25 MG tablet Take 1 tablet (25 mg) by mouth daily 90 tablet 3     traMADol (ULTRAM) 50 MG tablet Take 1 tablet (50 mg) by mouth every 6 hours as needed for severe pain 10 tablet 0     Allergies   Allergen Reactions     Amoxicillin Nausea and Vomiting     Percocet [Oxycodone-Acetaminophen] Nausea and Vomiting       Reviewed and updated as needed this visit by clinical staff  Tobacco  Allergies  Meds  Med Hx  Surg Hx  Fam Hx  Soc Hx      Reviewed and updated as needed this visit by Provider  Tobacco  Allergies  Meds  Med Hx  Surg Hx  Fam Hx  Soc Hx        ROS:  Constitutional, HEENT, cardiovascular, pulmonary, gi and gu systems are negative, except as otherwise noted.    OBJECTIVE:      /60 (BP Location: Right arm, Cuff Size: Adult Large)   Pulse 61   Temp 97.5  F (36.4  C) (Oral)   Ht 1.829 m (6')   Wt 142 kg (313 lb)   SpO2 98%   BMI 42.45 kg/m    Body mass index is 42.45 kg/m .  GENERAL: healthy, alert and no distress  EYES: Eyes grossly normal to inspection, PERRL and conjunctivae and sclerae normal  RESP: lungs clear to auscultation - no rales, rhonchi or wheezes  CV: regular rates and rhythm and no murmur, click or rub  ABDOMEN: soft, nontender, without hepatosplenomegaly or masses and bowel sounds normal    Diagnostic Test Results:  none     ASSESSMENT/PLAN:       ICD-10-CM    1. Acute diarrhea R19.7 CBC with platelets and differential     Comprehensive metabolic panel     Clostridium difficile Toxin B PCR     Enteric Bacteria and Virus Panel by [facility] Stool   2. Morbid obesity (H) E66.01    History suggests trending toward the good as stool volume decreasing.  ?viral versus bacterial.  Reassuring no red flags such as fever, focal abdominal pain so decreased concern for intra-abdominal process.   Weight down a bit, but hx of morbid obesity.  Encouraged to follow-up for routine physical and ongoing preventative health management.  See Patient Instructions  Patient in agreement with plan.     Patient Instructions   I'm sorry you're not feeling well.  Stay well-hydrated and continue bland diet.  Given duration will check additional stool studies and screen for blood loss.  History does suggest that you're trending towards the good though so this could still be viral.  Consider probiotic to help repopulate normal gut bacteria.  Recheck if not improving as expected or if worse.     Ruthie Xiong PA-C  [facility]

## 2019-04-01 NOTE — TELEPHONE ENCOUNTER
Patient calling to report:    Acute Illness   Acute illness concerns: diarrhea  Onset: Wednesday    Fever: no    Chills/Sweats: YES    Headache (location?): no    Sinus Pressure:no    Conjunctivitis:  no    Ear Pain: no    Rhinorrhea: no     Congestion: no     Sore Throat: no      Cough: no    Wheeze: no     Decreased Appetite: YES    Nausea: YES    Vomiting: no    Diarrhea:  YES- Every 2 hours, watery    Dysuria/Freq.: no     Fatigue/Achiness: YES    Sick/Strep Exposure: unknown     Therapies Tried and outcome: Has been increasing water intake and mostly been eating fruit bars.    Patient reports just feeling really wiped out and fatigued. Given length of illness, advised an appointment today. Patient verbalized understanding and agrees with plan.    Will call back if further questions or concerns.    Maria Del Carmen Herman, RN, BSN

## 2019-04-01 NOTE — PATIENT INSTRUCTIONS
I'm sorry you're not feeling well.  Stay well-hydrated and continue bland diet.  Given duration will check additional stool studies and screen for blood loss.  History does suggest that you're trending towards the good though so this could still be viral.  Consider probiotic to help repopulate normal gut bacteria.  Recheck if not improving as expected or if worse.

## 2019-04-02 DIAGNOSIS — R19.7 ACUTE DIARRHEA: ICD-10-CM

## 2019-04-02 LAB
ALBUMIN SERPL-MCNC: 3.8 G/DL (ref 3.4–5)
ALP SERPL-CCNC: 115 U/L (ref 40–150)
ALT SERPL W P-5'-P-CCNC: 82 U/L (ref 0–50)
ANION GAP SERPL CALCULATED.3IONS-SCNC: 4 MMOL/L (ref 3–14)
AST SERPL W P-5'-P-CCNC: 46 U/L (ref 0–45)
BILIRUB SERPL-MCNC: 0.4 MG/DL (ref 0.2–1.3)
BUN SERPL-MCNC: 30 MG/DL (ref 7–30)
C COLI+JEJUNI+LARI FUSA STL QL NAA+PROBE: NOT DETECTED
C DIFF TOX B STL QL: NEGATIVE
CALCIUM SERPL-MCNC: 9.6 MG/DL (ref 8.5–10.1)
CHLORIDE SERPL-SCNC: 106 MMOL/L (ref 94–109)
CO2 SERPL-SCNC: 27 MMOL/L (ref 20–32)
CREAT SERPL-MCNC: 1.41 MG/DL (ref 0.52–1.04)
EC STX1 GENE STL QL NAA+PROBE: NOT DETECTED
EC STX2 GENE STL QL NAA+PROBE: NOT DETECTED
ENTERIC PATHOGEN COMMENT: ABNORMAL
GFR SERPL CREATININE-BSD FRML MDRD: 42 ML/MIN/{1.73_M2}
GLUCOSE SERPL-MCNC: 106 MG/DL (ref 70–99)
NOROV GI+II ORF1-ORF2 JNC STL QL NAA+PR: NOT DETECTED
POTASSIUM SERPL-SCNC: 4.1 MMOL/L (ref 3.4–5.3)
PROT SERPL-MCNC: 7.8 G/DL (ref 6.8–8.8)
RVA NSP5 STL QL NAA+PROBE: NOT DETECTED
SALMONELLA SP RPOD STL QL NAA+PROBE: ABNORMAL
SHIGELLA SP+EIEC IPAH STL QL NAA+PROBE: NOT DETECTED
SODIUM SERPL-SCNC: 137 MMOL/L (ref 133–144)
SPECIMEN SOURCE: NORMAL
V CHOL+PARA RFBL+TRKH+TNAA STL QL NAA+PR: NOT DETECTED
Y ENTERO RECN STL QL NAA+PROBE: NOT DETECTED

## 2019-04-02 PROCEDURE — 87493 C DIFF AMPLIFIED PROBE: CPT | Performed by: PHYSICIAN ASSISTANT

## 2019-04-02 PROCEDURE — 87506 IADNA-DNA/RNA PROBE TQ 6-11: CPT | Performed by: PHYSICIAN ASSISTANT

## 2019-04-04 NOTE — RESULT ENCOUNTER NOTE
Reviewed results with patient via phone. + testing for salmonella with decline in renal function and mild liver enzyme elevation. Could be due to dehydration and advised close interval follow-up in 1 week to recheck labs and ensure she gets better. Reviewed treatment is largely supportive in immunocompetent patients and she does not meet criteria for abx therapy at this time as generally patients improve within 4-10 days. She will push fluids and and expect to improve over this weekend and see me back in 1 week to recheck. Encouraged to follow-up sooner if worsening or concerns.  Electronically Signed By: Ruthie Xiong PA-C

## 2019-04-08 ENCOUNTER — TELEPHONE (OUTPATIENT)
Dept: FAMILY MEDICINE | Facility: CLINIC | Age: 54
End: 2019-04-08

## 2019-04-08 NOTE — TELEPHONE ENCOUNTER
Returned call to [facility].  Patient was not hospitalized or given abx.    JHONY BoswellN, RN  [employer] [facility]

## 2019-04-08 NOTE — TELEPHONE ENCOUNTER
Reason for Call:  call back - [place] Department of Health    Detailed comments:  Please call back with the following information -   PT was seen on April 2,2019 for  Salmonella.   Needs - phone number to contact the PT.  If she has gone to the hospital  - name of hospitals  If she was given any antibiotics and names of it.  Antibiotics - need date it was given, dosage and how many pills  Lab results date for Salmonella      Phone Number Patient can be reached at:   [phone]    Best Time: any    Can we leave a detailed message on this number? Yes      Call taken on 4/8/2019 at 10:31 AM by Joan Fink

## 2019-04-21 NOTE — ED PROVIDER NOTES
"  History     Chief Complaint:  Dizziness, nausea, and chest pain     HPI   Fouzia Arenas is a 52 year old female who presents to the emergency department today with nausea and dizziness. The patient had sudden onset of room spinning dizziness and feeling like she was going to faint, followed shortly by nausea, all of which started 4 hours ago. The room still felt like it was spinning even when she laid down. She reports some chest pain \"where her bra sits\" and radiated upwards, but not to her arm or jaw. Later in the day, about 2-3 hour ago, she felt some arm numbness and was also worried because the nausea has not resolved. She also felt like the room was spinning when she walked into the ED. She had vertigo several years ago, but this did not feel similar, because it was not improving with laying down. The dizziness lasted several minutes, but has currently resolved. She does not have chest pain right now. She denies palpitations. She still feels nauseous currently. Her Bystolic was decreased to 10mg from 20mg recently because of her slow heart rate and reports of non-vertiginous lightheadedness. She had an ovarian cyst removal surgery about a month and a half ago. She denies new leg swelling.     Allergies:  Amoxicillin  Percocet     Medications:    Aspirin  Prinivil  Bystolic   Aldactone   Excedrin  Advil  Bystolic     Past Medical History:    Classic migraine   Morbid obesity  Atypical chest pain  Esophageal reflux   BPPV  Menstruation disorder   Complex endometrial hyperplasia  Hypertension  STORMY  Palpitations   PONV  SVT    Past Surgical History:    Cholecystectomy  Colonoscopy  D&C   EGD  Laparoscopy   Operative hysteroscopy     Family History:    Hypertension  Lipids  Prostate cancer   Breast cancer   Colon cancer  CAD  Gallbladder disease   Cerebrovascular disease   Diabetes     Social History:  The patient was accompanied to the ED by mother.  Smoking Status: Never  Smokeless Tobacco: Never  Alcohol Use: " 1 oz/week    Marital Status:  Single    Review of Systems   Cardiovascular: Positive for chest pain. Negative for palpitations and leg swelling.   Gastrointestinal: Positive for nausea.   Neurological: Positive for dizziness.   All other systems reviewed and are negative.    Physical Exam     Patient Vitals for the past 24 hrs:   BP Temp Temp src Pulse Heart Rate Resp SpO2   05/08/18 0030 (!) 143/94 - - - - - 97 %   05/08/18 0015 (!) 160/98 - - - 51 - 94 %   05/07/18 2345 110/78 - - - 52 - 97 %   05/07/18 2330 136/89 - - - - - 95 %   05/07/18 2230 160/80 - - - 59 13 95 %   05/07/18 2200 126/78 - - - 53 17 95 %   05/07/18 2145 116/90 - - - 55 8 95 %   05/07/18 2130 140/78 - - - 55 12 96 %   05/07/18 2115 135/84 - - - 61 8 98 %   05/07/18 2101 158/88 95.8  F (35.4  C) Temporal 64 - 18 97 %      Physical Exam  Constitutional:  Well developed, Well nourished, Anxious, otherwise comfortable appearing    HENT:  Ear canals and TMs unremarkable. Bilateral external ears normal, Mucous membranes moist, Nose normal. Neck- Normal range of motion, Supple  Eyes: PERRL, EOMI, conjunctivae unremarkable  Respiratory:  Normal breath sounds, No respiratory distress, No wheezing,  Cardiovascular:  Normal heart rate, Normal rhythm, No murmurs,    GI:  Bowel sounds normal, Soft, No tenderness,   Musculoskeletal:  Intact distal pulses, No edema, grossly unremarkable range of motion   Integument:  Warm, Dry   Neurological: Alert, attentive and oriented to person, place and time  Cranial nerves 2-12 intact;   5/5 strength throughout the upper and lower extremities;   Sensation intact to light touch throughout the upper and lower extremities;   Rapid alternating movements were normal   Psychiatric:  Anxious     Emergency Department Course     ECG:  Indication: dizziness  Completed at 2106.  Read at 2110.   Normal sinus rhythm  Low voltage QRS   ST & T wave abnormality, consider anterolateral ischemia   Abnormal ECG   Rate 62 bpm. NJ interval  186. QRS duration 98. QT/QTc 426/432. P-R-T axes 32 13 -9.     Imaging:  Radiology findings were communicated with the patient and family who voiced understanding of the findings.  Chest XR  IMPRESSION: No acute abnormality.  Report per radiology      Head/Neck Angiogram CT  1. Unremarkable neck CTA.  2. Vertebral arteries are patent through the neck and into the head.   3. No measurable stenosis in either proximal internal carotid artery based on NASCET criteria.   4. No evidence of major vessel dissection.   Report per radiology      Head CT w/o contrast  1.Unremarkable head CTA  Report per radiology      Laboratory:  Laboratory findings were communicated with the patient and family who voiced understanding of the findings.  CBC: AWNL (WBC 8.6, HGB 13.8, )  BMP: 108(H) Glucose o/w WNL (Creatinine 0.82)   D Dimer  <0.3  Troponin (Collected 2112): <0.015    Interventions:  2141: 0.9% NaCl 1L IV Bolus   2144: Zofran 4mg IV   2145: Antivert 25mg PO   2245: 0.9% NaCl 80mL IV Bolus     Medications   0.9% sodium chloride BOLUS (0 mLs Intravenous Stopped 5/7/18 2236)     Followed by   sodium chloride 0.9% infusion (not administered)   ondansetron (ZOFRAN) injection 4 mg (4 mg Intravenous Given 5/7/18 2144)   meclizine (ANTIVERT) tablet 25 mg (25 mg Oral Given 5/7/18 2145)   iopamidol (ISOVUE-370) solution 500 mL (70 mLs Intravenous Given 5/7/18 2245)   0.9% sodium chloride BOLUS (0 mLs Intravenous Stopped 5/7/18 2246)   aspirin tablet 325 mg (325 mg Oral Given 5/8/18 0023)   metoclopramide (REGLAN) injection 5 mg (5 mg Intravenous Given 5/8/18 0022)        Emergency Department Course:  Nursing notes and vitals reviewed.  2215: I performed an exam of the patient as documented above.   IV was inserted and blood was drawn for laboratory testing, results above.  The patient was sent for a Chest XR, Head/Neck Angiogram CT, and Head CT w/o contrast while in the emergency department, results above.   I reviewed the case  Calm/Appropriate with neurology, Dr. Mcgill, who reviewed imaging studies.  Reviewed patient's cardiac risk factors for possible central cause.  Given the nature of her symptoms, some narrowing right PICA and incomplete imaging on CT scan, he does recommend that she have an MRI done for discharge, recommend starting aspirin.  Unfortunately our MRI machine is not working until tomorrow.  I discussed this with the patient and her mother.  Offered transfer to [facility] or another hospital for sooner MRI imaging, but she strongly prefers to stay here.  She is overall feeling better with some persistent nausea but no significant recurrence of dizziness.    I discussed the case with Dr. murrell with the hospitalist service who graciously accepts her for further evaluation.    Impression & Plan    Medical Decision Making:  Fouzia Arenas is a 52 year old female who presents for evaluation of vertiginous dizziness. The differential diagnosis of vertigo is broad and includes common etiologies such as meniere's disease, labyrinthitis, benign positional vertigo, otitis media, etc.  More serious etiologies considered include central etiologies such as tumor, intracerebral bleed, dissection, ischemic cerebral vascular accident.  She notably has had vertigo in the past and notes that this is very different, as it did not appear to be positional.  Exam here was notable for no findings of ataxic deficits, no other neurologic deficits.  She is bradycardic but this is her baseline.  Workup here did include imaging with CT and CTA, but unfortunately MRI was not available.  Her imaging and other studies did not show any specific findings.  She has residual nausea as discussed.  As discussed, given her risk factors for central cause, she was admitted for further evaluation with an MRI.    Diagnosis:    ICD-10-CM    1. Nausea R11.0        Disposition:  Admitted to observation    Discharge Medications:  New Prescriptions    No medications on file       Scribe  Disclosure:  I, Kenya Rupesh, am serving as a scribe at 9:15 PM on 5/7/2018 to document services personally performed by Connie Dill MD based on my observations and the provider's statements to me.    5/7/2018   [facility] EMERGENCY DEPARTMENT       Connie Dill MD  05/08/18 0048

## 2019-04-22 ENCOUNTER — TELEPHONE (OUTPATIENT)
Dept: FAMILY MEDICINE | Facility: CLINIC | Age: 54
End: 2019-04-22

## 2019-04-22 DIAGNOSIS — N64.59 BREAST SIGNS AND SYMPTOMS: Primary | ICD-10-CM

## 2019-04-22 NOTE — TELEPHONE ENCOUNTER
Patient calling, needs a diagnostic mammogram order sent to [facility] [place]. Symptoms: itching, discoloration on nipple. Please call her when orders are in. She can call and make an appt    [phone] (home)   Thank you  Justyna Florian

## 2019-04-22 NOTE — TELEPHONE ENCOUNTER
please see below note, can you call patient and clarify below and set up scheduling as requested by Ruthie Xiong PA-C. Thank you, Amber Manzo R.N.

## 2019-04-22 NOTE — TELEPHONE ENCOUNTER
Called pt.  She states the itching and discoloration is both breasts.  She was also reminded she needs her routine appt and to recheck labs after illness.  Wanda Vora

## 2019-04-22 NOTE — TELEPHONE ENCOUNTER
Unfortunately note below did not include which side was affected. Can we clarify this? Then yes orders can be placed. Pt is also overdue for physical and to recheck her CMP in 1 week after last diarrhea illness. Please notify that she still needs to come in for her routine health physical and follow-up on this.  Electronically Signed By: Ruthie Xiong PA-C

## 2019-04-23 NOTE — TELEPHONE ENCOUNTER
Called pt and left detailed VM letting her know orders were placed and she can call [phone] to make the appt.  Wanda Vora

## 2019-04-30 ENCOUNTER — HOSPITAL ENCOUNTER (OUTPATIENT)
Dept: MAMMOGRAPHY | Facility: CLINIC | Age: 54
Discharge: HOME | End: 2019-04-30
Attending: PHYSICIAN ASSISTANT | Admitting: PHYSICIAN ASSISTANT
Payer: COMMERCIAL

## 2019-04-30 DIAGNOSIS — N64.59 BREAST SIGNS AND SYMPTOMS: ICD-10-CM

## 2019-04-30 PROCEDURE — 77066 DX MAMMO INCL CAD BI: CPT

## 2019-04-30 NOTE — LETTER
Fouzia Arenas  [address]  [place] [address]      April 30, 2019  Date of Exam:     Dear Fouzia:    Thank you for your recent visit.  Breast Imaging Result: We are pleased to inform you that the results of your recent breast imaging show no evidence of malignancy (cancer).    If you are experiencing any breast problems such as a lump or localized pain we request that you discuss this with your health care provider if you haven t already done so, as additional testing may be necessary.    As you know, early detection of cancer is very important. Although mammography is the most accurate method for early detection, not all cancers are found through mammography. A thorough examination includes a combination of mammography, physical examination and breast self-examination. Currently the American College of Radiology and the Society of Breast Imaging recommend an annual mammogram for all women beginning at the age of 40.    A report of your breast imaging results was sent to: Ruthie Xiong    Your breast imaging will become part of your medical file here at [facility] for at least 10 years. You are responsible for informing any new health care provider or breast imaging facility of the date and location of this examination.    We appreciate the opportunity to participate in your health care.    Sincerely,    Doctor Lang Kruger  Interpreting Radiologist  [facility]

## 2019-07-02 ENCOUNTER — OFFICE VISIT (OUTPATIENT)
Dept: FAMILY MEDICINE | Facility: CLINIC | Age: 54
End: 2019-07-02
Payer: COMMERCIAL

## 2019-07-02 VITALS
HEIGHT: 72 IN | TEMPERATURE: 98.7 F | SYSTOLIC BLOOD PRESSURE: 114 MMHG | HEART RATE: 58 BPM | DIASTOLIC BLOOD PRESSURE: 68 MMHG | WEIGHT: 293 LBS | BODY MASS INDEX: 39.68 KG/M2 | OXYGEN SATURATION: 96 %

## 2019-07-02 DIAGNOSIS — R74.8 ELEVATED LIVER ENZYMES: ICD-10-CM

## 2019-07-02 DIAGNOSIS — M79.3 LIPODERMATOSCLEROSIS OF BOTH LOWER EXTREMITIES: ICD-10-CM

## 2019-07-02 DIAGNOSIS — I47.10 SUPRAVENTRICULAR TACHYCARDIA (H): ICD-10-CM

## 2019-07-02 DIAGNOSIS — N64.59 NIPPLE SYMPTOM OR SIGN IN FEMALE: ICD-10-CM

## 2019-07-02 DIAGNOSIS — E66.01 MORBID OBESITY (H): ICD-10-CM

## 2019-07-02 DIAGNOSIS — G47.33 OSA (OBSTRUCTIVE SLEEP APNEA): ICD-10-CM

## 2019-07-02 DIAGNOSIS — Z00.00 ROUTINE GENERAL MEDICAL EXAMINATION AT A HEALTH CARE FACILITY: Primary | ICD-10-CM

## 2019-07-02 DIAGNOSIS — G43.109 MIGRAINE WITH AURA AND WITHOUT STATUS MIGRAINOSUS, NOT INTRACTABLE: ICD-10-CM

## 2019-07-02 DIAGNOSIS — I10 HYPERTENSION GOAL BP (BLOOD PRESSURE) < 130/80: ICD-10-CM

## 2019-07-02 DIAGNOSIS — Z13.6 CARDIOVASCULAR SCREENING; LDL GOAL LESS THAN 160: ICD-10-CM

## 2019-07-02 LAB
ALBUMIN SERPL-MCNC: 3.6 G/DL (ref 3.4–5)
ALP SERPL-CCNC: 89 U/L (ref 40–150)
ALT SERPL W P-5'-P-CCNC: 30 U/L (ref 0–50)
ANION GAP SERPL CALCULATED.3IONS-SCNC: 6 MMOL/L (ref 3–14)
AST SERPL W P-5'-P-CCNC: 19 U/L (ref 0–45)
BILIRUB SERPL-MCNC: 0.6 MG/DL (ref 0.2–1.3)
BUN SERPL-MCNC: 9 MG/DL (ref 7–30)
CALCIUM SERPL-MCNC: 8.6 MG/DL (ref 8.5–10.1)
CHLORIDE SERPL-SCNC: 108 MMOL/L (ref 94–109)
CHOLEST SERPL-MCNC: 182 MG/DL
CO2 SERPL-SCNC: 27 MMOL/L (ref 20–32)
CREAT SERPL-MCNC: 0.97 MG/DL (ref 0.52–1.04)
GFR SERPL CREATININE-BSD FRML MDRD: 67 ML/MIN/{1.73_M2}
GLUCOSE SERPL-MCNC: 97 MG/DL (ref 70–99)
HDLC SERPL-MCNC: 45 MG/DL
LDLC SERPL CALC-MCNC: 124 MG/DL
NONHDLC SERPL-MCNC: 137 MG/DL
POTASSIUM SERPL-SCNC: 4 MMOL/L (ref 3.4–5.3)
PROT SERPL-MCNC: 7.5 G/DL (ref 6.8–8.8)
SODIUM SERPL-SCNC: 141 MMOL/L (ref 133–144)
TRIGL SERPL-MCNC: 67 MG/DL

## 2019-07-02 PROCEDURE — 80061 LIPID PANEL: CPT | Performed by: PHYSICIAN ASSISTANT

## 2019-07-02 PROCEDURE — 36415 COLL VENOUS BLD VENIPUNCTURE: CPT | Performed by: PHYSICIAN ASSISTANT

## 2019-07-02 PROCEDURE — 99213 OFFICE O/P EST LOW 20 MIN: CPT | Mod: 25 | Performed by: PHYSICIAN ASSISTANT

## 2019-07-02 PROCEDURE — 80053 COMPREHEN METABOLIC PANEL: CPT | Performed by: PHYSICIAN ASSISTANT

## 2019-07-02 PROCEDURE — 99396 PREV VISIT EST AGE 40-64: CPT | Performed by: PHYSICIAN ASSISTANT

## 2019-07-02 RX ORDER — SUMATRIPTAN 100 MG/1
100 TABLET, FILM COATED ORAL
Qty: 9 TABLET | Refills: 1 | Status: SHIPPED | OUTPATIENT
Start: 2019-07-02

## 2019-07-02 ASSESSMENT — MIFFLIN-ST. JEOR: SCORE: 2173.05

## 2019-07-02 NOTE — PROGRESS NOTES
"   SUBJECTIVE:   CC: Fouzia Arenas is an 53 year old woman who presents for preventive health visit.     Healthy Habits:    Do you get at least three servings of calcium containing foods daily (dairy, green leafy vegetables, etc.)? yes    Amount of exercise or daily activities, outside of work: 1  day(s) per week    Problems taking medications regularly No    Medication side effects: No    Have you had an eye exam in the past two years? yes    Do you see a dentist twice per year? yes    Do you have sleep apnea, excessive snoring or daytime drowsiness? yes, has sleep apnea - doesn't feel that mask fits right as will wind up removing during the middle of night. Has not had this re-assessed in years so would be amenable to new referral.     1) Needs imitrex refilled - Epic review shows this has never been filled through [facility] and was last given at . Received 100mg to repeat in 2 hours if need.   Reports she had an episode of central vision loss in both eyes - explains that where her pupils were she couldn't see and describes as \"tunnel vision\" around 11am that lasted a few hours, felt like she knew a migraine was coming on as she gets discomfort at base of her neck on the L side. States this is classic of how her migraine comes on except in the past she has never had the tunnel vision. Has had other visual disturbance like \"flutteries\" in the periphery of her vision prior to migraines in the past, but not like this. Did take her last imitrex so would like to have this refilled.   Had an eye exam towards end of the year last year - estimates October.   Reports script was really old.  Used to get them a lot prior to pregnancy, but now daughter is 18 and experiences them less. Used to have 3x per month and now in past 12 yrs decreased to 1x per month. Last migraine now was last fall aside from most current. Gets nauseated and pain is so severe \"I don't think I'm going to live until morning.\"   Corn bag keeps in the " freezer to help.   Also will help if she takes excedrine and drinks a coke.   Reports at one point she saw neurology for injections.   Chiropractor will make adjustments in the top of her mouth and this helps.      2) Reviewed mammogram; pt was supposed to schedule a follow-up for concerns of nipple itching, but did not. Admits she has had this chronically off/on for the past 1 year. Actually had same report last year and has now 2 consecutive mammograms that were normal. Admits she was talking with someone and they had said this could be a sign of breast cancer so figured she should mention it. Also reports where it was itchy that the skin appeared a bit darker. Has tried different creams to help with this - coconut oil based. Does seem to help.   Loves humidity and feels this has helped and itching has been less.      3) Repeat liver enzymes after had salmonella diarrhea infection in April. Was supposed to return in 1 week, but did not.        Today's PHQ-2 Score:   PHQ-2 ( 1999 Pf[employer]) 7/2/2019 6/20/2017   Q1: Little interest or pleasure in doing things 0 0   Q2: Feeling down, depressed or hopeless 0 0   PHQ-2 Score 0 0       Abuse: Current or Past(Physical, Sexual or Emotional)- No  Do you feel safe in your environment? Yes    Social History     Tobacco Use     Smoking status: Never Smoker     Smokeless tobacco: Never Used   Substance Use Topics     Alcohol use: Yes     Alcohol/week: 1.0 oz     If you drink alcohol do you typically have >3 drinks per day or >7 drinks per week? No                     Reviewed orders with patient.  Reviewed health maintenance and updated orders accordingly - Yes  BP Readings from Last 3 Encounters:   07/02/19 114/68   04/01/19 102/60   12/21/18 120/80    Wt Readings from Last 3 Encounters:   07/02/19 145.6 kg (321 lb)   04/01/19 142 kg (313 lb)   12/21/18 147.9 kg (326 lb)                  Patient Active Problem List   Diagnosis     Esophageal reflux     CARDIOVASCULAR SCREENING;  LDL GOAL LESS THAN 160     Palpitations     STORMY (obstructive sleep apnea)     Atypical chest pain     BPPV (benign paroxysmal positional vertigo)     Morbid obesity (H)     Hypertension goal BP (blood pressure) < 130/80     Vertigo     Supraventricular tachycardia (H)     Lipodermatosclerosis of both lower extremities     Past Surgical History:   Procedure Laterality Date     CHOLECYSTECTOMY, LAPOROSCOPIC      Cholecystectomy, Laparoscopic     COLONOSCOPY N/A 11/25/2014    Procedure: COLONOSCOPY;  Surgeon: Wenceslao Galo MD;  Location:  GI     CYSTOSCOPY, SLING TRANSVAGINAL N/A 8/20/2018    Procedure: CYSTOSCOPY, SLING TRANSVAGINAL;;  Surgeon: Urban Elena MD;  Location: [facility]     D & C      X2-3 for abnormal bleeding     ESOPHAGOSCOPY, GASTROSCOPY, DUODENOSCOPY (EGD), COMBINED N/A 11/25/2014    Procedure: COMBINED ESOPHAGOSCOPY, GASTROSCOPY, DUODENOSCOPY (EGD), BIOPSY SINGLE OR MULTIPLE;  Surgeon: Wenceslao Galo MD;  Location: [facility]     LAPAROSCOPIC HYSTERECTOMY SUPRACERVICAL N/A 8/20/2018    Procedure: LAPAROSCOPIC HYSTERECTOMY SUPRACERVICAL;  LAPAROSCOPIC SUBTOATAL HYSTERECTOMY, BILATERAL SALPINGECTOMY, SUBURETHRAL SLING AND CYSTOSCOPY;  Surgeon: Urban Elena MD;  Location: [facility]     LAPAROSCOPIC SALPINGECTOMY Bilateral 8/20/2018    Procedure: LAPAROSCOPIC SALPINGECTOMY;;  Surgeon: Urban Elena MD;  Location: [facility]     LAPAROSCOPY      For endometriosis     OPERATIVE HYSTEROSCOPY WITH MORCELLATOR N/A 3/29/2018    Procedure: OPERATIVE HYSTEROSCOPY WITH MORCELLATOR (MYOSURE);  OPERATIVE HYSTEROSCOPY WITH MORCELLATOR ;  Surgeon: Ubran Elena MD;  Location: [facility]       Social History     Tobacco Use     Smoking status: Never Smoker     Smokeless tobacco: Never Used   Substance Use Topics     Alcohol use: Yes     Alcohol/week: 1.0 oz     Family History   Problem Relation Age of Onset     Hypertension Mother      Lipids Mother      Hypertension Father      Prostate  Cancer Father      Lipids Father      Breast Cancer Maternal Aunt      Hypertension Maternal Grandmother      DANIAL.A.D. Maternal Grandmother      Gallbladder Disease Maternal Grandmother      Cancer Maternal Grandfather         lung     Cerebrovascular Disease Paternal Grandmother      C.A.D. Paternal Grandfather      Cancer - colorectal Other         cousin maternal      Colon Cancer Cousin      Colon Cancer Cousin      Diabetes No family hx of          Current Outpatient Medications   Medication Sig Dispense Refill     SUMAtriptan (IMITREX) 100 MG tablet Take 1 tablet (100 mg) by mouth at onset of headache for migraine May repeat in 2 hours. Max dose 200mg in 24 hours. 9 tablet 1     Aspirin-Acetaminophen-Caffeine (EXCEDRIN EXTRA STRENGTH PO) Take 2 tablets by mouth every 6 hours as needed       hydrOXYzine (ATARAX) 25 MG tablet Take 1/2 to 1 tablet at night for pain affecting sleep 30 tablet 0     ibuprofen (ADVIL/MOTRIN) 600 MG tablet Take 1 tablet (600 mg) by mouth every 6 hours as needed for pain (mild) 40 tablet 0     lisinopril (PRINIVIL/ZESTRIL) 20 MG tablet Take 1 tablet (20 mg) by mouth 2 times daily 180 tablet 3     naproxen (NAPROSYN) 500 MG tablet   1     nebivolol (BYSTOLIC) 10 MG tablet Take 1 tablet (10 mg) by mouth daily 90 tablet 3     order for DME DREAMSTATION  9-12 CM/H20  FF MIRAGE QUATTRO       spironolactone (ALDACTONE) 25 MG tablet Take 1 tablet (25 mg) by mouth daily 90 tablet 3     traMADol (ULTRAM) 50 MG tablet Take 1 tablet (50 mg) by mouth every 6 hours as needed for severe pain 10 tablet 0     Allergies   Allergen Reactions     Amoxicillin Nausea and Vomiting     Percocet [Oxycodone-Acetaminophen] Nausea and Vomiting       Mammogram Screening: Patient over age 50, mutual decision to screen reflected in health maintenance.    Pertinent mammograms are reviewed under the imaging tab.  History of abnormal Pap smear: NO - age 30- 65 PAP every 3 years recommended  PAP / HPV Latest Ref Rng &  Units 2/6/2018 7/7/2014   PAP - NIL NIL   HPV 16 DNA NEG:Negative Negative -   HPV 18 DNA NEG:Negative Negative -   OTHER HR HPV NEG:Negative Negative -     Reviewed and updated as needed this visit by clinical staff  Tobacco  Allergies  Meds  Problems  Med Hx  Surg Hx  Fam Hx  Soc Hx          Reviewed and updated as needed this visit by Provider  Tobacco  Allergies  Meds  Problems  Med Hx  Surg Hx  Fam Hx  Soc Hx             ROS:  CONSTITUTIONAL: NEGATIVE for fever, chills, change in weight  INTEGUMENTARY/SKIN: + for chronic lipodermatosclerosis of bilateral LE from chronic venous insufficiency. Following instructions of cardiology as really wishes to avoid surgery.  NEGATIVE for worrisome rashes, moles or lesions  EYES: NEGATIVE for vision changes or irritation  ENT: NEGATIVE for ear, mouth and throat problems  RESP: NEGATIVE for significant cough or SOB  BREAST: + for intermittent chronic nipple itching. NEGATIVE for masses, tenderness or discharge  CV: NEGATIVE for chest pain, palpitations or peripheral edema  GI: NEGATIVE for nausea, abdominal pain, heartburn, or change in bowel habits  : NEGATIVE for unusual urinary or vaginal symptoms. No vaginal bleeding. S/p GENEVIEVE - still has cervix.   MUSCULOSKELETAL: NEGATIVE for significant arthralgias or myalgia  NEURO: NEGATIVE for weakness, dizziness or paresthesias  PSYCHIATRIC: NEGATIVE for changes in mood or affect     OBJECTIVE:   /68 (BP Location: Right arm, Cuff Size: Adult Large)   Pulse 58   Temp 98.7  F (37.1  C) (Oral)   Ht 1.829 m (6')   Wt 145.6 kg (321 lb)   SpO2 96%   BMI 43.54 kg/m    EXAM:  GENERAL: healthy, alert and no distress  EYES: Eyes grossly normal to inspection, PERRL and conjunctivae and sclerae normal  HENT: ear canals and TM's normal, nose and mouth without ulcers or lesions  NECK: no adenopathy, no asymmetry, masses, or scars and thyroid normal to palpation  RESP: lungs clear to auscultation - no rales, rhonchi or  wheezes  BREAST: normal without masses, tenderness or nipple discharge and no palpable axillary masses or adenopathy. No abnormal skin lesions or any appreciable dermatitis noted around nipples.  CV: regular rate and rhythm, normal S1 S2, no S3 or S4, no murmur, click or rub, no peripheral edema and peripheral pulses strong  ABDOMEN: soft, nontender, no hepatosplenomegaly, no masses and bowel sounds normal  MS: no gross musculoskeletal defects noted, no edema  SKIN: tightening of skin overlying anterior medial ankles c/w lipodermatosclerosis. no suspicious lesions or rashes  NEURO: Normal strength and tone, mentation intact and speech normal  PSYCH: mentation appears normal, affect normal/bright    Diagnostic Test Results:  Labs reviewed in Epic    ASSESSMENT/PLAN:       ICD-10-CM    1. Routine general medical examination at a health care facility Z00.00    2. Hypertension goal BP (blood pressure) < 130/80 I10 Comprehensive metabolic panel     Albumin Random Urine Quantitative with Creat Ratio     CANCELED: Albumin Random Urine Quantitative with Creat Ratio   3. Elevated liver enzymes R74.8 Comprehensive metabolic panel   4. CARDIOVASCULAR SCREENING; LDL GOAL LESS THAN 160 Z13.6 Lipid panel reflex to direct LDL Fasting   5. Migraine with aura and without status migrainosus, not intractable G43.109 SUMAtriptan (IMITREX) 100 MG tablet   6. Supraventricular tachycardia (H) I47.1    7. STORMY (obstructive sleep apnea) G47.33 SLEEP EVALUATION & MANAGEMENT REFERRAL - [facility]  [phone] (Age 18 and up)   8. Lipodermatosclerosis of both lower extremities I83.11     I83.12    9. Morbid obesity (H) E66.01    10. Nipple symptom or sign in female - bilateral, chronic recurrent itching  N64.59    Obtain routine fasting labs and notify of results when available.  BP and SVT stable as followed by cardiology.  Also has lipodermatosclerosis of bilateral LE from venous insufficiency. Pt would still  like to manage conservatively and avoid surgery at this time. She will follow-up with vascular as planned.  Over-due for STORMY re-assessment as pt reports difficulty with compliance as wondering if mask doesn't fit her appropriately. New referral given to re-establish care.  Repeat liver enzymes since salmonella infection in April. Notify of results when available.   We did also complete a breast exam as pt had mentioned during her mammogram recurrent bilateral nipple itching. She has had this off/on for over 1 yr and mammograms continue to be normal. No evidence for any rash or dermatitis on exam. She reports improvement in summer when it's more humid so this seems to suggest against yeast. Improves with using coconut oil so has continued to use this. Her bra did appear to fit more loosely so wonder if she is experiencing some agitation from this. Encouraged to give consideration to a bra that has a bit better support and she will consider this. Advised to follow-up should she develop any rashes or changes in skin. Currently, it's not bothersome to her and there were no abnormalities seen on exam.   Also refilled imitrex as this has been helpful to her in the past for migraine. Overall, these are much better in the past 12 years. She did however report a new visual disturbance prior to migraine onset that sounds suggestive of aura, but this is not typical of her previous visual disturbance. Thus, I have advised she follow-up with ophthalmology to ensure no other abnormalities. If she has persistence or worsening we can also consider neurology consult.    COUNSELING:   Reviewed preventive health counseling, as reflected in patient instructions       Regular exercise       Healthy diet/nutrition       Immunizations    Declined: Zoster due to Other - pt to check with insurance.               Colon cancer screening       HIV screeninx in teen years, 1x in adult years, and at intervals if high risk    Estimated body  mass index is 43.54 kg/m  as calculated from the following:    Height as of this encounter: 1.829 m (6').    Weight as of this encounter: 145.6 kg (321 lb).    Weight management plan: Discussed healthy diet and exercise guidelines     reports that she has never smoked. She has never used smokeless tobacco.      Counseling Resources:  ATP IV Guidelines  Pooled Cohorts Equation Calculator  Breast Cancer Risk Calculator  FRAX Risk Assessment  ICSI Preventive Guidelines  Dietary Guidelines for Americans, 2010  USDA's MyPlate  ASA Prophylaxis  Lung CA Screening    Ruthie Xiong PA-C  [facility]

## 2019-07-02 NOTE — LETTER
July 8, 2019      Fouzia LAWRENCE Deepa  [address] [place] [address]        Dear ,    We are writing to inform you of your test results.    -LDL(bad) cholesterol level is elevated, HDL(good) cholesterol level is low which can increase your heart disease risk.  A diet high in fat and simple carbohydrates, genetics and being overweight can contribute to this. ADVISE: exercising 150 minutes of aerobic exercise per week (30 minutes for 5 days per week or 50 minutes for 3 days per week are options) and eating a low saturated fat/low carbohydrate diet are helpful to improve this.  -Liver and gallbladder tests are normal (ALT,AST, Alk phos, bilirubin), kidney function is normal (Cr, GFR), sodium is normal, potassium is normal, calcium is normal, glucose is normal.    For additional lab test information, [email or web address] is an excellent reference.  Please contact the clinic at [phone] with any further questions or concerns.    Resulted Orders   Comprehensive metabolic panel   Result Value Ref Range    Sodium 141 133 - 144 mmol/L    Potassium 4.0 3.4 - 5.3 mmol/L    Chloride 108 94 - 109 mmol/L    Carbon Dioxide 27 20 - 32 mmol/L    Anion Gap 6 3 - 14 mmol/L    Glucose 97 70 - 99 mg/dL      Comment:      Fasting specimen    Urea Nitrogen 9 7 - 30 mg/dL    Creatinine 0.97 0.52 - 1.04 mg/dL    GFR Estimate 67 >60 mL/min/[1.73_m2]      Comment:      Non  GFR Calc  Starting 12/18/2018, serum creatinine based estimated GFR (eGFR) will be   calculated using the Chronic Kidney Disease Epidemiology Collaboration   (CKD-EPI) equation.      GFR Estimate If Black 77 >60 mL/min/[1.73_m2]      Comment:       GFR Calc  Starting 12/18/2018, serum creatinine based estimated GFR (eGFR) will be   calculated using the Chronic Kidney Disease Epidemiology Collaboration   (CKD-EPI) equation.      Calcium 8.6 8.5 - 10.1 mg/dL    Bilirubin Total 0.6 0.2 - 1.3 mg/dL    Albumin 3.6 3.4 -  5.0 g/dL    Protein Total 7.5 6.8 - 8.8 g/dL    Alkaline Phosphatase 89 40 - 150 U/L    ALT 30 0 - 50 U/L    AST 19 0 - 45 U/L   Lipid panel reflex to direct LDL Fasting   Result Value Ref Range    Cholesterol 182 <200 mg/dL    Triglycerides 67 <150 mg/dL      Comment:      Fasting specimen    HDL Cholesterol 45 (L) >49 mg/dL    LDL Cholesterol Calculated 124 (H) <100 mg/dL      Comment:      Above desirable:  100-129 mg/dl  Borderline High:  130-159 mg/dL  High:             160-189 mg/dL  Very high:       >189 mg/dl      Non HDL Cholesterol 137 (H) <130 mg/dL      Comment:      Above Desirable:  130-159 mg/dl  Borderline high:  160-189 mg/dl  High:             190-219 mg/dl  Very high:       >219 mg/dl         If you have any questions or concerns, please call the clinic at the number listed above.       Sincerely,        Ruthie Xiong PA-C

## 2019-07-06 NOTE — RESULT ENCOUNTER NOTE
Please call or write patient with the following results:    Dear Fouzia,    Your recent lab results are noted below:    -LDL(bad) cholesterol level is elevated, HDL(good) cholesterol level is low which can increase your heart disease risk.  A diet high in fat and simple carbohydrates, genetics and being overweight can contribute to this. ADVISE: exercising 150 minutes of aerobic exercise per week (30 minutes for 5 days per week or 50 minutes for 3 days per week are options) and eating a low saturated fat/low carbohydrate diet are helpful to improve this.  -Liver and gallbladder tests are normal (ALT,AST, Alk phos, bilirubin), kidney function is normal (Cr, GFR), sodium is normal, potassium is normal, calcium is normal, glucose is normal.    For additional lab test information, [email or web address] is an excellent reference.  Please contact the clinic at [phone] with any further questions or concerns.      Thank you,      Ruthie Xiong PA-C  [facility]

## 2019-09-23 ENCOUNTER — OFFICE VISIT (OUTPATIENT)
Dept: FAMILY MEDICINE | Facility: CLINIC | Age: 54
End: 2019-09-23
Payer: COMMERCIAL

## 2019-09-23 VITALS
TEMPERATURE: 98.3 F | HEART RATE: 63 BPM | RESPIRATION RATE: 14 BRPM | DIASTOLIC BLOOD PRESSURE: 72 MMHG | BODY MASS INDEX: 39.68 KG/M2 | OXYGEN SATURATION: 97 % | SYSTOLIC BLOOD PRESSURE: 120 MMHG | HEIGHT: 72 IN | WEIGHT: 293 LBS

## 2019-09-23 DIAGNOSIS — R39.9 UTI SYMPTOMS: Primary | ICD-10-CM

## 2019-09-23 DIAGNOSIS — R82.90 NONSPECIFIC FINDING ON EXAMINATION OF URINE: ICD-10-CM

## 2019-09-23 LAB
ALBUMIN UR-MCNC: 30 MG/DL
APPEARANCE UR: ABNORMAL
BACTERIA #/AREA URNS HPF: ABNORMAL /HPF
BILIRUB UR QL STRIP: NEGATIVE
COLOR UR AUTO: YELLOW
GLUCOSE UR STRIP-MCNC: NEGATIVE MG/DL
HGB UR QL STRIP: ABNORMAL
KETONES UR STRIP-MCNC: NEGATIVE MG/DL
LEUKOCYTE ESTERASE UR QL STRIP: ABNORMAL
NITRATE UR QL: NEGATIVE
NON-SQ EPI CELLS #/AREA URNS LPF: ABNORMAL /LPF
PH UR STRIP: 6 PH (ref 5–7)
RBC #/AREA URNS AUTO: ABNORMAL /HPF
SOURCE: ABNORMAL
SP GR UR STRIP: <=1.005 (ref 1–1.03)
UROBILINOGEN UR STRIP-ACNC: 0.2 EU/DL (ref 0.2–1)
WBC #/AREA URNS AUTO: ABNORMAL /HPF

## 2019-09-23 PROCEDURE — 81001 URINALYSIS AUTO W/SCOPE: CPT | Performed by: PHYSICIAN ASSISTANT

## 2019-09-23 PROCEDURE — 87086 URINE CULTURE/COLONY COUNT: CPT | Performed by: PHYSICIAN ASSISTANT

## 2019-09-23 PROCEDURE — 99213 OFFICE O/P EST LOW 20 MIN: CPT | Performed by: PHYSICIAN ASSISTANT

## 2019-09-23 RX ORDER — SULFAMETHOXAZOLE/TRIMETHOPRIM 800-160 MG
1 TABLET ORAL 2 TIMES DAILY
Qty: 6 TABLET | Refills: 0 | Status: SHIPPED | OUTPATIENT
Start: 2019-09-23 | End: 2019-09-27

## 2019-09-23 ASSESSMENT — MIFFLIN-ST. JEOR: SCORE: 2140.83

## 2019-09-23 NOTE — LETTER
September 25, 2019      Fouzia HOWARD Arenas  [address] [place]  [facility] [address]        Dear ,    We are writing to inform you of your test results.     your urine culture grew out normal sarthak.  Please finish your antibiotic and follow up if new or worsening symptoms    Resulted Orders   *UA reflex to Microscopic and Culture ([place] and [facility] (except [place] and [place])   Result Value Ref Range    Color Urine Yellow     Appearance Urine Slightly Cloudy     Glucose Urine Negative NEG^Negative mg/dL    Bilirubin Urine Negative NEG^Negative    Ketones Urine Negative NEG^Negative mg/dL    Specific Gravity Urine <=1.005 1.003 - 1.035    Blood Urine Large (A) NEG^Negative    pH Urine 6.0 5.0 - 7.0 pH    Protein Albumin Urine 30 (A) NEG^Negative mg/dL    Urobilinogen Urine 0.2 0.2 - 1.0 EU/dL    Nitrite Urine Negative NEG^Negative    Leukocyte Esterase Urine Small (A) NEG^Negative    Source Midstream Urine    Urine Microscopic   Result Value Ref Range    WBC Urine 10-25 (A) OTO5^0 - 5 /HPF    RBC Urine 25-50 (A) OTO2^O - 2 /HPF    Squamous Epithelial /LPF Urine Few FEW^Few /LPF    Bacteria Urine Few (A) NEG^Negative /HPF   Urine Culture Aerobic Bacterial   Result Value Ref Range    Specimen Description Midstream Urine     Culture Micro       10,000 to 50,000 colonies/mL  mixed urogenital sarthak  Susceptibility testing not routinely done         If you have any questions or concerns, please call the clinic at the number listed above.       Sincerely,        Sha Petty PA-C

## 2019-09-23 NOTE — PROGRESS NOTES
Subjective     Fouzia Arenas is a 54 year old female who presents to clinic today for the following health issues:    HPI   URINARY TRACT SYMPTOMS      Duration: 3 days ago    Description  dysuria, frequency and urgency    Intensity:  moderate    Accompanying signs and symptoms:  Fever/chills: no   Flank pain no   Nausea and vomiting: YES-   Vaginal symptoms: none  Abdominal/Pelvic Pain: no     History  History of frequent UTI's: YES  History of kidney stones: no     Precipitating or alleviating factors: None    Therapies tried and outcome: cranberry, extra fluids, ibuprofen   Outcome: not helpful      Patient Active Problem List   Diagnosis     Esophageal reflux     CARDIOVASCULAR SCREENING; LDL GOAL LESS THAN 160     Palpitations     STORMY (obstructive sleep apnea)     Atypical chest pain     BPPV (benign paroxysmal positional vertigo)     Morbid obesity (H)     Hypertension goal BP (blood pressure) < 130/80     Vertigo     Supraventricular tachycardia (H)     Lipodermatosclerosis of both lower extremities     Nipple symptom or sign in female - bilateral, chronic recurrent itching      Past Surgical History:   Procedure Laterality Date     CHOLECYSTECTOMY, LAPOROSCOPIC      Cholecystectomy, Laparoscopic     COLONOSCOPY N/A 11/25/2014    Procedure: COLONOSCOPY;  Surgeon: Wenceslao Galo MD;  Location: [facility]     CYSTOSCOPY, SLING TRANSVAGINAL N/A 8/20/2018    Procedure: CYSTOSCOPY, SLING TRANSVAGINAL;;  Surgeon: Urban Elena MD;  Location: [facility]     D & C      X2-3 for abnormal bleeding     ESOPHAGOSCOPY, GASTROSCOPY, DUODENOSCOPY (EGD), COMBINED N/A 11/25/2014    Procedure: COMBINED ESOPHAGOSCOPY, GASTROSCOPY, DUODENOSCOPY (EGD), BIOPSY SINGLE OR MULTIPLE;  Surgeon: Wenceslao Galo MD;  Location: [facility]     LAPAROSCOPIC HYSTERECTOMY SUPRACERVICAL N/A 8/20/2018    Procedure: LAPAROSCOPIC HYSTERECTOMY SUPRACERVICAL;  LAPAROSCOPIC SUBTOATAL HYSTERECTOMY, BILATERAL SALPINGECTOMY, SUBURETHRAL SLING AND  CYSTOSCOPY;  Surgeon: Urban Elena MD;  Location: [facility]     LAPAROSCOPIC SALPINGECTOMY Bilateral 8/20/2018    Procedure: LAPAROSCOPIC SALPINGECTOMY;;  Surgeon: Urban Elena MD;  Location: [facility]     LAPAROSCOPY      For endometriosis     OPERATIVE HYSTEROSCOPY WITH MORCELLATOR N/A 3/29/2018    Procedure: OPERATIVE HYSTEROSCOPY WITH MORCELLATOR (MYOSURE);  OPERATIVE HYSTEROSCOPY WITH MORCELLATOR ;  Surgeon: Urban Elena MD;  Location: [facility]       Social History     Tobacco Use     Smoking status: Never Smoker     Smokeless tobacco: Never Used   Substance Use Topics     Alcohol use: Yes     Alcohol/week: 1.7 standard drinks     Family History   Problem Relation Age of Onset     Hypertension Mother      Lipids Mother      Hypertension Father      Prostate Cancer Father      Lipids Father      Breast Cancer Maternal Aunt      Hypertension Maternal Grandmother      C.A.D. Maternal Grandmother      Gallbladder Disease Maternal Grandmother      Cancer Maternal Grandfather         lung     Cerebrovascular Disease Paternal Grandmother      C.A.D. Paternal Grandfather      Cancer - colorectal Other         cousin maternal      Colon Cancer Cousin      Colon Cancer Cousin      Diabetes No family hx of          Current Outpatient Medications   Medication Sig Dispense Refill     Aspirin-Acetaminophen-Caffeine (EXCEDRIN EXTRA STRENGTH PO) Take 2 tablets by mouth every 6 hours as needed       ibuprofen (ADVIL/MOTRIN) 600 MG tablet Take 1 tablet (600 mg) by mouth every 6 hours as needed for pain (mild) 40 tablet 0     lisinopril (PRINIVIL/ZESTRIL) 20 MG tablet Take 1 tablet (20 mg) by mouth 2 times daily 180 tablet 3     nebivolol (BYSTOLIC) 10 MG tablet Take 1 tablet (10 mg) by mouth daily 90 tablet 3     order for DME DREAMSTATION  9-12 CM/H20  FF MIRAGE QUATTRO       spironolactone (ALDACTONE) 25 MG tablet Take 1 tablet (25 mg) by mouth daily 90 tablet 3     sulfamethoxazole-trimethoprim (BACTRIM  DS/SEPTRA DS) 800-160 MG tablet Take 1 tablet by mouth 2 times daily 6 tablet 0     SUMAtriptan (IMITREX) 100 MG tablet Take 1 tablet (100 mg) by mouth at onset of headache for migraine May repeat in 2 hours. Max dose 200mg in 24 hours. 9 tablet 1     naproxen (NAPROSYN) 500 MG tablet   1     Allergies   Allergen Reactions     Amoxicillin Nausea and Vomiting     Percocet [Oxycodone-Acetaminophen] Nausea and Vomiting         Reviewed and updated as needed this visit by Provider         Review of Systems   ROS COMP: Constitutional, HEENT, cardiovascular, pulmonary, gi and gu systems are negative, except as otherwise noted.      Objective    /72   Pulse 63   Temp 98.3  F (36.8  C) (Tympanic)   Resp 14   Ht 1.829 m (6')   Wt 142.9 kg (315 lb)   SpO2 97%   BMI 42.72 kg/m    Body mass index is 42.72 kg/m .  Physical Exam   GENERAL: healthy, alert and no distress  RESP: lungs clear to auscultation - no rales, rhonchi or wheezes  CV: regular rate and rhythm, normal S1 S2, no S3 or S4  ABDOMEN: soft, nontender, no hepatosplenomegaly, no masses and bowel sounds normal  BACK: no CVA tenderness  PSYCH: mentation appears normal, affect normal/bright    Diagnostic Test Results:  Results for orders placed or performed in visit on 09/23/19 (from the past 24 hour(s))   *UA reflex to Microscopic and Culture ([place] [facility] (except [place] and [place])   Result Value Ref Range    Color Urine Yellow     Appearance Urine Slightly Cloudy     Glucose Urine Negative NEG^Negative mg/dL    Bilirubin Urine Negative NEG^Negative    Ketones Urine Negative NEG^Negative mg/dL    Specific Gravity Urine <=1.005 1.003 - 1.035    Blood Urine Large (A) NEG^Negative    pH Urine 6.0 5.0 - 7.0 pH    Protein Albumin Urine 30 (A) NEG^Negative mg/dL    Urobilinogen Urine 0.2 0.2 - 1.0 EU/dL    Nitrite Urine Negative NEG^Negative    Leukocyte Esterase Urine Small (A) NEG^Negative    Source Midstream Urine    Urine Microscopic    Result Value Ref Range    WBC Urine 10-25 (A) OTO5^0 - 5 /HPF    RBC Urine 25-50 (A) OTO2^O - 2 /HPF    Squamous Epithelial /LPF Urine Few FEW^Few /LPF    Bacteria Urine Few (A) NEG^Negative /HPF           Assessment & Plan     1. UTI symptoms  - *UA reflex to Microscopic and Culture ([facility] (except [place] and [place])  - Urine Microscopic  - sulfamethoxazole-trimethoprim (BACTRIM DS/SEPTRA DS) 800-160 MG tablet; Take 1 tablet by mouth 2 times daily  Dispense: 6 tablet; Refill: 0    2. Nonspecific finding on examination of urine  - Urine Culture Aerobic Bacterial     BMI:   Estimated body mass index is 42.72 kg/m  as calculated from the following:    Height as of this encounter: 1.829 m (6').    Weight as of this encounter: 142.9 kg (315 lb).         Return to the clinic if you have new or worsening symptoms      No follow-ups on file.    Sha Petty PA-C  [facility] [place] [facility]

## 2019-09-24 LAB
BACTERIA SPEC CULT: NORMAL
SPECIMEN SOURCE: NORMAL

## 2019-09-25 NOTE — RESULT ENCOUNTER NOTE
Letter sent to patient with results.    Sha Petty PA-C  [facility]

## 2019-09-26 ENCOUNTER — TELEPHONE (OUTPATIENT)
Dept: FAMILY MEDICINE | Facility: CLINIC | Age: 54
End: 2019-09-26

## 2019-09-26 NOTE — TELEPHONE ENCOUNTER
Lets have her come back in for reevaluation as her culture did not row a specific bacteria we will need to look for other causes of her symptoms

## 2019-09-26 NOTE — TELEPHONE ENCOUNTER
Called patient to inform her of PA-C note below. Scheduled patient for office visit tomorrow. Patient verbalized understanding and agrees with plan.     Wendy Youngblood RN, BSN  [facility] [place]

## 2019-09-26 NOTE — TELEPHONE ENCOUNTER
Reason for call:  Patient reporting a symptom    Symptom or request: The patient saw Sha on 09/23/2019. She says it's still painful when she goes to the bathroom. She is on her last day of the medication Sha prescribed. She is wondering what to do.    Phone Number patient can be reached at:  Cell number on file:    Telephone Information:   Mobile [phone]     Best Time:  Anytime    Can we leave a detailed message on this number:  YES    Call taken on 9/26/2019 at 9:04 AM by Sarai Camacho

## 2019-09-26 NOTE — TELEPHONE ENCOUNTER
Called patient back, was seen by DANIA Petty on 9/23/19 for UTI, was prescribed antibiotics and her last pill she has taken today and is still having the following symptoms below.       -Frequency with urination, patient is staying hydrated though   -Feels like her bladder is full, but just went to the bathroom  -Burning and pain with urination still  -Pale pink tinged urine intermittent    -Denies fever  -DeniesNo flank pain      Routing to PCP to advise. Thank you.     Okay to leave a detailed message? YES  Pharmacy: Allen Youngblood RN, BSN  [facility] [place]

## 2019-09-27 ENCOUNTER — OFFICE VISIT (OUTPATIENT)
Dept: FAMILY MEDICINE | Facility: CLINIC | Age: 54
End: 2019-09-27
Payer: COMMERCIAL

## 2019-09-27 VITALS
HEIGHT: 72 IN | HEART RATE: 56 BPM | WEIGHT: 293 LBS | SYSTOLIC BLOOD PRESSURE: 126 MMHG | OXYGEN SATURATION: 97 % | BODY MASS INDEX: 39.68 KG/M2 | DIASTOLIC BLOOD PRESSURE: 86 MMHG | TEMPERATURE: 99.1 F

## 2019-09-27 DIAGNOSIS — R31.9 HEMATURIA, UNSPECIFIED TYPE: ICD-10-CM

## 2019-09-27 DIAGNOSIS — R30.0 DYSURIA: Primary | ICD-10-CM

## 2019-09-27 LAB
ALBUMIN UR-MCNC: NEGATIVE MG/DL
AMORPH CRY #/AREA URNS HPF: ABNORMAL /HPF
APPEARANCE UR: ABNORMAL
BACTERIA #/AREA URNS HPF: ABNORMAL /HPF
BILIRUB UR QL STRIP: NEGATIVE
COLOR UR AUTO: YELLOW
GLUCOSE UR STRIP-MCNC: NEGATIVE MG/DL
HGB UR QL STRIP: NEGATIVE
KETONES UR STRIP-MCNC: NEGATIVE MG/DL
LEUKOCYTE ESTERASE UR QL STRIP: ABNORMAL
MUCOUS THREADS #/AREA URNS LPF: PRESENT /LPF
NITRATE UR QL: NEGATIVE
NON-SQ EPI CELLS #/AREA URNS LPF: ABNORMAL /LPF
PH UR STRIP: 5.5 PH (ref 5–7)
RBC #/AREA URNS AUTO: ABNORMAL /HPF
RENAL EPI CELLS #/AREA URNS HPF: ABNORMAL /HPF
SOURCE: ABNORMAL
SP GR UR STRIP: 1.01 (ref 1–1.03)
UROBILINOGEN UR STRIP-ACNC: 0.2 EU/DL (ref 0.2–1)
WBC #/AREA URNS AUTO: ABNORMAL /HPF

## 2019-09-27 PROCEDURE — 87086 URINE CULTURE/COLONY COUNT: CPT | Performed by: PHYSICIAN ASSISTANT

## 2019-09-27 PROCEDURE — 81001 URINALYSIS AUTO W/SCOPE: CPT | Performed by: PHYSICIAN ASSISTANT

## 2019-09-27 PROCEDURE — 99213 OFFICE O/P EST LOW 20 MIN: CPT | Performed by: PHYSICIAN ASSISTANT

## 2019-09-27 RX ORDER — PHENAZOPYRIDINE HYDROCHLORIDE 100 MG/1
100 TABLET, FILM COATED ORAL 3 TIMES DAILY PRN
Qty: 12 TABLET | Refills: 0 | Status: SHIPPED | OUTPATIENT
Start: 2019-09-27 | End: 2020-12-01

## 2019-09-27 RX ORDER — CIPROFLOXACIN 500 MG/1
500 TABLET, FILM COATED ORAL 2 TIMES DAILY
Qty: 14 TABLET | Refills: 0 | Status: SHIPPED | OUTPATIENT
Start: 2019-09-27 | End: 2019-10-04

## 2019-09-27 ASSESSMENT — MIFFLIN-ST. JEOR: SCORE: 2131.76

## 2019-09-27 NOTE — PROGRESS NOTES
Subjective     Fouzia Arenas is a 54 year old female who presents to clinic today for the following health issues:    HPI     Recheck UTI - finished antibiotics 9/26/19 and stilling having urinary frequency, feels like her bladder is full even after urinating, continues to have burning and pain with urination,blood tinged urine.      No flank pain, n/v/ or fevers.  No hx of kidney stones.   Urine culture on 09/26/2019 grew normal genital sarthak.  She finished 3 day course of bactrim without relief of her symptoms.           Reviewed and updated as needed this visit by Provider         Review of Systems   ROS COMP: Constitutional, HEENT, cardiovascular, pulmonary, GI, , musculoskeletal, neuro, skin, endocrine and psych systems are negative, except as otherwise noted.      Objective    There were no vitals taken for this visit.  There is no height or weight on file to calculate BMI.  Physical Exam   GENERAL: healthy, alert and no distress  RESP: lungs clear to auscultation - no rales, rhonchi or wheezes  CV: regular rate and rhythm, normal S1 S2, no S3 or S4, no murmur, click or rub, no peripheral edema and peripheral pulses strong  ABDOMEN: soft, nontender, no hepatosplenomegaly, no masses and bowel sounds normal  PSYCH: mentation appears normal, affect normal/bright    Diagnostic Test Results:  Results for orders placed or performed in visit on 09/27/19 (from the past 24 hour(s))   *UA reflex to Microscopic and Culture ([place] and [facility] (except [place] and [place])   Result Value Ref Range    Color Urine Yellow     Appearance Urine Cloudy     Glucose Urine Negative NEG^Negative mg/dL    Bilirubin Urine Negative NEG^Negative    Ketones Urine Negative NEG^Negative mg/dL    Specific Gravity Urine 1.010 1.003 - 1.035    Blood Urine Negative NEG^Negative    pH Urine 5.5 5.0 - 7.0 pH    Protein Albumin Urine Negative NEG^Negative mg/dL    Urobilinogen Urine 0.2 0.2 - 1.0 EU/dL    Nitrite Urine Negative  NEG^Negative    Leukocyte Esterase Urine Small (A) NEG^Negative    Source Midstream Urine    Urine Microscopic   Result Value Ref Range    WBC Urine 0 - 5 OTO5^0 - 5 /HPF    RBC Urine O - 2 OTO2^O - 2 /HPF    Squamous Epithelial /LPF Urine Few FEW^Few /LPF    Renal Tub Epi Many (A) NEG^Negative /HPF    Bacteria Urine Moderate (A) NEG^Negative /HPF    Amorphous Crystals Few (A) NEG^Negative /HPF    Mucous Urine Present (A) NEG^Negative /LPF   Urine Culture Aerobic Bacterial   Result Value Ref Range    Specimen Description Midstream Urine     Culture Micro       Referred to [facility], await final report.           Assessment & Plan     1. Dysuria   Fouzia's symptoms today continue to be consistent with a UTI.  Her UA shows bacteria and crystals and renal tubule epithelial cells.  At this time, I am going to retreat with antibiotics for 7 day course.  I advised that if her symptoms do not improve in 5 days with the antibiotic, we will consider imagine to further assess her kidneys.  She has also been advised to follow up in 2 weeks for repeat UA to ensure that abnormalities resolve with treatment.   - *UA reflex to Microscopic and Culture ([place] and [facility] (except [place] and [place])  - Urine Microscopic  - ciprofloxacin (CIPRO) 500 MG tablet; Take 1 tablet (500 mg) by mouth 2 times daily for 7 days  Dispense: 14 tablet; Refill: 0  - **UA reflex to Microscopic FUTURE anytime; Future  - Urine Culture Aerobic Bacterial  - phenazopyridine (PYRIDIUM) 100 MG tablet; Take 1 tablet (100 mg) by mouth 3 times daily as needed for urinary tract discomfort  Dispense: 12 tablet; Refill: 0    2. Hematuria, unspecified type  Not noted on UA today, but patient sees some gross blood in her urine.  See plan above       BMI:   Estimated body mass index is 42.45 kg/m  as calculated from the following:    Height as of this encounter: 1.829 m (6').    Weight as of this encounter: 142 kg  (313 lb).               No follow-ups on file.    Sha Petty PA-C  [facility] [place]

## 2019-09-28 LAB
BACTERIA SPEC CULT: NORMAL
SPECIMEN SOURCE: NORMAL

## 2019-09-30 ENCOUNTER — TELEPHONE (OUTPATIENT)
Dept: FAMILY MEDICINE | Facility: CLINIC | Age: 54
End: 2019-09-30

## 2019-09-30 NOTE — TELEPHONE ENCOUNTER
Left non-detailed message to call the clinic back at [phone] and ask to speak with a  triage nurse. Yoselyn Mei RN

## 2019-09-30 NOTE — TELEPHONE ENCOUNTER
Please call Fouzia with the results of her urine culture.  This continues to show only normal sarthak.  Please see how she is feeling on the antibiotic.  If her symptoms persist, we will plan on getting imaging

## 2019-09-30 NOTE — TELEPHONE ENCOUNTER
Patient states that she is actually felling much better. Patient agrees to call back with new or worsening symptoms. Yoselyn Mei RN

## 2019-10-24 DIAGNOSIS — R30.0 DYSURIA: ICD-10-CM

## 2019-10-24 DIAGNOSIS — R82.90 NONSPECIFIC FINDING ON EXAMINATION OF URINE: Primary | ICD-10-CM

## 2019-10-24 LAB
ALBUMIN UR-MCNC: NEGATIVE MG/DL
APPEARANCE UR: CLEAR
BACTERIA #/AREA URNS HPF: ABNORMAL /HPF
BILIRUB UR QL STRIP: NEGATIVE
COLOR UR AUTO: YELLOW
GLUCOSE UR STRIP-MCNC: NEGATIVE MG/DL
HGB UR QL STRIP: NEGATIVE
KETONES UR STRIP-MCNC: NEGATIVE MG/DL
LEUKOCYTE ESTERASE UR QL STRIP: ABNORMAL
NITRATE UR QL: NEGATIVE
NON-SQ EPI CELLS #/AREA URNS LPF: ABNORMAL /LPF
PH UR STRIP: 5 PH (ref 5–7)
RBC #/AREA URNS AUTO: ABNORMAL /HPF
SOURCE: ABNORMAL
SP GR UR STRIP: <=1.005 (ref 1–1.03)
UROBILINOGEN UR STRIP-ACNC: 0.2 EU/DL (ref 0.2–1)
WBC #/AREA URNS AUTO: ABNORMAL /HPF

## 2019-10-24 PROCEDURE — 87086 URINE CULTURE/COLONY COUNT: CPT | Performed by: PHYSICIAN ASSISTANT

## 2019-10-24 PROCEDURE — 81001 URINALYSIS AUTO W/SCOPE: CPT | Performed by: PHYSICIAN ASSISTANT

## 2019-10-25 LAB
BACTERIA SPEC CULT: NORMAL
SPECIMEN SOURCE: NORMAL

## 2019-10-28 ENCOUNTER — TELEPHONE (OUTPATIENT)
Dept: FAMILY MEDICINE | Facility: CLINIC | Age: 54
End: 2019-10-28

## 2019-10-28 DIAGNOSIS — R30.0 DYSURIA: Primary | ICD-10-CM

## 2019-10-28 DIAGNOSIS — R10.9 FLANK PAIN: ICD-10-CM

## 2019-10-28 NOTE — TELEPHONE ENCOUNTER
Patient calling back. She reports that she is not having the pain that she was before, but she is still experiencing urgency. When she gets to the bathroom very little comes out. Of note, she reports having some low back pain over the last week that seems to be slowly getting more intense. She does not feel it more on one side or the other, just generalized in the low back area. She also reports that she got a new mattress about a month ago so unsure if it is related to that or her urinary issues.     Patient can be reached at [phone] (detailed voicemail OK). Please advise. Thank you.  ROBIN Boggs, RN  [facility]

## 2019-10-28 NOTE — TELEPHONE ENCOUNTER
S/w pt and gave Sha's reply below.    Pt states understanding and will wait for phone call to schedule CT scan.    Nneka ALFRED RN  EP Triage

## 2019-10-28 NOTE — TELEPHONE ENCOUNTER
Left non-detailed message to call the clinic back at [phone] and ask to speak with a  triage nurse.   Yoselyn Mei RN

## 2019-10-28 NOTE — TELEPHONE ENCOUNTER
Thank you for the reply.  At this time, I think it would be reasonable to get a CT scan to further assess her kidneys.  I am placing an order for this to [facility].  Please let her know that she should receive a call to schedule this test in the next 2-3 days

## 2019-10-28 NOTE — TELEPHONE ENCOUNTER
Please call Fouzia with the results of her labs.  Her Urine continues to show a small amount of WBCs and leukocyte esterase ( bi products of bacteria).  Her culture was negative.  Please ask if she is still having symptoms.

## 2019-11-11 ENCOUNTER — HOSPITAL ENCOUNTER (OUTPATIENT)
Dept: CT IMAGING | Facility: CLINIC | Age: 54
Discharge: HOME | End: 2019-11-11
Attending: PHYSICIAN ASSISTANT | Admitting: PHYSICIAN ASSISTANT
Payer: COMMERCIAL

## 2019-11-11 DIAGNOSIS — R30.0 DYSURIA: ICD-10-CM

## 2019-11-11 DIAGNOSIS — R10.9 FLANK PAIN: ICD-10-CM

## 2019-11-11 PROCEDURE — 74176 CT ABD & PELVIS W/O CONTRAST: CPT

## 2019-11-12 ENCOUNTER — TELEPHONE (OUTPATIENT)
Dept: FAMILY MEDICINE | Facility: CLINIC | Age: 54
End: 2019-11-12

## 2019-11-12 DIAGNOSIS — R31.9 HEMATURIA, UNSPECIFIED TYPE: Primary | ICD-10-CM

## 2019-11-13 NOTE — TELEPHONE ENCOUNTER
Please call Fouzia with the results of her CT scan which do not show evidence of kidney stones.  Incidentally, there is some mild thickening ( sclerosis) of her left iliac bone which is non specific but unlikely to be relating to her symptoms.  If she has continued to have symptoms, the next step would be for her to see a urology.  I have placed a referral for this.  Please let me know if she has questions.

## 2019-11-13 NOTE — TELEPHONE ENCOUNTER
S/w pt and gave Sha's message about Ct results and referral information.    Your provider has referred you to: [facility]: [facility] [place] [phone]    Pt states understanding.    Nneka ALFRED RN  EP Triage

## 2019-12-16 DIAGNOSIS — I10 ESSENTIAL HYPERTENSION: ICD-10-CM

## 2019-12-16 DIAGNOSIS — I10 BENIGN ESSENTIAL HYPERTENSION: ICD-10-CM

## 2019-12-16 DIAGNOSIS — I10 HYPERTENSION GOAL BP (BLOOD PRESSURE) < 130/80: ICD-10-CM

## 2019-12-16 RX ORDER — NEBIVOLOL 10 MG/1
10 TABLET ORAL DAILY
Qty: 90 TABLET | Refills: 0 | Status: SHIPPED | OUTPATIENT
Start: 2019-12-16 | End: 2020-03-18

## 2019-12-16 RX ORDER — LISINOPRIL 20 MG/1
20 TABLET ORAL 2 TIMES DAILY
Qty: 180 TABLET | Refills: 3 | Status: SHIPPED | OUTPATIENT
Start: 2019-12-16 | End: 2021-01-29

## 2019-12-16 RX ORDER — SPIRONOLACTONE 25 MG/1
25 TABLET ORAL DAILY
Qty: 90 TABLET | Refills: 0 | Status: SHIPPED | OUTPATIENT
Start: 2019-12-16 | End: 2020-03-10

## 2020-03-10 DIAGNOSIS — I10 BENIGN ESSENTIAL HYPERTENSION: ICD-10-CM

## 2020-03-10 RX ORDER — SPIRONOLACTONE 25 MG/1
25 TABLET ORAL DAILY
Qty: 90 TABLET | Refills: 3 | Status: SHIPPED | OUTPATIENT
Start: 2020-03-10 | End: 2021-04-09

## 2020-03-18 DIAGNOSIS — I10 HYPERTENSION GOAL BP (BLOOD PRESSURE) < 130/80: ICD-10-CM

## 2020-03-18 RX ORDER — NEBIVOLOL 10 MG/1
10 TABLET ORAL DAILY
Qty: 90 TABLET | Refills: 0 | Status: SHIPPED | OUTPATIENT
Start: 2020-03-18 | End: 2020-06-17

## 2020-03-20 DIAGNOSIS — I10 BENIGN ESSENTIAL HYPERTENSION: Primary | ICD-10-CM

## 2020-03-20 NOTE — PROGRESS NOTES
Placed order for pt to have an overdue f/u in  June.  Pt did have a CMP completed in   January of this year. JNelsonCHACORTA

## 2020-06-17 DIAGNOSIS — I10 HYPERTENSION GOAL BP (BLOOD PRESSURE) < 130/80: ICD-10-CM

## 2020-06-17 RX ORDER — NEBIVOLOL 10 MG/1
10 TABLET ORAL DAILY
Qty: 90 TABLET | Refills: 0 | Status: SHIPPED | OUTPATIENT
Start: 2020-06-17 | End: 2021-01-29

## 2020-08-28 ENCOUNTER — OFFICE VISIT (OUTPATIENT)
Dept: FAMILY MEDICINE | Facility: CLINIC | Age: 55
End: 2020-08-28
Payer: COMMERCIAL

## 2020-08-28 VITALS
BODY MASS INDEX: 31.42 KG/M2 | DIASTOLIC BLOOD PRESSURE: 84 MMHG | HEIGHT: 72 IN | SYSTOLIC BLOOD PRESSURE: 122 MMHG | OXYGEN SATURATION: 98 % | WEIGHT: 232 LBS | TEMPERATURE: 97.4 F | HEART RATE: 65 BPM

## 2020-08-28 DIAGNOSIS — N30.00 ACUTE CYSTITIS WITHOUT HEMATURIA: ICD-10-CM

## 2020-08-28 DIAGNOSIS — R82.90 NONSPECIFIC FINDING ON EXAMINATION OF URINE: ICD-10-CM

## 2020-08-28 DIAGNOSIS — R31.9 HEMATURIA, UNSPECIFIED TYPE: ICD-10-CM

## 2020-08-28 DIAGNOSIS — R30.0 DYSURIA: Primary | ICD-10-CM

## 2020-08-28 LAB

## 2020-08-28 PROCEDURE — 87086 URINE CULTURE/COLONY COUNT: CPT | Performed by: NURSE PRACTITIONER

## 2020-08-28 PROCEDURE — 99213 OFFICE O/P EST LOW 20 MIN: CPT | Performed by: NURSE PRACTITIONER

## 2020-08-28 PROCEDURE — 81001 URINALYSIS AUTO W/SCOPE: CPT | Performed by: NURSE PRACTITIONER

## 2020-08-28 RX ORDER — CIPROFLOXACIN 500 MG/1
500 TABLET, FILM COATED ORAL 2 TIMES DAILY
Qty: 14 TABLET | Refills: 0 | Status: SHIPPED | OUTPATIENT
Start: 2020-08-28 | End: 2020-09-04

## 2020-08-28 ASSESSMENT — MIFFLIN-ST. JEOR: SCORE: 1759.35

## 2020-08-28 NOTE — PROGRESS NOTES
Subjective   Fouzia Arenas is a 55 year old female who presents to clinic today for the following health issues:    HPI   Genitourinary - Female  Onset/Duration: started x 1 week ago   Description:   Painful urination (Dysuria): no           Frequency: YES  Blood in urine (Hematuria): YES   Delay in urine (Hesitency): no  Intensity: mild  Progression of Symptoms:  worsening  Accompanying Signs & Symptoms:  Fever/chills: no  Flank pain: YES, left and right, back pain   Nausea and vomiting: no  Vaginal symptoms: none  Abdominal/Pelvic Pain: YES- sharp cramps   History:   History of frequent UTI s: YES- few years ago after bladder sling   History of kidney stones: no  Sexually Active: no  Possibility of pregnancy: No  Precipitating or alleviating factors: None  Therapies tried and outcome: fluids and Excedrin      Reviewed and updated as needed this visit by provider:         Review of Systems   Constitutional, HEENT, cardiovascular, pulmonary, GI, , musculoskeletal, neuro, skin, endocrine and psych systems are negative, except as otherwise noted per HPI.      Objective   /84   Pulse 65   Temp 97.4  F (36.3  C) (Tympanic)   Ht 1.829 m (6')   Wt 105.2 kg (232 lb)   SpO2 98%   Breastfeeding No   BMI 31.46 kg/m   Body mass index is 31.46 kg/m .  Physical Exam   GENERAL: healthy, alert, well nourished, well hydrated, no distress  HENT: ear canals- normal; TMs- normal; Nose- normal; Mouth- no ulcers, no lesions  NECK: no tenderness, no adenopathy, no asymmetry, no masses, no stiffness; thyroid- normal to palpation  RESP: lungs clear to auscultation - no rales, no rhonchi, no wheezes  CV: regular rates and rhythm, normal S1 S2, no S3 or S4 and no murmur, no click or rub -  ABDOMEN: soft, no tenderness, no  hepatosplenomegaly, no masses, normal bowel sounds    Diagnostic Test Results  UA suspicious for UTI      Assessment & Plan   Fouzia was seen today for urinary problem.    Diagnoses and all orders for this  visit:    Dysuria  -     UA reflex to Microscopic and Culture  -     Urine Microscopic    Hematuria, unspecified type    Acute cystitis without hematuria  -     ciprofloxacin (CIPRO) 500 MG tablet; Take 1 tablet (500 mg) by mouth 2 times daily for 7 days    Nonspecific finding on examination of urine  -     Urine Culture Aerobic Bacterial           BMI:   Estimated body mass index is 42.45 kg/m  as calculated from the following:    Height as of 9/27/19: 1.829 m (6').    Weight as of 9/27/19: 142 kg (313 lb).           See Patient Instructions    No follow-ups on file.            LILIANA Ugalde     [address] [address]  [email or web address]   Office: [phone]

## 2020-08-31 LAB
BACTERIA SPEC CULT: NORMAL
BACTERIA SPEC CULT: NORMAL
SPECIMEN SOURCE: NORMAL

## 2020-12-01 ENCOUNTER — VIRTUAL VISIT (OUTPATIENT)
Dept: FAMILY MEDICINE | Facility: CLINIC | Age: 55
End: 2020-12-01
Payer: COMMERCIAL

## 2020-12-01 DIAGNOSIS — R32 URINARY INCONTINENCE, UNSPECIFIED TYPE: ICD-10-CM

## 2020-12-01 DIAGNOSIS — R30.0 DYSURIA: Primary | ICD-10-CM

## 2020-12-01 PROCEDURE — 99213 OFFICE O/P EST LOW 20 MIN: CPT | Mod: 95 | Performed by: FAMILY MEDICINE

## 2020-12-01 RX ORDER — SULFAMETHOXAZOLE/TRIMETHOPRIM 800-160 MG
1 TABLET ORAL 2 TIMES DAILY
Qty: 14 TABLET | Refills: 0 | Status: SHIPPED | OUTPATIENT
Start: 2020-12-01 | End: 2020-12-01

## 2020-12-01 RX ORDER — SULFAMETHOXAZOLE/TRIMETHOPRIM 800-160 MG
1 TABLET ORAL 2 TIMES DAILY
Qty: 14 TABLET | Refills: 0 | Status: SHIPPED | OUTPATIENT
Start: 2020-12-01 | End: 2020-12-02

## 2020-12-01 NOTE — PROGRESS NOTES
Assessment & Plan   Dysuria  We will treat empirically, push fluids and be seen if fever back pain develops  - UA with Microscopic  - Urine Culture Aerobic Bacterial  - sulfamethoxazole-trimethoprim (BACTRIM DS) 800-160 MG tablet  Dispense: 14 tablet; Refill: 0       BMI:   Estimated body mass index is 31.46 kg/m  as calculated from the following:    Height as of 8/28/20: 1.829 m (6').    Weight as of 8/28/20: 105.2 kg (232 lb).   Weight management plan: Discussed healthy diet and exercise guidelines      Return in about 1 week (around 12/8/2020) for symptoms failing to improve or sooner if worsening.      Otf Abdalla MD      [address] [address]  [email or web address]   Office: [phone]  Pager: [phone]       Subjective   Fouzia Arenas is a 55 year old female who is being evaluated via a billable telephone visit today for the following health issues:      Patient has given verbal consent for Telephone visit?  Yes    How would you like to obtain your AVS? MyChart    Chief Complaint  Patient presents with:  UTI       HPI  Genitourinary - Female  Onset/Duration: 4 days ago- patient had hysterectomy and  bladder sling 2 years ago and has been having chronic bladder infections since  Description:   Painful urination (Dysuria): YES           Frequency: YES  Blood in urine (Hematuria): no  Delay in urine (Hesitency): YES  Intensity: moderate  Progression of Symptoms:  worsening  Accompanying Signs & Symptoms:  Fever/chills: no  Flank pain: no  Nausea and vomiting: no  Vaginal symptoms: odor  Abdominal/Pelvic Pain: YES- cramping  History:   History of frequent UTI s:   History of kidney stones: no  Sexually Active: no  Possibility of pregnancy: No  Precipitating or alleviating factors: None  Therapies tried and outcome:  cranberry juice- water     Reviewed and updated as needed this visit by Provider  Tobacco  Allergies  Meds  Problems   Med Hx  Surg Hx  Fam Hx          ROS: Constitutional, HEENT, cardiovascular, pulmonary, GI, , musculoskeletal, neuro, skin, endocrine and psych systems are negative, except as otherwise noted.       Objective   Reported vitals:  There were no vitals taken for this visit.   Gen: healthy, alert, and no distress  Psych: Alert and oriented times 3; coherent speech, normal   rate and volume, able to articulate logical thoughts, able   to abstract reason, no tangential thoughts, no hallucinations   or delusions.  Her affect is normal.    Diagnostic Test Results:  Labs reviewed in Epic  And pending    Phone call duration:  7 minutes

## 2020-12-02 DIAGNOSIS — R30.0 DYSURIA: ICD-10-CM

## 2020-12-02 LAB
ALBUMIN UR-MCNC: ABNORMAL MG/DL
APPEARANCE UR: ABNORMAL
BACTERIA #/AREA URNS HPF: ABNORMAL /HPF
BILIRUB UR QL STRIP: NEGATIVE
COLOR UR AUTO: YELLOW
GLUCOSE UR STRIP-MCNC: NEGATIVE MG/DL
HGB UR QL STRIP: ABNORMAL
KETONES UR STRIP-MCNC: NEGATIVE MG/DL
LEUKOCYTE ESTERASE UR QL STRIP: ABNORMAL
NITRATE UR QL: NEGATIVE
NON-SQ EPI CELLS #/AREA URNS LPF: ABNORMAL /LPF
PH UR STRIP: 5.5 PH (ref 5–7)
RBC #/AREA URNS AUTO: ABNORMAL /HPF
SOURCE: ABNORMAL
SP GR UR STRIP: 1.02 (ref 1–1.03)
UROBILINOGEN UR STRIP-ACNC: 0.2 EU/DL (ref 0.2–1)
WBC #/AREA URNS AUTO: ABNORMAL /HPF

## 2020-12-02 PROCEDURE — 81001 URINALYSIS AUTO W/SCOPE: CPT | Performed by: FAMILY MEDICINE

## 2020-12-02 PROCEDURE — 87086 URINE CULTURE/COLONY COUNT: CPT | Performed by: FAMILY MEDICINE

## 2020-12-02 RX ORDER — SULFAMETHOXAZOLE/TRIMETHOPRIM 800-160 MG
1 TABLET ORAL 2 TIMES DAILY
Qty: 14 TABLET | Refills: 0 | Status: SHIPPED | OUTPATIENT
Start: 2020-12-02 | End: 2021-01-29

## 2020-12-02 NOTE — PROGRESS NOTES
[facility] calling to report Bactrim needs to be resent as this was cancelled on mistake.    Rx resent.    Riky GRIMES RN   [facility]

## 2020-12-03 LAB
BACTERIA SPEC CULT: NORMAL
Lab: NORMAL
SPECIMEN SOURCE: NORMAL

## 2020-12-04 NOTE — RESULT ENCOUNTER NOTE
Note to Staff: please call the patient to explain results and to check on current symptoms.  Also send a result note if they would like that.     -Urine culture is abnormal but it looks like a contaminated, non clean-catch sample.  It is okay to finish the antibiotic course if it is helping.  If new, worsening, or persistent symptoms occur then you should call or return for a recheck.      For additional lab test information, [email or web address] is an excellent reference.

## 2020-12-07 NOTE — MR AVS SNAPSHOT
After Visit Summary   12/7/2018    Fouzia Arenas    MRN: [id number]           Patient Information     Date Of Birth          1965        Visit Information        Provider Department      12/7/2018 4:25 PM Carlene Mcadams PA-C [facility]        Today's Diagnoses     Knee injury, left, initial encounter    -  1    Left leg swelling           Follow-ups after your visit        Your next 10 appointments already scheduled     Dec 14, 2018 11:00 AM CST   Return Visit with Waqas Waterman MD   [facility] [place] ([facility])    [address] [place] [id number] [phone]              Future tests that were ordered for you today     Open Future Orders        Priority Expected Expires Ordered    US Lower Extremity Venous Duplex Left STAT  12/7/2019 12/7/2018            Who to contact     If you have questions or need follow up information about today's clinic visit or your schedule please contact [facility] directly at [phone].  Normal or non-critical lab and imaging results will be communicated to you by MyChart, letter or phone within 4 business days after the clinic has received the results. If you do not hear from us within 7 days, please contact the clinic through MyChart or phone. If you have a critical or abnormal lab result, we will notify you by phone as soon as possible.  Submit refill requests through [employer] or call your pharmacy and they will forward the refill request to us. Please allow 3 business days for your refill to be completed.          Additional Information About Your Visit        Care EveryWhere ID     This is your Care EveryWhere ID. This could be used by other organizations to access your [facility] medical records  [id number]        Your Vitals Were     Pulse Temperature Pulse Oximetry BMI (Body Mass Index)          55 98.3  F (36.8  C) (Oral)  What to know before your arrive:     -Do not eat, drink or chew gum beginning 8 hours before your scheduled arrival time to the hospital.  Except please drink 20 ounches of Gatorade and complete two hours before your  Given arrival time to the hospital.  If you drink too close to surgery time, your sugery may  Be delayed or cancelled.  Please complete as instructed.    -Do not shave any part of your body including abdomen or pelvic are for two    days before your procedure.   -If you are taking a scheduled medication (insulin, blood pressure medicine,   antibiotics) please consult with your physician whether to take on the day of   surgery.   -Remove all jewelry including rings, wedding bands, and piercing before coming   to the hospital.   -Leave important valuables at home.   -Do not wear dark fingernail polish.   -Please take two Tylenol 500 mg tablets the evening before surgery.   -Bring the following with you to the hospital:    -Picture ID and insurance, Medicare or Medicaid cards    -Co-pay/deductible required by insurance (Cash, Check, Credit Card)    -Copy of living will or power  document (if applicable)    -CPAP mask and tubing, not machine (if applicable)    -Skin prep instructions sheet    What to know the day of procedure:     -Park in the [facility], take elevator for first floor, exit to the right and  proceed through the doors to outside, follow the covered sidewalk to the  entrance of the [place], follow the hallway and signs to the [place],  enter the [place] to your right BEFORE entering the 17[address] lobby.  Take the  elevators to the 3rd floor ([address] [place]).   -Leave unnecessary items in your vehicle, including your suitcase.  Your support  person or a family member can get it for you after your procedure.   -Check in at the reception desk in the lobby of the 3rd floor ([address] [place]).   -One person may accompany you to the pre-op/recovery area.  Please have  other family  100% 43.81 kg/m2         Blood Pressure from Last 3 Encounters:   12/07/18 130/80   12/07/18 109/77   11/30/18 126/86    Weight from Last 3 Encounters:   12/07/18 323 lb (146.5 kg)   12/07/18 326 lb 1 oz (147.9 kg)   11/30/18 323 lb (146.5 kg)               Primary Care Provider Office Phone # Fax #    Ruthie Xiong PA-C [phone] [address] [place] [address]        Equal Access to Services     [facility]: Hadii aad ku hadasho Soomaali, waaxda luqadaha, qaybta kaalmada adeegyada, lili lantigua haymontez adedayna gerber . So [facility] [phone].    ATENCIÓN: Si habla español, tiene a hay disposición servicios gratuitos de asistencia lingüística. [facility] [phone].    We comply with applicable federal civil rights laws and [place] laws. We do not discriminate on the basis of race, color, national origin, age, disability, sex, sexual orientation, or gender identity.            Thank you!     Thank you for choosing [facility]  for your care. Our goal is always to provide you with excellent care. Hearing back from our patients is one way we can continue to improve our services. Please take a few minutes to complete the written survey that you may receive in the mail after your visit with us. Thank you!             Your Updated Medication List - Protect others around you: Learn how to safely use, store and throw away your medicines at www.disposemymeds.org.          This list is accurate as of 12/7/18  7:23 PM.  Always use your most recent med list.                   Brand Name Dispense Instructions for use Diagnosis    ampicillin 500 MG capsule    PRINCIPEN    21 capsule    Take 1 capsule (500 mg) by mouth 3 times daily    Dysuria       ciprofloxacin 500 MG tablet    CIPRO    14 tablet    Take 1 tablet (500 mg) by mouth 2 times daily    Acute cystitis without hematuria       EXCEDRIN EXTRA STRENGTH PO      Take 2 tablets by mouth every 6 hours as  members wait in the waiting room.   -An anesthesiologist will meet with your prior to your procedure.   -After anesthesia has been initiated, one person may accompany you in the  operating room.   -No video cameras are permitted in the operating room; only still cameras,  Please.      What to expect while you are in recovery:     -One person may stay with you while you are in recovery.   -If the baby is stable, he/she may visit to initiate breastfeeding & [facility].    THE FOLLOWING INFORMATION WAS PROVIDED TO PATIENT:     SECTION BOOKLET BY LORI  PAIN MANAGEMENT   RESPIREX    CHLORHEXIDINE GLUCONATE WIPES AND INSTRUCTIONS GIVEN TO PATIENT       needed        ibuprofen 600 MG tablet    ADVIL/MOTRIN    40 tablet    Take 1 tablet (600 mg) by mouth every 6 hours as needed for pain (mild)    Acute pain of right knee       lisinopril 20 MG tablet    PRINIVIL/ZESTRIL    180 tablet    Take 1 tablet (20 mg) by mouth 2 times daily    Essential hypertension       nebivolol 10 MG tablet    BYSTOLIC    90 tablet    Take 1 tablet (10 mg) by mouth daily    Hypertension goal BP (blood pressure) < 130/80       order for DME      DREAMSTATION 9-12 CM/H20 FF MIRAGE QUATTRO        spironolactone 25 MG tablet    ALDACTONE    90 tablet    Take 1 tablet (25 mg) by mouth daily    Benign essential hypertension       traMADol 50 MG tablet    ULTRAM    10 tablet    Take 1 tablet (50 mg) by mouth every 6 hours as needed for severe pain    Acute pain of right knee

## 2021-01-27 ENCOUNTER — TELEPHONE (OUTPATIENT)
Dept: CARDIOLOGY | Facility: CLINIC | Age: 56
End: 2021-01-27

## 2021-01-29 ENCOUNTER — OFFICE VISIT (OUTPATIENT)
Dept: CARDIOLOGY | Facility: CLINIC | Age: 56
End: 2021-01-29
Payer: COMMERCIAL

## 2021-01-29 VITALS
HEART RATE: 79 BPM | HEIGHT: 72 IN | BODY MASS INDEX: 39.68 KG/M2 | SYSTOLIC BLOOD PRESSURE: 161 MMHG | DIASTOLIC BLOOD PRESSURE: 83 MMHG | WEIGHT: 293 LBS

## 2021-01-29 DIAGNOSIS — I83.11 LIPODERMATOSCLEROSIS OF RIGHT LOWER EXTREMITY DUE TO VARICOSE VEINS: ICD-10-CM

## 2021-01-29 DIAGNOSIS — I83.891 SYMPTOMATIC VARICOSE VEINS OF RIGHT LOWER EXTREMITY: ICD-10-CM

## 2021-01-29 DIAGNOSIS — I10 HYPERTENSION GOAL BP (BLOOD PRESSURE) < 130/80: Primary | ICD-10-CM

## 2021-01-29 PROCEDURE — 99214 OFFICE O/P EST MOD 30 MIN: CPT | Performed by: INTERNAL MEDICINE

## 2021-01-29 PROCEDURE — 93000 ELECTROCARDIOGRAM COMPLETE: CPT | Performed by: INTERNAL MEDICINE

## 2021-01-29 RX ORDER — LOSARTAN POTASSIUM AND HYDROCHLOROTHIAZIDE 12.5; 5 MG/1; MG/1
1 TABLET ORAL 2 TIMES DAILY
Qty: 180 TABLET | Refills: 3 | Status: SHIPPED | OUTPATIENT
Start: 2021-01-29 | End: 2021-04-09

## 2021-01-29 RX ORDER — NEBIVOLOL 10 MG/1
10 TABLET ORAL DAILY
Qty: 90 TABLET | Refills: 3 | Status: SHIPPED | OUTPATIENT
Start: 2021-01-29 | End: 2021-04-09

## 2021-01-29 ASSESSMENT — MIFFLIN-ST. JEOR: SCORE: 2186.27

## 2021-01-29 NOTE — LETTER
1/29/2021    Ruthie Xiong PA-C  [address] [facility] [address]    RE: Fouzia Arenas       Dear Colleague,    I had the pleasure of seeing Fouzia Arenas in the [facility].    Vascular Cardiology Consultation      Fouzia Arenas MRN# [id number]   YOB: 1965 Age: 55 year old   Date of Visit 01/29/2021     Reason for consult:  Venous disease           Assessment and Plan:     1. Symptomatic varicose veins of the right lower extremity with lipoma dermatosclerosis and quality of life limiting discomfort    Patient has tried conservative therapy without improvement.  Her symptoms are progressing.    Plan updated venous competency testing and schedule for vein ablation.    Anticipate radiofrequency ablation of right greater and small saphenous veins with sclerotherapy and phlebectomy of varicose veins.      2. Hypertension, suboptimal control    Refilled her Bystolic which helps her palpitations.    Patient has had some cough recently, will change her lisinopril to losartan plus hydrochlorothiazide.  She will start with just 1 pill of the 50/12.5 mg combination pill and increase to twice per day if blood pressure is over 140 mmHg consistently.      This note was transcribed using electronic voice recognition software, typographical errors may be present.                Chief Complaint:   Palpitations, Chest Pain, and Hypertension           History of Present Illness:   This patient is a very pleasant 55 year old female denies seen previously a few years ago with severe right GSV and small saphenous vein incompetence with symptoms and varicose veins.  Her symptoms have progressed and she would like to proceed with vein ablation.    She has lipodermatosclerosis of the right lower leg.  She has edema and leg discomfort in the back of the calf and upper thigh which corresponds to where the venous varicosities were located in 2017.    The discomfort has gotten worse.   It does affect her quality of life.  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  She does try to elevate and she is Mosque in her compression stockings during the workday and has been for months.  She has had greater than 6 weeks of conservative therapy including attempts at weight reduction and exercise and her compression stockings.      She has run out of her Bystolic and has significant stress at home and does say that her blood pressures are in the 170 mmHg range at home.             Physical Exam:     Vitals: BP (!) 161/83   Pulse 79   Ht 1.829 m (6')   Wt 147.9 kg (326 lb 1.9 oz)   BMI 44.23 kg/m    Constitutional:  cooperative, alert and oriented, well developed, well nourished, in no acute distress overweight      Skin:  warm and dry to the touch        Head:  normocephalic, no masses or lesions        Eyes:  pupils equal and round, conjunctivae and lids unremarkable, sclera white, no xanthalasma, EOMS intact, no nystagmus        ENT:  no pallor or cyanosis        Neck:  JVP normal        Chest:  normal respiratory excursion        Cardiac: regular rhythm       grade 1;RUSB;early systolic murmur          Abdomen:      benign    Extremities and Back:  no deformities, clubbing, cyanosis, erythema observed stasis pigmentation;erythema      Neurological:  no gross motor deficits                      Past Medical History:   I have reviewed this patient's past medical history  Past Medical History:   Diagnosis Date     Classic migraine      Complex endometrial hyperplasia     without atypia     Hypertension      Motion sickness      Obese      STORMY (obstructive sleep apnea)      Other disorder of menstruation and other abnormal bleeding from female genital tract 2/24/2011     Palpitations      PONV (postoperative nausea and vomiting)      SVT (supraventricular tachycardia) (H) 9/2015             Past Surgical History:   I have reviewed this patient's past surgical history  Past Surgical  History:   Procedure Laterality Date     CHOLECYSTECTOMY, LAPOROSCOPIC      Cholecystectomy, Laparoscopic     COLONOSCOPY N/A 11/25/2014    Procedure: COLONOSCOPY;  Surgeon: Wenceslao Galo MD;  Location:  GI     CYSTOSCOPY, SLING TRANSVAGINAL N/A 8/20/2018    Procedure: CYSTOSCOPY, SLING TRANSVAGINAL;;  Surgeon: Urban Elena MD;  Location: [facility]     D & C      X2-3 for abnormal bleeding     ESOPHAGOSCOPY, GASTROSCOPY, DUODENOSCOPY (EGD), COMBINED N/A 11/25/2014    Procedure: COMBINED ESOPHAGOSCOPY, GASTROSCOPY, DUODENOSCOPY (EGD), BIOPSY SINGLE OR MULTIPLE;  Surgeon: Wenceslao Galo MD;  Location: [facility]     LAPAROSCOPIC HYSTERECTOMY SUPRACERVICAL N/A 8/20/2018    Procedure: LAPAROSCOPIC HYSTERECTOMY SUPRACERVICAL;  LAPAROSCOPIC SUBTOATAL HYSTERECTOMY, BILATERAL SALPINGECTOMY, SUBURETHRAL SLING AND CYSTOSCOPY;  Surgeon: Urban Elena MD;  Location: [facility]     LAPAROSCOPIC SALPINGECTOMY Bilateral 8/20/2018    Procedure: LAPAROSCOPIC SALPINGECTOMY;;  Surgeon: Urban Elena MD;  Location: [facility]     LAPAROSCOPY      For endometriosis     OPERATIVE HYSTEROSCOPY WITH MORCELLATOR N/A 3/29/2018    Procedure: OPERATIVE HYSTEROSCOPY WITH MORCELLATOR (MYOSURE);  OPERATIVE HYSTEROSCOPY WITH MORCELLATOR ;  Surgeon: Urban Elena MD;  Location: [facility]               Social History:   I have reviewed this patient's social history  Social History     Tobacco Use     Smoking status: Never Smoker     Smokeless tobacco: Never Used   Substance Use Topics     Alcohol use: Yes     Alcohol/week: 1.7 standard drinks             Family History:   I have reviewed this patient's family history  Family History   Problem Relation Age of Onset     Hypertension Mother      Lipids Mother      Hypertension Father      Prostate Cancer Father      Lipids Father      Breast Cancer Maternal Aunt      Hypertension Maternal Grandmother      C.A.D. Maternal Grandmother      Gallbladder Disease Maternal  Grandmother      Cancer Maternal Grandfather         lung     Cerebrovascular Disease Paternal Grandmother      YANDY. Paternal Grandfather      Cancer - colorectal Other         cousin maternal      Colon Cancer Cousin      Colon Cancer Cousin      Diabetes No family hx of              Allergies:     Allergies   Allergen Reactions     Amoxicillin Nausea and Vomiting     Percocet [Oxycodone-Acetaminophen] Nausea and Vomiting             Medications:   I have reviewed this patient's current medications  Current Outpatient Medications   Medication Sig Dispense Refill     Aspirin-Acetaminophen-Caffeine (EXCEDRIN EXTRA STRENGTH PO) Take 2 tablets by mouth every 6 hours as needed       losartan-hydrochlorothiazide (HYZAAR) 50-12.5 MG tablet Take 1 tablet by mouth 2 times daily 180 tablet 3     nebivolol (BYSTOLIC) 10 MG tablet Take 1 tablet (10 mg) by mouth daily 90 tablet 3     order for DME DREAMSTATION  9-12 CM/H20  FF MIRAGE QUATTRO       spironolactone (ALDACTONE) 25 MG tablet Take 1 tablet (25 mg) by mouth daily 90 tablet 3     SUMAtriptan (IMITREX) 100 MG tablet Take 1 tablet (100 mg) by mouth at onset of headache for migraine May repeat in 2 hours. Max dose 200mg in 24 hours. 9 tablet 1               Review of Systems:       Review of Systems:  Skin:  Negative     Eyes:  Negative    ENT:  Negative    Respiratory:  Negative    Cardiovascular:    Positive for;edema  Gastroenterology: Negative    Genitourinary:  Negative    Musculoskeletal:  Positive for arthritis(Knees only)  Neurologic:  Negative    Psychiatric:  Negative    Heme/Lymph/Imm:  Negative    Endocrine:  Negative                       Data:   All laboratory data reviewed  Lab Results   Component Value Date    CHOL 182 07/02/2019     Lab Results   Component Value Date    HDL 45 07/02/2019     Lab Results   Component Value Date     07/02/2019     Lab Results   Component Value Date    TRIG 67 07/02/2019     Lab Results   Component Value Date     CHOLHDLRATIO 4.0 07/07/2014     TSH   Date Value Ref Range Status   02/06/2018 4.12 (H) 0.40 - 4.00 mU/L Final     Last Basic Metabolic Panel:  Lab Results   Component Value Date     07/02/2019      Lab Results   Component Value Date    POTASSIUM 4.0 07/02/2019     Lab Results   Component Value Date    CHLORIDE 108 07/02/2019     Lab Results   Component Value Date    ZACH 8.6 07/02/2019     Lab Results   Component Value Date    CO2 27 07/02/2019     Lab Results   Component Value Date    BUN 9 07/02/2019     Lab Results   Component Value Date    CR 0.97 07/02/2019     Lab Results   Component Value Date    GLC 97 07/02/2019     Lab Results   Component Value Date    WBC 8.3 04/01/2019     Lab Results   Component Value Date    RBC 5.50 04/01/2019     Lab Results   Component Value Date    HGB 15.2 04/01/2019     Lab Results   Component Value Date    HCT 45.0 04/01/2019     Lab Results   Component Value Date    MCV 82 04/01/2019     Lab Results   Component Value Date    MCH 27.6 04/01/2019     Lab Results   Component Value Date    MCHC 33.8 04/01/2019     Lab Results   Component Value Date    RDW 12.7 04/01/2019     Lab Results   Component Value Date     04/01/2019       Thank you for allowing me to participate in the care of your patient.    Sincerely,     Adair Au MD     [facility]

## 2021-01-29 NOTE — PROGRESS NOTES
Vascular Cardiology Consultation      Fouzia Arenas MRN# [id number]   YOB: 1965 Age: 55 year old   Date of Visit 01/29/2021     Reason for consult:  Venous disease           Assessment and Plan:     1. Symptomatic varicose veins of the right lower extremity with lipoma dermatosclerosis and quality of life limiting discomfort    Patient has tried conservative therapy without improvement.  Her symptoms are progressing.    Plan updated venous competency testing and schedule for vein ablation.    Anticipate radiofrequency ablation of right greater and small saphenous veins with sclerotherapy and phlebectomy of varicose veins.      2. Hypertension, suboptimal control    Refilled her Bystolic which helps her palpitations.    Patient has had some cough recently, will change her lisinopril to losartan plus hydrochlorothiazide.  She will start with just 1 pill of the 50/12.5 mg combination pill and increase to twice per day if blood pressure is over 140 mmHg consistently.      This note was transcribed using electronic voice recognition software, typographical errors may be present.                Chief Complaint:   Palpitations, Chest Pain, and Hypertension           History of Present Illness:   This patient is a very pleasant 55 year old female denies seen previously a few years ago with severe right GSV and small saphenous vein incompetence with symptoms and varicose veins.  Her symptoms have progressed and she would like to proceed with vein ablation.    She has lipodermatosclerosis of the right lower leg.  She has edema and leg discomfort in the back of the calf and upper thigh which corresponds to where the venous varicosities were located in 2017.    The discomfort has gotten worse.  It does affect her quality of life.  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  She does try to elevate and she is Ep[employer] in her compression stockings during the workday and has been for months.  She  has had greater than 6 weeks of conservative therapy including attempts at weight reduction and exercise and her compression stockings.      She has run out of her Bystolic and has significant stress at home and does say that her blood pressures are in the 170 mmHg range at home.             Physical Exam:     Vitals: BP (!) 161/83   Pulse 79   Ht 1.829 m (6')   Wt 147.9 kg (326 lb 1.9 oz)   BMI 44.23 kg/m    Constitutional:  cooperative, alert and oriented, well developed, well nourished, in no acute distress overweight      Skin:  warm and dry to the touch        Head:  normocephalic, no masses or lesions        Eyes:  pupils equal and round, conjunctivae and lids unremarkable, sclera white, no xanthalasma, EOMS intact, no nystagmus        ENT:  no pallor or cyanosis        Neck:  JVP normal        Chest:  normal respiratory excursion        Cardiac: regular rhythm       grade 1;RUSB;early systolic murmur          Abdomen:      benign    Extremities and Back:  no deformities, clubbing, cyanosis, erythema observed stasis pigmentation;erythema      Neurological:  no gross motor deficits                      Past Medical History:   I have reviewed this patient's past medical history  Past Medical History:   Diagnosis Date     Classic migraine      Complex endometrial hyperplasia     without atypia     Hypertension      Motion sickness      Obese      STORMY (obstructive sleep apnea)      Other disorder of menstruation and other abnormal bleeding from female genital tract 2/24/2011     Palpitations      PONV (postoperative nausea and vomiting)      SVT (supraventricular tachycardia) (H) 9/2015             Past Surgical History:   I have reviewed this patient's past surgical history  Past Surgical History:   Procedure Laterality Date     CHOLECYSTECTOMY, LAPOROSCOPIC      Cholecystectomy, Laparoscopic     COLONOSCOPY N/A 11/25/2014    Procedure: COLONOSCOPY;  Surgeon: Wenceslao Galo MD;  Location:  GI      CYSTOSCOPY, SLING TRANSVAGINAL N/A 8/20/2018    Procedure: CYSTOSCOPY, SLING TRANSVAGINAL;;  Surgeon: Urban Elena MD;  Location: [facility]     D & C      X2-3 for abnormal bleeding     ESOPHAGOSCOPY, GASTROSCOPY, DUODENOSCOPY (EGD), COMBINED N/A 11/25/2014    Procedure: COMBINED ESOPHAGOSCOPY, GASTROSCOPY, DUODENOSCOPY (EGD), BIOPSY SINGLE OR MULTIPLE;  Surgeon: Wenceslao Galo MD;  Location: [facility]     LAPAROSCOPIC HYSTERECTOMY SUPRACERVICAL N/A 8/20/2018    Procedure: LAPAROSCOPIC HYSTERECTOMY SUPRACERVICAL;  LAPAROSCOPIC SUBTOATAL HYSTERECTOMY, BILATERAL SALPINGECTOMY, SUBURETHRAL SLING AND CYSTOSCOPY;  Surgeon: Urban Elena MD;  Location: [facility]     LAPAROSCOPIC SALPINGECTOMY Bilateral 8/20/2018    Procedure: LAPAROSCOPIC SALPINGECTOMY;;  Surgeon: Urban Elena MD;  Location: [facility]     LAPAROSCOPY      For endometriosis     OPERATIVE HYSTEROSCOPY WITH MORCELLATOR N/A 3/29/2018    Procedure: OPERATIVE HYSTEROSCOPY WITH MORCELLATOR (MYOSURE);  OPERATIVE HYSTEROSCOPY WITH MORCELLATOR ;  Surgeon: Ubran Elena MD;  Location: [facility]               Social History:   I have reviewed this patient's social history  Social History     Tobacco Use     Smoking status: Never Smoker     Smokeless tobacco: Never Used   Substance Use Topics     Alcohol use: Yes     Alcohol/week: 1.7 standard drinks             Family History:   I have reviewed this patient's family history  Family History   Problem Relation Age of Onset     Hypertension Mother      Lipids Mother      Hypertension Father      Prostate Cancer Father      Lipids Father      Breast Cancer Maternal Aunt      Hypertension Maternal Grandmother      C.A.D. Maternal Grandmother      Gallbladder Disease Maternal Grandmother      Cancer Maternal Grandfather         lung     Cerebrovascular Disease Paternal Grandmother      C.A.D. Paternal Grandfather      Cancer - colorectal Other         cousin maternal      Colon Cancer Cousin       Colon Cancer Cousin      Diabetes No family hx of              Allergies:     Allergies   Allergen Reactions     Amoxicillin Nausea and Vomiting     Percocet [Oxycodone-Acetaminophen] Nausea and Vomiting             Medications:   I have reviewed this patient's current medications  Current Outpatient Medications   Medication Sig Dispense Refill     Aspirin-Acetaminophen-Caffeine (EXCEDRIN EXTRA STRENGTH PO) Take 2 tablets by mouth every 6 hours as needed       losartan-hydrochlorothiazide (HYZAAR) 50-12.5 MG tablet Take 1 tablet by mouth 2 times daily 180 tablet 3     nebivolol (BYSTOLIC) 10 MG tablet Take 1 tablet (10 mg) by mouth daily 90 tablet 3     order for DME DREAMSTATION  9-12 CM/H20  FF MIRAGE QUATTRO       spironolactone (ALDACTONE) 25 MG tablet Take 1 tablet (25 mg) by mouth daily 90 tablet 3     SUMAtriptan (IMITREX) 100 MG tablet Take 1 tablet (100 mg) by mouth at onset of headache for migraine May repeat in 2 hours. Max dose 200mg in 24 hours. 9 tablet 1               Review of Systems:       Review of Systems:  Skin:  Negative     Eyes:  Negative    ENT:  Negative    Respiratory:  Negative    Cardiovascular:    Positive for;edema  Gastroenterology: Negative    Genitourinary:  Negative    Musculoskeletal:  Positive for arthritis(Knees only)  Neurologic:  Negative    Psychiatric:  Negative    Heme/Lymph/Imm:  Negative    Endocrine:  Negative                       Data:   All laboratory data reviewed  Lab Results   Component Value Date    CHOL 182 07/02/2019     Lab Results   Component Value Date    HDL 45 07/02/2019     Lab Results   Component Value Date     07/02/2019     Lab Results   Component Value Date    TRIG 67 07/02/2019     Lab Results   Component Value Date    CHOLHDLRATIO 4.0 07/07/2014     TSH   Date Value Ref Range Status   02/06/2018 4.12 (H) 0.40 - 4.00 mU/L Final     Last Basic Metabolic Panel:  Lab Results   Component Value Date     07/02/2019      Lab Results   Component  Value Date    POTASSIUM 4.0 07/02/2019     Lab Results   Component Value Date    CHLORIDE 108 07/02/2019     Lab Results   Component Value Date    ZACH 8.6 07/02/2019     Lab Results   Component Value Date    CO2 27 07/02/2019     Lab Results   Component Value Date    BUN 9 07/02/2019     Lab Results   Component Value Date    CR 0.97 07/02/2019     Lab Results   Component Value Date    GLC 97 07/02/2019     Lab Results   Component Value Date    WBC 8.3 04/01/2019     Lab Results   Component Value Date    RBC 5.50 04/01/2019     Lab Results   Component Value Date    HGB 15.2 04/01/2019     Lab Results   Component Value Date    HCT 45.0 04/01/2019     Lab Results   Component Value Date    MCV 82 04/01/2019     Lab Results   Component Value Date    MCH 27.6 04/01/2019     Lab Results   Component Value Date    MCHC 33.8 04/01/2019     Lab Results   Component Value Date    RDW 12.7 04/01/2019     Lab Results   Component Value Date     04/01/2019

## 2021-01-29 NOTE — PATIENT INSTRUCTIONS
Start taking the Bystolic again.  Stop taking the LISINOPRIL.  Start taking the LOSARTAN-HYDROCHOROTHIAZIDE just once per day for a week and then increase to twice per day if the blood pressures are above 140.    We will schedule the vein testing and procedure.

## 2021-02-09 DIAGNOSIS — I83.891 SYMPTOMATIC VARICOSE VEINS OF RIGHT LOWER EXTREMITY: Primary | ICD-10-CM

## 2021-02-23 ENCOUNTER — ANCILLARY PROCEDURE (OUTPATIENT)
Dept: VASCULAR ULTRASOUND | Facility: CLINIC | Age: 56
End: 2021-02-23
Attending: INTERNAL MEDICINE
Payer: COMMERCIAL

## 2021-02-23 DIAGNOSIS — I83.11 LIPODERMATOSCLEROSIS OF RIGHT LOWER EXTREMITY DUE TO VARICOSE VEINS: ICD-10-CM

## 2021-02-23 DIAGNOSIS — I83.891 SYMPTOMATIC VARICOSE VEINS OF RIGHT LOWER EXTREMITY: ICD-10-CM

## 2021-02-23 DIAGNOSIS — I10 HYPERTENSION GOAL BP (BLOOD PRESSURE) < 130/80: ICD-10-CM

## 2021-02-23 PROCEDURE — 93970 EXTREMITY STUDY: CPT | Performed by: INTERNAL MEDICINE

## 2021-02-24 ENCOUNTER — VIRTUAL VISIT (OUTPATIENT)
Dept: UROLOGY | Facility: CLINIC | Age: 56
End: 2021-02-24
Payer: COMMERCIAL

## 2021-02-24 VITALS — HEIGHT: 72 IN | BODY MASS INDEX: 39.68 KG/M2 | WEIGHT: 293 LBS

## 2021-02-24 DIAGNOSIS — R30.0 DYSURIA: ICD-10-CM

## 2021-02-24 DIAGNOSIS — R31.9 HEMATURIA: Primary | ICD-10-CM

## 2021-02-24 DIAGNOSIS — R32 URINARY INCONTINENCE, UNSPECIFIED TYPE: ICD-10-CM

## 2021-02-24 PROCEDURE — 99204 OFFICE O/P NEW MOD 45 MIN: CPT | Mod: 95 | Performed by: PHYSICIAN ASSISTANT

## 2021-02-24 RX ORDER — ESTRADIOL 0.1 MG/G
CREAM VAGINAL
Qty: 42.5 G | Refills: 3 | Status: SHIPPED | OUTPATIENT
Start: 2021-02-24 | End: 2021-06-04

## 2021-02-24 ASSESSMENT — MIFFLIN-ST. JEOR: SCORE: 2158.51

## 2021-02-24 ASSESSMENT — PAIN SCALES - GENERAL: PAINLEVEL: MILD PAIN (2)

## 2021-02-24 NOTE — PROGRESS NOTES
SEND LINK TO CELL PHONE    PT THINKS SHE HAS A UTI BUT NEG CULTURE.  Sx WHEN PT THINKS SHE HAS A UTI: GROSS HEM, SOME DYSURIA, AND URGENCY AND LITTLE URINE COMES OUT.  PT HAD BLADDER LIFT IN 2017 OR 2018...  AND PT HAD HYSTERECTOMY.  PT STATES SHE HAS CRAMPS.  SOMETIMES CRAMPS WAKE PT UP AT NT.  PT STATES SOMETIMES SHE CANT CATCH HER BREATH AND SOMETIMES FEELS NAUSEOUS.      Fouzia is a 55 year old who is being evaluated via a billable video visit.      How would you like to obtain your AVS? Mail a copy  If the video visit is dropped, the invitation should be resent by: Text to cell phone: [phone]  Will anyone else be joining your video visit? No      Video Start Time: 2:01 PM  Video-Visit Details    Type of service:  Video Visit    Video End Time:2:15 PM    Originating Location (pt. Location): Home    Distant Location (provider location):  [facility]     Platform used for Video Visit: [email or web address]    CC: Hematuria, dysuria    HPI: It is a pleasure to see Ms. Fouzia Arenas, a pleasant 55 year old female, seen via billable video visit, asked to be seen in consultation by Dr. Abdalla for evaluation of hematuria, dysuria.     The patient may note pink on the tissue post void at times.  Ms. Arenas voids without difficulty, but describes some urge incontinence and nocturia. Had a UA/UC x 2 due to freq, burning and bother were neg, but UA had micro hematuria.     Bladder lift and hysterectomy with Dr. Chambers in 2018.     Hematuria Risk Factors:  Age >40: Yes     Smoking history: no  Occupational exposure to chemicals or dyes (ie, benzenes, aromatic amines): no  History of urologic disorder or disease: no  History of irritative voiding symptoms: no  History of urinary tract infection: no  Analgesic abuse: no  History of pelvic irradiation: no    Past Medical History:   Diagnosis Date     Classic migraine      Complex endometrial hyperplasia     without atypia     Hypertension      Motion sickness       Mumps      Obese      STORMY (obstructive sleep apnea)      Other disorder of menstruation and other abnormal bleeding from female genital tract 2/24/2011     Palpitations      PONV (postoperative nausea and vomiting)      Spider veins      SVT (supraventricular tachycardia) (H) 9/2015     Past Surgical History:   Procedure Laterality Date     BLADDER SURGERY       CHOLECYSTECTOMY, LAPOROSCOPIC      Cholecystectomy, Laparoscopic     COLONOSCOPY N/A 11/25/2014    Procedure: COLONOSCOPY;  Surgeon: Wenceslao Galo MD;  Location:  GI     CYSTOSCOPY, SLING TRANSVAGINAL N/A 8/20/2018    Procedure: CYSTOSCOPY, SLING TRANSVAGINAL;;  Surgeon: Urban Elena MD;  Location: [facility]     D & C      X2-3 for abnormal bleeding     ESOPHAGOSCOPY, GASTROSCOPY, DUODENOSCOPY (EGD), COMBINED N/A 11/25/2014    Procedure: COMBINED ESOPHAGOSCOPY, GASTROSCOPY, DUODENOSCOPY (EGD), BIOPSY SINGLE OR MULTIPLE;  Surgeon: Wenceslao Galo MD;  Location: [facility]     LAPAROSCOPIC HYSTERECTOMY SUPRACERVICAL N/A 8/20/2018    Procedure: LAPAROSCOPIC HYSTERECTOMY SUPRACERVICAL;  LAPAROSCOPIC SUBTOATAL HYSTERECTOMY, BILATERAL SALPINGECTOMY, SUBURETHRAL SLING AND CYSTOSCOPY;  Surgeon: Urban Elena MD;  Location: [facility]     LAPAROSCOPIC SALPINGECTOMY Bilateral 8/20/2018    Procedure: LAPAROSCOPIC SALPINGECTOMY;;  Surgeon: Urban Elena MD;  Location: [facility]     LAPAROSCOPY      For endometriosis     OPERATIVE HYSTEROSCOPY WITH MORCELLATOR N/A 3/29/2018    Procedure: OPERATIVE HYSTEROSCOPY WITH MORCELLATOR (MYOSURE);  OPERATIVE HYSTEROSCOPY WITH MORCELLATOR ;  Surgeon: Urban Elena MD;  Location: [facility]     Current Outpatient Medications   Medication Sig Dispense Refill     Aspirin-Acetaminophen-Caffeine (EXCEDRIN EXTRA STRENGTH PO) Take 2 tablets by mouth every 6 hours as needed       LISINOPRIL PO        nebivolol (BYSTOLIC) 10 MG tablet Take 1 tablet (10 mg) by mouth daily 90 tablet 3     order for DME  DREAMSTATION  9-12 CM/H20  FF MIRAGE QUATTRO       spironolactone (ALDACTONE) 25 MG tablet Take 1 tablet (25 mg) by mouth daily 90 tablet 3     SUMAtriptan (IMITREX) 100 MG tablet Take 1 tablet (100 mg) by mouth at onset of headache for migraine May repeat in 2 hours. Max dose 200mg in 24 hours. 9 tablet 1     losartan-hydrochlorothiazide (HYZAAR) 50-12.5 MG tablet Take 1 tablet by mouth 2 times daily (Patient not taking: Reported on 2/24/2021) 180 tablet 3     Allergies   Allergen Reactions     Amoxicillin Nausea and Vomiting     Percocet [Oxycodone-Acetaminophen] Nausea and Vomiting     FAMILY HISTORY: There is no reported history of genitourinary carcinoma.  There is no history of urolithiasis.      Social History     Socioeconomic History     Marital status: Single     Spouse name: Not on file     Number of children: Not on file     Years of education: Not on file     Highest education level: Not on file   Occupational History     Not on file   Social Needs     Financial resource strain: Not on file     Food insecurity     Worry: Not on file     Inability: Not on file     Transportation needs     Medical: Not on file     Non-medical: Not on file   Tobacco Use     Smoking status: Never Smoker     Smokeless tobacco: Never Used   Substance and Sexual Activity     Alcohol use: Yes     Alcohol/week: 1.7 standard drinks     Drug use: No     Sexual activity: Never   Lifestyle     Physical activity     Days per week: Not on file     Minutes per session: Not on file     Stress: Not on file   Relationships     Social connections     Talks on phone: Not on file     Gets together: Not on file     Attends Rastafarian service: Not on file     Active member of club or organization: Not on file     Attends meetings of clubs or organizations: Not on file     Relationship status: Not on file     Intimate partner violence     Fear of current or ex partner: Not on file     Emotionally abused: Not on file     Physically abused: Not on  file     Forced sexual activity: Not on file   Other Topics Concern      Service Not Asked     Blood Transfusions Not Asked     Caffeine Concern Yes     Comment: 1-2 cans qd     Occupational Exposure Not Asked     Hobby Hazards Not Asked     Sleep Concern Not Asked     Stress Concern Not Asked     Weight Concern Not Asked     Special Diet Yes     Comment: started mediterranian      Back Care Not Asked     Exercise Yes     Comment: 20 minutes walking 3-4 days weekly      Bike Helmet Not Asked     Seat Belt Yes     Self-Exams Not Asked     Parent/sibling w/ CABG, MI or angioplasty before 65F 55M? No   Social History Narrative     Not on file       ROS: A comprehensive 14 point ROS was obtained and negative except for that outlined above in the HPI.    PHYSICAL EXAM:   Vitals:    02/24/21 1331   Weight: 145.2 kg (320 lb)   Height: 1.829 m (6')     PSYCH: NAD  EYES: EOMI  MOUTH: MMM  NECK: Supple, no notable adenopathy  RESP: Unlabored breathing  NEURO: AAO x3    Orders Only on 12/02/2020   Component Date Value Ref Range Status     Specimen Description 12/02/2020 Midstream Urine   Final     Special Requests 12/02/2020 Specimen received in preservative   Final     Culture Micro 12/02/2020    Final                    Value:10,000 to 50,000 colonies/mL  mixed urogenital sarthak  Susceptibility testing not routinely done       Color Urine 12/02/2020 Yellow   Final     Appearance Urine 12/02/2020 Slightly Cloudy   Final     Glucose Urine 12/02/2020 Negative  NEG^Negative mg/dL Final     Bilirubin Urine 12/02/2020 Negative  NEG^Negative Final     Ketones Urine 12/02/2020 Negative  NEG^Negative mg/dL Final     Specific Gravity Urine 12/02/2020 1.025  1.003 - 1.035 Final     pH Urine 12/02/2020 5.5  5.0 - 7.0 pH Final     Protein Albumin Urine 12/02/2020 Trace* NEG^Negative mg/dL Final     Urobilinogen Urine 12/02/2020 0.2  0.2 - 1.0 EU/dL Final     Nitrite Urine 12/02/2020 Negative  NEG^Negative Final     Blood Urine  12/02/2020 Small* NEG^Negative Final     Leukocyte Esterase Urine 12/02/2020 Moderate* NEG^Negative Final     Source 12/02/2020 Midstream Urine   Final     WBC Urine 12/02/2020 * OTO5^0 - 5 /HPF Final     RBC Urine 12/02/2020 25-50* OTO2^O - 2 /HPF Final     Squamous Epithelial /LPF Urine 12/02/2020 Moderate* FEW^Few /LPF Final     Bacteria Urine 12/02/2020 Moderate* NEG^Negative /HPF Final       ASSESSMENT and PLAN:    55 year old female with hematuria, dysuria.  The differential diagnosis at this point includes stone disease, infection, vaginal contaminant, urothelial malignancy, renal disorder versus another yet unknown diagnosis.    At this time, recommend proceeding with comprehensive hematuria evaluation to include:  - Urine cytology to look for cells concerning for malignancy.  - CT urogram for upper tract imaging.  - Cystoscopy with the first available urologist to evaluate the interior of the bladder. Follow up for hematuria as recommended by urologist performing cystoscopic evaluation.    Regarding the urge incontinence, we discussed estrogen cream on the urethra 3x per week. Will consider anticholinergic If hematuria eval is negative. She will follow-up with me after cysto to discuss further.    Thank you for allowing me to participate in Ms. Arenas's care. I will keep you updated of her progress, but please do not hesitate to contact me with any questions.    Melany Jane PA-C  [facility]    Review of prior external note(s) from - GYN, surgery, [facility], [facility], Care everywhere  I spent a total of 45 minutes on the day of the visit.    [id number]}

## 2021-02-24 NOTE — PATIENT INSTRUCTIONS
- Urine cytology to look for abnormal cells.  - CT scan  - Cystoscopy with the  urologist to evaluate the interior of the bladder. Follow up as recommended by the urologist.    Estrogen cream three times a week near urethra (pea-sized amount): If >$50, let me know and we can get via a compound pharmacy.      Below is a list of things that can irritate the bladder and should be eliminated:      Caffeinated soft drinks.    Coffee.    Tea.    Chocolate.    Tomato-based foods.    Acidic juices and fruits. (includes cranberry juice)    Alcohol.    Carbonated drinks.    Aspartame/Nutrasweet.

## 2021-03-01 ENCOUNTER — TELEPHONE (OUTPATIENT)
Dept: UROLOGY | Facility: CLINIC | Age: 56
End: 2021-03-01

## 2021-03-01 DIAGNOSIS — R30.0 DYSURIA: ICD-10-CM

## 2021-03-01 DIAGNOSIS — R31.9 HEMATURIA: Primary | ICD-10-CM

## 2021-03-01 NOTE — TELEPHONE ENCOUNTER
----- Message from Aparna Benitez sent at 3/1/2021 12:53 PM CST -----  Looks like aditya wanted a cytology on this patient as well but there's no order in. Can you please order?  ----- Message -----  From: Cynthia Cruz  Sent: 2/24/2021   2:28 PM CST  To: [facility] - Scheduling Pool    Cysto, CT/cysto prior,  hematuria     Video with me in 4-6 wks, med check, incontinence     BRITTANY

## 2021-03-04 ENCOUNTER — HOSPITAL ENCOUNTER (OUTPATIENT)
Dept: CT IMAGING | Facility: CLINIC | Age: 56
Discharge: HOME | End: 2021-03-04
Attending: PHYSICIAN ASSISTANT | Admitting: PHYSICIAN ASSISTANT
Payer: COMMERCIAL

## 2021-03-04 ENCOUNTER — TELEPHONE (OUTPATIENT)
Dept: CARDIOLOGY | Facility: CLINIC | Age: 56
End: 2021-03-04

## 2021-03-04 DIAGNOSIS — R31.9 HEMATURIA: ICD-10-CM

## 2021-03-04 PROCEDURE — 250N000009 HC RX 250: Performed by: PHYSICIAN ASSISTANT

## 2021-03-04 PROCEDURE — 250N000011 HC RX IP 250 OP 636: Performed by: PHYSICIAN ASSISTANT

## 2021-03-04 PROCEDURE — 74178 CT ABD&PLV WO CNTR FLWD CNTR: CPT

## 2021-03-04 RX ORDER — IOPAMIDOL 755 MG/ML
500 INJECTION, SOLUTION INTRAVASCULAR ONCE
Status: COMPLETED | OUTPATIENT
Start: 2021-03-04 | End: 2021-03-04

## 2021-03-04 RX ADMIN — IOPAMIDOL 95 ML: 755 INJECTION, SOLUTION INTRAVENOUS at 13:22

## 2021-03-04 RX ADMIN — SODIUM CHLORIDE 95 ML: 9 INJECTION, SOLUTION INTRAVENOUS at 13:22

## 2021-03-04 NOTE — TELEPHONE ENCOUNTER
Tried to call patient to go over procedure instructions for patients upcoming vein ablation and see where she would like the valium prescription called into. No answer. Left VM to call team 4 back with direct number.

## 2021-03-05 ENCOUNTER — TELEPHONE (OUTPATIENT)
Dept: UROLOGY | Facility: CLINIC | Age: 56
End: 2021-03-05

## 2021-03-05 DIAGNOSIS — I83.891 SYMPTOMATIC VARICOSE VEINS OF RIGHT LOWER EXTREMITY: ICD-10-CM

## 2021-03-05 LAB
LABORATORY COMMENT REPORT: NORMAL
SARS-COV-2 RNA RESP QL NAA+PROBE: NEGATIVE
SARS-COV-2 RNA RESP QL NAA+PROBE: NORMAL
SPECIMEN SOURCE: NORMAL
SPECIMEN SOURCE: NORMAL

## 2021-03-05 PROCEDURE — U0005 INFEC AGEN DETEC AMPLI PROBE: HCPCS | Performed by: INTERNAL MEDICINE

## 2021-03-05 PROCEDURE — U0003 INFECTIOUS AGENT DETECTION BY NUCLEIC ACID (DNA OR RNA); SEVERE ACUTE RESPIRATORY SYNDROME CORONAVIRUS 2 (SARS-COV-2) (CORONAVIRUS DISEASE [COVID-19]), AMPLIFIED PROBE TECHNIQUE, MAKING USE OF HIGH THROUGHPUT TECHNOLOGIES AS DESCRIBED BY CMS-2020-01-R: HCPCS | Performed by: INTERNAL MEDICINE

## 2021-03-05 RX ORDER — DIAZEPAM 5 MG
TABLET ORAL
COMMUNITY
Start: 2021-03-05 | End: 2021-07-01

## 2021-03-05 NOTE — TELEPHONE ENCOUNTER
Fouzia called and was given the result information and direction as below. She expressed understanding.  BALTAZAR Monique RN  _________________________________________________________    ----- Message from Melany Jane PA-C sent at 3/5/2021  9:38 AM CST -----  Results normal, please call pt and let them know. Keep appt with [facility] to complete eval  DANIA MIRAMONTES      CT Urogram wo & w Contrast  Order: [id number]  Status:  Final result   Visible to patient:  No (inaccessible in MyChart) Dx:  Hematuria  Details    Reading Physician Reading Date Result Priority   Alfred Renteria MD  [phone] 3/4/2021       Narrative & Impression     CT UROGRAM WITH AND WITHOUT CONTRAST March 4, 2021 1:47 PM      HISTORY: Hematuria.     TECHNIQUE: CT urogram protocol was performed. Volumetric helical  sections were acquired from the lung bases through the ischial  tuberosities prior to administration of IV contrast. 95mL Isovue-370  IV were administered using a split bolus technique. After contrast  administration, volumetric helical sections were again acquired from  the lung bases to the ischial tuberosities. Coronal images were also  reconstructed. Radiation dose for this scan was reduced using  automated exposure control, adjustment of the mA and/or kV according  to patient size, or iterative reconstruction technique.     COMPARISON: CT of the abdomen and pelvis without IV contrast performed  11/11/2019.     FINDINGS:      Right urinary tract: No urinary calculi. No hydronephrosis. No solid  renal masses. The collecting system and proximal ureter are moderately  well opacified, and no filling defects are identified.      Left urinary tract: No urinary calculi. No hydronephrosis. No solid  renal masses. The collecting system and proximal ureter are moderately  well opacified, and no filling defects are identified.      Urinary bladder: No bladder stones or masses are identified.     Other findings: The visualized lung bases are clear.  Small hiatal  hernia. Prior cholecystectomy. The liver, spleen, adrenal glands, and  pancreas are unremarkable. No bowel obstruction. Unremarkable  appendix. No free fluid in the pelvis. Degenerative changes are noted  in the lumbar spine.                                                                       IMPRESSION: No cause for hematuria is identified. No urinary calculi  or urinary tract masses.     TISH ALVAREZ MD

## 2021-03-05 NOTE — TELEPHONE ENCOUNTER
Called patient to review Pre- Ablation orders:    Patient scheduled for RF ablation w/ sclerotherapy and phlebectomy on 3/8/21, arrival time at 1300.  Patient will increase fluid the day before the procedure.  Patient will take 325mg Asprin night before and day of procedure.  Patient will hold losartan-hydrochlorothiazide and spironolactone until after the ablation.  Patient will not wear compression stockings the day before or the day of the ablation.  Valium was explained to patient and ordered to pharmacy of choice.  Patient aware to bring Valium to clinic.  Patient will have a  to bring them home.  Follow-up ultrasound for Wednesday scheduled.  Follow-up OV for 1 month scheduled.   Covid test pending.     Wellness Screening Tool    Symptom Screening:    Do you have one of the following NEW symptoms:      Fever (subjective or >100.0)?  No    New cough? No    Shortness of breath? No    Chills? No    New loss of taste or smell? No    Generalized body aches? No    New persistent headache? No    New sore throat? No    Nausea, vomiting or diarrhea? No    Within the past 2 weeks, have you been exposed to someone with a known positive illness below?      COVID - 19 (known or suspected) No    Chicken pox?  No    Measles? No    Pertussis? No    Have you had a positive COVID-19 diagnostic test (nasal swab test) in the last 14 days or are you currently   on self-quarantine restrictions (i.e.travel restriction, exposure, etc?) No      Patient notified of visitor restriction: Yes  Patient informed to wear a mask: Yes    Future Appointments   Date Time Provider Department Center   3/8/2021  1:00 PM [id number]   3/10/2021  9:30 AM [id number]   3/23/2021  9:30 AM Reggie Richter MD [facility]   4/5/2021 11:00 AM Melany Jane PA-C [facility]   4/9/2021 12:30 PM Peggy Edmond PA-C [facility]      CHACORTA Wang March 5, 2021 1:48 PM

## 2021-03-08 ENCOUNTER — ANCILLARY PROCEDURE (OUTPATIENT)
Dept: VASCULAR ULTRASOUND | Facility: CLINIC | Age: 56
End: 2021-03-08
Attending: INTERNAL MEDICINE
Payer: COMMERCIAL

## 2021-03-08 VITALS
SYSTOLIC BLOOD PRESSURE: 150 MMHG | RESPIRATION RATE: 16 BRPM | OXYGEN SATURATION: 95 % | HEART RATE: 54 BPM | DIASTOLIC BLOOD PRESSURE: 75 MMHG

## 2021-03-08 DIAGNOSIS — I83.891 SYMPTOMATIC VARICOSE VEINS OF RIGHT LOWER EXTREMITY: ICD-10-CM

## 2021-03-08 DIAGNOSIS — I10 HYPERTENSION GOAL BP (BLOOD PRESSURE) < 130/80: ICD-10-CM

## 2021-03-08 DIAGNOSIS — I83.11 LIPODERMATOSCLEROSIS OF RIGHT LOWER EXTREMITY DUE TO VARICOSE VEINS: ICD-10-CM

## 2021-03-08 PROCEDURE — 36471 NJX SCLRSNT MLT INCMPTNT VN: CPT | Mod: RT | Performed by: INTERNAL MEDICINE

## 2021-03-08 PROCEDURE — 36470 NJX SCLRSNT 1 INCMPTNT VEIN: CPT | Mod: RT | Performed by: INTERNAL MEDICINE

## 2021-03-08 PROCEDURE — 37799 UNLISTED PX VASCULAR SURGERY: CPT | Mod: RT | Performed by: INTERNAL MEDICINE

## 2021-03-08 PROCEDURE — 36475 ENDOVENOUS RF 1ST VEIN: CPT | Mod: RT | Performed by: INTERNAL MEDICINE

## 2021-03-08 PROCEDURE — 76937 US GUIDE VASCULAR ACCESS: CPT | Mod: 59 | Performed by: INTERNAL MEDICINE

## 2021-03-08 NOTE — PROGRESS NOTES
Pt stable throughout vein procedure. Post procedure instructions given to patient. Pt verbalized understanding. Pt has team 4 direct number to call with any questions or concerns. Follow up ultrasound 3/10/21 confirmed.

## 2021-03-10 ENCOUNTER — ANCILLARY PROCEDURE (OUTPATIENT)
Dept: VASCULAR ULTRASOUND | Facility: CLINIC | Age: 56
End: 2021-03-10
Attending: INTERNAL MEDICINE
Payer: COMMERCIAL

## 2021-03-10 DIAGNOSIS — I10 HYPERTENSION GOAL BP (BLOOD PRESSURE) < 130/80: ICD-10-CM

## 2021-03-10 DIAGNOSIS — I83.891 SYMPTOMATIC VARICOSE VEINS OF RIGHT LOWER EXTREMITY: ICD-10-CM

## 2021-03-10 DIAGNOSIS — I83.11 LIPODERMATOSCLEROSIS OF RIGHT LOWER EXTREMITY DUE TO VARICOSE VEINS: ICD-10-CM

## 2021-03-10 PROCEDURE — 93971 EXTREMITY STUDY: CPT | Mod: RT | Performed by: INTERNAL MEDICINE

## 2021-03-22 DIAGNOSIS — R31.9 HEMATURIA: Primary | ICD-10-CM

## 2021-03-23 ENCOUNTER — OFFICE VISIT (OUTPATIENT)
Dept: UROLOGY | Facility: CLINIC | Age: 56
End: 2021-03-23
Payer: COMMERCIAL

## 2021-03-23 VITALS — WEIGHT: 293 LBS | HEIGHT: 72 IN | BODY MASS INDEX: 39.68 KG/M2

## 2021-03-23 DIAGNOSIS — R31.1 BENIGN ESSENTIAL MICROSCOPIC HEMATURIA: ICD-10-CM

## 2021-03-23 DIAGNOSIS — Z79.2 PROPHYLACTIC ANTIBIOTIC: Primary | ICD-10-CM

## 2021-03-23 LAB
ALBUMIN UR-MCNC: NEGATIVE MG/DL
APPEARANCE UR: CLEAR
BILIRUB UR QL STRIP: NEGATIVE
COLOR UR AUTO: YELLOW
GLUCOSE UR STRIP-MCNC: NEGATIVE MG/DL
HGB UR QL STRIP: ABNORMAL
KETONES UR STRIP-MCNC: NEGATIVE MG/DL
LEUKOCYTE ESTERASE UR QL STRIP: ABNORMAL
NITRATE UR QL: NEGATIVE
PH UR STRIP: 5.5 PH (ref 5–7)
SOURCE: ABNORMAL
SP GR UR STRIP: 1.02 (ref 1–1.03)
UROBILINOGEN UR STRIP-ACNC: 0.2 EU/DL (ref 0.2–1)

## 2021-03-23 PROCEDURE — 52000 CYSTOURETHROSCOPY: CPT | Performed by: UROLOGY

## 2021-03-23 PROCEDURE — 81003 URINALYSIS AUTO W/O SCOPE: CPT | Performed by: UROLOGY

## 2021-03-23 PROCEDURE — 99213 OFFICE O/P EST LOW 20 MIN: CPT | Mod: 25 | Performed by: UROLOGY

## 2021-03-23 RX ORDER — MECLIZINE HCL 12.5 MG 12.5 MG/1
12.5 TABLET ORAL 3 TIMES DAILY PRN
COMMUNITY
End: 2024-06-06

## 2021-03-23 RX ORDER — CIPROFLOXACIN 500 MG/1
500 TABLET, FILM COATED ORAL ONCE
Qty: 1 TABLET | Refills: 0 | Status: SHIPPED | OUTPATIENT
Start: 2021-03-23 | End: 2021-03-23

## 2021-03-23 ASSESSMENT — PAIN SCALES - GENERAL: PAINLEVEL: NO PAIN (0)

## 2021-03-23 ASSESSMENT — MIFFLIN-ST. JEOR: SCORE: 2158.51

## 2021-03-23 NOTE — PATIENT INSTRUCTIONS

## 2021-03-23 NOTE — PROGRESS NOTES
Ms. Arenas is a 55-year-old female with microhematuria.  Her CT urogram was reviewed and appears normal.  She has seen no gross hematuria.  Her cultures are negative but her urinalyses are contaminated-may be a vaginal voider.  Other past medical history, medications and allergies reviewed  Review of systems: Some lower abdominal discomfort.  Exam: Alert and oriented, normal external genitalia and urethral meatus.  Abdomen obese, tender in left lower quadrant, no mass or peritoneal signs  Flexible cystoscopy-normal urethra, normal bladder mucosa with no tumors or stones or raised erythema.  Normal ureteral orifices  Assessment: Microhematuria with no evidence for kidney or bladder cancer.  Left lower quadrant discomfort may be related to sigmoid colon.  Follow-up with ENRIQUE Mata for urine FISH test and discussion of Estrace vaginal cream.  Antibiotic x1 today

## 2021-03-23 NOTE — LETTER
3/23/2021       RE: Fouzia Arenas  [address] [place] [facility] [address]     Dear Colleague,    Thank you for referring your patient, Fouzia Arenas, to the [facility] [place] at [facility]. Please see a copy of my visit note below.    Ms. Arenas is a 55-year-old female with microhematuria.  Her CT urogram was reviewed and appears normal.  She has seen no gross hematuria.  Her cultures are negative but her urinalyses are contaminated-may be a vaginal voider.  Other past medical history, medications and allergies reviewed  Review of systems: Some lower abdominal discomfort.  Exam: Alert and oriented, normal external genitalia and urethral meatus.  Abdomen obese, tender in left lower quadrant, no mass or peritoneal signs  Flexible cystoscopy-normal urethra, normal bladder mucosa with no tumors or stones or raised erythema.  Normal ureteral orifices  Assessment: Microhematuria with no evidence for kidney or bladder cancer.  Left lower quadrant discomfort may be related to sigmoid colon.  Follow-up with ENRIQUE Mata for urine FISH test and discussion of Estrace vaginal cream.  Antibiotic x1 today      Again, thank you for allowing me to participate in the care of your patient.      Sincerely,    Reggie Richter MD

## 2021-04-01 NOTE — PROGRESS NOTES
[facility]      Assessment & Plan   Problem List Items Addressed This Visit     Hypertension goal BP (blood pressure) < 130/80    Relevant Medications    nebivolol (BYSTOLIC) 10 MG tablet    lisinopril (ZESTRIL) 20 MG tablet    Other Relevant Orders    Basic metabolic panel      Other Visit Diagnoses     Benign essential hypertension        Relevant Medications    spironolactone (ALDACTONE) 25 MG tablet    lisinopril (ZESTRIL) 20 MG tablet    hydrochlorothiazide (HYDRODIURIL) 25 MG tablet        I had the pleasure of seeing Fouzia when she came for follow up of palpitations/AT.  This 55 year old sees Dr. Hensley and Dr. Au for her history of:    1. Hypertension with reduction in Bystolic dose 3/2018 due to lightheaded episodes (subsequently resolved)  2. Palpitations, with event monitor 10/2015 showing episodes of atrial tachycardia previously on carvedilol and now taking by Bystolic  3. Right lower extremity lipoma dermatosclerosis for which Dr. Au saw her 8/2017 with severe R GSV and small R SV. S/p R GSV RF ablation from prox thigh to upper calf and R dGSV insheath sclerotherapy. Direct sclerotherapy of lateral R thigh varicose veins and limited phlebectomy of lateral thigh varicose veins (limited by  vein's proximity to artery)    I saw Fouzia back in 11/2018 at which time she BP was under good control, palpitations were controlled on low dose Bystolic and follow-up with Dr. Au for her venous insufficiency was rec'd. She saw Dr. Au 1/29 to review and they discussed her continued sxs. Set up for vein ablation. Lisinopril was switched to losartan/HCTZ given c/o cough.    She underwent RF ablation with sclerotherapy and phlebectomy on 3/8    Interval History:  Is under quite a bit of stress as her mother's been dx'd with dementia. SBPs 140-190s/70-90s. No sxs at either extreme. She doesn't exercise routinely which she know contributes.  She stopped the losartan/HCTZ after 1 week b/c  she was getting odd cramping in her R upper back/side and lower R leg. She went back to lisinopril 20 mg BID.      No c/o CP, SOB. No c/o orthopnea, PND, dizziness, lightheadedness.  Palpitations remain under good control.     Reviewed her venous procedure done ~4 weeks ago with Dr. Au. She was surprised it was as painful as it was.  She is still with some discomfort but does think it's improving. She wears compression stockings (knee high, as thigh high bunch too much around the back of the knee. She does wear ACE bandages around thigh area to compress this area, which helps. Overall, she believes that venous procedure has improved her sxs, but still has some discomfort in the R lower medial LE area.       VITALS:  Vitals: BP (!) 175/79 (BP Location: Right arm, Patient Position: Sitting, Cuff Size: Adult Large)   Pulse 56   Ht 1.829 m (6')   Wt 149.3 kg (329 lb 3.2 oz)   SpO2 98%   BMI 44.65 kg/m      Diagnostic Testing:  US LE 3/10/2021 (2 d post procedure) No DVT. R GSV closed from proximal thigh to ankle; lateral thigh  and varicose veins patent with residual reflux up to 4.7s  Stress test 5/2017 showed no evidence of ischemia, with breast attenuation artifact.  Echocardiogram 2016 showed a low normal ejection fraction.  She had no significant valvular abnormalities noted.      Plan:  Hydrochlorothiazide 25 mg daily  BMP in 1 week    Assessment/Plan:    1. HTN    Felt she had some ADRs from combination losartan/HCTZ and went back to lisinopril 20 mg BID    BPs too high    PLAN:    Encouraged exercise    Start hydrochlorothiazide 25 mg daily. Refilled all of her other anti-hypertensives    Keep track of BP when possible    BMP in 1-2 week - will contact with results and see how BPs are running & PVD    2. Peripheral Venous Dz    S/p procedure as above; follow-up echo still showed some reflux but she does feel better overall    PLAN:    Continue compression stockings x 2-3 m from procedure    When  review BMP, will see if R lower leg medial area of soreness has improved. May need to follow-up with [facility]      3. AT    Under really good control on low dose Bysotlic    PLAN:    Continue to monitor      Jaqui Edmond PA-C, MSPAS      Orders Placed This Encounter   Procedures     Basic metabolic panel     Orders Placed This Encounter   Medications     nebivolol (BYSTOLIC) 10 MG tablet     Sig: Take 1 tablet (10 mg) by mouth daily     Dispense:  90 tablet     Refill:  3     spironolactone (ALDACTONE) 25 MG tablet     Sig: Take 1 tablet (25 mg) by mouth daily     Dispense:  90 tablet     Refill:  3     lisinopril (ZESTRIL) 20 MG tablet     Sig: Take 1 tablet (20 mg) by mouth 2 times daily     Dispense:  180 tablet     Refill:  3     hydrochlorothiazide (HYDRODIURIL) 25 MG tablet     Sig: Take 1 tablet (25 mg) by mouth daily     Dispense:  30 tablet     Refill:  1     Medications Discontinued During This Encounter   Medication Reason     losartan-hydrochlorothiazide (HYZAAR) 50-12.5 MG tablet Stopped by Patient     spironolactone (ALDACTONE) 25 MG tablet Reorder     nebivolol (BYSTOLIC) 10 MG tablet Reorder     LISINOPRIL PO Reorder         Encounter Diagnoses   Name Primary?     Hypertension goal BP (blood pressure) < 130/80      Benign essential hypertension        CURRENT MEDICATIONS:  Current Outpatient Medications   Medication Sig Dispense Refill     Aspirin-Acetaminophen-Caffeine (EXCEDRIN EXTRA STRENGTH PO) Take 2 tablets by mouth every 6 hours as needed       hydrochlorothiazide (HYDRODIURIL) 25 MG tablet Take 1 tablet (25 mg) by mouth daily 30 tablet 1     lisinopril (ZESTRIL) 20 MG tablet Take 1 tablet (20 mg) by mouth 2 times daily 180 tablet 3     meclizine (ANTIVERT) 12.5 MG tablet Take 12.5 mg by mouth 3 times daily as needed for dizziness       nebivolol (BYSTOLIC) 10 MG tablet Take 1 tablet (10 mg) by mouth daily 90 tablet 3     order for DME DREAMSTATION  9-12 CM/H20  FF MIRAGE QUATTRO        spironolactone (ALDACTONE) 25 MG tablet Take 1 tablet (25 mg) by mouth daily 90 tablet 3     SUMAtriptan (IMITREX) 100 MG tablet Take 1 tablet (100 mg) by mouth at onset of headache for migraine May repeat in 2 hours. Max dose 200mg in 24 hours. 9 tablet 1     diazepam (VALIUM) 5 MG tablet Take as directed by vein clinic for vein procedure.       estradiol (ESTRACE VAGINAL) 0.1 MG/GM vaginal cream Apply small amount to the vaginal opening and urethra M, W, F @ h.s. (Patient not taking: Reported on 4/9/2021) 42.5 g 3       ALLERGIES     Allergies   Allergen Reactions     Amoxicillin Nausea and Vomiting     Percocet [Oxycodone-Acetaminophen] Nausea and Vomiting         Review of Systems:  Skin:        Eyes:       ENT:       Respiratory:       Cardiovascular:       Gastroenterology:      Genitourinary:       Musculoskeletal:       Neurologic:       Psychiatric:       Heme/Lymph/Imm:       Endocrine:         Physical Exam:  Vitals: BP (!) 175/79 (BP Location: Right arm, Patient Position: Sitting, Cuff Size: Adult Large)   Pulse 56   Ht 1.829 m (6')   Wt 149.3 kg (329 lb 3.2 oz)   SpO2 98%   BMI 44.65 kg/m      Constitutional:           Skin:           Head:           Eyes:           ENT:           Neck:           Chest:           Cardiac:                    Abdomen:           Vascular:                                        Extremities and Back:           Neurological:               PAST MEDICAL HISTORY:  Past Medical History:   Diagnosis Date     Classic migraine      Complex endometrial hyperplasia     without atypia     Hypertension      Motion sickness      Mumps      Obese      STORMY (obstructive sleep apnea)      Other disorder of menstruation and other abnormal bleeding from female genital tract 2/24/2011     Palpitations      PONV (postoperative nausea and vomiting)      Spider veins      SVT (supraventricular tachycardia) (H) 9/2015       PAST SURGICAL HISTORY:  Past Surgical History:   Procedure Laterality  Date     BLADDER SURGERY       CHOLECYSTECTOMY, LAPOROSCOPIC      Cholecystectomy, Laparoscopic     COLONOSCOPY N/A 11/25/2014    Procedure: COLONOSCOPY;  Surgeon: Wenceslao Galo MD;  Location:  GI     CYSTOSCOPY, SLING TRANSVAGINAL N/A 8/20/2018    Procedure: CYSTOSCOPY, SLING TRANSVAGINAL;;  Surgeon: Urban Elena MD;  Location: [facility]     D & C      X2-3 for abnormal bleeding     ESOPHAGOSCOPY, GASTROSCOPY, DUODENOSCOPY (EGD), COMBINED N/A 11/25/2014    Procedure: COMBINED ESOPHAGOSCOPY, GASTROSCOPY, DUODENOSCOPY (EGD), BIOPSY SINGLE OR MULTIPLE;  Surgeon: Wenceslao Galo MD;  Location:  GI     LAPAROSCOPIC HYSTERECTOMY SUPRACERVICAL N/A 8/20/2018    Procedure: LAPAROSCOPIC HYSTERECTOMY SUPRACERVICAL;  LAPAROSCOPIC SUBTOATAL HYSTERECTOMY, BILATERAL SALPINGECTOMY, SUBURETHRAL SLING AND CYSTOSCOPY;  Surgeon: Urban Elena MD;  Location: [facility]     LAPAROSCOPIC SALPINGECTOMY Bilateral 8/20/2018    Procedure: LAPAROSCOPIC SALPINGECTOMY;;  Surgeon: Urban Elena MD;  Location: [facility]     LAPAROSCOPY      For endometriosis     LASER ABLATION VEIN VARICOSE       OPERATIVE HYSTEROSCOPY WITH MORCELLATOR N/A 3/29/2018    Procedure: OPERATIVE HYSTEROSCOPY WITH MORCELLATOR (MYOSURE);  OPERATIVE HYSTEROSCOPY WITH MORCELLATOR ;  Surgeon: Urban Elena MD;  Location: [facility]       FAMILY HISTORY:  Family History   Problem Relation Age of Onset     Hypertension Mother      Lipids Mother      Hypertension Father      Prostate Cancer Father      Lipids Father      Breast Cancer Maternal Aunt      Hypertension Maternal Grandmother      C.A.D. Maternal Grandmother      Gallbladder Disease Maternal Grandmother      Cancer Maternal Grandfather         lung     Cerebrovascular Disease Paternal Grandmother      C.A.D. Paternal Grandfather      Cancer - colorectal Other         cousin maternal      Colon Cancer Cousin      Colon Cancer Cousin      Diabetes No family hx of        SOCIAL  HISTORY:  Social History     Socioeconomic History     Marital status: Single     Spouse name: Not on file     Number of children: Not on file     Years of education: Not on file     Highest education level: Not on file   Occupational History     Not on file   Social Needs     Financial resource strain: Not on file     Food insecurity     Worry: Not on file     Inability: Not on file     Transportation needs     Medical: Not on file     Non-medical: Not on file   Tobacco Use     Smoking status: Never Smoker     Smokeless tobacco: Never Used   Substance and Sexual Activity     Alcohol use: Yes     Alcohol/week: 1.7 standard drinks     Drug use: No     Sexual activity: Never   Lifestyle     Physical activity     Days per week: Not on file     Minutes per session: Not on file     Stress: Not on file   Relationships     Social connections     Talks on phone: Not on file     Gets together: Not on file     Attends [employer]ism service: Not on file     Active member of club or organization: Not on file     Attends meetings of clubs or organizations: Not on file     Relationship status: Not on file     Intimate partner violence     Fear of current or ex partner: Not on file     Emotionally abused: Not on file     Physically abused: Not on file     Forced sexual activity: Not on file   Other Topics Concern      Service Not Asked     Blood Transfusions Not Asked     Caffeine Concern Yes     Comment: 1-2 cans qd     Occupational Exposure Not Asked     Hobby Hazards Not Asked     Sleep Concern Not Asked     Stress Concern Not Asked     Weight Concern Not Asked     Special Diet Yes     Comment: started mediterranian      Back Care Not Asked     Exercise Yes     Comment: 20 minutes walking 3-4 days weekly      Bike Helmet Not Asked     Seat Belt Yes     Self-Exams Not Asked     Parent/sibling w/ CABG, MI or angioplasty before 65F 55M? No   Social History Narrative     Not on file

## 2021-04-09 ENCOUNTER — OFFICE VISIT (OUTPATIENT)
Dept: CARDIOLOGY | Facility: CLINIC | Age: 56
End: 2021-04-09
Payer: COMMERCIAL

## 2021-04-09 VITALS
SYSTOLIC BLOOD PRESSURE: 175 MMHG | BODY MASS INDEX: 39.68 KG/M2 | HEART RATE: 56 BPM | WEIGHT: 293 LBS | DIASTOLIC BLOOD PRESSURE: 79 MMHG | HEIGHT: 72 IN | OXYGEN SATURATION: 98 %

## 2021-04-09 DIAGNOSIS — I10 HYPERTENSION GOAL BP (BLOOD PRESSURE) < 130/80: ICD-10-CM

## 2021-04-09 DIAGNOSIS — I10 BENIGN ESSENTIAL HYPERTENSION: ICD-10-CM

## 2021-04-09 PROCEDURE — 99214 OFFICE O/P EST MOD 30 MIN: CPT | Performed by: PHYSICIAN ASSISTANT

## 2021-04-09 RX ORDER — HYDROCHLOROTHIAZIDE 25 MG/1
25 TABLET ORAL DAILY
Qty: 30 TABLET | Refills: 1 | Status: SHIPPED | OUTPATIENT
Start: 2021-04-09 | End: 2021-06-04

## 2021-04-09 RX ORDER — LISINOPRIL 20 MG/1
20 TABLET ORAL 2 TIMES DAILY
Qty: 180 TABLET | Refills: 3 | Status: SHIPPED | OUTPATIENT
Start: 2021-04-09 | End: 2021-10-13

## 2021-04-09 RX ORDER — SPIRONOLACTONE 25 MG/1
25 TABLET ORAL DAILY
Qty: 90 TABLET | Refills: 3 | Status: SHIPPED | OUTPATIENT
Start: 2021-04-09 | End: 2021-10-13

## 2021-04-09 RX ORDER — NEBIVOLOL 10 MG/1
10 TABLET ORAL DAILY
Qty: 90 TABLET | Refills: 3 | Status: SHIPPED | OUTPATIENT
Start: 2021-04-09 | End: 2021-10-13

## 2021-04-09 ASSESSMENT — MIFFLIN-ST. JEOR: SCORE: 2200.24

## 2021-04-09 NOTE — PATIENT INSTRUCTIONS
Fouzia - it was so nice to see you today!    1. Reviewed your BP is high despite meds    PLAN:  1. Continue current medications  2. ADD hydrochlorothiazide 25 mg daily  3. Non-fasting blood work in 1-2 weeks for the addition of hydrochlorothiazide   4. When we review the results of the blood work, let me know what BPs are running and how the leg is feeling ... still improving? If not, will get you in with Sabina or Dr. Au    [phone]

## 2021-04-09 NOTE — LETTER
4/9/2021    Ruthie Xiong PA-C  [address] [facility] [address]    RE: Fouzia LAWRENCE Deepa       Dear Colleague,    I had the pleasure of seeing Fouzia Arenas in the [facility].    [facility]      Assessment & Plan   Problem List Items Addressed This Visit     Hypertension goal BP (blood pressure) < 130/80    Relevant Medications    nebivolol (BYSTOLIC) 10 MG tablet    lisinopril (ZESTRIL) 20 MG tablet    Other Relevant Orders    Basic metabolic panel      Other Visit Diagnoses     Benign essential hypertension        Relevant Medications    spironolactone (ALDACTONE) 25 MG tablet    lisinopril (ZESTRIL) 20 MG tablet    hydrochlorothiazide (HYDRODIURIL) 25 MG tablet        I had the pleasure of seeing Fouzia when she came for follow up of palpitations/AT.  This 55 year old sees Dr. Hensley and Dr. Au for her history of:    1. Hypertension with reduction in Bystolic dose 3/2018 due to lightheaded episodes (subsequently resolved)  2. Palpitations, with event monitor 10/2015 showing episodes of atrial tachycardia previously on carvedilol and now taking by Bystolic  3. Right lower extremity lipoma dermatosclerosis for which Dr. Au saw her 8/2017 with severe R GSV and small R SV. S/p R GSV RF ablation from prox thigh to upper calf and R dGSV insheath sclerotherapy. Direct sclerotherapy of lateral R thigh varicose veins and limited phlebectomy of lateral thigh varicose veins (limited by  vein's proximity to artery)    I saw Fouzia back in 11/2018 at which time she BP was under good control, palpitations were controlled on low dose Bystolic and follow-up with Dr. Au for her venous insufficiency was rec'd. She saw Dr. Au 1/29 to review and they discussed her continued sxs. Set up for vein ablation. Lisinopril was switched to losartan/HCTZ given c/o cough.    She underwent RF ablation with sclerotherapy and phlebectomy on  3/8    Interval History:  Is under quite a bit of stress as her mother's been dx'd with dementia. SBPs 140-190s/70-90s. No sxs at either extreme. She doesn't exercise routinely which she know contributes.  She stopped the losartan/HCTZ after 1 week b/c she was getting odd cramping in her R upper back/side and lower R leg. She went back to lisinopril 20 mg BID.      No c/o CP, SOB. No c/o orthopnea, PND, dizziness, lightheadedness.  Palpitations remain under good control.     Reviewed her venous procedure done ~4 weeks ago with Dr. Au. She was surprised it was as painful as it was.  She is still with some discomfort but does think it's improving. She wears compression stockings (knee high, as thigh high bunch too much around the back of the knee. She does wear ACE bandages around thigh area to compress this area, which helps. Overall, she believes that venous procedure has improved her sxs, but still has some discomfort in the R lower medial LE area.       VITALS:  Vitals: BP (!) 175/79 (BP Location: Right arm, Patient Position: Sitting, Cuff Size: Adult Large)   Pulse 56   Ht 1.829 m (6')   Wt 149.3 kg (329 lb 3.2 oz)   SpO2 98%   BMI 44.65 kg/m      Diagnostic Testing:  US LE 3/10/2021 (2 d post procedure) No DVT. R GSV closed from proximal thigh to ankle; lateral thigh  and varicose veins patent with residual reflux up to 4.7s  Stress test 5/2017 showed no evidence of ischemia, with breast attenuation artifact.  Echocardiogram 2016 showed a low normal ejection fraction.  She had no significant valvular abnormalities noted.      Plan:  Hydrochlorothiazide 25 mg daily  BMP in 1 week    Assessment/Plan:    1. HTN    Felt she had some ADRs from combination losartan/HCTZ and went back to lisinopril 20 mg BID    BPs too high    PLAN:    Encouraged exercise    Start hydrochlorothiazide 25 mg daily. Refilled all of her other anti-hypertensives    Keep track of BP when possible    BMP in 1-2 week - will  contact with results and see how BPs are running & PVD    2. Peripheral Venous Dz    S/p procedure as above; follow-up echo still showed some reflux but she does feel better overall    PLAN:    Continue compression stockings x 2-3 m from procedure    When review BMP, will see if R lower leg medial area of soreness has improved. May need to follow-up with [facility]      3. AT    Under really good control on low dose Bysotlic    PLAN:    Continue to monitor      Jaqui Edmond PA-C, MSPAS      Orders Placed This Encounter   Procedures     Basic metabolic panel     Orders Placed This Encounter   Medications     nebivolol (BYSTOLIC) 10 MG tablet     Sig: Take 1 tablet (10 mg) by mouth daily     Dispense:  90 tablet     Refill:  3     spironolactone (ALDACTONE) 25 MG tablet     Sig: Take 1 tablet (25 mg) by mouth daily     Dispense:  90 tablet     Refill:  3     lisinopril (ZESTRIL) 20 MG tablet     Sig: Take 1 tablet (20 mg) by mouth 2 times daily     Dispense:  180 tablet     Refill:  3     hydrochlorothiazide (HYDRODIURIL) 25 MG tablet     Sig: Take 1 tablet (25 mg) by mouth daily     Dispense:  30 tablet     Refill:  1     Medications Discontinued During This Encounter   Medication Reason     losartan-hydrochlorothiazide (HYZAAR) 50-12.5 MG tablet Stopped by Patient     spironolactone (ALDACTONE) 25 MG tablet Reorder     nebivolol (BYSTOLIC) 10 MG tablet Reorder     LISINOPRIL PO Reorder         Encounter Diagnoses   Name Primary?     Hypertension goal BP (blood pressure) < 130/80      Benign essential hypertension        CURRENT MEDICATIONS:  Current Outpatient Medications   Medication Sig Dispense Refill     Aspirin-Acetaminophen-Caffeine (EXCEDRIN EXTRA STRENGTH PO) Take 2 tablets by mouth every 6 hours as needed       hydrochlorothiazide (HYDRODIURIL) 25 MG tablet Take 1 tablet (25 mg) by mouth daily 30 tablet 1     lisinopril (ZESTRIL) 20 MG tablet Take 1 tablet (20 mg) by mouth 2 times daily 180 tablet 3      meclizine (ANTIVERT) 12.5 MG tablet Take 12.5 mg by mouth 3 times daily as needed for dizziness       nebivolol (BYSTOLIC) 10 MG tablet Take 1 tablet (10 mg) by mouth daily 90 tablet 3     order for DME DREAMSTATION  9-12 CM/H20  FF MIRAGE QUATTRO       spironolactone (ALDACTONE) 25 MG tablet Take 1 tablet (25 mg) by mouth daily 90 tablet 3     SUMAtriptan (IMITREX) 100 MG tablet Take 1 tablet (100 mg) by mouth at onset of headache for migraine May repeat in 2 hours. Max dose 200mg in 24 hours. 9 tablet 1     diazepam (VALIUM) 5 MG tablet Take as directed by vein clinic for vein procedure.       estradiol (ESTRACE VAGINAL) 0.1 MG/GM vaginal cream Apply small amount to the vaginal opening and urethra M, W, F @ h.s. (Patient not taking: Reported on 4/9/2021) 42.5 g 3       ALLERGIES     Allergies   Allergen Reactions     Amoxicillin Nausea and Vomiting     Percocet [Oxycodone-Acetaminophen] Nausea and Vomiting         Review of Systems:  Skin:        Eyes:       ENT:       Respiratory:       Cardiovascular:       Gastroenterology:      Genitourinary:       Musculoskeletal:       Neurologic:       Psychiatric:       Heme/Lymph/Imm:       Endocrine:         Physical Exam:  Vitals: BP (!) 175/79 (BP Location: Right arm, Patient Position: Sitting, Cuff Size: Adult Large)   Pulse 56   Ht 1.829 m (6')   Wt 149.3 kg (329 lb 3.2 oz)   SpO2 98%   BMI 44.65 kg/m      Constitutional:           Skin:           Head:           Eyes:           ENT:           Neck:           Chest:           Cardiac:                    Abdomen:           Vascular:                                        Extremities and Back:           Neurological:               PAST MEDICAL HISTORY:  Past Medical History:   Diagnosis Date     Classic migraine      Complex endometrial hyperplasia     without atypia     Hypertension      Motion sickness      Mumps      Obese      STORMY (obstructive sleep apnea)      Other disorder of menstruation and other abnormal  bleeding from female genital tract 2/24/2011     Palpitations      PONV (postoperative nausea and vomiting)      Spider veins      SVT (supraventricular tachycardia) (H) 9/2015       PAST SURGICAL HISTORY:  Past Surgical History:   Procedure Laterality Date     BLADDER SURGERY       CHOLECYSTECTOMY, LAPOROSCOPIC      Cholecystectomy, Laparoscopic     COLONOSCOPY N/A 11/25/2014    Procedure: COLONOSCOPY;  Surgeon: Wenceslao Galo MD;  Location:  GI     CYSTOSCOPY, SLING TRANSVAGINAL N/A 8/20/2018    Procedure: CYSTOSCOPY, SLING TRANSVAGINAL;;  Surgeon: Urban Elena MD;  Location: [facility]     D & C      X2-3 for abnormal bleeding     ESOPHAGOSCOPY, GASTROSCOPY, DUODENOSCOPY (EGD), COMBINED N/A 11/25/2014    Procedure: COMBINED ESOPHAGOSCOPY, GASTROSCOPY, DUODENOSCOPY (EGD), BIOPSY SINGLE OR MULTIPLE;  Surgeon: Wenceslao Galo MD;  Location: [facility]     LAPAROSCOPIC HYSTERECTOMY SUPRACERVICAL N/A 8/20/2018    Procedure: LAPAROSCOPIC HYSTERECTOMY SUPRACERVICAL;  LAPAROSCOPIC SUBTOATAL HYSTERECTOMY, BILATERAL SALPINGECTOMY, SUBURETHRAL SLING AND CYSTOSCOPY;  Surgeon: Urban Elena MD;  Location: [facility]     LAPAROSCOPIC SALPINGECTOMY Bilateral 8/20/2018    Procedure: LAPAROSCOPIC SALPINGECTOMY;;  Surgeon: Urban Elena MD;  Location: [facility]     LAPAROSCOPY      For endometriosis     LASER ABLATION VEIN VARICOSE       OPERATIVE HYSTEROSCOPY WITH MORCELLATOR N/A 3/29/2018    Procedure: OPERATIVE HYSTEROSCOPY WITH MORCELLATOR (MYOSURE);  OPERATIVE HYSTEROSCOPY WITH MORCELLATOR ;  Surgeon: Urban Elena MD;  Location: [facility]       FAMILY HISTORY:  Family History   Problem Relation Age of Onset     Hypertension Mother      Lipids Mother      Hypertension Father      Prostate Cancer Father      Lipids Father      Breast Cancer Maternal Aunt      Hypertension Maternal Grandmother      C.A.D. Maternal Grandmother      Gallbladder Disease Maternal Grandmother      Cancer Maternal Grandfather          lung     Cerebrovascular Disease Paternal Grandmother      C.A.D. Paternal Grandfather      Cancer - colorectal Other         cousin maternal      Colon Cancer Cousin      Colon Cancer Cousin      Diabetes No family hx of        SOCIAL HISTORY:  Social History     Socioeconomic History     Marital status: Single     Spouse name: Not on file     Number of children: Not on file     Years of education: Not on file     Highest education level: Not on file   Occupational History     Not on file   Social Needs     Financial resource strain: Not on file     Food insecurity     Worry: Not on file     Inability: Not on file     Transportation needs     Medical: Not on file     Non-medical: Not on file   Tobacco Use     Smoking status: Never Smoker     Smokeless tobacco: Never Used   Substance and Sexual Activity     Alcohol use: Yes     Alcohol/week: 1.7 standard drinks     Drug use: No     Sexual activity: Never   Lifestyle     Physical activity     Days per week: Not on file     Minutes per session: Not on file     Stress: Not on file   Relationships     Social connections     Talks on phone: Not on file     Gets together: Not on file     Attends Anabaptism service: Not on file     Active member of club or organization: Not on file     Attends meetings of clubs or organizations: Not on file     Relationship status: Not on file     Intimate partner violence     Fear of current or ex partner: Not on file     Emotionally abused: Not on file     Physically abused: Not on file     Forced sexual activity: Not on file   Other Topics Concern      Service Not Asked     Blood Transfusions Not Asked     Caffeine Concern Yes     Comment: 1-2 cans qd     Occupational Exposure Not Asked     Hobby Hazards Not Asked     Sleep Concern Not Asked     Stress Concern Not Asked     Weight Concern Not Asked     Special Diet Yes     Comment: started mediterranian      Back Care Not Asked     Exercise Yes     Comment: 20 minutes  walking 3-4 days weekly      Bike Helmet Not Asked     Seat Belt Yes     Self-Exams Not Asked     Parent/sibling w/ CABG, MI or angioplasty before 65F 55M? No   Social History Narrative     Not on file       Thank you for allowing me to participate in the care of your patient.      Sincerely,     DANIA Quach [facility]    cc:   No referring provider defined for this encounter.

## 2021-04-14 NOTE — TELEPHONE ENCOUNTER
Pt has additional questions regarding how long she will be out of work and recovery time. Pt also wants to know if bladder repair surgery can be at the same time as the hyst. Routing to Dr. Elena. Please contact pt. [phone]   Ambulatory

## 2021-04-16 DIAGNOSIS — R30.0 DYSURIA: ICD-10-CM

## 2021-04-16 DIAGNOSIS — R31.9 HEMATURIA: ICD-10-CM

## 2021-04-16 PROCEDURE — 88120 CYTP URNE 3-5 PROBES EA SPEC: CPT | Performed by: PATHOLOGY

## 2021-04-22 ENCOUNTER — DOCUMENTATION ONLY (OUTPATIENT)
Dept: CARDIOLOGY | Facility: CLINIC | Age: 56
End: 2021-04-22

## 2021-04-22 DIAGNOSIS — I10 HYPERTENSION GOAL BP (BLOOD PRESSURE) < 130/80: ICD-10-CM

## 2021-04-22 LAB
ANION GAP SERPL CALCULATED.3IONS-SCNC: 8 MMOL/L (ref 3–14)
BUN SERPL-MCNC: 12 MG/DL (ref 7–30)
CALCIUM SERPL-MCNC: 8.9 MG/DL (ref 8.5–10.1)
CHLORIDE SERPL-SCNC: 104 MMOL/L (ref 94–109)
CO2 SERPL-SCNC: 24 MMOL/L (ref 20–32)
CREAT SERPL-MCNC: 0.84 MG/DL (ref 0.52–1.04)
GFR SERPL CREATININE-BSD FRML MDRD: 78 ML/MIN/{1.73_M2}
GLUCOSE SERPL-MCNC: 117 MG/DL (ref 70–99)
POTASSIUM SERPL-SCNC: 4.1 MMOL/L (ref 3.4–5.3)
SODIUM SERPL-SCNC: 136 MMOL/L (ref 133–144)

## 2021-04-22 PROCEDURE — 36415 COLL VENOUS BLD VENIPUNCTURE: CPT | Performed by: INTERNAL MEDICINE

## 2021-04-22 PROCEDURE — 80048 BASIC METABOLIC PNL TOTAL CA: CPT | Performed by: INTERNAL MEDICINE

## 2021-04-22 NOTE — PROGRESS NOTES
Called Fouzia and reviewed BMP.    1. BPs at home not being checked. Stressors increased with her mom falling down the stairs and breaking her back. She's been increasing her cares for her mom since she's been home from the hospital    2. BMP today looks normal on hydrochlorothiazide 25 mg daily    3. Using CPAP and increasing fluid intake     4. R lower leg medial area of soreness has not worsened. Using the compression stockings. Reviewed Dr. Au's recommendations    Will start checking BP at least daily and write down. I'll contact her in ~1 month to touch base    Component      Latest Ref Rng & Units 7/2/2019 4/22/2021   Sodium      133 - 144 mmol/L 141 136   Potassium      3.4 - 5.3 mmol/L 4.0 4.1   Chloride      94 - 109 mmol/L 108 104   Carbon Dioxide      20 - 32 mmol/L 27 24   Anion Gap      3 - 14 mmol/L 6 8   Glucose      70 - 99 mg/dL 97 117 (H)   Urea Nitrogen      7 - 30 mg/dL 9 12   Creatinine      0.52 - 1.04 mg/dL 0.97 0.84   GFR Estimate      >60 mL/min/1.73:m2 67 78   GFR Estimate If Black      >60 mL/min/1.73:m2 77 90   Calcium      8.5 - 10.1 mg/dL 8.6 8.9

## 2021-04-27 LAB — COPATH REPORT: NORMAL

## 2021-04-30 ENCOUNTER — TELEPHONE (OUTPATIENT)
Dept: CARDIOLOGY | Facility: CLINIC | Age: 56
End: 2021-04-30

## 2021-04-30 ENCOUNTER — VIRTUAL VISIT (OUTPATIENT)
Dept: UROLOGY | Facility: CLINIC | Age: 56
End: 2021-04-30
Payer: COMMERCIAL

## 2021-04-30 VITALS — WEIGHT: 293 LBS | HEIGHT: 72 IN | BODY MASS INDEX: 39.68 KG/M2

## 2021-04-30 DIAGNOSIS — I48.0 PAROXYSMAL ATRIAL FIBRILLATION (H): Primary | ICD-10-CM

## 2021-04-30 DIAGNOSIS — R32 URINARY INCONTINENCE, UNSPECIFIED TYPE: ICD-10-CM

## 2021-04-30 DIAGNOSIS — N32.81 OAB (OVERACTIVE BLADDER): Primary | ICD-10-CM

## 2021-04-30 PROCEDURE — 99213 OFFICE O/P EST LOW 20 MIN: CPT | Mod: GT | Performed by: PHYSICIAN ASSISTANT

## 2021-04-30 RX ORDER — MIRABEGRON 25 MG/1
25 TABLET, FILM COATED, EXTENDED RELEASE ORAL DAILY
Qty: 90 TABLET | Refills: 3 | Status: SHIPPED | OUTPATIENT
Start: 2021-04-30 | End: 2022-08-30

## 2021-04-30 ASSESSMENT — PAIN SCALES - GENERAL: PAINLEVEL: NO PAIN (0)

## 2021-04-30 ASSESSMENT — MIFFLIN-ST. JEOR: SCORE: 2158.51

## 2021-04-30 NOTE — LETTER
4/30/2021       RE: Fouzia Arenas  [address] [facility] [address]     Dear Colleague,    Thank you for referring your patient, Fouzia Arenas, to the [facility] [place] at [facility]. Please see a copy of my visit note below.    *SEND LINK TO CELL PHONE*    PT STATES SHE IS NOT DOING IT AS OFTEN AS SHE SHOULD.  HASNT NOTICED A CHANGE    Fouzia is a 55 year old who is being evaluated via a billable video visit.      How would you like to obtain your AVS? MyChart  If the video visit is dropped, the invitation should be resent by: Text to cell phone: [phone]  Will anyone else be joining your video visit? No      Video Start Time: 1:12 PM  Video-Visit Details    Type of service:  Video Visit    Video End Time:1:20 PM    Originating Location (pt. Location): Home    Distant Location (provider location):  [facility] [place]     Platform used for Video Visit: Doxi    CC: Hematuria, dysuria     HPI: It is a pleasure to see Ms. Fouzia Arenas, a pleasant 55 year old female, seen via billable video visit in follow-up of hematuria, dysuria, urge incontinence.      The patient may note pink on the tissue post void at times.  Ms. Arenas voids without difficulty, but describes some urge incontinence and nocturia. Had a UA/UC x 2 due to freq, burning and bother were neg, but UA had micro hematuria.      Bladder lift and hysterectomy with Dr. Chambers in 2018.      Hematuria Risk Factors:  Age >40: Yes                                       Smoking history: no  Occupational exposure to chemicals or dyes (ie, benzenes, aromatic amines): no  History of urologic disorder or disease: no  History of irritative voiding symptoms: no  History of urinary tract infection: no  Analgesic abuse: no  History of pelvic irradiation: no     Past Medical History        Past Medical History:   Diagnosis Date     Classic migraine       Complex  endometrial hyperplasia       without atypia     Hypertension       Motion sickness       Mumps       Obese       STORMY (obstructive sleep apnea)       Other disorder of menstruation and other abnormal bleeding from female genital tract 2/24/2011     Palpitations       PONV (postoperative nausea and vomiting)       Spider veins       SVT (supraventricular tachycardia) (H) 9/2015         Past Surgical History         Past Surgical History:   Procedure Laterality Date     BLADDER SURGERY         CHOLECYSTECTOMY, LAPOROSCOPIC         Cholecystectomy, Laparoscopic     COLONOSCOPY N/A 11/25/2014     Procedure: COLONOSCOPY;  Surgeon: Wenceslao Galo MD;  Location:  GI     CYSTOSCOPY, SLING TRANSVAGINAL N/A 8/20/2018     Procedure: CYSTOSCOPY, SLING TRANSVAGINAL;;  Surgeon: Urban Elena MD;  Location: [facility]     D & C         X2-3 for abnormal bleeding     ESOPHAGOSCOPY, GASTROSCOPY, DUODENOSCOPY (EGD), COMBINED N/A 11/25/2014     Procedure: COMBINED ESOPHAGOSCOPY, GASTROSCOPY, DUODENOSCOPY (EGD), BIOPSY SINGLE OR MULTIPLE;  Surgeon: Wenceslao Galo MD;  Location: [facility]     LAPAROSCOPIC HYSTERECTOMY SUPRACERVICAL N/A 8/20/2018     Procedure: LAPAROSCOPIC HYSTERECTOMY SUPRACERVICAL;  LAPAROSCOPIC SUBTOATAL HYSTERECTOMY, BILATERAL SALPINGECTOMY, SUBURETHRAL SLING AND CYSTOSCOPY;  Surgeon: Urban Elena MD;  Location: [facility]     LAPAROSCOPIC SALPINGECTOMY Bilateral 8/20/2018     Procedure: LAPAROSCOPIC SALPINGECTOMY;;  Surgeon: Urban Elena MD;  Location: [facility]     LAPAROSCOPY         For endometriosis     OPERATIVE HYSTEROSCOPY WITH MORCELLATOR N/A 3/29/2018     Procedure: OPERATIVE HYSTEROSCOPY WITH MORCELLATOR (MYOSURE);  OPERATIVE HYSTEROSCOPY WITH MORCELLATOR ;  Surgeon: Urban Elena MD;  Location: [facility]         Current Outpatient Prescriptions          Current Outpatient Medications   Medication Sig Dispense Refill     Aspirin-Acetaminophen-Caffeine (EXCEDRIN EXTRA STRENGTH  PO) Take 2 tablets by mouth every 6 hours as needed         LISINOPRIL PO           nebivolol (BYSTOLIC) 10 MG tablet Take 1 tablet (10 mg) by mouth daily 90 tablet 3     order for DME DREAMSTATION  9-12 CM/H20  FF MIRAGE QUATTRO         spironolactone (ALDACTONE) 25 MG tablet Take 1 tablet (25 mg) by mouth daily 90 tablet 3     SUMAtriptan (IMITREX) 100 MG tablet Take 1 tablet (100 mg) by mouth at onset of headache for migraine May repeat in 2 hours. Max dose 200mg in 24 hours. 9 tablet 1     losartan-hydrochlorothiazide (HYZAAR) 50-12.5 MG tablet Take 1 tablet by mouth 2 times daily (Patient not taking: Reported on 2/24/2021) 180 tablet 3              Allergies   Allergen Reactions     Amoxicillin Nausea and Vomiting     Percocet [Oxycodone-Acetaminophen] Nausea and Vomiting      FAMILY HISTORY: There is no reported history of genitourinary carcinoma.  There is no history of urolithiasis.       Social History   Social History            Socioeconomic History     Marital status: Single       Spouse name: Not on file     Number of children: Not on file     Years of education: Not on file     Highest education level: Not on file   Occupational History     Not on file   Social Needs     Financial resource strain: Not on file     Food insecurity       Worry: Not on file       Inability: Not on file     Transportation needs       Medical: Not on file       Non-medical: Not on file   Tobacco Use     Smoking status: Never Smoker     Smokeless tobacco: Never Used   Substance and Sexual Activity     Alcohol use: Yes       Alcohol/week: 1.7 standard drinks     Drug use: No     Sexual activity: Never   Lifestyle     Physical activity       Days per week: Not on file       Minutes per session: Not on file     Stress: Not on file   Relationships     Social connections       Talks on phone: Not on file       Gets together: Not on file       Attends Holiness service: Not on file       Active member of club or organization: Not  on file       Attends meetings of clubs or organizations: Not on file       Relationship status: Not on file     Intimate partner violence       Fear of current or ex partner: Not on file       Emotionally abused: Not on file       Physically abused: Not on file       Forced sexual activity: Not on file   Other Topics Concern      Service Not Asked     Blood Transfusions Not Asked     Caffeine Concern Yes       Comment: 1-2 cans qd     Occupational Exposure Not Asked     Hobby Hazards Not Asked     Sleep Concern Not Asked     Stress Concern Not Asked     Weight Concern Not Asked     Special Diet Yes       Comment: started mediterranian      Back Care Not Asked     Exercise Yes       Comment: 20 minutes walking 3-4 days weekly      Bike Helmet Not Asked     Seat Belt Yes     Self-Exams Not Asked     Parent/sibling w/ CABG, MI or angioplasty before 65F 55M? No   Social History Narrative     Not on file            ROS: A comprehensive 14 point ROS was obtained and negative except for that outlined above in the HPI.     PHYSICAL EXAM:   Vitals       Vitals:     02/24/21 1331   Weight: 145.2 kg (320 lb)   Height: 1.829 m (6')         PSYCH: NAD  EYES: EOMI  MOUTH: MMM  NECK: Supple, no notable adenopathy  RESP: Unlabored breathing  NEURO: AAO x3              Orders Only on 12/02/2020   Component Date Value Ref Range Status      Specimen Description 12/02/2020 Midstream Urine    Final     Special Requests 12/02/2020 Specimen received in preservative    Final     Culture Micro 12/02/2020     Final                    Value:10,000 to 50,000 colonies/mL  mixed urogenital sarthak  Susceptibility testing not routinely done        Color Urine 12/02/2020 Yellow    Final     Appearance Urine 12/02/2020 Slightly Cloudy    Final     Glucose Urine 12/02/2020 Negative  NEG^Negative mg/dL Final     Bilirubin Urine 12/02/2020 Negative  NEG^Negative Final     Ketones Urine 12/02/2020 Negative  NEG^Negative mg/dL Final     Specific  Gravity Urine 12/02/2020 1.025  1.003 - 1.035 Final     pH Urine 12/02/2020 5.5  5.0 - 7.0 pH Final     Protein Albumin Urine 12/02/2020 Trace* NEG^Negative mg/dL Final     Urobilinogen Urine 12/02/2020 0.2  0.2 - 1.0 EU/dL Final     Nitrite Urine 12/02/2020 Negative  NEG^Negative Final     Blood Urine 12/02/2020 Small* NEG^Negative Final     Leukocyte Esterase Urine 12/02/2020 Moderate* NEG^Negative Final     Source 12/02/2020 Midstream Urine    Final     WBC Urine 12/02/2020 * OTO5^0 - 5 /HPF Final     RBC Urine 12/02/2020 25-50* OTO2^O - 2 /HPF Final     Squamous Epithelial /LPF Urine 12/02/2020 Moderate* FEW^Few /LPF Final     Bacteria Urine 12/02/2020 Moderate* NEG^Negative /HPF Final         ASSESSMENT and PLAN:    55 year old female with benign micro hematuria, dysuria, urge incontinence.  Hematuria eval benign  Regarding the urge incontinence, we discussed estrogen cream on the urethra 3x per week. Will add Myrbetriq 25mg, SE reviewed.      3 month visit for med check.    Melany Jane PA-C  [facility]

## 2021-04-30 NOTE — TELEPHONE ENCOUNTER
Her BMP on 4/22 was totally normal - hydrochlorothiazide typically would cause muscle issues if they depleted electrolytes ... not usually the med itself.    OK to stay off of it but MUST start checking BP at least daily and write down.    Pls have her see PCP or me in 1 m with BMP to review BPs.    Jaqui

## 2021-04-30 NOTE — PATIENT INSTRUCTIONS
You have been prescribed medication to help relax your bladder.    This medication may help control (or improve) urinary frequency, urgency and urge incontinence (Myrbetriq 25mg SE).    Common side effects include:  Dry mouth (Biotene mouthwash can help with this.)  Constipation  Drowsiness  Blurred vision    Rare side effect:  Urinary retention

## 2021-04-30 NOTE — PROGRESS NOTES
*SEND LINK TO CELL PHONE*    PT STATES SHE IS NOT DOING IT AS OFTEN AS SHE SHOULD.  HASNT NOTICED A CHANGE    Fouzia is a 55 year old who is being evaluated via a billable video visit.      How would you like to obtain your AVS? MyChart  If the video visit is dropped, the invitation should be resent by: Text to cell phone: [phone]  Will anyone else be joining your video visit? No      Video Start Time: 1:12 PM  Video-Visit Details    Type of service:  Video Visit    Video End Time:1:20 PM    Originating Location (pt. Location): Home    Distant Location (provider location):  [facility] [email or web address]     Platform used for Video Visit: Doxi    CC: Hematuria, dysuria     HPI: It is a pleasure to see Ms. Fouzia Arenas, a pleasant 55 year old female, seen via billable video visit in follow-up of hematuria, dysuria, urge incontinence.      The patient may note pink on the tissue post void at times.  Ms. Arenas voids without difficulty, but describes some urge incontinence and nocturia. Had a UA/UC x 2 due to freq, burning and bother were neg, but UA had micro hematuria.      Bladder lift and hysterectomy with Dr. Chambers in 2018.      Hematuria Risk Factors:  Age >40: Yes                                       Smoking history: no  Occupational exposure to chemicals or dyes (ie, benzenes, aromatic amines): no  History of urologic disorder or disease: no  History of irritative voiding symptoms: no  History of urinary tract infection: no  Analgesic abuse: no  History of pelvic irradiation: no     Past Medical History        Past Medical History:   Diagnosis Date     Classic migraine       Complex endometrial hyperplasia       without atypia     Hypertension       Motion sickness       Mumps       Obese       STORMY (obstructive sleep apnea)       Other disorder of menstruation and other abnormal bleeding from female genital tract 2/24/2011     Palpitations       PONV (postoperative nausea and vomiting)        Spider veins       SVT (supraventricular tachycardia) (H) 9/2015         Past Surgical History         Past Surgical History:   Procedure Laterality Date     BLADDER SURGERY         CHOLECYSTECTOMY, LAPOROSCOPIC         Cholecystectomy, Laparoscopic     COLONOSCOPY N/A 11/25/2014     Procedure: COLONOSCOPY;  Surgeon: Wenceslao Galo MD;  Location:  GI     CYSTOSCOPY, SLING TRANSVAGINAL N/A 8/20/2018     Procedure: CYSTOSCOPY, SLING TRANSVAGINAL;;  Surgeon: Urban Elena MD;  Location: [facility]     D & C         X2-3 for abnormal bleeding     ESOPHAGOSCOPY, GASTROSCOPY, DUODENOSCOPY (EGD), COMBINED N/A 11/25/2014     Procedure: COMBINED ESOPHAGOSCOPY, GASTROSCOPY, DUODENOSCOPY (EGD), BIOPSY SINGLE OR MULTIPLE;  Surgeon: Wenceslao Galo MD;  Location: [facility]     LAPAROSCOPIC HYSTERECTOMY SUPRACERVICAL N/A 8/20/2018     Procedure: LAPAROSCOPIC HYSTERECTOMY SUPRACERVICAL;  LAPAROSCOPIC SUBTOATAL HYSTERECTOMY, BILATERAL SALPINGECTOMY, SUBURETHRAL SLING AND CYSTOSCOPY;  Surgeon: Urban Elena MD;  Location: [facility]     LAPAROSCOPIC SALPINGECTOMY Bilateral 8/20/2018     Procedure: LAPAROSCOPIC SALPINGECTOMY;;  Surgeon: Urban Elena MD;  Location: [facility]     LAPAROSCOPY         For endometriosis     OPERATIVE HYSTEROSCOPY WITH MORCELLATOR N/A 3/29/2018     Procedure: OPERATIVE HYSTEROSCOPY WITH MORCELLATOR (MYOSURE);  OPERATIVE HYSTEROSCOPY WITH MORCELLATOR ;  Surgeon: Urban Elena MD;  Location: [facility]         Current Outpatient Prescriptions          Current Outpatient Medications   Medication Sig Dispense Refill     Aspirin-Acetaminophen-Caffeine (EXCEDRIN EXTRA STRENGTH PO) Take 2 tablets by mouth every 6 hours as needed         LISINOPRIL PO           nebivolol (BYSTOLIC) 10 MG tablet Take 1 tablet (10 mg) by mouth daily 90 tablet 3     order for DME DREAMSTATION  9-12 CM/H20  FF MIRAGE QUATTRO         spironolactone (ALDACTONE) 25 MG tablet Take 1 tablet (25 mg) by mouth daily  90 tablet 3     SUMAtriptan (IMITREX) 100 MG tablet Take 1 tablet (100 mg) by mouth at onset of headache for migraine May repeat in 2 hours. Max dose 200mg in 24 hours. 9 tablet 1     losartan-hydrochlorothiazide (HYZAAR) 50-12.5 MG tablet Take 1 tablet by mouth 2 times daily (Patient not taking: Reported on 2/24/2021) 180 tablet 3              Allergies   Allergen Reactions     Amoxicillin Nausea and Vomiting     Percocet [Oxycodone-Acetaminophen] Nausea and Vomiting      FAMILY HISTORY: There is no reported history of genitourinary carcinoma.  There is no history of urolithiasis.       Social History   Social History            Socioeconomic History     Marital status: Single       Spouse name: Not on file     Number of children: Not on file     Years of education: Not on file     Highest education level: Not on file   Occupational History     Not on file   Social Needs     Financial resource strain: Not on file     Food insecurity       Worry: Not on file       Inability: Not on file     Transportation needs       Medical: Not on file       Non-medical: Not on file   Tobacco Use     Smoking status: Never Smoker     Smokeless tobacco: Never Used   Substance and Sexual Activity     Alcohol use: Yes       Alcohol/week: 1.7 standard drinks     Drug use: No     Sexual activity: Never   Lifestyle     Physical activity       Days per week: Not on file       Minutes per session: Not on file     Stress: Not on file   Relationships     Social connections       Talks on phone: Not on file       Gets together: Not on file       Attends Yarsanism service: Not on file       Active member of club or organization: Not on file       Attends meetings of clubs or organizations: Not on file       Relationship status: Not on file     Intimate partner violence       Fear of current or ex partner: Not on file       Emotionally abused: Not on file       Physically abused: Not on file       Forced sexual activity: Not on file   Other  Topics Concern      Service Not Asked     Blood Transfusions Not Asked     Caffeine Concern Yes       Comment: 1-2 cans qd     Occupational Exposure Not Asked     Hobby Hazards Not Asked     Sleep Concern Not Asked     Stress Concern Not Asked     Weight Concern Not Asked     Special Diet Yes       Comment: started mediterranian      Back Care Not Asked     Exercise Yes       Comment: 20 minutes walking 3-4 days weekly      Bike Helmet Not Asked     Seat Belt Yes     Self-Exams Not Asked     Parent/sibling w/ CABG, MI or angioplasty before 65F 55M? No   Social History Narrative     Not on file            ROS: A comprehensive 14 point ROS was obtained and negative except for that outlined above in the HPI.     PHYSICAL EXAM:   Vitals       Vitals:     02/24/21 1331   Weight: 145.2 kg (320 lb)   Height: 1.829 m (6')         PSYCH: NAD  EYES: EOMI  MOUTH: MMM  NECK: Supple, no notable adenopathy  RESP: Unlabored breathing  NEURO: AAO x3              Orders Only on 12/02/2020   Component Date Value Ref Range Status      Specimen Description 12/02/2020 Midstream Urine    Final     Special Requests 12/02/2020 Specimen received in preservative    Final     Culture Micro 12/02/2020     Final                    Value:10,000 to 50,000 colonies/mL  mixed urogenital sarthak  Susceptibility testing not routinely done        Color Urine 12/02/2020 Yellow    Final     Appearance Urine 12/02/2020 Slightly Cloudy    Final     Glucose Urine 12/02/2020 Negative  NEG^Negative mg/dL Final     Bilirubin Urine 12/02/2020 Negative  NEG^Negative Final     Ketones Urine 12/02/2020 Negative  NEG^Negative mg/dL Final     Specific Gravity Urine 12/02/2020 1.025  1.003 - 1.035 Final     pH Urine 12/02/2020 5.5  5.0 - 7.0 pH Final     Protein Albumin Urine 12/02/2020 Trace* NEG^Negative mg/dL Final     Urobilinogen Urine 12/02/2020 0.2  0.2 - 1.0 EU/dL Final     Nitrite Urine 12/02/2020 Negative  NEG^Negative Final     Blood Urine  12/02/2020 Small* NEG^Negative Final     Leukocyte Esterase Urine 12/02/2020 Moderate* NEG^Negative Final     Source 12/02/2020 Midstream Urine    Final     WBC Urine 12/02/2020 * OTO5^0 - 5 /HPF Final     RBC Urine 12/02/2020 25-50* OTO2^O - 2 /HPF Final     Squamous Epithelial /LPF Urine 12/02/2020 Moderate* FEW^Few /LPF Final     Bacteria Urine 12/02/2020 Moderate* NEG^Negative /HPF Final         ASSESSMENT and PLAN:    55 year old female with benign micro hematuria, dysuria, urge incontinence.  Hematuria eval benign  Regarding the urge incontinence, we discussed estrogen cream on the urethra 3x per week. Will add Myrbetriq 25mg, SE reviewed.      3 month visit for med check.    Melany Jane PA-C  [facility]

## 2021-04-30 NOTE — TELEPHONE ENCOUNTER
4/30/21 Pt called to report that she decided to stop taking her hydrochlorothiazide one day ago. She has been feeling more and more fatigue with her limbs feeling achy and weak. She checked the side effects of hydrochlorothiazide on line and how she was feeling matched many of the side effects.   She has not been checking BPS at home so she has nothing to report. I explained that she is on hydrochlorothiazide for BP control   Will send update to Jaqui and call pt back w recommendations.  Pt voiced understanding and agreement with plan.   Jabari 222 pm

## 2021-05-03 NOTE — TELEPHONE ENCOUNTER
5/3/21 Spoke w pt and explained recommendations per Jaqui Edmond PA-C  Her BMP on 4/22 was totally normal - hydrochlorothiazide typically would cause muscle issues if they depleted electrolytes ... not usually the med itself.     OK to stay off of it but MUST start checking BP at least daily and write down.     Pls have her see PCP or me in 1 m with BMP to review BPs.    Pt prefers to follow up w Jaqui. Appt made for 6/4 at 150 w BMP 1245.  Orders placed.   Pt voiced understanding and agreement with plan.   Jabari 847 am

## 2021-06-02 NOTE — PROGRESS NOTES
"[facility]    I had the pleasure of seeing Fouzia when she came for follow up of HTN.  This 55 year old sees Dr. Hensley and Dr. Au for her history of:    1. Hypertension with reduction in Bystolic dose 3/2018 due to lightheaded episodes (subsequently resolved)  2. Palpitations, with event monitor 10/2015 showing episodes of atrial tachycardia previously on carvedilol and now taking by Bystolic  3. Right lower extremity lipoma dermatosclerosis for which Dr. Au saw her 8/2017 with severe R GSV and small R SV. S/p R GSV RF ablation from prox thigh to upper calf and R dGSV insheath sclerotherapy. Direct sclerotherapy of lateral R thigh varicose veins and limited phlebectomy of lateral thigh varicose veins (limited by  vein's proximity to artery)    I saw Fouzia 4/2021 at which time we reviewed that she had been under quite a bit of stress due to her mother's dementia.  Blood pressures were getting too high, which she knew was also due to lack of exercise.  She had felt muscle \"cramping\" after Dr. Au started losartan/HCTZ, and had gone back to lisinopril 20 mg twice daily.  We reviewed her recent phlebectomy.    I started HCTZ given normal labs and had her continue Bystolic for atrial tachycardia.    She contacted us 4/30 noting that she had stopped HCTZ secondary to limbs feeling \"achy and weak\" and fatigue, despite her normal follow-up BMP.    1 month follow-up for blood pressure testing recommended with me or PCP.    Interval History:  Her BPs have really been looking \"great\" ranging 110-150s with vast majority in 110-120 despite having to stop hydrochlorothiazide d/t weakness/achiness of muscles.    She notes fatigue - she's back to using her CPAP but note nasal prongs are a little bigger than her nostrils so she ends up taking it off in the night. Also has been started on Myrbetriq for urinary incontinence that seemed worse after bladder sling was placed. She's been on this just a few " "weeks and continues to note poor sleep d/t nocturia.    R leg feels \"good\" after vein procedure (Dr. Au).     VITALS:  Vitals: /84 (BP Location: Right arm, Cuff Size: Adult Large)   Pulse 71   Ht 1.829 m (6')   Wt 145.2 kg (320 lb)   BMI 43.40 kg/m      Diagnostic Testing:  US LE 3/10/2021 (2 d post procedure) No DVT. R GSV closed from proximal thigh to ankle; lateral thigh  and varicose veins patent with residual reflux up to 4.7s  Stress test 5/2017 showed no evidence of ischemia, with breast attenuation artifact.  Echocardiogram 2016 showed a low normal ejection fraction.  She had no significant valvular abnormalities noted.      Plan:  1.annual follow-up     Assessment/Plan:    1. HTN    Remains on Bystolic 10 mg daily, lisinopril 20 mg BID and spironolactone 25 mg daily and BPs really look good    PLAN:    No changes for now    Continue routine exercise; weight loss    2. Fatigue    Multifactorial - notes nocturia (just started Mybetriq), ill-fitting CPAP (small nostrils)    Underlying bradycardia could be contributing, but notes no lightheadedness or dizziness.    Note no recent labs - she's \"overdue\" with ENRIQUE Xiong/PCP    PLAN:    Decrease fluid intake before bedtime. Myrtebriq causes dry mouth, but she'll try ice chips    Will call me after her appt with Urologist. If her nocturia has improved but still fatigued, can back off on Bystolic noting her palpitations and BP may worsen    Would recommended thyroid studies (last checked 2018, with abnl TSH but nl T4) and Hgb when she sees her PCP        Jauqi Edmond PA-C, MSPAS      No orders of the defined types were placed in this encounter.    No orders of the defined types were placed in this encounter.    Medications Discontinued During This Encounter   Medication Reason     hydrochlorothiazide (HYDRODIURIL) 25 MG tablet Stopped by Patient     estradiol (ESTRACE VAGINAL) 0.1 MG/GM vaginal cream Stopped by Patient         Encounter " Diagnosis   Name Primary?     Paroxysmal atrial fibrillation (H)        CURRENT MEDICATIONS:  Current Outpatient Medications   Medication Sig Dispense Refill     Aspirin-Acetaminophen-Caffeine (EXCEDRIN EXTRA STRENGTH PO) Take 2 tablets by mouth every 6 hours as needed       lisinopril (ZESTRIL) 20 MG tablet Take 1 tablet (20 mg) by mouth 2 times daily 180 tablet 3     meclizine (ANTIVERT) 12.5 MG tablet Take 12.5 mg by mouth 3 times daily as needed for dizziness       mirabegron (MYRBETRIQ) 25 MG 24 hr tablet Take 1 tablet (25 mg) by mouth daily 90 tablet 3     nebivolol (BYSTOLIC) 10 MG tablet Take 1 tablet (10 mg) by mouth daily 90 tablet 3     order for DME DREAMSTATION  9-12 CM/H20  FF MIRAGE QUATTRO       spironolactone (ALDACTONE) 25 MG tablet Take 1 tablet (25 mg) by mouth daily 90 tablet 3     SUMAtriptan (IMITREX) 100 MG tablet Take 1 tablet (100 mg) by mouth at onset of headache for migraine May repeat in 2 hours. Max dose 200mg in 24 hours. 9 tablet 1     diazepam (VALIUM) 5 MG tablet Take as directed by vein clinic for vein procedure.         ALLERGIES     Allergies   Allergen Reactions     Amoxicillin Nausea and Vomiting     Hctz [Hydrochlorothiazide] Other (See Comments)     Weak/Achy despite nl BMP     Percocet [Oxycodone-Acetaminophen] Nausea and Vomiting         Review of Systems:  Skin:        Eyes:       ENT:       Respiratory:       Cardiovascular:       Gastroenterology:      Genitourinary:       Musculoskeletal:       Neurologic:       Psychiatric:       Heme/Lymph/Imm:       Endocrine:         Physical Exam:  Vitals: /84 (BP Location: Right arm, Cuff Size: Adult Large)   Pulse 71   Ht 1.829 m (6')   Wt 145.2 kg (320 lb)   BMI 43.40 kg/m      Constitutional:           Skin:           Head:           Eyes:           ENT:           Neck:           Chest:           Cardiac:                    Abdomen:           Vascular:                                        Extremities and Back:            Neurological:               PAST MEDICAL HISTORY:  Past Medical History:   Diagnosis Date     Classic migraine      Complex endometrial hyperplasia     without atypia     Hypertension      Motion sickness      Mumps      Obese      STORMY (obstructive sleep apnea)      Other disorder of menstruation and other abnormal bleeding from female genital tract 2/24/2011     Palpitations      PONV (postoperative nausea and vomiting)      Spider veins      SVT (supraventricular tachycardia) (H) 9/2015       PAST SURGICAL HISTORY:  Past Surgical History:   Procedure Laterality Date     BLADDER SURGERY       CHOLECYSTECTOMY, LAPOROSCOPIC      Cholecystectomy, Laparoscopic     COLONOSCOPY N/A 11/25/2014    Procedure: COLONOSCOPY;  Surgeon: Wenceslao Galo MD;  Location:  GI     CYSTOSCOPY, SLING TRANSVAGINAL N/A 8/20/2018    Procedure: CYSTOSCOPY, SLING TRANSVAGINAL;;  Surgeon: Urban Elena MD;  Location: [facility]     D & C      X2-3 for abnormal bleeding     ESOPHAGOSCOPY, GASTROSCOPY, DUODENOSCOPY (EGD), COMBINED N/A 11/25/2014    Procedure: COMBINED ESOPHAGOSCOPY, GASTROSCOPY, DUODENOSCOPY (EGD), BIOPSY SINGLE OR MULTIPLE;  Surgeon: Wenceslao Galo MD;  Location: [facility]     LAPAROSCOPIC HYSTERECTOMY SUPRACERVICAL N/A 8/20/2018    Procedure: LAPAROSCOPIC HYSTERECTOMY SUPRACERVICAL;  LAPAROSCOPIC SUBTOATAL HYSTERECTOMY, BILATERAL SALPINGECTOMY, SUBURETHRAL SLING AND CYSTOSCOPY;  Surgeon: Urban Elena MD;  Location: [facility]     LAPAROSCOPIC SALPINGECTOMY Bilateral 8/20/2018    Procedure: LAPAROSCOPIC SALPINGECTOMY;;  Surgeon: Urban Elena MD;  Location: [facility]     LAPAROSCOPY      For endometriosis     LASER ABLATION VEIN VARICOSE       OPERATIVE HYSTEROSCOPY WITH MORCELLATOR N/A 3/29/2018    Procedure: OPERATIVE HYSTEROSCOPY WITH MORCELLATOR (MYOSURE);  OPERATIVE HYSTEROSCOPY WITH MORCELLATOR ;  Surgeon: Urban Elena MD;  Location: [facility]       FAMILY HISTORY:  Family History   Problem  Relation Age of Onset     Hypertension Mother      Lipids Mother      Hypertension Father      Prostate Cancer Father      Lipids Father      Breast Cancer Maternal Aunt      Hypertension Maternal Grandmother      C.A.D. Maternal Grandmother      Gallbladder Disease Maternal Grandmother      Cancer Maternal Grandfather         lung     Cerebrovascular Disease Paternal Grandmother      C.A.D. Paternal Grandfather      Cancer - colorectal Other         cousin maternal      Colon Cancer Cousin      Colon Cancer Cousin      Diabetes No family hx of        SOCIAL HISTORY:  Social History     Socioeconomic History     Marital status: Single     Spouse name: None     Number of children: None     Years of education: None     Highest education level: None   Occupational History     None   Social Needs     Financial resource strain: None     Food insecurity     Worry: None     Inability: None     Transportation needs     Medical: None     Non-medical: None   Tobacco Use     Smoking status: Never Smoker     Smokeless tobacco: Never Used   Substance and Sexual Activity     Alcohol use: Yes     Alcohol/week: 1.7 standard drinks     Drug use: No     Sexual activity: Never   Lifestyle     Physical activity     Days per week: None     Minutes per session: None     Stress: None   Relationships     Social connections     Talks on phone: None     Gets together: None     Attends [employer] service: None     Active member of club or organization: None     Attends meetings of clubs or organizations: None     Relationship status: None     Intimate partner violence     Fear of current or ex partner: None     Emotionally abused: None     Physically abused: None     Forced sexual activity: None   Other Topics Concern      Service Not Asked     Blood Transfusions Not Asked     Caffeine Concern Yes     Comment: 1-2 cans qd     Occupational Exposure Not Asked     Hobby Hazards Not Asked     Sleep Concern Not Asked     Stress Concern Not  Asked     Weight Concern Not Asked     Special Diet Yes     Comment: started mediterranian      Back Care Not Asked     Exercise Yes     Comment: 20 minutes walking 3-4 days weekly      Bike Helmet Not Asked     Seat Belt Yes     Self-Exams Not Asked     Parent/sibling w/ CABG, MI or angioplasty before 65F 55M? No   Social History Narrative     None

## 2021-06-04 ENCOUNTER — OFFICE VISIT (OUTPATIENT)
Dept: CARDIOLOGY | Facility: CLINIC | Age: 56
End: 2021-06-04
Attending: PHYSICIAN ASSISTANT
Payer: COMMERCIAL

## 2021-06-04 VITALS
DIASTOLIC BLOOD PRESSURE: 84 MMHG | SYSTOLIC BLOOD PRESSURE: 126 MMHG | HEART RATE: 71 BPM | HEIGHT: 72 IN | WEIGHT: 293 LBS | BODY MASS INDEX: 39.68 KG/M2

## 2021-06-04 DIAGNOSIS — I48.0 PAROXYSMAL ATRIAL FIBRILLATION (H): ICD-10-CM

## 2021-06-04 PROCEDURE — 99214 OFFICE O/P EST MOD 30 MIN: CPT | Performed by: PHYSICIAN ASSISTANT

## 2021-06-04 ASSESSMENT — MIFFLIN-ST. JEOR: SCORE: 2158.51

## 2021-06-04 NOTE — PATIENT INSTRUCTIONS
Fouzia - good to see you today!    1. Reviewed your blood pressures that overall look really good! 100-150s, with majority in 110-120s.  2. Reviewed that 'still not feeling great.' Unsure if stress, poor sleep, low heart rate, or maybe endocrine???      PLAN:  1. Unsure if getting better sleep will improve fatigue. Call me after you meet with Tomasz gurrola and see if sleep is better. If not, can consider reducing Bystolic (watching BP closely) to see if HR increase could help the fatigue  [phone]    2. Talk to urologist about ?bladder sling?    3. Limit fluids after 6-7 PM (3ish hours before bedtime) for urination at night  4. Check with PCP about thyroid testing and hemoglobin given fatigue

## 2021-06-04 NOTE — LETTER
"6/4/2021    Ruthie Xiong PA-C  [address] [facility] [address]    RE: Fouzia Arenas       Dear Colleague,    I had the pleasure of seeing Fouzia Arenas in the [facility].    [facility]    I had the pleasure of seeing Fouzia when she came for follow up of HTN.  This 55 year old sees Dr. Hensley and Dr. Au for her history of:    1. Hypertension with reduction in Bystolic dose 3/2018 due to lightheaded episodes (subsequently resolved)  2. Palpitations, with event monitor 10/2015 showing episodes of atrial tachycardia previously on carvedilol and now taking by Bystolic  3. Right lower extremity lipoma dermatosclerosis for which Dr. Au saw her 8/2017 with severe R GSV and small R SV. S/p R GSV RF ablation from prox thigh to upper calf and R dGSV insheath sclerotherapy. Direct sclerotherapy of lateral R thigh varicose veins and limited phlebectomy of lateral thigh varicose veins (limited by  vein's proximity to artery)    I saw Fouzia 4/2021 at which time we reviewed that she had been under quite a bit of stress due to her mother's dementia.  Blood pressures were getting too high, which she knew was also due to lack of exercise.  She had felt muscle \"cramping\" after Dr. Au started losartan/HCTZ, and had gone back to lisinopril 20 mg twice daily.  We reviewed her recent phlebectomy.    I started HCTZ given normal labs and had her continue Bystolic for atrial tachycardia.    She contacted us 4/30 noting that she had stopped HCTZ secondary to limbs feeling \"achy and weak\" and fatigue, despite her normal follow-up BMP.    1 month follow-up for blood pressure testing recommended with me or PCP.    Interval History:  Her BPs have really been looking \"great\" ranging 110-150s with vast majority in 110-120 despite having to stop hydrochlorothiazide d/t weakness/achiness of muscles.    She notes fatigue - she's back to using her " "CPAP but note nasal prongs are a little bigger than her nostrils so she ends up taking it off in the night. Also has been started on Myrbetriq for urinary incontinence that seemed worse after bladder sling was placed. She's been on this just a few weeks and continues to note poor sleep d/t nocturia.    R leg feels \"good\" after vein procedure (Dr. Au).     VITALS:  Vitals: /84 (BP Location: Right arm, Cuff Size: Adult Large)   Pulse 71   Ht 1.829 m (6')   Wt 145.2 kg (320 lb)   BMI 43.40 kg/m      Diagnostic Testing:  US LE 3/10/2021 (2 d post procedure) No DVT. R GSV closed from proximal thigh to ankle; lateral thigh  and varicose veins patent with residual reflux up to 4.7s  Stress test 5/2017 showed no evidence of ischemia, with breast attenuation artifact.  Echocardiogram 2016 showed a low normal ejection fraction.  She had no significant valvular abnormalities noted.      Plan:  1.annual follow-up     Assessment/Plan:    1. HTN    Remains on Bystolic 10 mg daily, lisinopril 20 mg BID and spironolactone 25 mg daily and BPs really look good    PLAN:    No changes for now    Continue routine exercise; weight loss    2. Fatigue    Multifactorial - notes nocturia (just started Mybetriq), ill-fitting CPAP (small nostrils)    Underlying bradycardia could be contributing, but notes no lightheadedness or dizziness.    Note no recent labs - she's \"overdue\" with ENRIQUE Xiong/PCP    PLAN:    Decrease fluid intake before bedtime. Myrtebriq causes dry mouth, but she'll try ice chips    Will call me after her appt with Urologist. If her nocturia has improved but still fatigued, can back off on Bystolic noting her palpitations and BP may worsen    Would recommended thyroid studies (last checked 2018, with abnl TSH but nl T4) and Hgb when she sees her PCP        Jaqui Edmond PA-C, [facility]      No orders of the defined types were placed in this encounter.    No orders of the defined types were placed in this " encounter.    Medications Discontinued During This Encounter   Medication Reason     hydrochlorothiazide (HYDRODIURIL) 25 MG tablet Stopped by Patient     estradiol (ESTRACE VAGINAL) 0.1 MG/GM vaginal cream Stopped by Patient         Encounter Diagnosis   Name Primary?     Paroxysmal atrial fibrillation (H)        CURRENT MEDICATIONS:  Current Outpatient Medications   Medication Sig Dispense Refill     Aspirin-Acetaminophen-Caffeine (EXCEDRIN EXTRA STRENGTH PO) Take 2 tablets by mouth every 6 hours as needed       lisinopril (ZESTRIL) 20 MG tablet Take 1 tablet (20 mg) by mouth 2 times daily 180 tablet 3     meclizine (ANTIVERT) 12.5 MG tablet Take 12.5 mg by mouth 3 times daily as needed for dizziness       mirabegron (MYRBETRIQ) 25 MG 24 hr tablet Take 1 tablet (25 mg) by mouth daily 90 tablet 3     nebivolol (BYSTOLIC) 10 MG tablet Take 1 tablet (10 mg) by mouth daily 90 tablet 3     order for DME DREAMSTATION  9-12 CM/H20  FF MIRAGE QUATTRO       spironolactone (ALDACTONE) 25 MG tablet Take 1 tablet (25 mg) by mouth daily 90 tablet 3     SUMAtriptan (IMITREX) 100 MG tablet Take 1 tablet (100 mg) by mouth at onset of headache for migraine May repeat in 2 hours. Max dose 200mg in 24 hours. 9 tablet 1     diazepam (VALIUM) 5 MG tablet Take as directed by vein clinic for vein procedure.         ALLERGIES     Allergies   Allergen Reactions     Amoxicillin Nausea and Vomiting     Hctz [Hydrochlorothiazide] Other (See Comments)     Weak/Achy despite nl BMP     Percocet [Oxycodone-Acetaminophen] Nausea and Vomiting         Review of Systems:  Skin:        Eyes:       ENT:       Respiratory:       Cardiovascular:       Gastroenterology:      Genitourinary:       Musculoskeletal:       Neurologic:       Psychiatric:       Heme/Lymph/Imm:       Endocrine:         Physical Exam:  Vitals: /84 (BP Location: Right arm, Cuff Size: Adult Large)   Pulse 71   Ht 1.829 m (6')   Wt 145.2 kg (320 lb)   BMI 43.40 kg/m       Constitutional:           Skin:           Head:           Eyes:           ENT:           Neck:           Chest:           Cardiac:                    Abdomen:           Vascular:                                        Extremities and Back:           Neurological:               PAST MEDICAL HISTORY:  Past Medical History:   Diagnosis Date     Classic migraine      Complex endometrial hyperplasia     without atypia     Hypertension      Motion sickness      Mumps      Obese      STORMY (obstructive sleep apnea)      Other disorder of menstruation and other abnormal bleeding from female genital tract 2/24/2011     Palpitations      PONV (postoperative nausea and vomiting)      Spider veins      SVT (supraventricular tachycardia) (H) 9/2015       PAST SURGICAL HISTORY:  Past Surgical History:   Procedure Laterality Date     BLADDER SURGERY       CHOLECYSTECTOMY, LAPOROSCOPIC      Cholecystectomy, Laparoscopic     COLONOSCOPY N/A 11/25/2014    Procedure: COLONOSCOPY;  Surgeon: Wenceslao Galo MD;  Location:  GI     CYSTOSCOPY, SLING TRANSVAGINAL N/A 8/20/2018    Procedure: CYSTOSCOPY, SLING TRANSVAGINAL;;  Surgeon: Urban Elena MD;  Location: [facility]     D & C      X2-3 for abnormal bleeding     ESOPHAGOSCOPY, GASTROSCOPY, DUODENOSCOPY (EGD), COMBINED N/A 11/25/2014    Procedure: COMBINED ESOPHAGOSCOPY, GASTROSCOPY, DUODENOSCOPY (EGD), BIOPSY SINGLE OR MULTIPLE;  Surgeon: Wenceslao Galo MD;  Location: [facility]     LAPAROSCOPIC HYSTERECTOMY SUPRACERVICAL N/A 8/20/2018    Procedure: LAPAROSCOPIC HYSTERECTOMY SUPRACERVICAL;  LAPAROSCOPIC SUBTOATAL HYSTERECTOMY, BILATERAL SALPINGECTOMY, SUBURETHRAL SLING AND CYSTOSCOPY;  Surgeon: Urban Elena MD;  Location: [facility]     LAPAROSCOPIC SALPINGECTOMY Bilateral 8/20/2018    Procedure: LAPAROSCOPIC SALPINGECTOMY;;  Surgeon: Urban Elena MD;  Location: [facility]     LAPAROSCOPY      For endometriosis     LASER ABLATION VEIN VARICOSE       OPERATIVE  HYSTEROSCOPY WITH MORCELLATOR N/A 3/29/2018    Procedure: OPERATIVE HYSTEROSCOPY WITH MORCELLATOR (MYOSURE);  OPERATIVE HYSTEROSCOPY WITH MORCELLATOR ;  Surgeon: Urban Elena MD;  Location: [facility]       FAMILY HISTORY:  Family History   Problem Relation Age of Onset     Hypertension Mother      Lipids Mother      Hypertension Father      Prostate Cancer Father      Lipids Father      Breast Cancer Maternal Aunt      Hypertension Maternal Grandmother      C.A.D. Maternal Grandmother      Gallbladder Disease Maternal Grandmother      Cancer Maternal Grandfather         lung     Cerebrovascular Disease Paternal Grandmother      C.A.D. Paternal Grandfather      Cancer - colorectal Other         cousin maternal      Colon Cancer Cousin      Colon Cancer Cousin      Diabetes No family hx of        SOCIAL HISTORY:  Social History     Socioeconomic History     Marital status: Single     Spouse name: None     Number of children: None     Years of education: None     Highest education level: None   Occupational History     None   Social Needs     Financial resource strain: None     Food insecurity     Worry: None     Inability: None     Transportation needs     Medical: None     Non-medical: None   Tobacco Use     Smoking status: Never Smoker     Smokeless tobacco: Never Used   Substance and Sexual Activity     Alcohol use: Yes     Alcohol/week: 1.7 standard drinks     Drug use: No     Sexual activity: Never   Lifestyle     Physical activity     Days per week: None     Minutes per session: None     Stress: None   Relationships     Social connections     Talks on phone: None     Gets together: None     Attends Denominational service: None     Active member of club or organization: None     Attends meetings of clubs or organizations: None     Relationship status: None     Intimate partner violence     Fear of current or ex partner: None     Emotionally abused: None     Physically abused: None     Forced sexual activity:  None   Other Topics Concern      Service Not Asked     Blood Transfusions Not Asked     Caffeine Concern Yes     Comment: 1-2 cans qd     Occupational Exposure Not Asked     Hobby Hazards Not Asked     Sleep Concern Not Asked     Stress Concern Not Asked     Weight Concern Not Asked     Special Diet Yes     Comment: started mediterranian      Back Care Not Asked     Exercise Yes     Comment: 20 minutes walking 3-4 days weekly      Bike Helmet Not Asked     Seat Belt Yes     Self-Exams Not Asked     Parent/sibling w/ CABG, MI or angioplasty before 65F 55M? No   Social History Narrative     None     Thank you for allowing me to participate in the care of your patient.      Sincerely,     Peggy Edmond PA-C     [facility]    cc:   Peggy Edmond PA-C  [address] [place]  [place] [address]

## 2021-07-01 ENCOUNTER — OFFICE VISIT (OUTPATIENT)
Dept: FAMILY MEDICINE | Facility: CLINIC | Age: 56
End: 2021-07-01
Payer: COMMERCIAL

## 2021-07-01 ENCOUNTER — ANCILLARY PROCEDURE (OUTPATIENT)
Dept: MAMMOGRAPHY | Facility: CLINIC | Age: 56
End: 2021-07-01
Payer: COMMERCIAL

## 2021-07-01 VITALS
RESPIRATION RATE: 16 BRPM | HEART RATE: 56 BPM | SYSTOLIC BLOOD PRESSURE: 128 MMHG | OXYGEN SATURATION: 97 % | HEIGHT: 71 IN | TEMPERATURE: 97.7 F | BODY MASS INDEX: 41.02 KG/M2 | WEIGHT: 293 LBS | DIASTOLIC BLOOD PRESSURE: 82 MMHG

## 2021-07-01 DIAGNOSIS — R53.83 FATIGUE, UNSPECIFIED TYPE: ICD-10-CM

## 2021-07-01 DIAGNOSIS — K52.9 CHRONIC DIARRHEA: ICD-10-CM

## 2021-07-01 DIAGNOSIS — Z12.31 VISIT FOR SCREENING MAMMOGRAM: ICD-10-CM

## 2021-07-01 DIAGNOSIS — Z13.21 ENCOUNTER FOR VITAMIN DEFICIENCY SCREENING: ICD-10-CM

## 2021-07-01 DIAGNOSIS — I47.10 SUPRAVENTRICULAR TACHYCARDIA (H): ICD-10-CM

## 2021-07-01 DIAGNOSIS — Z13.1 SCREENING FOR DIABETES MELLITUS: ICD-10-CM

## 2021-07-01 DIAGNOSIS — Z13.220 LIPID SCREENING: ICD-10-CM

## 2021-07-01 DIAGNOSIS — H81.10 BENIGN PAROXYSMAL POSITIONAL VERTIGO, UNSPECIFIED LATERALITY: ICD-10-CM

## 2021-07-01 DIAGNOSIS — G43.009 MIGRAINE WITHOUT AURA AND WITHOUT STATUS MIGRAINOSUS, NOT INTRACTABLE: ICD-10-CM

## 2021-07-01 DIAGNOSIS — R10.813 RIGHT LOWER QUADRANT ABDOMINAL TENDERNESS WITHOUT REBOUND TENDERNESS: ICD-10-CM

## 2021-07-01 DIAGNOSIS — L98.9 SKIN LESION: ICD-10-CM

## 2021-07-01 DIAGNOSIS — Z00.00 ROUTINE GENERAL MEDICAL EXAMINATION AT A HEALTH CARE FACILITY: Primary | ICD-10-CM

## 2021-07-01 DIAGNOSIS — R10.2 PELVIC CRAMPING: ICD-10-CM

## 2021-07-01 LAB
ERYTHROCYTE [DISTWIDTH] IN BLOOD BY AUTOMATED COUNT: 12.7 % (ref 10–15)
HCT VFR BLD AUTO: 43.3 % (ref 35–47)
HGB BLD-MCNC: 14.2 G/DL (ref 11.7–15.7)
MCH RBC QN AUTO: 27.8 PG (ref 26.5–33)
MCHC RBC AUTO-ENTMCNC: 32.8 G/DL (ref 31.5–36.5)
MCV RBC AUTO: 85 FL (ref 78–100)
PLATELET # BLD AUTO: 238 10E9/L (ref 150–450)
RBC # BLD AUTO: 5.11 10E12/L (ref 3.8–5.2)
WBC # BLD AUTO: 6.5 10E9/L (ref 4–11)

## 2021-07-01 PROCEDURE — 82306 VITAMIN D 25 HYDROXY: CPT | Performed by: PHYSICIAN ASSISTANT

## 2021-07-01 PROCEDURE — 77063 BREAST TOMOSYNTHESIS BI: CPT | Mod: TC | Performed by: RADIOLOGY

## 2021-07-01 PROCEDURE — 99396 PREV VISIT EST AGE 40-64: CPT | Performed by: PHYSICIAN ASSISTANT

## 2021-07-01 PROCEDURE — 77067 SCR MAMMO BI INCL CAD: CPT | Mod: TC | Performed by: RADIOLOGY

## 2021-07-01 PROCEDURE — 80053 COMPREHEN METABOLIC PANEL: CPT | Performed by: PHYSICIAN ASSISTANT

## 2021-07-01 PROCEDURE — 36415 COLL VENOUS BLD VENIPUNCTURE: CPT | Performed by: PHYSICIAN ASSISTANT

## 2021-07-01 PROCEDURE — 99214 OFFICE O/P EST MOD 30 MIN: CPT | Mod: 25 | Performed by: PHYSICIAN ASSISTANT

## 2021-07-01 PROCEDURE — 80061 LIPID PANEL: CPT | Performed by: PHYSICIAN ASSISTANT

## 2021-07-01 PROCEDURE — 85027 COMPLETE CBC AUTOMATED: CPT | Performed by: PHYSICIAN ASSISTANT

## 2021-07-01 PROCEDURE — 84443 ASSAY THYROID STIM HORMONE: CPT | Performed by: PHYSICIAN ASSISTANT

## 2021-07-01 RX ORDER — VITAMIN B COMPLEX
1 TABLET ORAL DAILY
COMMUNITY
Start: 2021-07-01 | End: 2022-08-30

## 2021-07-01 RX ORDER — CHOLECALCIFEROL (VITAMIN D3) 50 MCG
1 TABLET ORAL DAILY
COMMUNITY
End: 2021-07-01

## 2021-07-01 ASSESSMENT — ENCOUNTER SYMPTOMS
BREAST MASS: 0
DIARRHEA: 1
HEADACHES: 1
HEMATOCHEZIA: 1
ABDOMINAL PAIN: 1
HEMATURIA: 1
FREQUENCY: 1
CONSTIPATION: 1

## 2021-07-01 ASSESSMENT — MIFFLIN-ST. JEOR: SCORE: 2179.38

## 2021-07-01 NOTE — LETTER
[facility]  [address] [place], [place] [address] [phone]                    July 7, 2021    Fouzia Arenas  [address] [facility] [address]      Dear Fouzia,    Here is a summary of your recent test results:    Normal red blood cell (hgb) levels, normal white blood cell count and normal platelet levels.   -LDL(bad) cholesterol level is elevated, HDL(good) cholesterol level is low which can increase your heart disease risk.  A diet high in fat and simple carbohydrates, genetics and being overweight can contribute to this. ADVISE: exercising 150 minutes of aerobic exercise per week (30 minutes for 5 days per week or 50 minutes for 3 days per week are options) and eating a low saturated fat/low carbohydrate diet are helpful to improve this.   -Liver and gallbladder tests (ALT,AST, Alk phos,bilirubin) are normal.   -Kidney function (GFR) is decreased when compared to last checked 2 months ago and mildly worse from when checked 2 yrs ago. This can have multiple causes including kidney damage from underlying high BP, dehydration or taking too much kidney damaging medication like ibuprofen. Please stay well-hydrated, ensure good BP control and avoid kidney damaging meds and return for repeat testing in 1 month.   -Sodium is normal.   -Potassium is normal.   -Calcium is normal.   -Glucose (diabetic screening test) is normal.   -TSH (thyroid stimulating hormone) level is normal which indicates normal thyroid function.   -Vitamin D level is normal and getting 1000 IU daily in your diet or supplements is recommended.     Your test results are enclosed.                   Thank you very much for trusting [facility].     Healthy regards,    Ruthie Xiong PA-C          Results for orders placed or performed in visit on 07/01/21   Lipid panel reflex to direct LDL Fasting     Status: Abnormal   Result Value Ref Range    Cholesterol 189 <200 mg/dL     Triglycerides 90 <150 mg/dL    HDL Cholesterol 48 (L) >49 mg/dL    LDL Cholesterol Calculated 123 (H) <100 mg/dL    Non HDL Cholesterol 141 (H) <130 mg/dL   TSH with free T4 reflex     Status: None   Result Value Ref Range    TSH 3.62 0.40 - 4.00 mU/L   CBC with platelets     Status: None   Result Value Ref Range    WBC 6.5 4.0 - 11.0 10e9/L    RBC Count 5.11 3.8 - 5.2 10e12/L    Hemoglobin 14.2 11.7 - 15.7 g/dL    Hematocrit 43.3 35.0 - 47.0 %    MCV 85 78 - 100 fl    MCH 27.8 26.5 - 33.0 pg    MCHC 32.8 31.5 - 36.5 g/dL    RDW 12.7 10.0 - 15.0 %    Platelet Count 238 150 - 450 10e9/L   Comprehensive metabolic panel     Status: Abnormal   Result Value Ref Range    Sodium 141 133 - 144 mmol/L    Potassium 4.4 3.4 - 5.3 mmol/L    Chloride 107 94 - 109 mmol/L    Carbon Dioxide 28 20 - 32 mmol/L    Anion Gap 6 3 - 14 mmol/L    Glucose 89 70 - 99 mg/dL    Urea Nitrogen 16 7 - 30 mg/dL    Creatinine 1.11 (H) 0.52 - 1.04 mg/dL    GFR Estimate 56 (L) >60 mL/min/[1.73_m2]    GFR Estimate If Black 64 >60 mL/min/[1.73_m2]    Calcium 8.6 8.5 - 10.1 mg/dL    Bilirubin Total 0.5 0.2 - 1.3 mg/dL    Albumin 3.7 3.4 - 5.0 g/dL    Protein Total 7.4 6.8 - 8.8 g/dL    Alkaline Phosphatase 102 40 - 150 U/L    ALT 31 0 - 50 U/L    AST 19 0 - 45 U/L   25 Hydroxyvitamin D2 and D3     Status: None   Result Value Ref Range    25 OH Vit D2 <5 ug/L    25 OH Vit D3 26 ug/L    25 OH Vit D total <31 20 - 75 ug/L   Results for orders placed or performed in visit on 07/01/21   MA Screen Bilateral w/Arjun     Status: None    Narrative    SCREENING MAMMOGRAM, BILATERAL, DIGITAL w/CAD AND TOMOSYNTHESIS -  7/1/2021 8:33 AM.    BREAST SYMPTOMS: No current breast complaints.     COMPARISON:  4/30/19, 2/7/18,  12/09/2010, 05/05/2009.    BREAST DENSITY: Almost entirely fat.    COMMENTS: No findings of suspicion for malignancy.       Impression    IMPRESSION: BI-RADS CATEGORY: 1 - Negative.    RECOMMENDED FOLLOW-UP: Annual Mammography.  Recommend routine  annual screening mammography.    Exam results letter mailed to patient.    INDU MADRID MD          SYSTEM ID:  [id number]

## 2021-07-01 NOTE — LETTER
[facility]  [address] [place] [address] [phone]                    July 5, 2021    Fouzia Arenas  [address] [address] [address]      Dear Fouzia,    Here is a summary of your recent test results:    Mammogram was normal.  ADVISE: rechecking in 1 year.     Your test results are enclosed.      Please contact me if you have any questions.           Thank you very much for trusting [facility].     Healthy regards,    Ruthie Xiong PA-C          Results for orders placed or performed in visit on 07/01/21   Lipid panel reflex to direct LDL Fasting     Status: Abnormal   Result Value Ref Range    Cholesterol 189 <200 mg/dL    Triglycerides 90 <150 mg/dL    HDL Cholesterol 48 (L) >49 mg/dL    LDL Cholesterol Calculated 123 (H) <100 mg/dL    Non HDL Cholesterol 141 (H) <130 mg/dL   TSH with free T4 reflex     Status: None   Result Value Ref Range    TSH 3.62 0.40 - 4.00 mU/L   CBC with platelets     Status: None   Result Value Ref Range    WBC 6.5 4.0 - 11.0 10e9/L    RBC Count 5.11 3.8 - 5.2 10e12/L    Hemoglobin 14.2 11.7 - 15.7 g/dL    Hematocrit 43.3 35.0 - 47.0 %    MCV 85 78 - 100 fl    MCH 27.8 26.5 - 33.0 pg    MCHC 32.8 31.5 - 36.5 g/dL    RDW 12.7 10.0 - 15.0 %    Platelet Count 238 150 - 450 10e9/L   Comprehensive metabolic panel     Status: Abnormal   Result Value Ref Range    Sodium 141 133 - 144 mmol/L    Potassium 4.4 3.4 - 5.3 mmol/L    Chloride 107 94 - 109 mmol/L    Carbon Dioxide 28 20 - 32 mmol/L    Anion Gap 6 3 - 14 mmol/L    Glucose 89 70 - 99 mg/dL    Urea Nitrogen 16 7 - 30 mg/dL    Creatinine 1.11 (H) 0.52 - 1.04 mg/dL    GFR Estimate 56 (L) >60 mL/min/[1.73_m2]    GFR Estimate If Black 64 >60 mL/min/[1.73_m2]    Calcium 8.6 8.5 - 10.1 mg/dL    Bilirubin Total 0.5 0.2 - 1.3 mg/dL    Albumin 3.7 3.4 - 5.0 g/dL    Protein Total 7.4 6.8 - 8.8 g/dL    Alkaline Phosphatase 102 40 - 150 U/L    ALT 31 0 - 50 U/L     AST 19 0 - 45 U/L   25 Hydroxyvitamin D2 and D3     Status: None   Result Value Ref Range    25 OH Vit D2 <5 ug/L    25 OH Vit D3 26 ug/L    25 OH Vit D total <31 20 - 75 ug/L   Results for orders placed or performed in visit on 07/01/21   MA Screen Bilateral w/Arjun     Status: None    Narrative    SCREENING MAMMOGRAM, BILATERAL, DIGITAL w/CAD AND TOMOSYNTHESIS -  7/1/2021 8:33 AM.    BREAST SYMPTOMS: No current breast complaints.     COMPARISON:  4/30/19, 2/7/18,  12/09/2010, 05/05/2009.    BREAST DENSITY: Almost entirely fat.    COMMENTS: No findings of suspicion for malignancy.       Impression    IMPRESSION: BI-RADS CATEGORY: 1 - Negative.    RECOMMENDED FOLLOW-UP: Annual Mammography.  Recommend routine annual screening mammography.    Exam results letter mailed to patient.    INDU MADRID MD          SYSTEM ID:  [id number]

## 2021-07-01 NOTE — PROGRESS NOTES
"   SUBJECTIVE:   CC: Fouzia Arenas is an 55 year old woman who presents for preventive health visit.       Patient has been advised of split billing requirements and indicates understanding: Yes  Healthy Habits:     Getting at least 3 servings of Calcium per day:  NO    Bi-annual eye exam:  NO    Dental care twice a year:  Yes    Sleep apnea or symptoms of sleep apnea:  Daytime drowsiness and Sleep apnea    Diet:  Low salt    Frequency of exercise:  2-3 days/week    Duration of exercise:  15-30 minutes    Taking medications regularly:  Yes    Medication side effects:  Lightheadedness and Other    PHQ-2 Total Score: 0    Additional concerns today:  Yes    Hx of migraine - declines renewal of imitrex as has current script at home and luckily hasn't had to use them. Used to get once per month, but last was in Nov. Daughter living [place] for college and she loves it so may wind up staying. She would consider moving, but currently helping her ailing parents  Sochx: mom has alzheimers     Got her mammogram done today. No breast complaints.     Non-healing skin lesions along R anterior upper arm and L upper back x1 year. No Fhx of skin cancer. Denies tanning garza use.    Endorsing lower abdominal \"cramps\" x6 months. Can wake her up from sleep. If lying on her back she notices it more then will lie on her side which helps. No vomiting, but pretty consistently has diarrrhea - reports this is a long-standing issue for her for the past few years. Reports she just saw urology due to worsening incontinence after her hysterectomy and having a bladder sling placed (hx of endometriosis) and was told it was not a bladder issue. Hematuria eval was benign. Felt could be more related to IBS - reports she was seen by GI in the past as well for GERD issues and wilfrido was Dx with IBS at time of same. Fortunately, she was able to taper off PPI therapy due to improvement.   Cramps not affiliated with having a BM though. Typically occur " "around 3am. \"Sharp\" in nature 6/10. Sometimes will take excedrine.  Had colonoscopy in 2014 - normal repeat advised in 10 yrs.   Fhx: 2 first cousins with colon cancer, but no celiac, UC or Crohns    Ongoing care with cardiology for hx of HTN and A-fib. Reports had been feeling a bit groggy and light-headed so was considering decreasing her bystolic. They recommended she get her thyroid checked though so had labs placed to complete along with her routine health physical today. Summary copied below:  Assessment/Plan:     1. HTN    Remains on Bystolic 10 mg daily, lisinopril 20 mg BID and spironolactone 25 mg daily and BPs really look good     PLAN:    No changes for now    Continue routine exercise; weight loss     2. Fatigue    Multifactorial - notes nocturia (just started Mybetriq), ill-fitting CPAP (small nostrils)    Underlying bradycardia could be contributing, but notes no lightheadedness or dizziness.    Note no recent labs - she's \"overdue\" with ENRIQUE Xiong/PCP     PLAN:    Decrease fluid intake before bedtime. Myrtebriq causes dry mouth, but she'll try ice chips    Will call me after her appt with Urologist. If her nocturia has improved but still fatigued, can back off on Bystolic noting her palpitations and BP may worsen    Would recommended thyroid studies (last checked 2018, with abnl TSH but nl T4) and Hgb when she sees her PCP       Today's PHQ-2 Score:   PHQ-2 ( 1999 [employer]) 7/1/2021   Q1: Little interest or pleasure in doing things 0   Q2: Feeling down, depressed or hopeless 0   PHQ-2 Score 0   Q1: Little interest or pleasure in doing things Not at all   Q2: Feeling down, depressed or hopeless Not at all   PHQ-2 Score 0       Abuse: Current or Past (Physical, Sexual or Emotional) - No  Do you feel safe in your environment? Yes    Have you ever done Advance Care Planning? (For example, a Health Directive, POLST, or a discussion with a medical provider or your loved ones about your wishes): No, advance " care planning information given to patient to review.  Patient declined advance care planning discussion at this time.    Social History     Tobacco Use     Smoking status: Never Smoker     Smokeless tobacco: Never Used   Substance Use Topics     Alcohol use: Yes     Alcohol/week: 1.7 standard drinks     If you drink alcohol do you typically have >3 drinks per day or >7 drinks per week? No    Alcohol Use 7/1/2021   Prescreen: >3 drinks/day or >7 drinks/week? No   Prescreen: >3 drinks/day or >7 drinks/week? -   No flowsheet data found.    Reviewed orders with patient.  Reviewed health maintenance and updated orders accordingly - Yes  Patient Active Problem List   Diagnosis     Esophageal reflux     CARDIOVASCULAR SCREENING; LDL GOAL LESS THAN 160     Palpitations     STORMY (obstructive sleep apnea)     Atypical chest pain     BPPV (benign paroxysmal positional vertigo)     Morbid obesity (H)     Hypertension goal BP (blood pressure) < 130/80     Vertigo     Supraventricular tachycardia (H)     Lipodermatosclerosis of both lower extremities     Nipple symptom or sign in female - bilateral, chronic recurrent itching      Past Surgical History:   Procedure Laterality Date     BLADDER SURGERY       CHOLECYSTECTOMY, LAPOROSCOPIC      Cholecystectomy, Laparoscopic     COLONOSCOPY N/A 11/25/2014    Procedure: COLONOSCOPY;  Surgeon: Wenceslao Galo MD;  Location:  GI     CYSTOSCOPY, SLING TRANSVAGINAL N/A 8/20/2018    Procedure: CYSTOSCOPY, SLING TRANSVAGINAL;;  Surgeon: Urban Elena MD;  Location[facility]     D & C      X2-3 for abnormal bleeding     ESOPHAGOSCOPY, GASTROSCOPY, DUODENOSCOPY (EGD), COMBINED N/A 11/25/2014    Procedure: COMBINED ESOPHAGOSCOPY, GASTROSCOPY, DUODENOSCOPY (EGD), BIOPSY SINGLE OR MULTIPLE;  Surgeon: Wenceslao Galo MD;  Location: [facility]     LAPAROSCOPIC HYSTERECTOMY SUPRACERVICAL N/A 8/20/2018    Procedure: LAPAROSCOPIC HYSTERECTOMY SUPRACERVICAL;  LAPAROSCOPIC SUBTOATAL  HYSTERECTOMY, BILATERAL SALPINGECTOMY, SUBURETHRAL SLING AND CYSTOSCOPY;  Surgeon: Urban Elena MD;  Location: [facility]     LAPAROSCOPIC SALPINGECTOMY Bilateral 8/20/2018    Procedure: LAPAROSCOPIC SALPINGECTOMY;;  Surgeon: Urban Elena MD;  Location: [facility]     LAPAROSCOPY      For endometriosis     LASER ABLATION VEIN VARICOSE       OPERATIVE HYSTEROSCOPY WITH MORCELLATOR N/A 3/29/2018    Procedure: OPERATIVE HYSTEROSCOPY WITH MORCELLATOR (MYOSURE);  OPERATIVE HYSTEROSCOPY WITH MORCELLATOR ;  Surgeon: Urban Elena MD;  Location: [facility]       Social History     Tobacco Use     Smoking status: Never Smoker     Smokeless tobacco: Never Used   Substance Use Topics     Alcohol use: Yes     Alcohol/week: 1.7 standard drinks     Family History   Problem Relation Age of Onset     Hypertension Mother      Lipids Mother      Alzheimer Disease Mother      Hypertension Father      Prostate Cancer Father      Lipids Father      Breast Cancer Maternal Aunt      Hypertension Maternal Grandmother      C.A.D. Maternal Grandmother      Gallbladder Disease Maternal Grandmother      Cancer Maternal Grandfather         lung     Cerebrovascular Disease Paternal Grandmother      C.A.D. Paternal Grandfather      Cancer - colorectal Other         cousin maternal      Colon Cancer Cousin      Colon Cancer Cousin      Breast Cancer Paternal Aunt      Diabetes No family hx of          Current Outpatient Medications   Medication Sig Dispense Refill     Aspirin-Acetaminophen-Caffeine (EXCEDRIN EXTRA STRENGTH PO) Take 2 tablets by mouth every 6 hours as needed       lisinopril (ZESTRIL) 20 MG tablet Take 1 tablet (20 mg) by mouth 2 times daily 180 tablet 3     meclizine (ANTIVERT) 12.5 MG tablet Take 12.5 mg by mouth 3 times daily as needed for dizziness       mirabegron (MYRBETRIQ) 25 MG 24 hr tablet Take 1 tablet (25 mg) by mouth daily 90 tablet 3     nebivolol (BYSTOLIC) 10 MG tablet Take 1 tablet (10 mg) by mouth  daily 90 tablet 3     spironolactone (ALDACTONE) 25 MG tablet Take 1 tablet (25 mg) by mouth daily 90 tablet 3     SUMAtriptan (IMITREX) 100 MG tablet Take 1 tablet (100 mg) by mouth at onset of headache for migraine May repeat in 2 hours. Max dose 200mg in 24 hours. 9 tablet 1     Vitamin D3 (CHOLECALCIFEROL) 25 mcg (1000 units) tablet Take 1 tablet (25 mcg) by mouth daily       Allergies   Allergen Reactions     Amoxicillin Nausea and Vomiting     Hctz [Hydrochlorothiazide] Other (See Comments)     Weak/Achy despite nl BMP     Percocet [Oxycodone-Acetaminophen] Nausea and Vomiting       Breast Cancer Screening:    FHS-7:   Breast CA Risk Assessment (FHS-7) 7/1/2021   Did any of your first-degree relatives have breast or ovarian cancer? No   Did any of your relatives have bilateral breast cancer? uknown   Did any man in your family have breast cancer? No   Did any woman in your family have breast and ovarian cancer? No   Did any woman in your family have breast cancer before age 50 y? No   Do you have 2 or more relatives with breast and/or ovarian cancer? Yes   Do you have 2 or more relatives with breast and/or bowel cancer? No   Declines genetic referral    Mammogram Screening: Recommended mammography every 1-2 years with patient discussion and risk factor consideration  Pertinent mammograms are reviewed under the imaging tab.    History of abnormal Pap smear: NO - age 30-65 PAP every 5 years with negative HPV co-testing recommended  PAP / HPV Latest Ref Rng & Units 2/6/2018 7/7/2014   PAP - NIL NIL   HPV 16 DNA NEG:Negative Negative -   HPV 18 DNA NEG:Negative Negative -   OTHER HR HPV NEG:Negative Negative -     Reviewed and updated as needed this visit by clinical staff  Tobacco   Meds  Problems             Reviewed and updated as needed this visit by Provider    Meds  Problems              Review of Systems   Cardiovascular: Positive for peripheral edema.   Gastrointestinal: Positive for abdominal pain,  "constipation, diarrhea and hematochezia.   Breasts:  Negative for tenderness, breast mass and discharge.   Genitourinary: Positive for frequency, hematuria and pelvic pain. Negative for vaginal bleeding and vaginal discharge.   Neurological: Positive for headaches.        OBJECTIVE:   /82   Pulse 56   Temp 97.7  F (36.5  C) (Tympanic)   Resp 16   Ht 1.803 m (5' 11\")   Wt 148.8 kg (328 lb 1.6 oz)   SpO2 97%   BMI 45.76 kg/m    Physical Exam  GENERAL: healthy, alert and no distress  EYES: Eyes grossly normal to inspection, PERRL and conjunctivae and sclerae normal  HENT: ear canals and TM's normal, nose and mouth without ulcers or lesions  NECK: no adenopathy, no asymmetry, masses, or scars and thyroid normal to palpation  RESP: lungs clear to auscultation - no rales, rhonchi or wheezes  BREAST: deferred  CV: regular rate and rhythm, normal S1 S2, no S3 or S4, no murmur, click or rub, no peripheral edema and peripheral pulses strong  ABDOMEN: soft, nontender, no hepatosplenomegaly, no masses and bowel sounds normal   (female): normal female external genitalia, normal urethral meatus, vaginal mucosa pink, moist, well rugated, and normal cervix without masses or discharge. S/p hysterectomy.   MS: no gross musculoskeletal defects noted, no edema  SKIN: small scab lesion along R mid anterior upper arm and another vascular nodular type lesion with black dot along L shoulder  NEURO: Normal strength and tone, mentation intact and speech normal  PSYCH: mentation appears normal, affect normal/bright    Diagnostic Test Results:  Labs reviewed in Epic    ASSESSMENT/PLAN:   1. Routine general medical examination at a health care facility  Reviewed preventative health recommendations for age.  - REVIEW OF HEALTH MAINTENANCE PROTOCOL ORDERS    2. Supraventricular tachycardia (H) - on bystolic followed by cardiology  3. Fatigue, unspecified type  Ongoing care with cardiology for paroxysmal SVT stable on bystolic - " advised to see PCP to check TSH and CBC to rule out other causes for her fatigue, but if neg will follow back up with them to consider cutting back her dose of bystolic. See HPI regarding details.  - TSH with free T4 reflex  - CBC with platelets    4. Pelvic cramping  5. Right lower quadrant abdominal tenderness without rebound tenderness  Chronic diarrhea  Pt endorses central low pelvic cramping x6 months, primarily at night which sometimes will awake her from sleep. Initially consulted with urology as has had worsening urinary incontinence since hysterectomy and bladder sling 2018 with UA showing microhematuria. Reports work-up was neg and they discussed topical estrogen cream on urethra and trial of myrbetriq for possible OAB. Per her report she was told that they didn't feel cramping was being caused by any urologic origin. We did complete a pelvic exam today since still has ovaries and she did exhibit some R sided discomfort without any apparent mass. Advised pelvic US to evaluate further. Of note, ROS positive for chronic diarrhea which she attributes to IBS. Previously normal colonoscopy, but would consider repeating earlier if US neg. If normal and worsening, could also consider CT abdomen/pelvis.   - US Pelvic Complete with Transvaginal; Future    6. Skin lesion  Persistent non-healing lesion on R arm and L upper back. Advised consult with dermatology.  - ADULT DERMATOLOGY REFERRAL; Future    7. Benign paroxysmal positional vertigo, unspecified laterality  Stable - keeps meclizine on hand to take prn.    8. Migraine without aura and without status migrainosus, not intractable  Improved and hasn't required imitrex. Will let us know though if she needs refill.    9. Lipid screening  - Lipid panel reflex to direct LDL Fasting    10. Screening for diabetes mellitus  - Comprehensive metabolic panel    11. Encounter for vitamin deficiency screening  - 25 Hydroxyvitamin D2 and D3    Patient has been advised of  "split billing requirements and indicates understanding: No  COUNSELING:  Reviewed preventive health counseling, as reflected in patient instructions       Regular exercise       Healthy diet/nutrition       Immunizations    Encouraged to consider COVID19 vaccine and check insurance on shingrix             Colon cancer screening       HIV screeninx in teen years, 1x in adult years, and at intervals if high risk    Estimated body mass index is 45.76 kg/m  as calculated from the following:    Height as of this encounter: 1.803 m (5' 11\").    Weight as of this encounter: 148.8 kg (328 lb 1.6 oz).    Weight management plan: Discussed healthy diet and exercise guidelines    She reports that she has never smoked. She has never used smokeless tobacco.      Counseling Resources:  ATP IV Guidelines  Pooled Cohorts Equation Calculator  Breast Cancer Risk Calculator  BRCA-Related Cancer Risk Assessment: FHS-7 Tool  FRAX Risk Assessment  ICSI Preventive Guidelines  Dietary Guidelines for Americans,   USDA's MyPlate  ASA Prophylaxis  Lung CA Screening    Ruthie Xiong PA-C  [facility]  "

## 2021-07-02 LAB
ALBUMIN SERPL-MCNC: 3.7 G/DL (ref 3.4–5)
ALP SERPL-CCNC: 102 U/L (ref 40–150)
ALT SERPL W P-5'-P-CCNC: 31 U/L (ref 0–50)
ANION GAP SERPL CALCULATED.3IONS-SCNC: 6 MMOL/L (ref 3–14)
AST SERPL W P-5'-P-CCNC: 19 U/L (ref 0–45)
BILIRUB SERPL-MCNC: 0.5 MG/DL (ref 0.2–1.3)
BUN SERPL-MCNC: 16 MG/DL (ref 7–30)
CALCIUM SERPL-MCNC: 8.6 MG/DL (ref 8.5–10.1)
CHLORIDE SERPL-SCNC: 107 MMOL/L (ref 94–109)
CHOLEST SERPL-MCNC: 189 MG/DL
CO2 SERPL-SCNC: 28 MMOL/L (ref 20–32)
CREAT SERPL-MCNC: 1.11 MG/DL (ref 0.52–1.04)
DEPRECATED CALCIDIOL+CALCIFEROL SERPL-MC: <31 UG/L (ref 20–75)
GFR SERPL CREATININE-BSD FRML MDRD: 56 ML/MIN/{1.73_M2}
GLUCOSE SERPL-MCNC: 89 MG/DL (ref 70–99)
HDLC SERPL-MCNC: 48 MG/DL
LDLC SERPL CALC-MCNC: 123 MG/DL
NONHDLC SERPL-MCNC: 141 MG/DL
POTASSIUM SERPL-SCNC: 4.4 MMOL/L (ref 3.4–5.3)
PROT SERPL-MCNC: 7.4 G/DL (ref 6.8–8.8)
SODIUM SERPL-SCNC: 141 MMOL/L (ref 133–144)
TRIGL SERPL-MCNC: 90 MG/DL
TSH SERPL DL<=0.005 MIU/L-ACNC: 3.62 MU/L (ref 0.4–4)
VITAMIN D2 SERPL-MCNC: <5 UG/L
VITAMIN D3 SERPL-MCNC: 26 UG/L

## 2021-07-02 NOTE — RESULT ENCOUNTER NOTE
Please call or write patient with the following results:    Dear Fouzia,    Your recent lab results are noted below:    -Mammogram was normal.  ADVISE: rechecking in 1 year.    For additional lab test information, [email or web address] is an excellent reference.  Please contact the clinic at [phone] with any further questions or concerns.      Thank you,      Ruthie Xiong PA-C  [facility]

## 2021-07-07 DIAGNOSIS — R94.4 ABNORMAL RENAL FUNCTION TEST: Primary | ICD-10-CM

## 2021-07-07 NOTE — RESULT ENCOUNTER NOTE
Please call or write patient with the following results:    Dear Fouzia,    Your recent lab results are noted below:    -Normal red blood cell (hgb) levels, normal white blood cell count and normal platelet levels.  -LDL(bad) cholesterol level is elevated, HDL(good) cholesterol level is low which can increase your heart disease risk.  A diet high in fat and simple carbohydrates, genetics and being overweight can contribute to this. ADVISE: exercising 150 minutes of aerobic exercise per week (30 minutes for 5 days per week or 50 minutes for 3 days per week are options) and eating a low saturated fat/low carbohydrate diet are helpful to improve this.  -Liver and gallbladder tests (ALT,AST, Alk phos,bilirubin) are normal.  -Kidney function (GFR) is decreased when compared to last checked 2 months ago and mildly worse from when checked 2 yrs ago. This can have multiple causes including kidney damage from underlying high BP, dehydration or taking too much kidney damaging medication like ibuprofen. Please stay well-hydrated, ensure good BP control and avoid kidney damaging meds and return for repeat testing in 1 month.  -Sodium is normal.  -Potassium is normal.  -Calcium is normal.  -Glucose (diabetic screening test) is normal.  -TSH (thyroid stimulating hormone) level is normal which indicates normal thyroid function.  -Vitamin D level is normal and getting 1000 IU daily in your diet or supplements is recommended.     For additional lab test information, [email or web address] is an excellent reference.  Please contact the clinic at [phone] with any further questions or concerns.      Thank you,      Ruthie Xiong PA-C  [facility]

## 2021-07-19 ENCOUNTER — ANCILLARY PROCEDURE (OUTPATIENT)
Dept: ULTRASOUND IMAGING | Facility: CLINIC | Age: 56
End: 2021-07-19
Attending: PHYSICIAN ASSISTANT
Payer: COMMERCIAL

## 2021-07-19 DIAGNOSIS — R10.2 PELVIC CRAMPING: ICD-10-CM

## 2021-07-19 DIAGNOSIS — R10.813 RIGHT LOWER QUADRANT ABDOMINAL TENDERNESS WITHOUT REBOUND TENDERNESS: ICD-10-CM

## 2021-07-19 PROCEDURE — 76830 TRANSVAGINAL US NON-OB: CPT | Performed by: OBSTETRICS & GYNECOLOGY

## 2021-07-19 PROCEDURE — 76856 US EXAM PELVIC COMPLETE: CPT | Performed by: OBSTETRICS & GYNECOLOGY

## 2021-07-19 NOTE — LETTER
[facility]  [address], [place] [address] [phone]                    July 27, 2021    Fouzia Arenas  [address]  [place] [address]      Dear Fouzia,    Here is a summary of your recent test results:    -Pelvic ultrasound confirmed your surgically absent uterus, but unfortunately was not able to visualize either ovary due to overlying bowel gas. Thus, cause for your pelvic cramping is still unclear. At this point, we could consider a CT of your abdomen/pelvis to evaluate things further versus referring to GI for additional consult especially in light of your chronic diarrhea to see if they would recommend repeating your colonoscopy sooner or can shed further light on things. Let me know your thoughts.     For additional lab test information, [email or web address] is an excellent reference.       Your test results are enclosed.      Please contact me if you have any questions.    In addition, here is a list of due or overdue Health Maintenance reminders.    Health Maintenance Due   Topic Date Due     COVID-19 Vaccine (1) Never done     Zoster (Shingles) Vaccine (1 of 2) Never done     Please call us at [phone] (or use FAST FELT) to address the above recommendations.            Thank you very much for trusting [facility].     Ruthie Gaona PA-C      Results for orders placed or performed in visit on 07/19/21   US Pelvic Complete with Transvaginal     Status: None    Narrative    [facility]  ULTRASOUND - PELVIC GYN- Transabdominal and Transvaginal     Referring MD: Ruthie Xiong PA-C     ===================================     CLINICAL INFORMATION     Indications for ultrasound:   Pain - Pelvic pain. Hx of Hysterectomy     LMP: NA    Hormones: none     Measurements:  Uterus:  Surgically absent        Right ovary: NV  Left ovary:  NV     Cul de sac: no free  fluid     ===================================    Complete pelvic ultrasound using realtime transabdominal and transvaginal   scanning.    Impression:  Surgically absent uterus  No free fluid  Bilateral ovaries not visualized, multiple attempts made to view  Imaging limited by shadowing from bowel gas  No etiology for pelvic pain noted on today's ultrasound, consider CT   abdomen/pelvis if clinically indicated    Seble Connell MD

## 2021-07-19 NOTE — LETTER
July 22, 2021      Fouzia LAWRENCE Deepa  [address] [place]   [place] [address]        Dear ,    We are writing to inform you of your test results.    -Pelvic ultrasound confirmed your surgically absent uterus, but unfortunately was not able to visualize either ovary due to overlying bowel gas. Thus, cause for your pelvic cramping is still unclear. At this point, we could consider a CT of your abdomen/pelvis to evaluate things further versus referring to GI for additional consult especially in light of your chronic diarrhea to see if they would recommend repeating your colonoscopy sooner or can shed further light on things. Let me know your thoughts.     For additional lab test information, [email or web address] is an excellent reference.  Please contact the clinic at [phone] with any further questions or concerns.      Resulted Orders   US Pelvic Complete with Transvaginal    Narrative    [facility]  ULTRASOUND - PELVIC GYN- Transabdominal and Transvaginal     Referring MD: Ruthie Xiong PA-C     ===================================     CLINICAL INFORMATION     Indications for ultrasound:   Pain - Pelvic pain. Hx of Hysterectomy     LMP: NA    Hormones: none     Measurements:  Uterus:  Surgically absent        Right ovary: NV  Left ovary:  NV     Cul de sac: no free fluid     ===================================    Complete pelvic ultrasound using realtime transabdominal and transvaginal   scanning.    Impression:  Surgically absent uterus  No free fluid  Bilateral ovaries not visualized, multiple attempts made to view  Imaging limited by shadowing from bowel gas  No etiology for pelvic pain noted on today's ultrasound, consider CT   abdomen/pelvis if clinically indicated    Seble Connell MD        If you have any questions or concerns, please call the clinic at the number listed above.       Sincerely,      Ruthie Xiong PA-C

## 2021-07-19 NOTE — LETTER
August 10, 2021      Fouzia Arenas  [address] [place] [address]        Dear ,    A referral was placed to [place] [facility] as they have closer locations than [place]. If you would like to stay within [place], please let us know, they have locations at the [facility] or [place].     If you have worsening pain or issues we would recommend recheck with Ruthie Xiong PA-C to be seen sooner and consideration to CT abdm/plvs at that time.     Here is the contact information for ROXANNA BAUM to call and schedule and appointment:    [facility] [address]    [facility] [address]   Phone: [phone]       If you have any questions or concerns, please call the clinic at the number listed above.       Sincerely,      Ruthie Xiong PA-C  Phone: [phone]      Resulted Orders   US Pelvic Complete with Transvaginal    Narrative    [facility]  ULTRASOUND - PELVIC GYN- Transabdominal and Transvaginal     Referring MD: Ruthie Xiong PA-C     ===================================     CLINICAL INFORMATION     Indications for ultrasound:   Pain - Pelvic pain. Hx of Hysterectomy     LMP: NA    Hormones: none     Measurements:  Uterus:  Surgically absent        Right ovary: NV  Left ovary:  NV     Cul de sac: no free fluid     ===================================    Complete pelvic ultrasound using realtime transabdominal and transvaginal   scanning.    Impression:  Surgically absent uterus  No free fluid  Bilateral ovaries not visualized, multiple attempts made to view  Imaging limited by shadowing from bowel gas  No etiology for pelvic pain noted on today's ultrasound, consider CT   abdomen/pelvis if clinically indicated    Seble Connell MD

## 2021-07-21 NOTE — RESULT ENCOUNTER NOTE
Please CALL patient with the following results:    Dear Fouzia,    Your recent lab results are noted below:    -Pelvic ultrasound confirmed your surgically absent uterus, but unfortunately was not able to visualize either ovary due to overlying bowel gas. Thus, cause for your pelvic cramping is still unclear. At this point, we could consider a CT of your abdomen/pelvis to evaluate things further versus referring to GI for additional consult especially in light of your chronic diarrhea to see if they would recommend repeating your colonoscopy sooner or can shed further light on things. Let me know your thoughts.    For additional lab test information, [email or web address] is an excellent reference.  Please contact the clinic at [phone] with any further questions or concerns.      Thank you,      Ruthie Xiong PA-C  [facility]

## 2021-08-04 ENCOUNTER — VIRTUAL VISIT (OUTPATIENT)
Dept: UROLOGY | Facility: CLINIC | Age: 56
End: 2021-08-04
Payer: COMMERCIAL

## 2021-08-04 ENCOUNTER — TELEPHONE (OUTPATIENT)
Dept: UROLOGY | Facility: CLINIC | Age: 56
End: 2021-08-04

## 2021-08-04 VITALS — HEIGHT: 72 IN | WEIGHT: 293 LBS | BODY MASS INDEX: 39.68 KG/M2

## 2021-08-04 DIAGNOSIS — N39.41 URGE INCONTINENCE OF URINE: Primary | ICD-10-CM

## 2021-08-04 PROCEDURE — 99213 OFFICE O/P EST LOW 20 MIN: CPT | Mod: GT | Performed by: PHYSICIAN ASSISTANT

## 2021-08-04 RX ORDER — MIRABEGRON 50 MG/1
50 TABLET, EXTENDED RELEASE ORAL DAILY
Qty: 90 TABLET | Refills: 4 | Status: SHIPPED | OUTPATIENT
Start: 2021-08-04 | End: 2022-08-30

## 2021-08-04 ASSESSMENT — MIFFLIN-ST. JEOR: SCORE: 2113.15

## 2021-08-04 ASSESSMENT — PAIN SCALES - GENERAL: PAINLEVEL: NO PAIN (1)

## 2021-08-04 NOTE — LETTER
8/4/2021       RE: Fouzia Arenas  [address] [place] [facility] [address]     Dear Colleague,    Thank you for referring your patient, Fouzia Arenas, to the [facility] [place] at [facility]. Please see a copy of my visit note below.    *SEND LINK TO CELL PHONE*    PT STATES NOTHING HAS CHANGED.  PT STATES SHE STILL HAS URGENCY.  PT HAS PAIN AND CRAMPING    Fouzia is a 55 year old who is being evaluated via a billable video visit.      How would you like to obtain your AVS? MyChart  If the video visit is dropped, the invitation should be resent by: Text to cell phone: [phone]  Will anyone else be joining your video visit? No      Video Start Time: 12:58 PM  Video-Visit Details    Pt in her car and connection was not stable. Changed to telephone visit fotr 9 min.    Originating Location (pt. Location): Home    Distant Location (provider location):  [facility] [place]     Platform used for Video Visit: [employer]     CC: Hematuria, dysuria     HPI: It is a pleasure to see Ms. Fouzia Arenas, a pleasant 55 year old female, seen via billable video visit in follow-up of hematuria, dysuria, urge incontinence. Had cysto with Dr. Richter in March 2021.     Ms. Arenas voids without difficulty, but describes some urge incontinence and nocturia. Had a UA/UC x 2 due to freq, burning and bother were neg, but UA had micro hematuria.      Bladder lift and hysterectomy with Dr. Chambers in 2018.   Has tried Myrbetriq 25mg daily as well as topical estrogen. Not much improvement in symptoms.      Hematuria Risk Factors:  Age >40: Yes                                       Smoking history: no  Occupational exposure to chemicals or dyes (ie, benzenes, aromatic amines): no  History of urologic disorder or disease: no  History of irritative voiding symptoms: no  History of urinary tract infection: no  Analgesic abuse: no  History of pelvic  irradiation: no     Past Medical History           Past Medical History:   Diagnosis Date     Classic migraine       Complex endometrial hyperplasia       without atypia     Hypertension       Motion sickness       Mumps       Obese       STORMY (obstructive sleep apnea)       Other disorder of menstruation and other abnormal bleeding from female genital tract 2/24/2011     Palpitations       PONV (postoperative nausea and vomiting)       Spider veins       SVT (supraventricular tachycardia) (H) 9/2015         Past Surgical History             Past Surgical History:   Procedure Laterality Date     BLADDER SURGERY         CHOLECYSTECTOMY, LAPOROSCOPIC         Cholecystectomy, Laparoscopic     COLONOSCOPY N/A 11/25/2014     Procedure: COLONOSCOPY;  Surgeon: Wenceslao Galo MD;  Location:  GI     CYSTOSCOPY, SLING TRANSVAGINAL N/A 8/20/2018     Procedure: CYSTOSCOPY, SLING TRANSVAGINAL;;  Surgeon: Urban Elena MD;  Location: [facility]     D & C         X2-3 for abnormal bleeding     ESOPHAGOSCOPY, GASTROSCOPY, DUODENOSCOPY (EGD), COMBINED N/A 11/25/2014     Procedure: COMBINED ESOPHAGOSCOPY, GASTROSCOPY, DUODENOSCOPY (EGD), BIOPSY SINGLE OR MULTIPLE;  Surgeon: Wenceslao Galo MD;  Location: [facility]     LAPAROSCOPIC HYSTERECTOMY SUPRACERVICAL N/A 8/20/2018     Procedure: LAPAROSCOPIC HYSTERECTOMY SUPRACERVICAL;  LAPAROSCOPIC SUBTOATAL HYSTERECTOMY, BILATERAL SALPINGECTOMY, SUBURETHRAL SLING AND CYSTOSCOPY;  Surgeon: Urban Elena MD;  Location: [facility]     LAPAROSCOPIC SALPINGECTOMY Bilateral 8/20/2018     Procedure: LAPAROSCOPIC SALPINGECTOMY;;  Surgeon: Urban Elena MD;  Location: [facility]     LAPAROSCOPY         For endometriosis     OPERATIVE HYSTEROSCOPY WITH MORCELLATOR N/A 3/29/2018     Procedure: OPERATIVE HYSTEROSCOPY WITH MORCELLATOR (MYOSURE);  OPERATIVE HYSTEROSCOPY WITH MORCELLATOR ;  Surgeon: Urban lEena MD;  Location: [facility]         Current Outpatient Prescriptions                Current Outpatient Medications   Medication Sig Dispense Refill     Aspirin-Acetaminophen-Caffeine (EXCEDRIN EXTRA STRENGTH PO) Take 2 tablets by mouth every 6 hours as needed         LISINOPRIL PO           nebivolol (BYSTOLIC) 10 MG tablet Take 1 tablet (10 mg) by mouth daily 90 tablet 3     order for DME DREAMSTATION  9-12 CM/H20  FF MIRAGE QUATTRO         spironolactone (ALDACTONE) 25 MG tablet Take 1 tablet (25 mg) by mouth daily 90 tablet 3     SUMAtriptan (IMITREX) 100 MG tablet Take 1 tablet (100 mg) by mouth at onset of headache for migraine May repeat in 2 hours. Max dose 200mg in 24 hours. 9 tablet 1     losartan-hydrochlorothiazide (HYZAAR) 50-12.5 MG tablet Take 1 tablet by mouth 2 times daily (Patient not taking: Reported on 2/24/2021) 180 tablet 3                 Allergies   Allergen Reactions     Amoxicillin Nausea and Vomiting     Percocet [Oxycodone-Acetaminophen] Nausea and Vomiting      FAMILY HISTORY: There is no reported history of genitourinary carcinoma.  There is no history of urolithiasis.       Social History   Social History                Socioeconomic History     Marital status: Single       Spouse name: Not on file     Number of children: Not on file     Years of education: Not on file     Highest education level: Not on file   Occupational History     Not on file   Social Needs     Financial resource strain: Not on file     Food insecurity       Worry: Not on file       Inability: Not on file     Transportation needs       Medical: Not on file       Non-medical: Not on file   Tobacco Use     Smoking status: Never Smoker     Smokeless tobacco: Never Used   Substance and Sexual Activity     Alcohol use: Yes       Alcohol/week: 1.7 standard drinks     Drug use: No     Sexual activity: Never   Lifestyle     Physical activity       Days per week: Not on file       Minutes per session: Not on file     Stress: Not on file   Relationships     Social connections       Talks on phone:  Not on file       Gets together: Not on file       Attends Congregational service: Not on file       Active member of club or organization: Not on file       Attends meetings of clubs or organizations: Not on file       Relationship status: Not on file     Intimate partner violence       Fear of current or ex partner: Not on file       Emotionally abused: Not on file       Physically abused: Not on file       Forced sexual activity: Not on file   Other Topics Concern      Service Not Asked     Blood Transfusions Not Asked     Caffeine Concern Yes       Comment: 1-2 cans qd     Occupational Exposure Not Asked     Hobby Hazards Not Asked     Sleep Concern Not Asked     Stress Concern Not Asked     Weight Concern Not Asked     Special Diet Yes       Comment: started mediterranian      Back Care Not Asked     Exercise Yes       Comment: 20 minutes walking 3-4 days weekly      Bike Helmet Not Asked     Seat Belt Yes     Self-Exams Not Asked     Parent/sibling w/ CABG, MI or angioplasty before 65F 55M? No   Social History Narrative     Not on file            ROS: A comprehensive 14 point ROS was obtained and negative except for that outlined above in the HPI.     PHYSICAL EXAM:   Vitals         Vitals:     02/24/21 1331   Weight: 145.2 kg (320 lb)   Height: 1.829 m (6')         PSYCH: NAD  EYES: EOMI  MOUTH: MMM  NECK: Supple, no notable adenopathy  RESP: Unlabored breathing  NEURO: AAO x3                     Orders Only on 12/02/2020   Component Date Value Ref Range Status       Specimen Description 12/02/2020 Midstream Urine    Final     Special Requests 12/02/2020 Specimen received in preservative    Final     Culture Micro 12/02/2020     Final                    Value:10,000 to 50,000 colonies/mL  mixed urogenital sarthak  Susceptibility testing not routinely done        Color Urine 12/02/2020 Yellow    Final     Appearance Urine 12/02/2020 Slightly Cloudy    Final     Glucose Urine 12/02/2020 Negative  NEG^Negative  mg/dL Final     Bilirubin Urine 12/02/2020 Negative  NEG^Negative Final     Ketones Urine 12/02/2020 Negative  NEG^Negative mg/dL Final     Specific Gravity Urine 12/02/2020 1.025  1.003 - 1.035 Final     pH Urine 12/02/2020 5.5  5.0 - 7.0 pH Final     Protein Albumin Urine 12/02/2020 Trace* NEG^Negative mg/dL Final     Urobilinogen Urine 12/02/2020 0.2  0.2 - 1.0 EU/dL Final     Nitrite Urine 12/02/2020 Negative  NEG^Negative Final     Blood Urine 12/02/2020 Small* NEG^Negative Final     Leukocyte Esterase Urine 12/02/2020 Moderate* NEG^Negative Final     Source 12/02/2020 Midstream Urine    Final     WBC Urine 12/02/2020 * OTO5^0 - 5 /HPF Final     RBC Urine 12/02/2020 25-50* OTO2^O - 2 /HPF Final     Squamous Epithelial /LPF Urine 12/02/2020 Moderate* FEW^Few /LPF Final     Bacteria Urine 12/02/2020 Moderate* NEG^Negative /HPF Final         ASSESSMENT and PLAN:    55 year old female with benign micro hematuria, dysuria, urge incontinence.  Hematuria eval benign  Regarding the urge incontinence, we discussed estrogen cream on the urethra 3x per week. Will increase Myrbetriq 50mg, SE reviewed.   -Pelvic floor PT eval   -See female urology if not improving.        Melany Jane PA-C  [facility]    22 minutes spent on the date of the encounter doing chart review, review of test results, interpretation of tests, patient visit and documentation

## 2021-08-04 NOTE — PROGRESS NOTES
*SEND LINK TO CELL PHONE*    PT STATES NOTHING HAS CHANGED.  PT STATES SHE STILL HAS URGENCY.  PT HAS PAIN AND CRAMPING    Fouzia is a 55 year old who is being evaluated via a billable video visit.      How would you like to obtain your AVS? MyChart  If the video visit is dropped, the invitation should be resent by: Text to cell phone: [phone]  Will anyone else be joining your video visit? No      Video Start Time: 12:58 PM  Video-Visit Details    Pt in her car and connection was not stable. Changed to telephone visit fotr 9 min.    Originating Location (pt. Location): Home    Distant Location (provider location):  [facility] [employer]     Platform used for Video Visit: [employer]     CC: Hematuria, dysuria     HPI: It is a pleasure to see Ms. Fouzia Arenas, a pleasant 55 year old female, seen via billable video visit in follow-up of hematuria, dysuria, urge incontinence. Had cysto with Dr. Richter in March 2021.     Ms. Arenas voids without difficulty, but describes some urge incontinence and nocturia. Had a UA/UC x 2 due to freq, burning and bother were neg, but UA had micro hematuria.      Bladder lift and hysterectomy with Dr. Chambers in 2018.   Has tried Myrbetriq 25mg daily as well as topical estrogen. Not much improvement in symptoms.      Hematuria Risk Factors:  Age >40: Yes                                       Smoking history: no  Occupational exposure to chemicals or dyes (ie, benzenes, aromatic amines): no  History of urologic disorder or disease: no  History of irritative voiding symptoms: no  History of urinary tract infection: no  Analgesic abuse: no  History of pelvic irradiation: no     Past Medical History           Past Medical History:   Diagnosis Date     Classic migraine       Complex endometrial hyperplasia       without atypia     Hypertension       Motion sickness       Mumps       Obese       STORMY (obstructive sleep apnea)       Other disorder of menstruation and other  abnormal bleeding from female genital tract 2/24/2011     Palpitations       PONV (postoperative nausea and vomiting)       Spider veins       SVT (supraventricular tachycardia) (H) 9/2015         Past Surgical History             Past Surgical History:   Procedure Laterality Date     BLADDER SURGERY         CHOLECYSTECTOMY, LAPOROSCOPIC         Cholecystectomy, Laparoscopic     COLONOSCOPY N/A 11/25/2014     Procedure: COLONOSCOPY;  Surgeon: Wenceslao Galo MD;  Location:  GI     CYSTOSCOPY, SLING TRANSVAGINAL N/A 8/20/2018     Procedure: CYSTOSCOPY, SLING TRANSVAGINAL;;  Surgeon: Urban Elena MD;  Location: [facility]     D & C         X2-3 for abnormal bleeding     ESOPHAGOSCOPY, GASTROSCOPY, DUODENOSCOPY (EGD), COMBINED N/A 11/25/2014     Procedure: COMBINED ESOPHAGOSCOPY, GASTROSCOPY, DUODENOSCOPY (EGD), BIOPSY SINGLE OR MULTIPLE;  Surgeon: Wenceslao Galo MD;  Location: [facility]     LAPAROSCOPIC HYSTERECTOMY SUPRACERVICAL N/A 8/20/2018     Procedure: LAPAROSCOPIC HYSTERECTOMY SUPRACERVICAL;  LAPAROSCOPIC SUBTOATAL HYSTERECTOMY, BILATERAL SALPINGECTOMY, SUBURETHRAL SLING AND CYSTOSCOPY;  Surgeon: Urban Elena MD;  Location: [facility]     LAPAROSCOPIC SALPINGECTOMY Bilateral 8/20/2018     Procedure: LAPAROSCOPIC SALPINGECTOMY;;  Surgeon: Urban Elena MD;  Location: [facility]     LAPAROSCOPY         For endometriosis     OPERATIVE HYSTEROSCOPY WITH MORCELLATOR N/A 3/29/2018     Procedure: OPERATIVE HYSTEROSCOPY WITH MORCELLATOR (MYOSURE);  OPERATIVE HYSTEROSCOPY WITH MORCELLATOR ;  Surgeon: Urban Elena MD;  Location: [facility]         Current Outpatient Prescriptions               Current Outpatient Medications   Medication Sig Dispense Refill     Aspirin-Acetaminophen-Caffeine (EXCEDRIN EXTRA STRENGTH PO) Take 2 tablets by mouth every 6 hours as needed         LISINOPRIL PO           nebivolol (BYSTOLIC) 10 MG tablet Take 1 tablet (10 mg) by mouth daily 90 tablet 3     order for  DME DREAMSTATION  9-12 CM/H20  FF MIRAGE QUATTRO         spironolactone (ALDACTONE) 25 MG tablet Take 1 tablet (25 mg) by mouth daily 90 tablet 3     SUMAtriptan (IMITREX) 100 MG tablet Take 1 tablet (100 mg) by mouth at onset of headache for migraine May repeat in 2 hours. Max dose 200mg in 24 hours. 9 tablet 1     losartan-hydrochlorothiazide (HYZAAR) 50-12.5 MG tablet Take 1 tablet by mouth 2 times daily (Patient not taking: Reported on 2/24/2021) 180 tablet 3                 Allergies   Allergen Reactions     Amoxicillin Nausea and Vomiting     Percocet [Oxycodone-Acetaminophen] Nausea and Vomiting      FAMILY HISTORY: There is no reported history of genitourinary carcinoma.  There is no history of urolithiasis.       Social History   Social History                Socioeconomic History     Marital status: Single       Spouse name: Not on file     Number of children: Not on file     Years of education: Not on file     Highest education level: Not on file   Occupational History     Not on file   Social Needs     Financial resource strain: Not on file     Food insecurity       Worry: Not on file       Inability: Not on file     Transportation needs       Medical: Not on file       Non-medical: Not on file   Tobacco Use     Smoking status: Never Smoker     Smokeless tobacco: Never Used   Substance and Sexual Activity     Alcohol use: Yes       Alcohol/week: 1.7 standard drinks     Drug use: No     Sexual activity: Never   Lifestyle     Physical activity       Days per week: Not on file       Minutes per session: Not on file     Stress: Not on file   Relationships     Social connections       Talks on phone: Not on file       Gets together: Not on file       Attends Hoahaoism service: Not on file       Active member of club or organization: Not on file       Attends meetings of clubs or organizations: Not on file       Relationship status: Not on file     Intimate partner violence       Fear of current or ex partner:  Not on file       Emotionally abused: Not on file       Physically abused: Not on file       Forced sexual activity: Not on file   Other Topics Concern      Service Not Asked     Blood Transfusions Not Asked     Caffeine Concern Yes       Comment: 1-2 cans qd     Occupational Exposure Not Asked     Hobby Hazards Not Asked     Sleep Concern Not Asked     Stress Concern Not Asked     Weight Concern Not Asked     Special Diet Yes       Comment: started mediterranian      Back Care Not Asked     Exercise Yes       Comment: 20 minutes walking 3-4 days weekly      Bike Helmet Not Asked     Seat Belt Yes     Self-Exams Not Asked     Parent/sibling w/ CABG, MI or angioplasty before 65F 55M? No   Social History Narrative     Not on file            ROS: A comprehensive 14 point ROS was obtained and negative except for that outlined above in the HPI.     PHYSICAL EXAM:   Vitals         Vitals:     02/24/21 1331   Weight: 145.2 kg (320 lb)   Height: 1.829 m (6')         PSYCH: NAD  EYES: EOMI  MOUTH: MMM  NECK: Supple, no notable adenopathy  RESP: Unlabored breathing  NEURO: AAO x3                     Orders Only on 12/02/2020   Component Date Value Ref Range Status       Specimen Description 12/02/2020 Midstream Urine    Final     Special Requests 12/02/2020 Specimen received in preservative    Final     Culture Micro 12/02/2020     Final                    Value:10,000 to 50,000 colonies/mL  mixed urogenital sarthak  Susceptibility testing not routinely done        Color Urine 12/02/2020 Yellow    Final     Appearance Urine 12/02/2020 Slightly Cloudy    Final     Glucose Urine 12/02/2020 Negative  NEG^Negative mg/dL Final     Bilirubin Urine 12/02/2020 Negative  NEG^Negative Final     Ketones Urine 12/02/2020 Negative  NEG^Negative mg/dL Final     Specific Gravity Urine 12/02/2020 1.025  1.003 - 1.035 Final     pH Urine 12/02/2020 5.5  5.0 - 7.0 pH Final     Protein Albumin Urine 12/02/2020 Trace* NEG^Negative mg/dL  Final     Urobilinogen Urine 12/02/2020 0.2  0.2 - 1.0 EU/dL Final     Nitrite Urine 12/02/2020 Negative  NEG^Negative Final     Blood Urine 12/02/2020 Small* NEG^Negative Final     Leukocyte Esterase Urine 12/02/2020 Moderate* NEG^Negative Final     Source 12/02/2020 Midstream Urine    Final     WBC Urine 12/02/2020 * OTO5^0 - 5 /HPF Final     RBC Urine 12/02/2020 25-50* OTO2^O - 2 /HPF Final     Squamous Epithelial /LPF Urine 12/02/2020 Moderate* FEW^Few /LPF Final     Bacteria Urine 12/02/2020 Moderate* NEG^Negative /HPF Final         ASSESSMENT and PLAN:    55 year old female with benign micro hematuria, dysuria, urge incontinence.  Hematuria eval benign  Regarding the urge incontinence, we discussed estrogen cream on the urethra 3x per week. Will increase Myrbetriq 50mg, SE reviewed.   -Pelvic floor PT eval   -See female urology if not improving.        Melany Jane PA-C  [facility]    22 minutes spent on the date of the encounter doing chart review, review of test results, interpretation of tests, patient visit and documentation

## 2021-08-04 NOTE — TELEPHONE ENCOUNTER
----- Message from Yoselyn Marino sent at 8/4/2021  3:26 PM CDT -----  Pls call her with a [employer] code if able  Lali Philip if not improving    BRITTANY

## 2021-08-04 NOTE — PATIENT INSTRUCTIONS
Please see one of the dedicated pelvic floor physical therapists ([facility] [phone]).    Please be aware that coverage of these services is subject to the terms and limitations of your health insurance plan.  Call member services at your health plan with any benefit or coverage questions.      Please bring the following to your appointment:    *Your personal calendar for scheduling future appointments  *Comfortable clothing    Please return to see Dr. Escalera if not improving.  Call [phone] to make appointment if you feel necessary.      You do not need an appointment with Dr. Escalera if you feel your progress is satisfactory.

## 2021-10-13 ENCOUNTER — TELEPHONE (OUTPATIENT)
Dept: CARDIOLOGY | Facility: CLINIC | Age: 56
End: 2021-10-13

## 2021-10-13 DIAGNOSIS — I10 HYPERTENSION GOAL BP (BLOOD PRESSURE) < 130/80: ICD-10-CM

## 2021-10-13 DIAGNOSIS — I10 BENIGN ESSENTIAL HYPERTENSION: ICD-10-CM

## 2021-10-13 RX ORDER — SPIRONOLACTONE 25 MG/1
25 TABLET ORAL DAILY
Qty: 90 TABLET | Refills: 3 | Status: SHIPPED | OUTPATIENT
Start: 2021-10-13 | End: 2022-10-27

## 2021-10-13 RX ORDER — NEBIVOLOL 10 MG/1
10 TABLET ORAL DAILY
Qty: 90 TABLET | Refills: 3 | Status: SHIPPED | OUTPATIENT
Start: 2021-10-13 | End: 2022-05-10

## 2021-10-13 RX ORDER — LISINOPRIL 20 MG/1
20 TABLET ORAL 2 TIMES DAILY
Qty: 180 TABLET | Refills: 3 | Status: SHIPPED | OUTPATIENT
Start: 2021-10-13 | End: 2022-09-20

## 2021-10-13 NOTE — TELEPHONE ENCOUNTER
10/13/21 Refill request recd from pt for   Received refill request for: Lisinopril 20 mg BID, Bystolic 10 mg QD , Spironolactone 25 mg QD  Last OV was: 06/04/21  Labs/EKG: ordered for 6/2022  F/U scheduled : ordered for 6/2022  Rx sent to: [facility] , [place]  Pt notified via phone message on   Mao WHATLEY 1152 am

## 2021-12-15 ENCOUNTER — LAB (OUTPATIENT)
Dept: LAB | Facility: CLINIC | Age: 56
End: 2021-12-15
Payer: COMMERCIAL

## 2021-12-15 DIAGNOSIS — R30.0 DYSURIA: ICD-10-CM

## 2021-12-15 DIAGNOSIS — R31.9 HEMATURIA, UNSPECIFIED TYPE: Primary | ICD-10-CM

## 2021-12-15 LAB
ALBUMIN UR-MCNC: ABNORMAL MG/DL
APPEARANCE UR: CLEAR
BILIRUB UR QL STRIP: NEGATIVE
COLOR UR AUTO: YELLOW
GLUCOSE UR STRIP-MCNC: NEGATIVE MG/DL
HGB UR QL STRIP: ABNORMAL
KETONES UR STRIP-MCNC: NEGATIVE MG/DL
LEUKOCYTE ESTERASE UR QL STRIP: ABNORMAL
NITRATE UR QL: NEGATIVE
PH UR STRIP: 5.5 [PH] (ref 5–7)
SP GR UR STRIP: 1.02 (ref 1–1.03)
UROBILINOGEN UR STRIP-ACNC: 0.2 E.U./DL

## 2021-12-15 PROCEDURE — 81003 URINALYSIS AUTO W/O SCOPE: CPT

## 2021-12-15 PROCEDURE — 87086 URINE CULTURE/COLONY COUNT: CPT

## 2021-12-16 ENCOUNTER — TELEPHONE (OUTPATIENT)
Dept: UROLOGY | Facility: CLINIC | Age: 56
End: 2021-12-16
Payer: COMMERCIAL

## 2021-12-16 LAB — BACTERIA UR CULT: NORMAL

## 2021-12-16 NOTE — TELEPHONE ENCOUNTER
[facility]    Phone Message    May a detailed message be left on voicemail: yes     Reason for Call: Requesting Results   Name/type of test: Urine labs  Date of test: 12/15/21  Was test done at a location other than [facility] (Please fill in the location if not [facility])?: No      Action Taken: Message routed to:  [facility] Urology    Travel Screening: Not Applicable

## 2021-12-16 NOTE — TELEPHONE ENCOUNTER
Informed culture was contaminated . Having some urgency  Slight dysuria , Has been drinking more coke .  She will eliminate this  And push water  If however symptoms continue she will need a catherized culture . Patient will call office next week if symptoms continue

## 2022-05-10 DIAGNOSIS — I10 HYPERTENSION GOAL BP (BLOOD PRESSURE) < 130/80: ICD-10-CM

## 2022-05-10 RX ORDER — NEBIVOLOL 10 MG/1
10 TABLET ORAL DAILY
Qty: 90 TABLET | Refills: 0 | Status: SHIPPED | OUTPATIENT
Start: 2022-05-10 | End: 2022-08-12

## 2022-08-12 DIAGNOSIS — I10 HYPERTENSION GOAL BP (BLOOD PRESSURE) < 130/80: ICD-10-CM

## 2022-08-12 RX ORDER — NEBIVOLOL 10 MG/1
10 TABLET ORAL DAILY
Qty: 90 TABLET | Refills: 0 | Status: SHIPPED | OUTPATIENT
Start: 2022-08-12 | End: 2022-09-20

## 2022-08-24 ENCOUNTER — HOSPITAL ENCOUNTER (EMERGENCY)
Facility: CLINIC | Age: 57
Discharge: HOME | End: 2022-08-25
Attending: EMERGENCY MEDICINE | Admitting: EMERGENCY MEDICINE
Payer: COMMERCIAL

## 2022-08-24 ENCOUNTER — APPOINTMENT (OUTPATIENT)
Dept: GENERAL RADIOLOGY | Facility: CLINIC | Age: 57
End: 2022-08-24
Attending: EMERGENCY MEDICINE
Payer: COMMERCIAL

## 2022-08-24 ENCOUNTER — TELEPHONE (OUTPATIENT)
Dept: CARDIOLOGY | Facility: CLINIC | Age: 57
End: 2022-08-24

## 2022-08-24 DIAGNOSIS — R07.89 CHEST HEAVINESS: Primary | ICD-10-CM

## 2022-08-24 LAB
ALBUMIN SERPL BCG-MCNC: 4.1 G/DL (ref 3.5–5.2)
ALP SERPL-CCNC: 87 U/L (ref 35–104)
ALT SERPL W P-5'-P-CCNC: 23 U/L (ref 10–35)
ANION GAP SERPL CALCULATED.3IONS-SCNC: 9 MMOL/L (ref 7–15)
AST SERPL W P-5'-P-CCNC: 26 U/L (ref 10–35)
BASOPHILS # BLD AUTO: 0.1 10E3/UL (ref 0–0.2)
BASOPHILS NFR BLD AUTO: 1 %
BILIRUB SERPL-MCNC: 0.8 MG/DL
BUN SERPL-MCNC: 14.8 MG/DL (ref 6–20)
CALCIUM SERPL-MCNC: 9.1 MG/DL (ref 8.6–10)
CHLORIDE SERPL-SCNC: 104 MMOL/L (ref 98–107)
CREAT SERPL-MCNC: 1.08 MG/DL (ref 0.51–0.95)
D DIMER PPP FEU-MCNC: 0.32 UG/ML FEU (ref 0–0.5)
DEPRECATED HCO3 PLAS-SCNC: 27 MMOL/L (ref 22–29)
EOSINOPHIL # BLD AUTO: 0.1 10E3/UL (ref 0–0.7)
EOSINOPHIL NFR BLD AUTO: 2 %
ERYTHROCYTE [DISTWIDTH] IN BLOOD BY AUTOMATED COUNT: 12.6 % (ref 10–15)
GFR SERPL CREATININE-BSD FRML MDRD: 60 ML/MIN/1.73M2
GLUCOSE SERPL-MCNC: 107 MG/DL (ref 70–99)
HCT VFR BLD AUTO: 45.4 % (ref 35–47)
HGB BLD-MCNC: 14.2 G/DL (ref 11.7–15.7)
HOLD SPECIMEN: NORMAL
IMM GRANULOCYTES # BLD: 0 10E3/UL
IMM GRANULOCYTES NFR BLD: 0 %
LYMPHOCYTES # BLD AUTO: 2.7 10E3/UL (ref 0.8–5.3)
LYMPHOCYTES NFR BLD AUTO: 34 %
MCH RBC QN AUTO: 27.8 PG (ref 26.5–33)
MCHC RBC AUTO-ENTMCNC: 31.3 G/DL (ref 31.5–36.5)
MCV RBC AUTO: 89 FL (ref 78–100)
MONOCYTES # BLD AUTO: 0.7 10E3/UL (ref 0–1.3)
MONOCYTES NFR BLD AUTO: 8 %
NEUTROPHILS # BLD AUTO: 4.5 10E3/UL (ref 1.6–8.3)
NEUTROPHILS NFR BLD AUTO: 55 %
NRBC # BLD AUTO: 0 10E3/UL
NRBC BLD AUTO-RTO: 0 /100
PLATELET # BLD AUTO: 271 10E3/UL (ref 150–450)
POTASSIUM SERPL-SCNC: 4 MMOL/L (ref 3.4–5.3)
PROT SERPL-MCNC: 7.3 G/DL (ref 6.4–8.3)
RBC # BLD AUTO: 5.1 10E6/UL (ref 3.8–5.2)
SODIUM SERPL-SCNC: 140 MMOL/L (ref 136–145)
TROPONIN T SERPL HS-MCNC: 9 NG/L
WBC # BLD AUTO: 8.1 10E3/UL (ref 4–11)

## 2022-08-24 PROCEDURE — C9803 HOPD COVID-19 SPEC COLLECT: HCPCS

## 2022-08-24 PROCEDURE — 84132 ASSAY OF SERUM POTASSIUM: CPT | Performed by: EMERGENCY MEDICINE

## 2022-08-24 PROCEDURE — 36415 COLL VENOUS BLD VENIPUNCTURE: CPT | Performed by: EMERGENCY MEDICINE

## 2022-08-24 PROCEDURE — 99285 EMERGENCY DEPT VISIT HI MDM: CPT | Mod: CS,25

## 2022-08-24 PROCEDURE — 71046 X-RAY EXAM CHEST 2 VIEWS: CPT

## 2022-08-24 PROCEDURE — 84484 ASSAY OF TROPONIN QUANT: CPT | Performed by: EMERGENCY MEDICINE

## 2022-08-24 PROCEDURE — 85004 AUTOMATED DIFF WBC COUNT: CPT | Performed by: EMERGENCY MEDICINE

## 2022-08-24 PROCEDURE — 85379 FIBRIN DEGRADATION QUANT: CPT | Performed by: EMERGENCY MEDICINE

## 2022-08-24 PROCEDURE — 93005 ELECTROCARDIOGRAM TRACING: CPT

## 2022-08-24 ASSESSMENT — ENCOUNTER SYMPTOMS
HEADACHES: 0
NUMBNESS: 0
ABDOMINAL PAIN: 0
HEMATURIA: 0
VOMITING: 0
CHEST TIGHTNESS: 0
SEIZURES: 0
DYSURIA: 0
FATIGUE: 1
BACK PAIN: 0
DIZZINESS: 0
CONSTIPATION: 0
EYE PAIN: 0
RHINORRHEA: 0
SORE THROAT: 0
DIAPHORESIS: 1
CHILLS: 0
NAUSEA: 1
BLOOD IN STOOL: 0
NECK PAIN: 0
FEVER: 0
SHORTNESS OF BREATH: 1
PHOTOPHOBIA: 0

## 2022-08-24 ASSESSMENT — ACTIVITIES OF DAILY LIVING (ADL): ADLS_ACUITY_SCORE: 35

## 2022-08-24 NOTE — TELEPHONE ENCOUNTER
"[facility]    Phone Message    May a detailed message be left on voicemail: yes     Reason for Call: Symptoms or Concerns     If patient has red-flag symptoms, warm transfer to triage line    Current symptom or concern: Chest \"heaviness\" - denies chest pain, sweats, arm fatigue and overall fatigue    Symptoms have been present for:  3 day(s)    Has patient previously been seen for this? No    By :     Date:     Are there any new or worsening symptoms? No      Action Taken: Message routed to:  Other: Cardiology    Travel Screening: Not Applicable                                                                      "

## 2022-08-25 VITALS
HEART RATE: 66 BPM | TEMPERATURE: 97.9 F | SYSTOLIC BLOOD PRESSURE: 144 MMHG | BODY MASS INDEX: 39.68 KG/M2 | OXYGEN SATURATION: 95 % | HEIGHT: 72 IN | WEIGHT: 293 LBS | RESPIRATION RATE: 18 BRPM | DIASTOLIC BLOOD PRESSURE: 71 MMHG

## 2022-08-25 LAB
ATRIAL RATE - MUSE: 82 BPM
DIASTOLIC BLOOD PRESSURE - MUSE: NORMAL MMHG
FLUAV RNA SPEC QL NAA+PROBE: NEGATIVE
FLUBV RNA RESP QL NAA+PROBE: NEGATIVE
INTERPRETATION ECG - MUSE: NORMAL
P AXIS - MUSE: 33 DEGREES
PR INTERVAL - MUSE: 192 MS
QRS DURATION - MUSE: 98 MS
QT - MUSE: 408 MS
QTC - MUSE: 476 MS
R AXIS - MUSE: -7 DEGREES
RSV RNA SPEC NAA+PROBE: NEGATIVE
SARS-COV-2 RNA RESP QL NAA+PROBE: NEGATIVE
SYSTOLIC BLOOD PRESSURE - MUSE: NORMAL MMHG
T AXIS - MUSE: 99 DEGREES
VENTRICULAR RATE- MUSE: 82 BPM

## 2022-08-25 PROCEDURE — 87637 SARSCOV2&INF A&B&RSV AMP PRB: CPT | Performed by: EMERGENCY MEDICINE

## 2022-08-25 ASSESSMENT — ENCOUNTER SYMPTOMS
AGITATION: 0
COLOR CHANGE: 0

## 2022-08-25 ASSESSMENT — ACTIVITIES OF DAILY LIVING (ADL): ADLS_ACUITY_SCORE: 35

## 2022-08-25 NOTE — ED PROVIDER NOTES
History   Chief Complaint:  Chest Pain       HPI   Fouzia Arenas is a 57 year old female with history of hypertension who presents with constant progressively worsening chest heaviness since Saturday. She states that moving around helps. No exacerbating factors. She notes that since Monday she has broke out to a sweat for no reason with accompanied nausea and shortness of breath. She would have to sit down and rest afterwards. She reports that she has had fatigue since Saturday that has been progressively worsening. She reports of some congestion. Her last stress test was not within the past year. No history of blood clots, heart attack or stents. She is not on blood thinners. She rarely drinks alcohol. She does not smoke or use drugs. Denies fever, chills, vomiting, abdominal pain, hematuria, new dysuria, blood in stool, constipation, runny nose, sore throat, neck pain, back pain, headache, dizziness, vision changes, eye pain, photophobia, chest pain, chest tightness, syncope, seizures or numbness. She is not vaccinated for COVID.     Review of Systems   Constitutional: Positive for diaphoresis and fatigue. Negative for chills and fever.   HENT: Positive for congestion. Negative for rhinorrhea and sore throat.    Eyes: Negative for photophobia, pain and visual disturbance.   Respiratory: Positive for shortness of breath. Negative for chest tightness.    Cardiovascular: Negative for chest pain.        + chest heaviness   Gastrointestinal: Positive for nausea. Negative for abdominal pain, blood in stool, constipation and vomiting.   Genitourinary: Negative for dysuria and hematuria.   Musculoskeletal: Negative for back pain and neck pain.   Skin: Negative for color change and pallor.   Neurological: Negative for dizziness, seizures, syncope, numbness and headaches.   Psychiatric/Behavioral: Negative for agitation and behavioral problems.       Allergies:  Amoxicillin  Hctz [Hydrochlorothiazide]  Percocet  [Oxycodone-Acetaminophen]    Medications:  Zestril  Antivert  Myrbetriq  Bystolic  Aldactone  Imitrex  Cholecalciferol    Past Medical History:     Esophageal reflux  Palpitations  STORMY  BPPV  Morbid obesity  Vertigo  Hypertension  SVT  Lipodermatosclerosis of both lower extremities  Migraine     Past Surgical History:    Bladder surgery  Cholecystectomy  Colonoscopy  Cystoscopy, sling transvaginal  D&C  EGD  Salpingectomy  Laser ablation vein varicose  Hysterectomy     Family History:    Mother - hypertension, lipids, alzheimer disease  Father - hypertension, prostate cancer, lipids    Social History:  The patient presents to the ED with spouse.  She rarely drinks alcohol. She does not smoke or use drugs.  PCP: Ruthie Xiong       Physical Exam     Patient Vitals for the past 24 hrs:   BP Temp Temp src Pulse Resp SpO2 Height Weight   08/25/22 0045 (!) 144/71 -- -- 66 18 95 % -- --   08/25/22 0030 (!) 146/74 -- -- 64 16 97 % -- --   08/25/22 0015 133/67 -- -- 58 9 99 % -- --   08/25/22 0000 121/83 -- -- 62 16 96 % -- --   08/24/22 2345 128/59 -- -- 59 9 97 % -- --   08/24/22 2315 116/79 -- -- 61 24 97 % -- --   08/24/22 2245 114/73 -- -- 59 18 96 % -- --   08/24/22 2240 101/75 -- -- 64 20 95 % -- --   08/24/22 2035 (!) 156/93 -- -- -- -- -- -- --   08/24/22 2028 -- 97.9  F (36.6  C) Temporal 73 16 100 % 1.829 m (6') 145.2 kg (320 lb)       Physical Exam  Constitutional:       Appearance: She is well-developed.   HENT:      Head: Normocephalic and atraumatic.   Eyes:      Extraocular Movements: Extraocular movements intact.      Conjunctiva/sclera: Conjunctivae normal.   Cardiovascular:      Rate and Rhythm: Normal rate.      Comments: Sinus rhythm with sinus arrythmia on telemetry  Pulmonary:      Effort: Pulmonary effort is normal. No respiratory distress.      Breath sounds: Normal breath sounds. No wheezing.   Abdominal:      General: Abdomen is flat. There is no distension.      Palpations: Abdomen  is soft.      Tenderness: There is no abdominal tenderness.   Musculoskeletal:      Cervical back: Normal range of motion and neck supple.      Right lower leg: No edema.      Left lower leg: No edema.   Skin:     General: Skin is warm and dry.   Neurological:      General: No focal deficit present.      Mental Status: She is alert and oriented to person, place, and time.   Psychiatric:         Mood and Affect: Mood normal.         Behavior: Behavior normal.       Emergency Department Course   ECG  ECG taken at 2047, ECG read at 2054  Sinus rhythm with marked sinus arrhythmia. Low voltage QRS. ST & T wave abnormality, consider anterior ischemia. Prolonged QT. Abnormal ECG.   Rate 82 bpm. UT interval 192 ms. QRS duration 98 ms. QT/QTc 408/476 ms. P-R-T axes 33 -7 99.     Imaging:  XR Chest 2 Views   Final Result   IMPRESSION: Negative chest.        Report per radiology    Laboratory:  Labs Ordered and Resulted from Time of ED Arrival to Time of ED Departure   COMPREHENSIVE METABOLIC PANEL - Abnormal       Result Value    Sodium 140      Potassium 4.0      Creatinine 1.08 (*)     Urea Nitrogen 14.8      Chloride 104      Carbon Dioxide (CO2) 27      Anion Gap 9      Glucose 107 (*)     Calcium 9.1      Protein Total 7.3      Albumin 4.1      Bilirubin Total 0.8      Alkaline Phosphatase 87      AST 26      ALT 23      GFR Estimate 60 (*)    CBC WITH PLATELETS AND DIFFERENTIAL - Abnormal    WBC Count 8.1      RBC Count 5.10      Hemoglobin 14.2      Hematocrit 45.4      MCV 89      MCH 27.8      MCHC 31.3 (*)     RDW 12.6      Platelet Count 271      % Neutrophils 55      % Lymphocytes 34      % Monocytes 8      % Eosinophils 2      % Basophils 1      % Immature Granulocytes 0      NRBCs per 100 WBC 0      Absolute Neutrophils 4.5      Absolute Lymphocytes 2.7      Absolute Monocytes 0.7      Absolute Eosinophils 0.1      Absolute Basophils 0.1      Absolute Immature Granulocytes 0.0      Absolute NRBCs 0.0      TROPONIN T, HIGH SENSITIVITY - Normal    Troponin T, High Sensitivity 9     D DIMER QUANTITATIVE - Normal    D-Dimer Quantitative 0.32     INFLUENZA A/B & SARS-COV2 PCR MULTIPLEX - Normal    Influenza A PCR Negative      Influenza B PCR Negative      RSV PCR Negative      SARS CoV2 PCR Negative          Emergency Department Course:       Reviewed:  I reviewed nursing notes, vitals, past medical history and Care Everywhere    Assessments/Consults:  ED Course as of 08/25/22 0211   Wed Aug 24, 2022   2202 Evaluated patient.   2305 D-Dimer Quantitative: 0.32   2339 XR Chest 2 Views  neg   2356 On recheck her chest pressure lightened up a little bit but is still present. She has a history of heart being out of rhythm and is not a new thing for her.   Thu Aug 25, 2022   0111 Updated patient on lab findings.  Had extensive discussion with patient regarding her heart score of 4.  Offered observation admission for inpatient stress echocardiogram versus further evaluation/monitoring.  Patient states that she would prefer not to be admitted to the hospital.  Patient states that she will call her physician's assistant tomorrow regarding her visit to the ER and schedule for outpatient stress echocardiogram and potentially an earlier appointment with her primary care provider. Discussed strict return precautions.  Answered all questions.  Patient voiced understanding and agreement with plan.       Interventions:  Medications - No data to display    Disposition:  The patient was discharged to home.     Impression & Plan     CMS Diagnoses: None    HEART Score  Background  Calculates the overall risk of adverse event in patient's presenting with chest pain.  Based on 5 criteria (each assigned 0-2 points) including suspiciousness of history, EKG, age, risk factors and troponin.    Data  57 year old female  has Esophageal reflux; CARDIOVASCULAR SCREENING; LDL GOAL LESS THAN 160; Palpitations; STORMY (obstructive sleep apnea); Atypical  chest pain; BPPV (benign paroxysmal positional vertigo); Morbid obesity (H); Hypertension goal BP (blood pressure) < 130/80; Vertigo; Supraventricular tachycardia (H) - on bystolic followed by cardiology; Lipodermatosclerosis of both lower extremities; Nipple symptom or sign in female - bilateral, chronic recurrent itching ; and Migraine without aura on their problem list.   reports that she has never smoked. She has never used smokeless tobacco.  family history includes Alzheimer Disease in her mother; Breast Cancer in her maternal aunt and paternal aunt; C.A.D. in her maternal grandmother and paternal grandfather; Cancer in her maternal grandfather; Cancer - colorectal in an other family member; Cerebrovascular Disease in her paternal grandmother; Colon Cancer in her cousin and cousin; Gallbladder Disease in her maternal grandmother; Hypertension in her father, maternal grandmother, and mother; Lipids in her father and mother; Prostate Cancer in her father.  Lab Results   Component Value Date    TROPI <0.015 2018     Criteria   0-2 points for each of 5 items (maximum of 10 points):  Score 0- History slightly suspicious for coronary syndrome  Score 1- EKG with Non-specific repolarization disturbance  Score 1- Age 45 to 65 years old  Score 2- Three or more risk factors for or history of atherosclerotic disease  Score 0- Within normal limits for troponin levels  Interpretation  Risk of adverse outcome  Heart Score: 4  Total Score 4-6- Adverse Outcome Risk 20.3% - Supports admission with standard rule-out management -serial troponins and stress testing    Medical Decision Makin-year-old female as described above presents to the emergency department for chest heaviness that is nonexertional.  Patient hemodynamically stable upon evaluation.  Afebrile.  Patient's chest heaviness has been ongoing since Saturday without period of relief or worsening.  Will rule out ACS.  1 set troponin given onset of symptoms  for greater than 24 hours.  D-dimer for evaluation for pulmonary embolus.  COVID swab since patient is not vaccinated for COVID-19.  Patient has heart score of 4.  Consider offering observation admission if negative work-up.  We will have shared decision making discussion at that time if work-up remains negative.  Discussed care plan with patient who voiced understanding and agreement with plan.  Answered all questions.  Additional work-up and orders as listed in chart.    Please refer to ED course above for details on the patient's treatment course and any changes or updates in care plan beyond my initial evaluation and MDM.    Diagnosis:    ICD-10-CM    1. Chest heaviness  R07.89        Discharge Medications:  Discharge Medication List as of 8/25/2022  1:14 AM          Scribe Disclosure:  I, Nadya López, am serving as a scribe at 9:59 PM on 8/24/2022 to document services personally performed by Pedro Cee DO based on my observations and the provider's statements to me.              Pedro Cee DO  08/25/22 0211

## 2022-08-25 NOTE — ED TRIAGE NOTES
Pt. Presents to ED with complaints of chest heaviness since Saturday. Pt. Reports painful tired arms with cramping in her back. Pt. Reports she been more diaphoretic since Monday. 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 Pt. Reports a hx of HTN and reports a sometimes takes bistolic (has been out for 2 weeks) for palpitations. Reports she takes lisinopril(last does was last night) and spironolactone(last pill Sunday). Reports sometimes she feels like she is working harder to breathe. Reports the lisinopril gave her a regular cough. Denies swelling. Denies vomiting. Pt. Reports she took 4 baby ASA.

## 2022-08-25 NOTE — TELEPHONE ENCOUNTER
Patient presented to ED last evening.  Will call patient today as she is overdue for cardiology visit. CHACORTA Brunner

## 2022-08-25 NOTE — DISCHARGE INSTRUCTIONS
Please follow-up with your primary care provider and/or specialist regarding your visit to the ER today.    Please contact your primary care provider regarding scheduling an outpatient stress test.    Please return to the emergency department should you experience any of the symptoms we specifically discussed, including but not limited to recurrence or worsening of your symptoms, or development of any new and concerning symptoms.

## 2022-08-26 NOTE — TELEPHONE ENCOUNTER
No need for testing prior - will review in follow-up and can order testing at that time if needed    Jaqui

## 2022-08-26 NOTE — PROGRESS NOTES
Patient Follow-up Call:    Brendan states that she is doing well with improvement in he symptoms since evaluation in the ER.    She was able to schedule outpatient stress echocardiogram via her primary care physician's assistant.    She was able to re-fill her medications.    Discussed return precautions. Answered all questions.

## 2022-08-26 NOTE — TELEPHONE ENCOUNTER
Spoke to pt who was in the ER last night for her fatigue in Arms and general, heaviness in chest, cramping in back and sweating. Pt Trop was negative, PE ruled out ad pt is negative for COVID. States that she was able to go for a walk this morning. Pt states that ER MD had recommended a Stress test and also a monitor as she was told that here HR was all over the place.  Pt EKG notes SR with pac's.  Pt had attempted to get an OV with Jaqui, but none was available until the end of October. This writer found an opening on 8/30 at 1350. Pt will take that time. Will message Jaqui if anything wanted prior to this OV. Blaise

## 2022-08-30 ENCOUNTER — OFFICE VISIT (OUTPATIENT)
Dept: CARDIOLOGY | Facility: CLINIC | Age: 57
End: 2022-08-30
Attending: PHYSICIAN ASSISTANT
Payer: COMMERCIAL

## 2022-08-30 VITALS
BODY MASS INDEX: 39.68 KG/M2 | HEART RATE: 71 BPM | WEIGHT: 293 LBS | HEIGHT: 72 IN | DIASTOLIC BLOOD PRESSURE: 83 MMHG | SYSTOLIC BLOOD PRESSURE: 138 MMHG

## 2022-08-30 DIAGNOSIS — I48.0 PAROXYSMAL ATRIAL FIBRILLATION (H): ICD-10-CM

## 2022-08-30 DIAGNOSIS — R07.89 ATYPICAL CHEST PAIN: Primary | ICD-10-CM

## 2022-08-30 PROCEDURE — 99214 OFFICE O/P EST MOD 30 MIN: CPT | Performed by: PHYSICIAN ASSISTANT

## 2022-08-30 NOTE — PROGRESS NOTES
"[facility]    I had the pleasure of seeing Fouzia when she came for follow up of HTN and recent ER visit.  This 57 year old sees Dr. Hensley and Dr. Au for her history of:    1. Hypertension with reduction in Bystolic dose 3/2018 due to lightheaded episodes (subsequently resolved)  2. Palpitations, with event monitor 10/2015 showing episodes of atrial tachycardia previously on carvedilol and now taking by Bystolic  3. Right lower extremity lipoma dermatosclerosis for which Dr. Au saw her 8/2017 with severe R GSV and small R SV. S/p R GSV RF ablation from prox thigh to upper calf and R dGSV insheath sclerotherapy. Direct sclerotherapy of lateral R thigh varicose veins and limited phlebectomy of lateral thigh varicose veins (limited by  vein's proximity to artery)   4. STORMY - CPAP is on RECALL      I saw Fouzia 6/2021 at which time she was doing really well after stopping hydrochlorothiazide (d/t limbs feeling achy/weak and fatigue). Her BPs were under good control and she was using CPAP therapy. Her legs had improved dramatically after intervention with Dr. Au. No changes made and annual follow-up rec'd.     She called 8/25 noting she'd been in ER 8/24 for chest heaviness. Had c/o fatigue in arms, heaviness in chest and cramping in back a/w diaphoresis. Negative troponin, CTPE and COVID testing.  Rec'd for stress testing and monitor has \"HR all over the place\" per ER MD.    Interval History:  Fouzia noted a \"chest heaviness\" starting ~8/19. People told her her \"color was off\" and upper arms ached. The heaviness seems better at rest but hasn't gotten \"super bad\" again since 8/24, when she presented to the ER.  In the ER, D-dimer and troponins were negative, EKG with SR/frequent PACs/sinus arrhythmia.  In the ER, she was told that her heart rates were \"all over the place.\"  She confirms no problems with palpitations, dizziness, lightheadedness or syncope.    She is trying to improve her " "health, and cut out [employer] ~3 weeks ago.  She started walking to get more exercise, and she does not think that this chest heaviness has necessarily gotten worse when she walks.  She does note some right thigh \"aching,\" mostly when she has a day where she is sitting at work more.  She is unsure if this is something that could be related to her previous vein intervention (also right side) she reminds me she did have a varicosity in the right upper thigh that was not treated d/t its proximity to the artery.  No numbness/tingling.    We reviewed her risk factors for CAD - she has a FHx \"hardening of the arteries\" with her dad starting in his 20s, though he never had a heart attack or stents. No issues with mom/siblings. LDL in 7/2021 was 123 mg/dL. No h/o smoking.     VITALS:  Vitals: /83 (BP Location: Left arm, Cuff Size: Adult Large)   Pulse 71   Ht 1.829 m (6')   Wt (!) 154.6 kg (340 lb 14.4 oz)   BMI 46.23 kg/m      Diagnostic Testing:  US LE 3/10/2021 (2 d post procedure) No DVT. R GSV closed from proximal thigh to ankle; lateral thigh  and varicose veins patent with residual reflux up to 4.7s  Stress test 5/2017 showed no evidence of ischemia, with breast attenuation artifact.  Echocardiogram 2016 showed a low normal ejection fraction.  She had no significant valvular abnormalities noted.      Plan:  1.  Lexiscan nuclear stress test.  She gets R thigh aching and is on Bystolic for AT so will not do exercise  2.  24-hour Holter  3. Start ASA  4. See me back to review stress test/Holter    Assessment/Plan:    1. Chest discomfort    She notes this is different than her \"indigestion\" and more of a heaviness which she describes as placing a flat hand on her upper chest.    She had episodes of this starting ~8/19, worse ~8/24, and they have improved since hospitalization.    She had an exercise nuclear stress test 5/2017 which did show breast attenuation.      PLAN:    Nuclear stress test.  Has R " thigh discomfort and would prefer not having to exercise for this.  Furthermore, fear that holding Bystolic will risk recurrent AT    Start ASA    Limit activities that would bring this discomfort on until stress test is complete.  Back to ER if severe discomfort    See me back to review stress test    2. PACs    EKG from ER shows sinus with sinus arrhythmia and frequent PACs.    She has a h/o AT, but has not had palpitations, dizziness or lightheadedness    PLAN:    Continue Bystolic    24-hour Holter    See me back to review    Get back on CPAP therapy    3. R thigh discomfort    She notes this mostly after days where she sits for a long period of time    It can affect walking    Notes that it is near the area where she has an untreated varicose vein (too close to artery)    PLAN:    Will reach out to Dr. Angely Edmond PA-C, MSPAS      Orders Placed This Encounter   Procedures     Follow-Up with Cardiology [facility]     Holter Monitor 24 hour Adult Pediatric     Orders Placed This Encounter   Medications     aspirin (ASA) 81 MG EC tablet     Sig: Take 1 tablet (81 mg) by mouth daily     Medications Discontinued During This Encounter   Medication Reason     mirabegron (MYRBETRIQ) 25 MG 24 hr tablet Stopped by Patient     mirabegron (MYRBETRIQ) 50 MG 24 hr tablet Stopped by Patient     Vitamin D3 (CHOLECALCIFEROL) 25 mcg (1000 units) tablet Stopped by Patient         Encounter Diagnoses   Name Primary?     Paroxysmal atrial fibrillation (H)      Atypical chest pain Yes       CURRENT MEDICATIONS:  Current Outpatient Medications   Medication Sig Dispense Refill     aspirin (ASA) 81 MG EC tablet Take 1 tablet (81 mg) by mouth daily       Aspirin-Acetaminophen-Caffeine (EXCEDRIN EXTRA STRENGTH PO) Take 2 tablets by mouth every 6 hours as needed       lisinopril (ZESTRIL) 20 MG tablet Take 1 tablet (20 mg) by mouth 2 times daily 180 tablet 3     meclizine (ANTIVERT) 12.5 MG tablet Take 12.5 mg by mouth 3 times daily  as needed for dizziness       nebivolol (BYSTOLIC) 10 MG tablet Take 1 tablet (10 mg) by mouth daily No further  refills, please call [phone] to schedule 90 tablet 0     spironolactone (ALDACTONE) 25 MG tablet Take 1 tablet (25 mg) by mouth daily 90 tablet 3     SUMAtriptan (IMITREX) 100 MG tablet Take 1 tablet (100 mg) by mouth at onset of headache for migraine May repeat in 2 hours. Max dose 200mg in 24 hours. 9 tablet 1       ALLERGIES     Allergies   Allergen Reactions     Amoxicillin Nausea and Vomiting     Hctz [Hydrochlorothiazide] Other (See Comments)     Weak/Achy despite nl BMP     Percocet [Oxycodone-Acetaminophen] Nausea and Vomiting         Review of Systems:  Skin:  Negative itching;bruising   Eyes:  Negative glasses  ENT:  Negative    Respiratory:  Negative for dyspnea on exertion;shortness of breath  Cardiovascular:  Negative for;syncope or near-syncope;cyanosis;chest pain;palpitations Positive for;edema;chest pain  Gastroenterology: Negative for melena;hematochezia  Genitourinary:  Negative    Musculoskeletal:  Positive for arthritis (Knees only)  Neurologic:  Negative migraine headaches  Psychiatric:  Negative    Heme/Lymph/Imm:  Negative night sweats  Endocrine:  Negative      Physical Exam:  Vitals: /83 (BP Location: Left arm, Cuff Size: Adult Large)   Pulse 71   Ht 1.829 m (6')   Wt (!) 154.6 kg (340 lb 14.4 oz)   BMI 46.23 kg/m      Constitutional:  cooperative, alert and oriented, well developed, well nourished, in no acute distress overweight      Skin:  warm and dry to the touch        Head:  normocephalic, no masses or lesions        Eyes:  pupils equal and round;conjunctivae and lids unremarkable;sclera white        ENT:  no pallor or cyanosis        Neck:  JVP normal        Chest:  normal respiratory excursion   no reproducible pain    Cardiac: regular rhythm       grade 1;RUSB;early systolic murmur          Abdomen:  abdomen soft obese benign    Vascular: pulses full and  equal                                      Extremities and Back:  no deformities, clubbing, cyanosis, erythema observed stasis pigmentation;erythema      Neurological:  no gross motor deficits            PAST MEDICAL HISTORY:  Past Medical History:   Diagnosis Date     Classic migraine      Complex endometrial hyperplasia     without atypia     Hypertension      Motion sickness      Mumps      Obese      STORMY (obstructive sleep apnea)      Other disorder of menstruation and other abnormal bleeding from female genital tract 2/24/2011     Palpitations      PONV (postoperative nausea and vomiting)      Spider veins      SVT (supraventricular tachycardia) (H) 9/2015       PAST SURGICAL HISTORY:  Past Surgical History:   Procedure Laterality Date     BLADDER SURGERY       CHOLECYSTECTOMY, LAPOROSCOPIC      Cholecystectomy, Laparoscopic     COLONOSCOPY N/A 11/25/2014    Procedure: COLONOSCOPY;  Surgeon: Wenceslao Galo MD;  Location:  GI     CYSTOSCOPY, SLING TRANSVAGINAL N/A 8/20/2018    Procedure: CYSTOSCOPY, SLING TRANSVAGINAL;;  Surgeon: Urban Elena MD;  Location: [facility]     D & C      X2-3 for abnormal bleeding     ESOPHAGOSCOPY, GASTROSCOPY, DUODENOSCOPY (EGD), COMBINED N/A 11/25/2014    Procedure: COMBINED ESOPHAGOSCOPY, GASTROSCOPY, DUODENOSCOPY (EGD), BIOPSY SINGLE OR MULTIPLE;  Surgeon: Wenceslao Galo MD;  Location: [facility]     LAPAROSCOPIC HYSTERECTOMY SUPRACERVICAL N/A 8/20/2018    Procedure: LAPAROSCOPIC HYSTERECTOMY SUPRACERVICAL;  LAPAROSCOPIC SUBTOATAL HYSTERECTOMY, BILATERAL SALPINGECTOMY, SUBURETHRAL SLING AND CYSTOSCOPY;  Surgeon: Urban Elena MD;  Location: [facility]     LAPAROSCOPIC SALPINGECTOMY Bilateral 8/20/2018    Procedure: LAPAROSCOPIC SALPINGECTOMY;;  Surgeon: Urban Elena MD;  Location: [facility]     LAPAROSCOPY      For endometriosis     LASER ABLATION VEIN VARICOSE       OPERATIVE HYSTEROSCOPY WITH MORCELLATOR N/A 3/29/2018    Procedure: OPERATIVE HYSTEROSCOPY  WITH MORCELLATOR (MYOSURE);  OPERATIVE HYSTEROSCOPY WITH MORCELLATOR ;  Surgeon: Urban Elena MD;  Location: [facility]       FAMILY HISTORY:  Family History   Problem Relation Age of Onset     Hypertension Mother      Lipids Mother      Alzheimer Disease Mother      Hypertension Father      Prostate Cancer Father      Lipids Father      Breast Cancer Maternal Aunt      Hypertension Maternal Grandmother      C.A.D. Maternal Grandmother      Gallbladder Disease Maternal Grandmother      Cancer Maternal Grandfather         lung     Cerebrovascular Disease Paternal Grandmother      C.A.D. Paternal Grandfather      Cancer - colorectal Other         cousin maternal      Colon Cancer Cousin      Colon Cancer Cousin      Breast Cancer Paternal Aunt      Diabetes No family hx of        SOCIAL HISTORY:  Social History     Socioeconomic History     Marital status: Single     Spouse name: None     Number of children: None     Years of education: None     Highest education level: None   Tobacco Use     Smoking status: Never Smoker     Smokeless tobacco: Never Used   Substance and Sexual Activity     Alcohol use: Yes     Alcohol/week: 1.7 standard drinks     Drug use: No     Sexual activity: Never   Other Topics Concern     Caffeine Concern Yes     Comment: 1-2 cans qd     Special Diet Yes     Comment: started mediterranian      Exercise Yes     Comment: 20 minutes walking 3-4 days weekly      Seat Belt Yes     Parent/sibling w/ CABG, MI or angioplasty before 65F 55M? No

## 2022-08-30 NOTE — LETTER
"8/30/2022    Ruthie Xiong PA-C  [address] [facility] [address]    RE: Fouzia Arenas       Dear Colleague,     I had the pleasure of seeing Fouzia Arenas in the [facility].  [facility]    I had the pleasure of seeing Fouzia when she came for follow up of HTN and recent ER visit.  This 57 year old sees Dr. Hensley and Dr. Au for her history of:    1. Hypertension with reduction in Bystolic dose 3/2018 due to lightheaded episodes (subsequently resolved)  2. Palpitations, with event monitor 10/2015 showing episodes of atrial tachycardia previously on carvedilol and now taking by Bystolic  3. Right lower extremity lipoma dermatosclerosis for which Dr. Au saw her 8/2017 with severe R GSV and small R SV. S/p R GSV RF ablation from prox thigh to upper calf and R dGSV insheath sclerotherapy. Direct sclerotherapy of lateral R thigh varicose veins and limited phlebectomy of lateral thigh varicose veins (limited by  vein's proximity to artery)   4. STORMY - CPAP is on RECALL      I saw Fouzia 6/2021 at which time she was doing really well after stopping hydrochlorothiazide (d/t limbs feeling achy/weak and fatigue). Her BPs were under good control and she was using CPAP therapy. Her legs had improved dramatically after intervention with Dr. Au. No changes made and annual follow-up rec'd.     She called 8/25 noting she'd been in ER 8/24 for chest heaviness. Had c/o fatigue in arms, heaviness in chest and cramping in back a/w diaphoresis. Negative troponin, CTPE and COVID testing.  Rec'd for stress testing and monitor has \"HR all over the place\" per ER MD.    Interval History:  Fouzia noted a \"chest heaviness\" starting ~8/19. People told her her \"color was off\" and upper arms ached. The heaviness seems better at rest but hasn't gotten \"super bad\" again since 8/24, when she presented to the ER.  In the ER, D-dimer and troponins were negative, EKG with SR/frequent " "PACs/sinus arrhythmia.  In the ER, she was told that her heart rates were \"all over the place.\"  She confirms no problems with palpitations, dizziness, lightheadedness or syncope.    She is trying to improve her health, and cut out [employer] ~3 weeks ago.  She started walking to get more exercise, and she does not think that this chest heaviness has necessarily gotten worse when she walks.  She does note some right thigh \"aching,\" mostly when she has a day where she is sitting at work more.  She is unsure if this is something that could be related to her previous vein intervention (also right side) she reminds me she did have a varicosity in the right upper thigh that was not treated d/t its proximity to the artery.  No numbness/tingling.    We reviewed her risk factors for CAD - she has a FHx \"hardening of the arteries\" with her dad starting in his 20s, though he never had a heart attack or stents. No issues with mom/siblings. LDL in 7/2021 was 123 mg/dL. No h/o smoking.     VITALS:  Vitals: /83 (BP Location: Left arm, Cuff Size: Adult Large)   Pulse 71   Ht 1.829 m (6')   Wt (!) 154.6 kg (340 lb 14.4 oz)   BMI 46.23 kg/m      Diagnostic Testing:  US LE 3/10/2021 (2 d post procedure) No DVT. R GSV closed from proximal thigh to ankle; lateral thigh  and varicose veins patent with residual reflux up to 4.7s  Stress test 5/2017 showed no evidence of ischemia, with breast attenuation artifact.  Echocardiogram 2016 showed a low normal ejection fraction.  She had no significant valvular abnormalities noted.      Plan:  1.  Lexiscan nuclear stress test.  She gets R thigh aching and is on Bystolic for AT so will not do exercise  2.  24-hour Holter  3. Start ASA  4. See me back to review stress test/Holter    Assessment/Plan:    1. Chest discomfort    She notes this is different than her \"indigestion\" and more of a heaviness which she describes as placing a flat hand on her upper chest.    She had " episodes of this starting ~8/19, worse ~8/24, and they have improved since hospitalization.    She had an exercise nuclear stress test 5/2017 which did show breast attenuation.      PLAN:    Nuclear stress test.  Has R thigh discomfort and would prefer not having to exercise for this.  Furthermore, fear that holding Bystolic will risk recurrent AT    Start ASA    Limit activities that would bring this discomfort on until stress test is complete.  Back to ER if severe discomfort    See me back to review stress test    2. PACs    EKG from ER shows sinus with sinus arrhythmia and frequent PACs.    She has a h/o AT, but has not had palpitations, dizziness or lightheadedness    PLAN:    Continue Bystolic    24-hour Holter    See me back to review    Get back on CPAP therapy    3. R thigh discomfort    She notes this mostly after days where she sits for a long period of time    It can affect walking    Notes that it is near the area where she has an untreated varicose vein (too close to artery)    PLAN:    Will reach out to Dr. Angely Edmond PA-C, MSP[facility]      Orders Placed This Encounter   Procedures     Follow-Up with Cardiology [facility]     Holter Monitor 24 hour Adult Pediatric     Orders Placed This Encounter   Medications     aspirin (ASA) 81 MG EC tablet     Sig: Take 1 tablet (81 mg) by mouth daily     Medications Discontinued During This Encounter   Medication Reason     mirabegron (MYRBETRIQ) 25 MG 24 hr tablet Stopped by Patient     mirabegron (MYRBETRIQ) 50 MG 24 hr tablet Stopped by Patient     Vitamin D3 (CHOLECALCIFEROL) 25 mcg (1000 units) tablet Stopped by Patient         Encounter Diagnoses   Name Primary?     Paroxysmal atrial fibrillation (H)      Atypical chest pain Yes       CURRENT MEDICATIONS:  Current Outpatient Medications   Medication Sig Dispense Refill     aspirin (ASA) 81 MG EC tablet Take 1 tablet (81 mg) by mouth daily       Aspirin-Acetaminophen-Caffeine (EXCEDRIN EXTRA STRENGTH PO) Take  2 tablets by mouth every 6 hours as needed       lisinopril (ZESTRIL) 20 MG tablet Take 1 tablet (20 mg) by mouth 2 times daily 180 tablet 3     meclizine (ANTIVERT) 12.5 MG tablet Take 12.5 mg by mouth 3 times daily as needed for dizziness       nebivolol (BYSTOLIC) 10 MG tablet Take 1 tablet (10 mg) by mouth daily No further  refills, please call [phone] to schedule 90 tablet 0     spironolactone (ALDACTONE) 25 MG tablet Take 1 tablet (25 mg) by mouth daily 90 tablet 3     SUMAtriptan (IMITREX) 100 MG tablet Take 1 tablet (100 mg) by mouth at onset of headache for migraine May repeat in 2 hours. Max dose 200mg in 24 hours. 9 tablet 1       ALLERGIES     Allergies   Allergen Reactions     Amoxicillin Nausea and Vomiting     Hctz [Hydrochlorothiazide] Other (See Comments)     Weak/Achy despite nl BMP     Percocet [Oxycodone-Acetaminophen] Nausea and Vomiting         Review of Systems:  Skin:  Negative itching;bruising   Eyes:  Negative glasses  ENT:  Negative    Respiratory:  Negative for dyspnea on exertion;shortness of breath  Cardiovascular:  Negative for;syncope or near-syncope;cyanosis;chest pain;palpitations Positive for;edema;chest pain  Gastroenterology: Negative for melena;hematochezia  Genitourinary:  Negative    Musculoskeletal:  Positive for arthritis (Knees only)  Neurologic:  Negative migraine headaches  Psychiatric:  Negative    Heme/Lymph/Imm:  Negative night sweats  Endocrine:  Negative      Physical Exam:  Vitals: /83 (BP Location: Left arm, Cuff Size: Adult Large)   Pulse 71   Ht 1.829 m (6')   Wt (!) 154.6 kg (340 lb 14.4 oz)   BMI 46.23 kg/m      Constitutional:  cooperative, alert and oriented, well developed, well nourished, in no acute distress overweight      Skin:  warm and dry to the touch        Head:  normocephalic, no masses or lesions        Eyes:  pupils equal and round;conjunctivae and lids unremarkable;sclera white        ENT:  no pallor or cyanosis        Neck:  JVP  normal        Chest:  normal respiratory excursion   no reproducible pain    Cardiac: regular rhythm       grade 1;RUSB;early systolic murmur          Abdomen:  abdomen soft obese benign    Vascular: pulses full and equal                                      Extremities and Back:  no deformities, clubbing, cyanosis, erythema observed stasis pigmentation;erythema      Neurological:  no gross motor deficits            PAST MEDICAL HISTORY:  Past Medical History:   Diagnosis Date     Classic migraine      Complex endometrial hyperplasia     without atypia     Hypertension      Motion sickness      Mumps      Obese      STORMY (obstructive sleep apnea)      Other disorder of menstruation and other abnormal bleeding from female genital tract 2/24/2011     Palpitations      PONV (postoperative nausea and vomiting)      Spider veins      SVT (supraventricular tachycardia) (H) 9/2015       PAST SURGICAL HISTORY:  Past Surgical History:   Procedure Laterality Date     BLADDER SURGERY       CHOLECYSTECTOMY, LAPOROSCOPIC      Cholecystectomy, Laparoscopic     COLONOSCOPY N/A 11/25/2014    Procedure: COLONOSCOPY;  Surgeon: Wenceslao Galo MD;  Location:  GI     CYSTOSCOPY, SLING TRANSVAGINAL N/A 8/20/2018    Procedure: CYSTOSCOPY, SLING TRANSVAGINAL;;  Surgeon: Urban Elena MD;  Location: [facility]     D & C      X2-3 for abnormal bleeding     ESOPHAGOSCOPY, GASTROSCOPY, DUODENOSCOPY (EGD), COMBINED N/A 11/25/2014    Procedure: COMBINED ESOPHAGOSCOPY, GASTROSCOPY, DUODENOSCOPY (EGD), BIOPSY SINGLE OR MULTIPLE;  Surgeon: Wenceslao Galo MD;  Location: [facility]     LAPAROSCOPIC HYSTERECTOMY SUPRACERVICAL N/A 8/20/2018    Procedure: LAPAROSCOPIC HYSTERECTOMY SUPRACERVICAL;  LAPAROSCOPIC SUBTOATAL HYSTERECTOMY, BILATERAL SALPINGECTOMY, SUBURETHRAL SLING AND CYSTOSCOPY;  Surgeon: Urban Elena MD;  Location: [facility]     LAPAROSCOPIC SALPINGECTOMY Bilateral 8/20/2018    Procedure: LAPAROSCOPIC SALPINGECTOMY;;   Surgeon: Urban Elena MD;  Location: [facility]     LAPAROSCOPY      For endometriosis     LASER ABLATION VEIN VARICOSE       OPERATIVE HYSTEROSCOPY WITH MORCELLATOR N/A 3/29/2018    Procedure: OPERATIVE HYSTEROSCOPY WITH MORCELLATOR (MYOSURE);  OPERATIVE HYSTEROSCOPY WITH MORCELLATOR ;  Surgeon: Urban Elena MD;  Location: [facility]       FAMILY HISTORY:  Family History   Problem Relation Age of Onset     Hypertension Mother      Lipids Mother      Alzheimer Disease Mother      Hypertension Father      Prostate Cancer Father      Lipids Father      Breast Cancer Maternal Aunt      Hypertension Maternal Grandmother      C.A.D. Maternal Grandmother      Gallbladder Disease Maternal Grandmother      Cancer Maternal Grandfather         lung     Cerebrovascular Disease Paternal Grandmother      C.A.D. Paternal Grandfather      Cancer - colorectal Other         cousin maternal      Colon Cancer Cousin      Colon Cancer Cousin      Breast Cancer Paternal Aunt      Diabetes No family hx of        SOCIAL HISTORY:  Social History     Socioeconomic History     Marital status: Single     Spouse name: None     Number of children: None     Years of education: None     Highest education level: None   Tobacco Use     Smoking status: Never Smoker     Smokeless tobacco: Never Used   Substance and Sexual Activity     Alcohol use: Yes     Alcohol/week: 1.7 standard drinks     Drug use: No     Sexual activity: Never   Other Topics Concern     Caffeine Concern Yes     Comment: 1-2 cans qd     Special Diet Yes     Comment: started mediterranian      Exercise Yes     Comment: 20 minutes walking 3-4 days weekly      Seat Belt Yes     Parent/sibling w/ CABG, MI or angioplasty before 65F 55M? No           Thank you for allowing me to participate in the care of your patient.      Sincerely,     DANIA Quach [facility]  cc:   Peggy Edmond,  DANIA  [address]  [place] [address]

## 2022-08-30 NOTE — PATIENT INSTRUCTIONS
Fouzia - it was good to see you today!    Reviewed your increased fatigue/nodding off, weight gain and changes in diet you've made  Reviewed ER visit 8/24 for chest heaviness in middle of chest as well as heaviness of arms  Reviewed that tests in ER were OK. EKG showed lots of early beats    PLAN:  24 hour Holter monitor   Stress test - will do chemical one and will have you stay on Bystolic for the test  See me back to review  Start aspirin 81 mg daily with food for extra heart protection while waiting  To ER again if something changes dramatically  Hold off on exercise that brings on chest pressure until stress test is back  GET CPAP back!!!!!!!!!    [phone]

## 2022-09-01 ENCOUNTER — HOSPITAL ENCOUNTER (OUTPATIENT)
Dept: CARDIOLOGY | Facility: CLINIC | Age: 57
Discharge: HOME | End: 2022-09-01
Attending: PHYSICIAN ASSISTANT | Admitting: PHYSICIAN ASSISTANT
Payer: COMMERCIAL

## 2022-09-01 DIAGNOSIS — I48.0 PAROXYSMAL ATRIAL FIBRILLATION (H): ICD-10-CM

## 2022-09-01 DIAGNOSIS — R07.89 ATYPICAL CHEST PAIN: ICD-10-CM

## 2022-09-01 PROCEDURE — 93226 XTRNL ECG REC<48 HR SCAN A/R: CPT

## 2022-09-01 PROCEDURE — 93227 XTRNL ECG REC<48 HR R&I: CPT | Performed by: INTERNAL MEDICINE

## 2022-09-08 DIAGNOSIS — I83.811 VARICOSE VEINS OF RIGHT LOWER EXTREMITY WITH PAIN: Primary | ICD-10-CM

## 2022-09-12 ENCOUNTER — HOSPITAL ENCOUNTER (OUTPATIENT)
Dept: NUCLEAR MEDICINE | Facility: CLINIC | Age: 57
Setting detail: NUCLEAR MEDICINE
Discharge: HOME | End: 2022-09-12
Attending: PHYSICIAN ASSISTANT
Payer: COMMERCIAL

## 2022-09-12 DIAGNOSIS — R07.89 ATYPICAL CHEST PAIN: ICD-10-CM

## 2022-09-12 DIAGNOSIS — I48.0 PAROXYSMAL ATRIAL FIBRILLATION (H): ICD-10-CM

## 2022-09-12 PROCEDURE — 78452 HT MUSCLE IMAGE SPECT MULT: CPT

## 2022-09-12 PROCEDURE — 93017 CV STRESS TEST TRACING ONLY: CPT

## 2022-09-12 PROCEDURE — 78452 HT MUSCLE IMAGE SPECT MULT: CPT | Mod: 26 | Performed by: INTERNAL MEDICINE

## 2022-09-12 PROCEDURE — A9502 TC99M TETROFOSMIN: HCPCS | Performed by: PHYSICIAN ASSISTANT

## 2022-09-12 PROCEDURE — 93018 CV STRESS TEST I&R ONLY: CPT | Performed by: INTERNAL MEDICINE

## 2022-09-12 PROCEDURE — 93016 CV STRESS TEST SUPVJ ONLY: CPT | Performed by: INTERNAL MEDICINE

## 2022-09-12 PROCEDURE — 343N000001 HC RX 343: Performed by: PHYSICIAN ASSISTANT

## 2022-09-12 RX ADMIN — Medication 33 MILLICURIE: at 12:12

## 2022-09-13 ENCOUNTER — DOCUMENTATION ONLY (OUTPATIENT)
Dept: CARDIOLOGY | Facility: CLINIC | Age: 57
End: 2022-09-13

## 2022-09-13 ENCOUNTER — HOSPITAL ENCOUNTER (OUTPATIENT)
Dept: CARDIOLOGY | Facility: CLINIC | Age: 57
Discharge: HOME | End: 2022-09-13
Attending: PHYSICIAN ASSISTANT
Payer: COMMERCIAL

## 2022-09-13 ENCOUNTER — HOSPITAL ENCOUNTER (OUTPATIENT)
Dept: NUCLEAR MEDICINE | Facility: CLINIC | Age: 57
Setting detail: NUCLEAR MEDICINE
Discharge: HOME | End: 2022-09-13
Attending: PHYSICIAN ASSISTANT
Payer: COMMERCIAL

## 2022-09-13 ENCOUNTER — HOSPITAL ENCOUNTER (EMERGENCY)
Facility: CLINIC | Age: 57
Discharge: HOME | End: 2022-09-14
Attending: EMERGENCY MEDICINE | Admitting: EMERGENCY MEDICINE
Payer: COMMERCIAL

## 2022-09-13 VITALS — DIASTOLIC BLOOD PRESSURE: 85 MMHG | SYSTOLIC BLOOD PRESSURE: 151 MMHG

## 2022-09-13 DIAGNOSIS — R06.02 SOB (SHORTNESS OF BREATH): ICD-10-CM

## 2022-09-13 DIAGNOSIS — Z53.9 ERRONEOUS ENCOUNTER--DISREGARD: Primary | ICD-10-CM

## 2022-09-13 DIAGNOSIS — R07.9 CHEST PAIN, UNSPECIFIED TYPE: ICD-10-CM

## 2022-09-13 LAB
ANION GAP SERPL CALCULATED.3IONS-SCNC: 6 MMOL/L (ref 3–14)
ATRIAL RATE - MUSE: 64 BPM
BASOPHILS # BLD AUTO: 0.1 10E3/UL (ref 0–0.2)
BASOPHILS NFR BLD AUTO: 1 %
BUN SERPL-MCNC: 16 MG/DL (ref 7–30)
CALCIUM SERPL-MCNC: 9.2 MG/DL (ref 8.5–10.1)
CHLORIDE BLD-SCNC: 105 MMOL/L (ref 94–109)
CO2 SERPL-SCNC: 26 MMOL/L (ref 20–32)
CREAT SERPL-MCNC: 0.99 MG/DL (ref 0.52–1.04)
CV STRESS MAX HR HE: 62
DIASTOLIC BLOOD PRESSURE - MUSE: NORMAL MMHG
EOSINOPHIL # BLD AUTO: 0.1 10E3/UL (ref 0–0.7)
EOSINOPHIL NFR BLD AUTO: 2 %
ERYTHROCYTE [DISTWIDTH] IN BLOOD BY AUTOMATED COUNT: 12.5 % (ref 10–15)
GFR SERPL CREATININE-BSD FRML MDRD: 66 ML/MIN/1.73M2
GLUCOSE BLD-MCNC: 125 MG/DL (ref 70–99)
HCT VFR BLD AUTO: 46.1 % (ref 35–47)
HGB BLD-MCNC: 14.4 G/DL (ref 11.7–15.7)
HOLD SPECIMEN: NORMAL
IMM GRANULOCYTES # BLD: 0 10E3/UL
IMM GRANULOCYTES NFR BLD: 0 %
INTERPRETATION ECG - MUSE: NORMAL
LYMPHOCYTES # BLD AUTO: 2.9 10E3/UL (ref 0.8–5.3)
LYMPHOCYTES NFR BLD AUTO: 37 %
MCH RBC QN AUTO: 28.2 PG (ref 26.5–33)
MCHC RBC AUTO-ENTMCNC: 31.2 G/DL (ref 31.5–36.5)
MCV RBC AUTO: 90 FL (ref 78–100)
MONOCYTES # BLD AUTO: 0.6 10E3/UL (ref 0–1.3)
MONOCYTES NFR BLD AUTO: 8 %
NEUTROPHILS # BLD AUTO: 4 10E3/UL (ref 1.6–8.3)
NEUTROPHILS NFR BLD AUTO: 52 %
NRBC # BLD AUTO: 0 10E3/UL
NRBC BLD AUTO-RTO: 0 /100
NT-PROBNP SERPL-MCNC: 297 PG/ML (ref 0–900)
NUC STRESS EJECTION FRACTION: 76 %
P AXIS - MUSE: 57 DEGREES
PLATELET # BLD AUTO: 267 10E3/UL (ref 150–450)
POTASSIUM BLD-SCNC: 4.2 MMOL/L (ref 3.4–5.3)
PR INTERVAL - MUSE: 186 MS
QRS DURATION - MUSE: 94 MS
QT - MUSE: 416 MS
QTC - MUSE: 429 MS
R AXIS - MUSE: -18 DEGREES
RATE PRESSURE PRODUCT: 8680
RBC # BLD AUTO: 5.11 10E6/UL (ref 3.8–5.2)
SODIUM SERPL-SCNC: 137 MMOL/L (ref 133–144)
STRESS ECHO BASELINE DIASTOLIC HE: 85
STRESS ECHO BASELINE HR: 48 BPM
STRESS ECHO BASELINE SYSTOLIC BP: 151
STRESS ECHO CALCULATED PERCENT HR: 38 %
STRESS ECHO LAST STRESS DIASTOLIC BP: 90
STRESS ECHO LAST STRESS SYSTOLIC BP: 140
STRESS ECHO TARGET HR: 163
SYSTOLIC BLOOD PRESSURE - MUSE: NORMAL MMHG
T AXIS - MUSE: -12 DEGREES
TROPONIN I SERPL HS-MCNC: 7 NG/L
VENTRICULAR RATE- MUSE: 64 BPM
WBC # BLD AUTO: 7.7 10E3/UL (ref 4–11)

## 2022-09-13 PROCEDURE — 85025 COMPLETE CBC W/AUTO DIFF WBC: CPT | Performed by: EMERGENCY MEDICINE

## 2022-09-13 PROCEDURE — 93005 ELECTROCARDIOGRAM TRACING: CPT

## 2022-09-13 PROCEDURE — 250N000011 HC RX IP 250 OP 636

## 2022-09-13 PROCEDURE — 99285 EMERGENCY DEPT VISIT HI MDM: CPT | Mod: 25

## 2022-09-13 PROCEDURE — 36415 COLL VENOUS BLD VENIPUNCTURE: CPT | Performed by: EMERGENCY MEDICINE

## 2022-09-13 PROCEDURE — 80048 BASIC METABOLIC PNL TOTAL CA: CPT | Performed by: EMERGENCY MEDICINE

## 2022-09-13 PROCEDURE — A9502 TC99M TETROFOSMIN: HCPCS | Performed by: PHYSICIAN ASSISTANT

## 2022-09-13 PROCEDURE — 85379 FIBRIN DEGRADATION QUANT: CPT | Performed by: EMERGENCY MEDICINE

## 2022-09-13 PROCEDURE — 343N000001 HC RX 343: Performed by: PHYSICIAN ASSISTANT

## 2022-09-13 PROCEDURE — 83880 ASSAY OF NATRIURETIC PEPTIDE: CPT | Performed by: EMERGENCY MEDICINE

## 2022-09-13 PROCEDURE — 84484 ASSAY OF TROPONIN QUANT: CPT | Performed by: EMERGENCY MEDICINE

## 2022-09-13 RX ORDER — REGADENOSON 0.08 MG/ML
0.4 INJECTION, SOLUTION INTRAVENOUS ONCE
Status: COMPLETED | OUTPATIENT
Start: 2022-09-13 | End: 2022-09-13

## 2022-09-13 RX ORDER — ALBUTEROL SULFATE 90 UG/1
2 AEROSOL, METERED RESPIRATORY (INHALATION) EVERY 5 MIN PRN
Status: DISCONTINUED | OUTPATIENT
Start: 2022-09-13 | End: 2022-09-14 | Stop reason: HOSPADM

## 2022-09-13 RX ORDER — ACYCLOVIR 200 MG/1
0-1 CAPSULE ORAL
Status: DISCONTINUED | OUTPATIENT
Start: 2022-09-13 | End: 2022-09-14 | Stop reason: HOSPADM

## 2022-09-13 RX ORDER — AMINOPHYLLINE 25 MG/ML
50-100 INJECTION, SOLUTION INTRAVENOUS
Status: DISCONTINUED | OUTPATIENT
Start: 2022-09-13 | End: 2022-09-14 | Stop reason: HOSPADM

## 2022-09-13 RX ORDER — REGADENOSON 0.08 MG/ML
INJECTION, SOLUTION INTRAVENOUS
Status: COMPLETED
Start: 2022-09-13 | End: 2022-09-13

## 2022-09-13 RX ADMIN — REGADENOSON 0.4 MG: 0.08 INJECTION, SOLUTION INTRAVENOUS at 11:09

## 2022-09-13 RX ADMIN — Medication 33 MILLICURIE: at 11:20

## 2022-09-13 ASSESSMENT — ENCOUNTER SYMPTOMS
SHORTNESS OF BREATH: 1
FEVER: 0
NAUSEA: 1
COUGH: 0

## 2022-09-13 ASSESSMENT — ACTIVITIES OF DAILY LIVING (ADL): ADLS_ACUITY_SCORE: 33

## 2022-09-13 NOTE — PROGRESS NOTES
[place] scan given - SOB and chest pressure 6/10 occurred immediately after med given.  Headache slowly coming on.  Symptoms resolved within few minutes but headache lingers.  Will escort to radiology waiting room to wait for final scan.  Shannen Paige RN

## 2022-09-14 ENCOUNTER — APPOINTMENT (OUTPATIENT)
Dept: GENERAL RADIOLOGY | Facility: CLINIC | Age: 57
End: 2022-09-14
Attending: EMERGENCY MEDICINE
Payer: COMMERCIAL

## 2022-09-14 VITALS
BODY MASS INDEX: 43.4 KG/M2 | SYSTOLIC BLOOD PRESSURE: 146 MMHG | HEART RATE: 45 BPM | TEMPERATURE: 98.1 F | DIASTOLIC BLOOD PRESSURE: 95 MMHG | OXYGEN SATURATION: 95 % | RESPIRATION RATE: 20 BRPM | WEIGHT: 293 LBS

## 2022-09-14 LAB
D DIMER PPP FEU-MCNC: <0.27 UG/ML FEU (ref 0–0.5)
TROPONIN I SERPL HS-MCNC: 7 NG/L

## 2022-09-14 PROCEDURE — 71046 X-RAY EXAM CHEST 2 VIEWS: CPT

## 2022-09-14 ASSESSMENT — ACTIVITIES OF DAILY LIVING (ADL)
ADLS_ACUITY_SCORE: 35
ADLS_ACUITY_SCORE: 35

## 2022-09-14 NOTE — ED TRIAGE NOTES
Pt presents c/o SOB and CP that began at about 1900 tonight. Pt suddenly felt nauseated and dizzy. Pt had stress test earlier today. Hx irregular heart rhythm.

## 2022-09-18 NOTE — PROGRESS NOTES
"[facility]    I had the pleasure of seeing Fouzia when she came for follow up of recent testing.  This 57 year old sees Dr. Hensley and Dr. Au for her history of:    1. Hypertension with reduction in Bystolic dose 3/2018 due to lightheaded episodes (subsequently resolved)  2. Palpitations, with event monitor 10/2015 showing episodes of atrial tachycardia previously on carvedilol and now taking by Bystolic.  Holter monitor done 9/2022 with 21% PACs  3. Right lower extremity lipoma dermatosclerosis for which Dr. Au saw her 8/2017 with severe R GSV and small R SV. S/p R GSV RF ablation from prox thigh to upper calf and R dGSV insheath sclerotherapy. Direct sclerotherapy of lateral R thigh varicose veins and limited phlebectomy of lateral thigh varicose veins (limited by  vein's proximity to artery)   4. STORMY - CPAP is on RECALL      I saw Fouzia 8/30 at which time we reviewed c/o chest heaviness starting 8/19.  She was in the ER 8/24, where D-dimer and troponins were negative.  She had also noted some right thigh \"aching\" and was wondering if this could be related to her previous vein intervention.  I recommended stress testing and 24-hour HM given frequent PACs on EKG in ER.  I had her start ASA and limit activity until she could get her stress test done.    Stress test on 9/13 showed no evidence of ischemia/infarction with hyperdynamic LV EF.  Holter monitor showed NSR with very frequent PACs (21%) on Bystolic 10 mg daily.    She was in the ER again 9/13 for c/o CP. They reviewed her negative stress test done earlier that day, along with nl rodger.    Interval History:  Overall, Tari feels like she is doing a bit better from a cardiac perspective.  She did recently injure her right rib cage by leaning over a stool for a prolonged period of time, and is seeing the chiropractor, which has helped.    She continues to get occasional chest \"heaviness\" but these remain unrelated to exertion.  She " "was very relieved to note that stress test was normal. She remains with VILLAFANA.    She is eager to meet with Dr. Au as her right thigh heaviness keeps her from walking very much.  She cut out sugared soda ~6 weeks ago and is disappointed she has not lost more weight with the significant calorie reduction (previously drinking 6-8 Cokes/day).    No dizziness, lightheadedness.  Had occasional palpitations that she documented on her Holter monitor, which we reviewed in detail.  She had SR, and I noted a very blunted HR response, with HR only getting into 80s on Bystolic 10 mg daily.  In the ER , HR was noted to be in the 40s, however I see minimum heart rate on her Holter was only 50 bpm, and wonder if this was d/t PACs that were not picked up by the ER monitor.    We reviewed her symptoms of palpitations did not correlate with SR/PACs, however she had 21% PAC burden, and was not symptomatic with the vast majority of these.    She continues to c/o cough.  She had been taken off of lisinopril back in 2021 by Dr. Au and switched to losartan/HCTZ.  She stopped this after 1 week d/t R upper back/side and RLE cramping and went back to lisinopril 20 mg BID.  In 2021, I started HCTZ.  She then stopped HCTZ 2022 d/t fatigue, aching and weakness of her limbs.  She was tolerating lisinopril 20 BID, and therefore we made no changes.  Her cough is now more bothersome, and she actually cut back on lisinopril to just taking it at night because she did not want to cough throughout the day.  We reviewed her possible ADRs with losartan -she is marked on the bottle that she was \"dizzy\" with this (though I do not see this in any of our notes).    VITALS:  Vitals: /83   Pulse 54   Ht 1.829 m (6')   Wt (!) 155.6 kg (343 lb)   BMI 46.52 kg/m      Diagnostic Testin hour Holter 2022 SR 62 bpm (range 50-84 bpm). <1% PVCs and 21% PACs  Nuc Stress Test 2022 with no inducible myocardial ischemia.  Hyperdynamic " "LVEF 76%  US LE 3/10/2021 (2 d post procedure) No DVT. R GSV closed from proximal thigh to ankle; lateral thigh  and varicose veins patent with residual reflux up to 4.7s  Stress test 5/2017 showed no evidence of ischemia, with breast attenuation artifact.  Echocardiogram 2016 showed a low normal ejection fraction.  She had no significant valvular abnormalities noted.      Plan:  1. Hold Bystolic 10 mg x 2 weeks  2. Call me after 10/1 with an update on HR, BP and VILLAFANA    Assessment/Plan:    1. Chest discomfort    Episode started 8/19, and got much worse 8/24, prompting ER visit.  She was back in the ER 9/13/2022, where troponins were again negative    Stress test done 9/13/2022 was negative for ischemia/infarction with normal LVEF    Still with occasional chest \"heaviness\" but not exertional    PLAN:    Continue to monitor    2. Frequent PACs    EKG from ER showed SR with SA/PACs    She has a history of AT, but had actually been asymptomatic.      Holter monitor done 9/1/2022 showed very frequent PACs, with 21% burden on Bystolic 10 mg daily. These PACs were largely asymptomatic.      I think she had a peripheral pulse deficit in the ER 9/13 as HRs were listed to be in the 40s.  This was not seen on her 24-hour Holter      PLAN:    Despite frequent PACs, will do a trial OFF of Bystolic to see if this improves her HR variability/chronotropic incompetence     She will contact me in 2 weeks with an update on HR, BP and VILLAFANA    We may want to try something like flecainide for her frequent PACs/AT if they are found to affect EF or cause symptoms    3. Hypertension    Remains on lisinopril 20 mg (she decreased to daily d/t cough), Bystolic 10 mg daily and spironolactone 25 mg daily    BP okay today    PLAN:    Agreed that when we talked ~10/1, would determine if we should switch her lisinopril to an ARB other than losartan.  She has marked on a bottle of plain losartan that this caused dizziness, so would prefer " not to use this again.  Certainly, we could try valsartan    4. Lipoma dermatosclerosis    S/p R GSV RF ablation from prox thigh to upper calf and R dGSV insheath sclerotherapy. Direct sclerotherapy of lateral R thigh varicose veins and limited phlebectomy of lateral thigh varicose veins (limited by  vein's proximity to artery)    PLAN:    See Dr. Au 11/2022 as planned      Jaqui Edmond PA-C, MSPAS      No orders of the defined types were placed in this encounter.    Orders Placed This Encounter   Medications     CINNAMON PO     Sig: Take 2,000 mg by mouth daily     MAGNESIUM PO     Cholecalciferol (VITAMIN D-3) 125 MCG (5000 UT) TABS     Sig: Take 2 capsules by mouth daily     UNABLE TO FIND     Sig: Cardiotrophin pmg     UNABLE TO FIND     Sig: Beta plus     Acetaminophen (TYLENOL 8 HOUR ARTHRITIS PAIN PO)     lisinopril (ZESTRIL) 20 MG tablet     Sig: Take 1 tablet (20 mg) by mouth daily     Medications Discontinued During This Encounter   Medication Reason     nebivolol (BYSTOLIC) 10 MG tablet      lisinopril (ZESTRIL) 20 MG tablet          Encounter Diagnoses   Name Primary?     Paroxysmal atrial fibrillation (H)      Atypical chest pain      Benign essential hypertension        CURRENT MEDICATIONS:  Current Outpatient Medications   Medication Sig Dispense Refill     Acetaminophen (TYLENOL 8 HOUR ARTHRITIS PAIN PO)        aspirin (ASA) 81 MG EC tablet Take 1 tablet (81 mg) by mouth daily       Aspirin-Acetaminophen-Caffeine (EXCEDRIN EXTRA STRENGTH PO) Take 2 tablets by mouth every 6 hours as needed       Cholecalciferol (VITAMIN D-3) 125 MCG (5000 UT) TABS Take 2 capsules by mouth daily       CINNAMON PO Take 2,000 mg by mouth daily       lisinopril (ZESTRIL) 20 MG tablet Take 1 tablet (20 mg) by mouth daily       MAGNESIUM PO        meclizine (ANTIVERT) 12.5 MG tablet Take 12.5 mg by mouth 3 times daily as needed for dizziness       spironolactone (ALDACTONE) 25 MG tablet Take 1 tablet (25 mg) by  mouth daily 90 tablet 3     SUMAtriptan (IMITREX) 100 MG tablet Take 1 tablet (100 mg) by mouth at onset of headache for migraine May repeat in 2 hours. Max dose 200mg in 24 hours. 9 tablet 1     UNABLE TO FIND Cardiotrophin pmg       UNABLE TO FIND Beta plus         ALLERGIES     Allergies   Allergen Reactions     Amoxicillin Nausea and Vomiting     Hctz [Hydrochlorothiazide] Other (See Comments)     Weak/Achy despite nl BMP     Percocet [Oxycodone-Acetaminophen] Nausea and Vomiting         Review of Systems:  Skin:  Negative itching;bruising   Eyes:  Negative glasses  ENT:  Negative    Respiratory:  Negative for dyspnea on exertion;shortness of breath  Cardiovascular:  Negative for;syncope or near-syncope;cyanosis;chest pain;palpitations Positive for;edema;chest pain  Gastroenterology: Negative for melena;hematochezia  Genitourinary:  Negative    Musculoskeletal:  Positive for arthritis (Knees only)  Neurologic:  Negative migraine headaches  Psychiatric:  Negative    Heme/Lymph/Imm:  Negative night sweats  Endocrine:  Negative      Physical Exam:  Vitals: /83   Pulse 54   Ht 1.829 m (6')   Wt (!) 155.6 kg (343 lb)   BMI 46.52 kg/m      Constitutional:  cooperative, alert and oriented, well developed, well nourished, in no acute distress overweight      Skin:  warm and dry to the touch        Head:  normocephalic, no masses or lesions        Eyes:  pupils equal and round;conjunctivae and lids unremarkable;sclera white        ENT:  not assessed this visit        Neck:  not assessed this visit        Chest:  normal respiratory excursion   no reproducible pain    Cardiac:                    Abdomen:  not assessed this visit        Vascular: not assessed this visit                                      Extremities and Back:  no deformities, clubbing, cyanosis, erythema observed;not assessed this visit stasis pigmentation;erythema      Neurological:  no gross motor deficits            PAST MEDICAL  HISTORY:  Past Medical History:   Diagnosis Date     Classic migraine      Complex endometrial hyperplasia     without atypia     Hypertension      Motion sickness      Mumps      Obese      STORMY (obstructive sleep apnea)      Other disorder of menstruation and other abnormal bleeding from female genital tract 2/24/2011     Palpitations      PONV (postoperative nausea and vomiting)      Spider veins      SVT (supraventricular tachycardia) (H) 9/2015       PAST SURGICAL HISTORY:  Past Surgical History:   Procedure Laterality Date     BLADDER SURGERY       CHOLECYSTECTOMY, LAPOROSCOPIC      Cholecystectomy, Laparoscopic     COLONOSCOPY N/A 11/25/2014    Procedure: COLONOSCOPY;  Surgeon: Wenceslao Galo MD;  Location:  GI     CYSTOSCOPY, SLING TRANSVAGINAL N/A 8/20/2018    Procedure: CYSTOSCOPY, SLING TRANSVAGINAL;;  Surgeon: Urban Elena MD;  Location: [facility]     D & C      X2-3 for abnormal bleeding     ESOPHAGOSCOPY, GASTROSCOPY, DUODENOSCOPY (EGD), COMBINED N/A 11/25/2014    Procedure: COMBINED ESOPHAGOSCOPY, GASTROSCOPY, DUODENOSCOPY (EGD), BIOPSY SINGLE OR MULTIPLE;  Surgeon: Wenceslao Galo MD;  Location: [facility]     LAPAROSCOPIC HYSTERECTOMY SUPRACERVICAL N/A 8/20/2018    Procedure: LAPAROSCOPIC HYSTERECTOMY SUPRACERVICAL;  LAPAROSCOPIC SUBTOATAL HYSTERECTOMY, BILATERAL SALPINGECTOMY, SUBURETHRAL SLING AND CYSTOSCOPY;  Surgeon: Urban Elena MD;  Location: [facility]     LAPAROSCOPIC SALPINGECTOMY Bilateral 8/20/2018    Procedure: LAPAROSCOPIC SALPINGECTOMY;;  Surgeon: Urban Elena MD;  Location: [facility]     LAPAROSCOPY      For endometriosis     LASER ABLATION VEIN VARICOSE       OPERATIVE HYSTEROSCOPY WITH MORCELLATOR N/A 3/29/2018    Procedure: OPERATIVE HYSTEROSCOPY WITH MORCELLATOR (MYOSURE);  OPERATIVE HYSTEROSCOPY WITH MORCELLATOR ;  Surgeon: Urban Elena MD;  Location: [facility]       FAMILY HISTORY:  Family History   Problem Relation Age of Onset     Hypertension  Mother      Lipids Mother      Alzheimer Disease Mother      Hypertension Father      Prostate Cancer Father      Lipids Father      Breast Cancer Maternal Aunt      Hypertension Maternal Grandmother      C.A.D. Maternal Grandmother      Gallbladder Disease Maternal Grandmother      Cancer Maternal Grandfather         lung     Cerebrovascular Disease Paternal Grandmother      C.A.D. Paternal Grandfather      Cancer - colorectal Other         cousin maternal      Colon Cancer Cousin      Colon Cancer Cousin      Breast Cancer Paternal Aunt      Diabetes No family hx of        SOCIAL HISTORY:  Social History     Socioeconomic History     Marital status: Single     Spouse name: None     Number of children: None     Years of education: None     Highest education level: None   Tobacco Use     Smoking status: Never Smoker     Smokeless tobacco: Never Used   Substance and Sexual Activity     Alcohol use: Yes     Alcohol/week: 1.7 standard drinks     Drug use: No     Sexual activity: Never   Other Topics Concern     Caffeine Concern Yes     Comment: 1-2 cans qd     Special Diet Yes     Comment: started mediterranian      Exercise Yes     Comment: 20 minutes walking 3-4 days weekly      Seat Belt Yes     Parent/sibling w/ CABG, MI or angioplasty before 65F 55M? No

## 2022-09-20 ENCOUNTER — OFFICE VISIT (OUTPATIENT)
Dept: CARDIOLOGY | Facility: CLINIC | Age: 57
End: 2022-09-20
Payer: COMMERCIAL

## 2022-09-20 VITALS
BODY MASS INDEX: 39.68 KG/M2 | DIASTOLIC BLOOD PRESSURE: 83 MMHG | WEIGHT: 293 LBS | SYSTOLIC BLOOD PRESSURE: 132 MMHG | HEIGHT: 72 IN | HEART RATE: 54 BPM

## 2022-09-20 DIAGNOSIS — R07.89 ATYPICAL CHEST PAIN: ICD-10-CM

## 2022-09-20 DIAGNOSIS — I10 BENIGN ESSENTIAL HYPERTENSION: ICD-10-CM

## 2022-09-20 DIAGNOSIS — I48.0 PAROXYSMAL ATRIAL FIBRILLATION (H): ICD-10-CM

## 2022-09-20 PROCEDURE — 99214 OFFICE O/P EST MOD 30 MIN: CPT | Performed by: PHYSICIAN ASSISTANT

## 2022-09-20 RX ORDER — LISINOPRIL 20 MG/1
20 TABLET ORAL DAILY
COMMUNITY
Start: 2022-09-20 | End: 2023-11-13

## 2022-09-20 NOTE — LETTER
"9/20/2022    Ruthie Xiong PA-C  [address] [facility] [address]    RE: Fouzia Arenas       Dear Colleague,     I had the pleasure of seeing Fouzia Arenas in the [facility].  [facility]    I had the pleasure of seeing Fouzia when she came for follow up of recent testing.  This 57 year old sees Dr. Hensley and Dr. Au for her history of:    1. Hypertension with reduction in Bystolic dose 3/2018 due to lightheaded episodes (subsequently resolved)  2. Palpitations, with event monitor 10/2015 showing episodes of atrial tachycardia previously on carvedilol and now taking by Bystolic.  Holter monitor done 9/2022 with 21% PACs  3. Right lower extremity lipoma dermatosclerosis for which Dr. Au saw her 8/2017 with severe R GSV and small R SV. S/p R GSV RF ablation from prox thigh to upper calf and R dGSV insheath sclerotherapy. Direct sclerotherapy of lateral R thigh varicose veins and limited phlebectomy of lateral thigh varicose veins (limited by  vein's proximity to artery)   4. STORMY - CPAP is on RECALL      I saw Fouzia 8/30 at which time we reviewed c/o chest heaviness starting 8/19.  She was in the ER 8/24, where D-dimer and troponins were negative.  She had also noted some right thigh \"aching\" and was wondering if this could be related to her previous vein intervention.  I recommended stress testing and 24-hour HM given frequent PACs on EKG in ER.  I had her start ASA and limit activity until she could get her stress test done.    Stress test on 9/13 showed no evidence of ischemia/infarction with hyperdynamic LV EF.  Holter monitor showed NSR with very frequent PACs (21%) on Bystolic 10 mg daily.    She was in the ER again 9/13 for c/o CP. They reviewed her negative stress test done earlier that day, along with nl rodger.    Interval History:  Overall, Tari feels like she is doing a bit better from a cardiac perspective.  She did recently injure her right " "rib cage by leaning over a stool for a prolonged period of time, and is seeing the chiropractor, which has helped.    She continues to get occasional chest \"heaviness\" but these remain unrelated to exertion.  She was very relieved to note that stress test was normal. She remains with VILLAFANA.    She is eager to meet with Dr. Au as her right thigh heaviness keeps her from walking very much.  She cut out sugared soda ~6 weeks ago and is disappointed she has not lost more weight with the significant calorie reduction (previously drinking 6-8 Cokes/day).    No dizziness, lightheadedness.  Had occasional palpitations that she documented on her Holter monitor, which we reviewed in detail.  She had SR, and I noted a very blunted HR response, with HR only getting into 80s on Bystolic 10 mg daily.  In the ER 9/13, HR was noted to be in the 40s, however I see minimum heart rate on her Holter was only 50 bpm, and wonder if this was d/t PACs that were not picked up by the ER monitor.    We reviewed her symptoms of palpitations did not correlate with SR/PACs, however she had 21% PAC burden, and was not symptomatic with the vast majority of these.    She continues to c/o cough.  She had been taken off of lisinopril back in 1/2021 by Dr. Au and switched to losartan/HCTZ.  She stopped this after 1 week d/t R upper back/side and RLE cramping and went back to lisinopril 20 mg BID.  In 4/2021, I started HCTZ.  She then stopped HCTZ 4/30/2022 d/t fatigue, aching and weakness of her limbs.  She was tolerating lisinopril 20 BID, and therefore we made no changes.  Her cough is now more bothersome, and she actually cut back on lisinopril to just taking it at night because she did not want to cough throughout the day.  We reviewed her possible ADRs with losartan -she is marked on the bottle that she was \"dizzy\" with this (though I do not see this in any of our notes).    VITALS:  Vitals: /83   Pulse 54   Ht 1.829 m (6')   Wt (!) " "155.6 kg (343 lb)   BMI 46.52 kg/m      Diagnostic Testin hour Holter 2022 SR 62 bpm (range 50-84 bpm). <1% PVCs and 21% PACs  Nuc Stress Test 2022 with no inducible myocardial ischemia.  Hyperdynamic LVEF 76%  US LE 3/10/2021 (2 d post procedure) No DVT. R GSV closed from proximal thigh to ankle; lateral thigh  and varicose veins patent with residual reflux up to 4.7s  Stress test 2017 showed no evidence of ischemia, with breast attenuation artifact.  Echocardiogram  showed a low normal ejection fraction.  She had no significant valvular abnormalities noted.      Plan:  1. Hold Bystolic 10 mg x 2 weeks  2. Call me after 10/1 with an update on HR, BP and VILLAFAAN    Assessment/Plan:    1. Chest discomfort    Episode started , and got much worse , prompting ER visit.  She was back in the ER 2022, where troponins were again negative    Stress test done 2022 was negative for ischemia/infarction with normal LVEF    Still with occasional chest \"heaviness\" but not exertional    PLAN:    Continue to monitor    2. Frequent PACs    EKG from ER showed SR with SA/PACs    She has a history of AT, but had actually been asymptomatic.      Holter monitor done 2022 showed very frequent PACs, with 21% burden on Bystolic 10 mg daily. These PACs were largely asymptomatic.      I think she had a peripheral pulse deficit in the ER  as HRs were listed to be in the 40s.  This was not seen on her 24-hour Holter      PLAN:    Despite frequent PACs, will do a trial OFF of Bystolic to see if this improves her HR variability/chronotropic incompetence     She will contact me in 2 weeks with an update on HR, BP and VILLAFANA    We may want to try something like flecainide for her frequent PACs/AT if they are found to affect EF or cause symptoms    3. Hypertension    Remains on lisinopril 20 mg (she decreased to daily d/t cough), Bystolic 10 mg daily and spironolactone 25 mg daily    BP okay " today    PLAN:    Agreed that when we talked ~10/1, would determine if we should switch her lisinopril to an ARB other than losartan.  She has marked on a bottle of plain losartan that this caused dizziness, so would prefer not to use this again.  Certainly, we could try valsartan    4. Lipoma dermatosclerosis    S/p R GSV RF ablation from prox thigh to upper calf and R dGSV insheath sclerotherapy. Direct sclerotherapy of lateral R thigh varicose veins and limited phlebectomy of lateral thigh varicose veins (limited by  vein's proximity to artery)    PLAN:    See Dr. Au 11/2022 as planned      Jaqui Edmond PA-C, MSPAS      No orders of the defined types were placed in this encounter.    Orders Placed This Encounter   Medications     CINNAMON PO     Sig: Take 2,000 mg by mouth daily     MAGNESIUM PO     Cholecalciferol (VITAMIN D-3) 125 MCG (5000 UT) TABS     Sig: Take 2 capsules by mouth daily     UNABLE TO FIND     Sig: Cardiotrophin pmg     UNABLE TO FIND     Sig: Beta plus     Acetaminophen (TYLENOL 8 HOUR ARTHRITIS PAIN PO)     lisinopril (ZESTRIL) 20 MG tablet     Sig: Take 1 tablet (20 mg) by mouth daily     Medications Discontinued During This Encounter   Medication Reason     nebivolol (BYSTOLIC) 10 MG tablet      lisinopril (ZESTRIL) 20 MG tablet          Encounter Diagnoses   Name Primary?     Paroxysmal atrial fibrillation (H)      Atypical chest pain      Benign essential hypertension        CURRENT MEDICATIONS:  Current Outpatient Medications   Medication Sig Dispense Refill     Acetaminophen (TYLENOL 8 HOUR ARTHRITIS PAIN PO)        aspirin (ASA) 81 MG EC tablet Take 1 tablet (81 mg) by mouth daily       Aspirin-Acetaminophen-Caffeine (EXCEDRIN EXTRA STRENGTH PO) Take 2 tablets by mouth every 6 hours as needed       Cholecalciferol (VITAMIN D-3) 125 MCG (5000 UT) TABS Take 2 capsules by mouth daily       CINNAMON PO Take 2,000 mg by mouth daily       lisinopril (ZESTRIL) 20 MG tablet Take 1  tablet (20 mg) by mouth daily       MAGNESIUM PO        meclizine (ANTIVERT) 12.5 MG tablet Take 12.5 mg by mouth 3 times daily as needed for dizziness       spironolactone (ALDACTONE) 25 MG tablet Take 1 tablet (25 mg) by mouth daily 90 tablet 3     SUMAtriptan (IMITREX) 100 MG tablet Take 1 tablet (100 mg) by mouth at onset of headache for migraine May repeat in 2 hours. Max dose 200mg in 24 hours. 9 tablet 1     UNABLE TO FIND Cardiotrophin pmg       UNABLE TO FIND Beta plus         ALLERGIES     Allergies   Allergen Reactions     Amoxicillin Nausea and Vomiting     Hctz [Hydrochlorothiazide] Other (See Comments)     Weak/Achy despite nl BMP     Percocet [Oxycodone-Acetaminophen] Nausea and Vomiting         Review of Systems:  Skin:  Negative itching;bruising   Eyes:  Negative glasses  ENT:  Negative    Respiratory:  Negative for dyspnea on exertion;shortness of breath  Cardiovascular:  Negative for;syncope or near-syncope;cyanosis;chest pain;palpitations Positive for;edema;chest pain  Gastroenterology: Negative for melena;hematochezia  Genitourinary:  Negative    Musculoskeletal:  Positive for arthritis (Knees only)  Neurologic:  Negative migraine headaches  Psychiatric:  Negative    Heme/Lymph/Imm:  Negative night sweats  Endocrine:  Negative      Physical Exam:  Vitals: /83   Pulse 54   Ht 1.829 m (6')   Wt (!) 155.6 kg (343 lb)   BMI 46.52 kg/m      Constitutional:  cooperative, alert and oriented, well developed, well nourished, in no acute distress overweight      Skin:  warm and dry to the touch        Head:  normocephalic, no masses or lesions        Eyes:  pupils equal and round;conjunctivae and lids unremarkable;sclera white        ENT:  not assessed this visit        Neck:  not assessed this visit        Chest:  normal respiratory excursion   no reproducible pain    Cardiac:                    Abdomen:  not assessed this visit        Vascular: not assessed this visit                                       Extremities and Back:  no deformities, clubbing, cyanosis, erythema observed;not assessed this visit stasis pigmentation;erythema      Neurological:  no gross motor deficits            PAST MEDICAL HISTORY:  Past Medical History:   Diagnosis Date     Classic migraine      Complex endometrial hyperplasia     without atypia     Hypertension      Motion sickness      Mumps      Obese      STORMY (obstructive sleep apnea)      Other disorder of menstruation and other abnormal bleeding from female genital tract 2/24/2011     Palpitations      PONV (postoperative nausea and vomiting)      Spider veins      SVT (supraventricular tachycardia) (H) 9/2015       PAST SURGICAL HISTORY:  Past Surgical History:   Procedure Laterality Date     BLADDER SURGERY       CHOLECYSTECTOMY, LAPOROSCOPIC      Cholecystectomy, Laparoscopic     COLONOSCOPY N/A 11/25/2014    Procedure: COLONOSCOPY;  Surgeon: Wenceslao Galo MD;  Location:  GI     CYSTOSCOPY, SLING TRANSVAGINAL N/A 8/20/2018    Procedure: CYSTOSCOPY, SLING TRANSVAGINAL;;  Surgeon: Urban Elena MD;  Location: [facility]     D & C      X2-3 for abnormal bleeding     ESOPHAGOSCOPY, GASTROSCOPY, DUODENOSCOPY (EGD), COMBINED N/A 11/25/2014    Procedure: COMBINED ESOPHAGOSCOPY, GASTROSCOPY, DUODENOSCOPY (EGD), BIOPSY SINGLE OR MULTIPLE;  Surgeon: Wenceslao Galo MD;  Location: [facility]     LAPAROSCOPIC HYSTERECTOMY SUPRACERVICAL N/A 8/20/2018    Procedure: LAPAROSCOPIC HYSTERECTOMY SUPRACERVICAL;  LAPAROSCOPIC SUBTOATAL HYSTERECTOMY, BILATERAL SALPINGECTOMY, SUBURETHRAL SLING AND CYSTOSCOPY;  Surgeon: Urban Elena MD;  Location: [facility]     LAPAROSCOPIC SALPINGECTOMY Bilateral 8/20/2018    Procedure: LAPAROSCOPIC SALPINGECTOMY;;  Surgeon: Urban Elena MD;  Location: [facility]     LAPAROSCOPY      For endometriosis     LASER ABLATION VEIN VARICOSE       OPERATIVE HYSTEROSCOPY WITH MORCELLATOR N/A 3/29/2018    Procedure: OPERATIVE HYSTEROSCOPY WITH  MORCELLATOR (MYOSURE);  OPERATIVE HYSTEROSCOPY WITH MORCELLATOR ;  Surgeon: Urban Elena MD;  Location: [facility]       FAMILY HISTORY:  Family History   Problem Relation Age of Onset     Hypertension Mother      Lipids Mother      Alzheimer Disease Mother      Hypertension Father      Prostate Cancer Father      Lipids Father      Breast Cancer Maternal Aunt      Hypertension Maternal Grandmother      C.A.D. Maternal Grandmother      Gallbladder Disease Maternal Grandmother      Cancer Maternal Grandfather         lung     Cerebrovascular Disease Paternal Grandmother      C.A.D. Paternal Grandfather      Cancer - colorectal Other         cousin maternal      Colon Cancer Cousin      Colon Cancer Cousin      Breast Cancer Paternal Aunt      Diabetes No family hx of        SOCIAL HISTORY:  Social History     Socioeconomic History     Marital status: Single     Spouse name: None     Number of children: None     Years of education: None     Highest education level: None   Tobacco Use     Smoking status: Never Smoker     Smokeless tobacco: Never Used   Substance and Sexual Activity     Alcohol use: Yes     Alcohol/week: 1.7 standard drinks     Drug use: No     Sexual activity: Never   Other Topics Concern     Caffeine Concern Yes     Comment: 1-2 cans qd     Special Diet Yes     Comment: started mediterranian      Exercise Yes     Comment: 20 minutes walking 3-4 days weekly      Seat Belt Yes     Parent/sibling w/ CABG, MI or angioplasty before 65F 55M? No       Thank you for allowing me to participate in the care of your patient.      Sincerely,     Peggy Edmond PA-C     [facility]    cc:   Peggy Edmond PA-C  [address] [place] [id number]

## 2022-09-20 NOTE — PATIENT INSTRUCTIONS
Fouzia - it was good to see you today!    Reviewed stress test that looked really good! No evidence that chest pressure is due to heart blockages  Reviewed Holter x 24 hours on Bystolic 10 mg daily that showed good HR control with avg HR 62 bpm (range 50 at night to 84 with exertion to bathroom)  Odd that peak HR only got to 84!   Note that the HR in the 40s in the ER could have been due to frequent early beats from top of heart - PACs  3. Still with cough    PLAN:  HOLD Bystolic 10 mg daily  Around 10/1 call me or MyChart with update on   BPs  Any change in exercise tolerance now that HR can get higher  What are HRs  Agreed that you'd stay on lisinopril despite cough uyntil we talked. When we talk early Oct, will likely switch to something other than losartan (dizziness)    [phone]

## 2022-10-13 ENCOUNTER — OFFICE VISIT (OUTPATIENT)
Dept: VASCULAR SURGERY | Facility: CLINIC | Age: 57
End: 2022-10-13
Payer: COMMERCIAL

## 2022-10-13 ENCOUNTER — ANCILLARY PROCEDURE (OUTPATIENT)
Dept: ULTRASOUND IMAGING | Facility: CLINIC | Age: 57
End: 2022-10-13
Attending: INTERNAL MEDICINE
Payer: COMMERCIAL

## 2022-10-13 ENCOUNTER — MEDICAL CORRESPONDENCE (OUTPATIENT)
Dept: HEALTH INFORMATION MANAGEMENT | Facility: CLINIC | Age: 57
End: 2022-10-13

## 2022-10-13 VITALS — DIASTOLIC BLOOD PRESSURE: 88 MMHG | HEART RATE: 65 BPM | SYSTOLIC BLOOD PRESSURE: 158 MMHG

## 2022-10-13 DIAGNOSIS — I83.811 VARICOSE VEINS OF RIGHT LOWER EXTREMITY WITH PAIN: ICD-10-CM

## 2022-10-13 DIAGNOSIS — I87.2 VENOUS REFLUX: Primary | ICD-10-CM

## 2022-10-13 PROCEDURE — 99215 OFFICE O/P EST HI 40 MIN: CPT | Performed by: INTERNAL MEDICINE

## 2022-10-13 PROCEDURE — 93971 EXTREMITY STUDY: CPT | Mod: RT | Performed by: INTERNAL MEDICINE

## 2022-10-13 NOTE — PROGRESS NOTES
Vein Clinic Consultation Note    HPI:  Fouzia Arenas is a 57 year old female who was seen today in consultation for right leg fatigue and heaviness associated with severe greater than 12 seconds reflux arising from large perforators.    The patient has had a right greater saphenous vein ablation with radiofrequency treatment from the upper to lower thigh.    On her venous competency test done today, she has a large 4.1 mm  with 6.4 seconds of reflux that causes compromise of the distal greater saphenous vein 2.8 mm, 12.7 seconds.    On the posterior right thigh, she has incompetent  measuring 3.6 mm with 6.6 seconds of reflux that causes reflux in varicose veins 3.6 mm, 13.5 seconds that course down the lateral and posterior aspect of the calf and causes compromise of her small saphenous vein 3.1 mm, 3.2 seconds.    The patient has worn compression stockings greater than 6 weeks without any benefit.  She has predominantly right lower extremity fatigue that limits her from exercising.  She has morbid obesity and cannot actively exercise due to the right leg fatigue.  This is persisted despite attempts at exercise, elevation, compression that requires her to take frequent breaks that limit her aerobic activity and weight loss efforts.    We had previously considered doing radiofrequency ablation of one of her perforators but it was near an artery so we chose not to ablate at that time.  At this time, the symptoms have progressed and warrant revisitation.      HERBIE    PHH:    Past Medical History:   Diagnosis Date     Classic migraine      Complex endometrial hyperplasia     without atypia     Hypertension      Motion sickness      Mumps      Obese      STORMY (obstructive sleep apnea)      Other disorder of menstruation and other abnormal bleeding from female genital tract 2/24/2011     Palpitations      PONV (postoperative nausea and vomiting)      Spider veins      SVT (supraventricular  tachycardia) (H) 9/2015        Past Surgical History:   Procedure Laterality Date     BLADDER SURGERY       CHOLECYSTECTOMY, LAPOROSCOPIC      Cholecystectomy, Laparoscopic     COLONOSCOPY N/A 11/25/2014    Procedure: COLONOSCOPY;  Surgeon: Wenceslao Galo MD;  Location:  GI     CYSTOSCOPY, SLING TRANSVAGINAL N/A 8/20/2018    Procedure: CYSTOSCOPY, SLING TRANSVAGINAL;;  Surgeon: Urban Elena MD;  Location: [facility]     D & C      X2-3 for abnormal bleeding     ESOPHAGOSCOPY, GASTROSCOPY, DUODENOSCOPY (EGD), COMBINED N/A 11/25/2014    Procedure: COMBINED ESOPHAGOSCOPY, GASTROSCOPY, DUODENOSCOPY (EGD), BIOPSY SINGLE OR MULTIPLE;  Surgeon: Wenceslao Galo MD;  Location:  GI     LAPAROSCOPIC HYSTERECTOMY SUPRACERVICAL N/A 8/20/2018    Procedure: LAPAROSCOPIC HYSTERECTOMY SUPRACERVICAL;  LAPAROSCOPIC SUBTOATAL HYSTERECTOMY, BILATERAL SALPINGECTOMY, SUBURETHRAL SLING AND CYSTOSCOPY;  Surgeon: Urban Elena MD;  Location: [facility]     LAPAROSCOPIC SALPINGECTOMY Bilateral 8/20/2018    Procedure: LAPAROSCOPIC SALPINGECTOMY;;  Surgeon: Urban Elena MD;  Location: [facility]     LAPAROSCOPY      For endometriosis     LASER ABLATION VEIN VARICOSE       OPERATIVE HYSTEROSCOPY WITH MORCELLATOR N/A 3/29/2018    Procedure: OPERATIVE HYSTEROSCOPY WITH MORCELLATOR (MYOSURE);  OPERATIVE HYSTEROSCOPY WITH MORCELLATOR ;  Surgeon: Urban Elena MD;  Location: [facility]       ALLERGIES:  Amoxicillin, Hctz [hydrochlorothiazide], and Percocet [oxycodone-acetaminophen]    Allergies   Allergen Reactions     Amoxicillin Nausea and Vomiting     Hctz [Hydrochlorothiazide] Other (See Comments)     Weak/Achy despite nl BMP     Percocet [Oxycodone-Acetaminophen] Nausea and Vomiting       MEDS:    Current Outpatient Medications:      Acetaminophen (TYLENOL 8 HOUR ARTHRITIS PAIN PO), , Disp: , Rfl:      aspirin (ASA) 81 MG EC tablet, Take 1 tablet (81 mg) by mouth daily, Disp: , Rfl:       Aspirin-Acetaminophen-Caffeine (EXCEDRIN EXTRA STRENGTH PO), Take 2 tablets by mouth every 6 hours as needed, Disp: , Rfl:      Cholecalciferol (VITAMIN D-3) 125 MCG (5000 UT) TABS, Take 2 capsules by mouth daily, Disp: , Rfl:      CINNAMON PO, Take 2,000 mg by mouth daily, Disp: , Rfl:      lisinopril (ZESTRIL) 20 MG tablet, Take 1 tablet (20 mg) by mouth daily, Disp: , Rfl:      MAGNESIUM PO, , Disp: , Rfl:      meclizine (ANTIVERT) 12.5 MG tablet, Take 12.5 mg by mouth 3 times daily as needed for dizziness, Disp: , Rfl:      spironolactone (ALDACTONE) 25 MG tablet, Take 1 tablet (25 mg) by mouth daily, Disp: 90 tablet, Rfl: 3     SUMAtriptan (IMITREX) 100 MG tablet, Take 1 tablet (100 mg) by mouth at onset of headache for migraine May repeat in 2 hours. Max dose 200mg in 24 hours., Disp: 9 tablet, Rfl: 1     UNABLE TO FIND, Cardiotrophin pmg, Disp: , Rfl:      UNABLE TO FIND, Beta plus, Disp: , Rfl:     Current Facility-Administered Medications:      lidocaine 1 % injection 4 mL, 4 mL, , , Waqas Waterman MD, 4 mL at 12/14/18 1116     triamcinolone (KENALOG-40) injection 40 mg, 40 mg, , , Waqas Waterman MD, 40 mg at 12/14/18 1116    SOCIAL HABITS:    History   Smoking Status     Never   Smokeless Tobacco     Never     Social History    Substance and Sexual Activity      Alcohol use: Yes        Alcohol/week: 1.7 standard drinks      History   Drug Use No       FAMILY HISTORY:    Family History   Problem Relation Age of Onset     Hypertension Mother      Lipids Mother      Alzheimer Disease Mother      Hypertension Father      Prostate Cancer Father      Lipids Father      Breast Cancer Maternal Aunt      Hypertension Maternal Grandmother      C.A.D. Maternal Grandmother      Gallbladder Disease Maternal Grandmother      Cancer Maternal Grandfather         lung     Cerebrovascular Disease Paternal Grandmother      C.A.D. Paternal Grandfather      Cancer - colorectal Other         cousin maternal       Colon Cancer Cousin      Colon Cancer Cousin      Breast Cancer Paternal Aunt      Diabetes No family hx of        PHYSICAL EXAMINATION:      IMAGING:     Results for orders placed or performed in visit on 10/13/22   US Venous Competency Right    Narrative    Name:  Fouzia Arenas                                                     Patient ID: [id number]  Date: 2022                                              :   1965  Sex: female    Impression        Examined by: GERMAINE Jha RVT  Age:  57 year old                                                           Reading MD: BALTAZAR Au MD            INDICATION:  Varicose veins with pain     EXAM TYPE  RIGHT LOWER EXTREMITY VENOUS DUPLEX FOR VENOUS INSUFFICIENCY  TECHNICAL SUMMARY     A duplex ultrasound study using color flow was performed, to evaluate the   right lower extremity veins for valvular incompetence with the patient in   a steep reversed trendelenberg.      RIGHT:     The deep veins demonstrate phasic flow, compress and respond to   augmentations.  There is no reflux or DVT.  The common   femoral/femoral/popliteal vein is incompetent and free of thrombus. The   remaining deep veins are competent and free of thrombus.      The GSV demonstrates phasic flow, compresses and responds to augmentations   at the saphenofemoral junction and from the proximal calf to the ankle   with no evidence of thrombus. The GSV is not visualized proximal thigh to   knee.  The great saphenous vein measures 9.1 mm at the saphenofemoral   junction and 2.4 mm in the proximal calf. The GSV is incompetent from   Proximal Calf  to Distal Calf, with the greatest reflux time of 01957   milliseconds.       The AASV is competent ( 3.9 mm) draining into the saphenofemoral junction.        The Giacomini vein is absent.     The SSV demonstrates phasic flow, compresses and responds to augmentations   from the popliteal space to the ankle.  No thrombus is seen.  The    saphenopopliteal junction is absent. The SSV is incompetent from the   Proximal Calf  to Mid Calf  with a reflux time of 3216 milliseconds.      Perforators: There is an incompetent  vein ( 4.1  mm) at   proximal calf 7 cm from the knee crease that communicates with GSV   remnant.  There is an incompetent  vein ( 3.6  mm) in the   posterior lateral thigh 15 cm proximal to the knee crease that gives rise   to varicose veins ( 3.6 mm ) in the posterior lateral thigh and into the   posterior calf with reflux measuring 84197 milliseconds.       LEFT:     The CFV demonstrate phasic flow, compress and respond to augmentations.    There is no DVT.       FINAL SUMMARY:  1. Incompetent right distal GSV below the knee: 2.4mm, 12 seconds.  2. Large, incompetent proximal calf  vein 4.1mm feeding the 12   second GSV reflux.  3. Large, incompetent posterolateral thigh  vein 3.6mm and 13   seconds reflux.      Incompetence Criteria: Greater than 500 milliseconds reflux in the   superficial and  veins and greater than 1000 milliseconds reflux   in the deep veins             VEIN CLINIC LEG DRAWING    ASSESSMENT: Right leg fatigue that limits quality of life and activities of daily living.  VCSS score 9, C4.  It has not improved with conservative management including exercise, elevation, compression greater than 6 weeks.  It affects her ability to lose weight and be active.  She has severe  incompetence 4.1 mm, 6.4 seconds and 3.6 mm, 6.6 seconds that cause secondary compromise of distal greater saphenous vein 2.8 mm, 12.7 seconds and varicose veins 3.6 mm, 13.5 seconds.    PLAN: At this time, the most prudent course of action is addressing the 2 large, refluxing perforators that are driving the compromise of the distal right greater saphenous vein and varicose veins.    Will try radiofrequency  ablation of these 2 perforators, being cautious of any adjacent  arterial structures.    40 minutes spent personally today reviewing EMR, decision making, discussing with patient and communication with staff as well as charting.      Adair Au MD

## 2022-10-13 NOTE — PROGRESS NOTES
October 13, 2022    Vein Procedure Recommendation    Spoke with patient in clinic.    Dr. Au has recommended patient to have the following vein procedure(s):     1. Right leg VNUS closure 2 perforators    Patient is recommended to wear Thigh High compression hose following her procedure. Patient has thigh high compression hose.    Entered in patient's After Visit Summary (viewable in [employer]) written procedure instructions to review on her own (see After Visit Summary).    Pre op Questions:  Allergies: See chart  On ASA 81mg  No total joint surgeries or heart valve  No ulcers  No nausea with sedation meds       Next steps:      Insurance Submission  Informed patient this process could take up to 14 business days, but once approved, the patient will be contacted by our surgery scheduler to schedule the above procedure. Gave patient our surgery scheduler's information.    Patient is in agreement with all of the above and has no further questions at this time.    Giovanny Mckee RN  [facility]  Vein Clinic

## 2022-10-13 NOTE — PATIENT INSTRUCTIONS
Pre-Procedure Instructions:                                        VNUS Closure  You are having a VNUS Closure. One or more of your veins will be closed with radiofrequency heating.    Insurance  Precertification and/or referral authorization may be required by your insurance company.  We will call your insurance company to verify benefits for the medically necessary part of your procedure.    Your Current Medications and Allergies  Are you on blood thinner medications? (Aspirin, Plavix, Coumadin, Eliquis, Xarelto) Please discuss this with your surgeon.  Are you sensitive to latex or adhesives used for fake fingernails? Please let us know!     Driving Escort and   Please arrange to have a trusted adult (18 years old or older) drive you to and from the clinic.  For your safety, we recommend you have a trusted adult to stay with you until the next morning.    Your Health  If you have a change in your health before the procedure, contact our office immediately.  (For example: cold symptoms, cough, urinary tract infection, fever, flu symptoms).  A pre-procedure physical is not required.    Note  It is sometimes necessary to adjust the procedure schedule due to emergencies. We greatly appreciate your flexibility and understanding in this matter.  Compression hose are needed following this procedure.  __________________________________________________________________________________    Check List:  The Morning of Your Procedure  ___1.  Please do not apply anything on your leg(s) or shave the day of your procedure.  ___2.  You may take your normal medications the day of your procedure.  ___3.  It is recommended you eat a light breakfast or lunch on the day of your procedure.  ___4.  Wear comfortable loose-fitting clothing and wide-fitting shoes (i.e. tennis shoes, slip-ons).  ___5.  Please arrive at our clinic at the specified time given by the nurse.  ___6.  You will sign an affirmation of informed  consent.  ___7.  Bring your pre-procedure sedation medication (lorazepam and clonidine) with you to the clinic. One hour              before your procedure, you will be instructed to take these medications. The lorazepam (Ativan) lowers              anxiety and sedates you; the clonidine makes the lorazepam more effective. Everyone's body processes              these medications differently. Therefore, reactions to these medications vary. Some people stay awake and              some people sleep through the whole procedure. You may not remember everything about the procedure              or the day. You do not want to make any big decisions for the rest of the day.  ___8.  Bring the appropriate compression hose (i.e.thigh high).           The Day of Your Procedure:                  VNUS Closure  In the Exam Room  A nurse will bring you back to an exam room with your family member or friend. This is when your informed consent will be signed, and you will take your pre-procedure medications.  You will be asked to remove everything from the waist down, including undergarments. You will then put on a hospital gown or shorts and blue booties.  Your surgeon will come in to answer any questions and prashant the appropriate leg(s) with a marker.  You will be taken to the restroom to empty your bladder before going into the procedure room.    In the Procedure Room  You will be escorted to the procedure room. You will lie on a procedure table covered with a sheet or blanket.  A nurse will put a blood pressure cuff on your arm and a pulse/oxygen monitor on a finger. Your vital signs will be monitored every 15 minutes.  Your gown will be pulled up slightly and the groin exposed for a short period of time. The surgeon's assistant will clean your foot, leg, and groin with an antibacterial solution. We will get you covered up as quickly as possible!  Sterile towels and blue drapes will be used to cover you and the table. You will be  asked to keep your hands under the blue drapes during the procedure.    The Procedure  The surgeon will visualize your veins with an ultrasound machine. He or she will then numb your skin and access the vein. A catheter is passed up the vein and positioned with ultrasound guidance. The table will then be tipped head down.  Once the catheter is in the correct position, medication will be injected to numb your leg. You will feel some needle sticks and may feel discomfort as the medication goes in. Once this is done, you should not experience significant discomfort. But if you do, please let us know and more numbing medication can be injected. As the catheter sends out heat, the vein closes off and the catheter is withdrawn.        Post-Procedure  Once the procedure is done, your leg will be washed with warm water and dried. You will have one or more small bandages covering your incisions. Your compression hose will be put on or an ACE wrap from your toes to groin. If the ACE wrap feels too tight or binds, please elevate your leg and loosen it.  You will be offered something to drink and a light snack.  You will rest with your leg(s) elevated for approximately 30 minutes. Your friend or family member may join you.   For your safety, you will be accompanied to your car by a staff member.    Post-Procedure Instructions:                          VNUS Closure    Post-Op Day Zero - The Day of Your Procedure:  1. Medication for Pain Control and Inflammation Control   - The numbing medication injected during your procedure will last for several hours. The pre-procedure    tablets may make you very sleepy and you might not remember everything from the procedure or from    the day. This will usually wear off by the next day.   - Ibuprofen:  If tolerated, take ibuprofen (e.g., Advil) to reduce inflammation whether or not you have    pain. For three days, take two tablets (200mg each) with every meal and at bedtime with a snack.  If    you are not able to take Ibuprofen, Tylenol is another option.   - You may resume taking any medications you were taking before your procedure.  2. Activity   - You may be up as tolerated when the sedation wears off. Elevate if possible when not walking.  3. Bandages   - You will have one or more small bandages covering the incision site(s) where we accessed the vein(s).    Keep your bandages on and dry for 48 hours. Compression hose should be worn continuously over    the bandages for the first 24 hours.  4. Incisions   - Bleeding: You may see some incision sites that are oozing through the bandages. This is not unusual    and can be managed with Rest, Ice, Compression and Elevation (RICE). Apply ice and firm pressure    directly to the site that is bleeding and rest with your leg(s) elevated above your heart for 20-30 minutes.    Post-Op Day One:  1. Medication   - Ibuprofen:  Continue the same as the Day of Your Procedure.  2. Activity   - Walk as tolerated. Resume your normal daily walking activities. If it hurts, stop. We encourage you to               walk. Elevate if possible when not walking.  3. Bandages and Compression   - After 24 hours, you may remove your compression hose to take a shower. Please keep your bandage(s)    on and intact. You may want to cover your bandage(s) to ensure it remains dry during your shower.    Reapply your compression hose after your shower and wear during waking hours only.  4. Driving   - You may resume driving when you can do so safely.    Post-Op Day Two:   1. Medication  - Ibuprofen:  Continue the same as the Day of Your Procedure.  2.  Activity   - Walk as tolerated. Elevate if possible when not walking.  3. Bandages and Compression  - You may remove your bandage after 48 hours. Continue wearing your compression hose during waking hours only for a total of seven days following your procedure.  4. Incisions   - Your leg(s) may be bruised at or near incision site(s)  and possibly have numb spots. This is normal.   5. Call Us If:   - You see any areas on your leg that are red and angry in appearance.   - You notice any drainage that is milky or cloudy in appearance or that has a foul odor.   - You run a temperature of 100.5 or greater.      Post-Op Day Three:  You will have a follow up appointment 2-4 days post-procedure. At this appointment, you will have an ultrasound and we will check your incisions.      The Two Weeks Following Your Procedure  1.  Skin Care              - Do not use any lotions, creams, or powders on your incisions for 14 days or until the incisions have healed.              - Do not soak in a bathtub, hot tub or go swimming for 14 days or until your incisions have healed.  2.  Medications   - You may use ibuprofen or acetaminophen (e.g., Tylenol) as needed for pain or discomfort.  3.  Activity   - Do not lift over 25 pounds. After about two weeks you may resume exercise such as aerobics, running,    tennis or weightlifting. Use your common sense and ease back into your exercise routine slowly.   - You may feel a cord-like tightness along the inside of your leg. Gentle stretching can be helpful.  4. Compression Hose   - Your doctor may instruct you to wear compression for longer than seven days; please    follow your doctor's instructions. As a comfort measure, you may choose to wear compression for    longer than required.  5.  Travel   - Do not fly in an airplane for 14 days after your procedure.  If you have a long car trip planned within    two to three weeks following your procedure, stop and walk for a few minutes every two hours.    Periodic ankle pumps during the ride may be helpful.    Six Week Appointment  - At your six-week appointment, you will see your surgeon for an exam and evaluation. This office visit               will be scheduled when you return for post-op day three return appointment.     Return to Work  1.  If you work outside the home,  you may return to work the next day depending on the extent of your    procedure, how you tolerate it, and the type of work you perform.  2.  Paperwork: If your employer requires paperwork or you would like a letter written to your employer, please    let us know. We will complete disability type forms at no charge. Please allow five business days for    forms to be completed.     [employer] last reviewed this educational content on 11/1/2019 2000-2021 [employer]. All rights reserved. This information is not intended as a substitute for professional medical care. Always follow your healthcare professional's instructions.

## 2022-10-13 NOTE — NURSING NOTE
Patient Reported symptoms:    Right leg   Heaviness Some of the time   Achiness Some of the time   Swelling Most of the time   Throbbing None of the time   Itching None of the time   Appearance Moderately noticeable   Impact on work/activities Symptoms but full able to participate

## 2022-10-13 NOTE — LETTER
10/13/2022         RE: Fouzia Arenas  [address] [place] [facility] [address]        Dear Colleague,    Thank you for referring your patient, Fouzia Arenas, to the [facility] VEIN CLINIC [place]. Please see a copy of my visit note below.            Vein Clinic Consultation Note    HPI:  Fouzia Arenas is a 57 year old female who was seen today in consultation for right leg fatigue and heaviness associated with severe greater than 12 seconds reflux arising from large perforators.    The patient has had a right greater saphenous vein ablation with radiofrequency treatment from the upper to lower thigh.    On her venous competency test done today, she has a large 4.1 mm  with 6.4 seconds of reflux that causes compromise of the distal greater saphenous vein 2.8 mm, 12.7 seconds.    On the posterior right thigh, she has incompetent  measuring 3.6 mm with 6.6 seconds of reflux that causes reflux in varicose veins 3.6 mm, 13.5 seconds that course down the lateral and posterior aspect of the calf and causes compromise of her small saphenous vein 3.1 mm, 3.2 seconds.    The patient has worn compression stockings greater than 6 weeks without any benefit.  She has predominantly right lower extremity fatigue that limits her from exercising.  She has morbid obesity and cannot actively exercise due to the right leg fatigue.  This is persisted despite attempts at exercise, elevation, compression that requires her to take frequent breaks that limit her aerobic activity and weight loss efforts.    We had previously considered doing radiofrequency ablation of one of her perforators but it was near an artery so we chose not to ablate at that time.  At this time, the symptoms have progressed and warrant revisitation.      HERBIE    PHH:    Past Medical History:   Diagnosis Date     Classic migraine      Complex endometrial hyperplasia     without atypia     Hypertension      Motion sickness      Mumps       Obese      STORMY (obstructive sleep apnea)      Other disorder of menstruation and other abnormal bleeding from female genital tract 2/24/2011     Palpitations      PONV (postoperative nausea and vomiting)      Spider veins      SVT (supraventricular tachycardia) (H) 9/2015        Past Surgical History:   Procedure Laterality Date     BLADDER SURGERY       CHOLECYSTECTOMY, LAPOROSCOPIC      Cholecystectomy, Laparoscopic     COLONOSCOPY N/A 11/25/2014    Procedure: COLONOSCOPY;  Surgeon: Wenceslao Galo MD;  Location:  GI     CYSTOSCOPY, SLING TRANSVAGINAL N/A 8/20/2018    Procedure: CYSTOSCOPY, SLING TRANSVAGINAL;;  Surgeon: Urban Elena MD;  Location: [facility]     D & C      X2-3 for abnormal bleeding     ESOPHAGOSCOPY, GASTROSCOPY, DUODENOSCOPY (EGD), COMBINED N/A 11/25/2014    Procedure: COMBINED ESOPHAGOSCOPY, GASTROSCOPY, DUODENOSCOPY (EGD), BIOPSY SINGLE OR MULTIPLE;  Surgeon: Wenceslao Glao MD;  Location: [facility]     LAPAROSCOPIC HYSTERECTOMY SUPRACERVICAL N/A 8/20/2018    Procedure: LAPAROSCOPIC HYSTERECTOMY SUPRACERVICAL;  LAPAROSCOPIC SUBTOATAL HYSTERECTOMY, BILATERAL SALPINGECTOMY, SUBURETHRAL SLING AND CYSTOSCOPY;  Surgeon: Urban Elena MD;  Location: [facility]     LAPAROSCOPIC SALPINGECTOMY Bilateral 8/20/2018    Procedure: LAPAROSCOPIC SALPINGECTOMY;;  Surgeon: Urban Elena MD;  Location: [facility]     LAPAROSCOPY      For endometriosis     LASER ABLATION VEIN VARICOSE       OPERATIVE HYSTEROSCOPY WITH MORCELLATOR N/A 3/29/2018    Procedure: OPERATIVE HYSTEROSCOPY WITH MORCELLATOR (MYOSURE);  OPERATIVE HYSTEROSCOPY WITH MORCELLATOR ;  Surgeon: Urban Elena MD;  Location: [facility]       ALLERGIES:  Amoxicillin, Hctz [hydrochlorothiazide], and Percocet [oxycodone-acetaminophen]    Allergies   Allergen Reactions     Amoxicillin Nausea and Vomiting     Hctz [Hydrochlorothiazide] Other (See Comments)     Weak/Achy despite nl BMP     Percocet [Oxycodone-Acetaminophen]  Nausea and Vomiting       MEDS:    Current Outpatient Medications:      Acetaminophen (TYLENOL 8 HOUR ARTHRITIS PAIN PO), , Disp: , Rfl:      aspirin (ASA) 81 MG EC tablet, Take 1 tablet (81 mg) by mouth daily, Disp: , Rfl:      Aspirin-Acetaminophen-Caffeine (EXCEDRIN EXTRA STRENGTH PO), Take 2 tablets by mouth every 6 hours as needed, Disp: , Rfl:      Cholecalciferol (VITAMIN D-3) 125 MCG (5000 UT) TABS, Take 2 capsules by mouth daily, Disp: , Rfl:      CINNAMON PO, Take 2,000 mg by mouth daily, Disp: , Rfl:      lisinopril (ZESTRIL) 20 MG tablet, Take 1 tablet (20 mg) by mouth daily, Disp: , Rfl:      MAGNESIUM PO, , Disp: , Rfl:      meclizine (ANTIVERT) 12.5 MG tablet, Take 12.5 mg by mouth 3 times daily as needed for dizziness, Disp: , Rfl:      spironolactone (ALDACTONE) 25 MG tablet, Take 1 tablet (25 mg) by mouth daily, Disp: 90 tablet, Rfl: 3     SUMAtriptan (IMITREX) 100 MG tablet, Take 1 tablet (100 mg) by mouth at onset of headache for migraine May repeat in 2 hours. Max dose 200mg in 24 hours., Disp: 9 tablet, Rfl: 1     UNABLE TO FIND, Cardiotrophin pmg, Disp: , Rfl:      UNABLE TO FIND, Beta plus, Disp: , Rfl:     Current Facility-Administered Medications:      lidocaine 1 % injection 4 mL, 4 mL, , , Waqas Waterman MD, 4 mL at 12/14/18 1116     triamcinolone (KENALOG-40) injection 40 mg, 40 mg, , , Waqas Waterman MD, 40 mg at 12/14/18 1116    SOCIAL HABITS:    History   Smoking Status     Never   Smokeless Tobacco     Never     Social History    Substance and Sexual Activity      Alcohol use: Yes        Alcohol/week: 1.7 standard drinks      History   Drug Use No       FAMILY HISTORY:    Family History   Problem Relation Age of Onset     Hypertension Mother      Lipids Mother      Alzheimer Disease Mother      Hypertension Father      Prostate Cancer Father      Lipids Father      Breast Cancer Maternal Aunt      Hypertension Maternal Grandmother      C.A.D. Maternal Grandmother       Gallbladder Disease Maternal Grandmother      Cancer Maternal Grandfather         lung     Cerebrovascular Disease Paternal Grandmother      C.A.D. Paternal Grandfather      Cancer - colorectal Other         cousin maternal      Colon Cancer Cousin      Colon Cancer Cousin      Breast Cancer Paternal Aunt      Diabetes No family hx of        PHYSICAL EXAMINATION:      IMAGING:     Results for orders placed or performed in visit on 10/13/22   US Venous Competency Right    Narrative    Name:  Fouzia Arenas                                                     Patient ID: [id number]  Date: 2022                                              :   1965  Sex: female    Impression        Examined by: GERMAINE Jha RVT  Age:  57 year old                                                           Reading MD: BALTAZAR Au MD            INDICATION:  Varicose veins with pain     EXAM TYPE  RIGHT LOWER EXTREMITY VENOUS DUPLEX FOR VENOUS INSUFFICIENCY  TECHNICAL SUMMARY     A duplex ultrasound study using color flow was performed, to evaluate the   right lower extremity veins for valvular incompetence with the patient in   a steep reversed trendelenberg.      RIGHT:     The deep veins demonstrate phasic flow, compress and respond to   augmentations.  There is no reflux or DVT.  The common   femoral/femoral/popliteal vein is incompetent and free of thrombus. The   remaining deep veins are competent and free of thrombus.      The GSV demonstrates phasic flow, compresses and responds to augmentations   at the saphenofemoral junction and from the proximal calf to the ankle   with no evidence of thrombus. The GSV is not visualized proximal thigh to   knee.  The great saphenous vein measures 9.1 mm at the saphenofemoral   junction and 2.4 mm in the proximal calf. The GSV is incompetent from   Proximal Calf  to Distal Calf, with the greatest reflux time of 80068   milliseconds.       The AASV is competent ( 3.9 mm) draining  into the saphenofemoral junction.        The Giacomini vein is absent.     The SSV demonstrates phasic flow, compresses and responds to augmentations   from the popliteal space to the ankle.  No thrombus is seen.  The   saphenopopliteal junction is absent. The SSV is incompetent from the   Proximal Calf  to Mid Calf  with a reflux time of 3216 milliseconds.      Perforators: There is an incompetent  vein ( 4.1  mm) at   proximal calf 7 cm from the knee crease that communicates with GSV   remnant.  There is an incompetent  vein ( 3.6  mm) in the   posterior lateral thigh 15 cm proximal to the knee crease that gives rise   to varicose veins ( 3.6 mm ) in the posterior lateral thigh and into the   posterior calf with reflux measuring 30808 milliseconds.       LEFT:     The CFV demonstrate phasic flow, compress and respond to augmentations.    There is no DVT.       FINAL SUMMARY:  1. Incompetent right distal GSV below the knee: 2.4mm, 12 seconds.  2. Large, incompetent proximal calf  vein 4.1mm feeding the 12   second GSV reflux.  3. Large, incompetent posterolateral thigh  vein 3.6mm and 13   seconds reflux.      Incompetence Criteria: Greater than 500 milliseconds reflux in the   superficial and  veins and greater than 1000 milliseconds reflux   in the deep veins             VEIN CLINIC LEG DRAWING    ASSESSMENT: Right leg fatigue that limits quality of life and activities of daily living.  VCSS score 9, C4.  It has not improved with conservative management including exercise, elevation, compression greater than 6 weeks.  It affects her ability to lose weight and be active.  She has severe  incompetence 4.1 mm, 6.4 seconds and 3.6 mm, 6.6 seconds that cause secondary compromise of distal greater saphenous vein 2.8 mm, 12.7 seconds and varicose veins 3.6 mm, 13.5 seconds.    PLAN: At this time, the most prudent course of action is addressing the 2 large,  refluxing perforators that are driving the compromise of the distal right greater saphenous vein and varicose veins.    Will try radiofrequency  ablation of these 2 perforators, being cautious of any adjacent arterial structures.    40 minutes spent personally today reviewing EMR, decision making, discussing with patient and communication with staff as well as charting.      Adair Au MD      Again, thank you for allowing me to participate in the care of your patient.        Sincerely,        Adair Au MD

## 2022-10-22 ENCOUNTER — HEALTH MAINTENANCE LETTER (OUTPATIENT)
Age: 57
End: 2022-10-22

## 2022-10-27 DIAGNOSIS — I10 BENIGN ESSENTIAL HYPERTENSION: ICD-10-CM

## 2022-10-27 RX ORDER — SPIRONOLACTONE 25 MG/1
25 TABLET ORAL DAILY
Qty: 90 TABLET | Refills: 1 | Status: SHIPPED | OUTPATIENT
Start: 2022-10-27 | End: 2023-05-10

## 2022-11-04 DIAGNOSIS — I83.811 VARICOSE VEINS OF RIGHT LOWER EXTREMITY WITH PAIN: Primary | ICD-10-CM

## 2022-12-07 NOTE — PATIENT INSTRUCTIONS
Post-Procedure Instructions:                          VNUS Closure    Post-Op Day Zero - The Day of Your Procedure:  1. Medication for Pain Control and Inflammation Control   - The numbing medication injected during your procedure will last for several hours. The pre-procedure    tablets may make you very sleepy and you might not remember everything from the procedure or from    the day. This will usually wear off by the next day.   - Ibuprofen:  If tolerated, take ibuprofen (e.g., Advil) to reduce inflammation whether or not you have    pain. For three days, take two tablets (200mg each) with every meal and at bedtime with a snack. If    you are not able to take Ibuprofen, Tylenol is another option.   - You may resume taking any medications you were taking before your procedure.  2. Activity   You may be up as tolerated, when the sedation wears off. Elevate if possible when not walking.  3. Bandages   - You will have one or more small bandages covering the incision site(s) where we accessed the vein(s).    Keep your bandages on and dry for 48 hours. Compression hose should be worn continuously over    the bandages for the first 24 hours.  4. Incisions   - Bleeding: You may see some incision sites that are oozing through the bandages. This is not unusual    and can be managed with Rest, Ice, Compression and Elevation (RICE). Apply ice and firm pressure    directly to the site that is bleeding and rest with your leg(s) elevated above your heart for 20-30 minutes.    Post-Op Day One:  1. Medication   - Ibuprofen:  Continue the same as the Day of Your Procedure.  2. Activity   - Walk as tolerated. Resume your normal daily walking activities. If it hurts, stop. We encourage you to               walk. Elevate if possible when not walking.  3. Bandages and Compression   - After 24 hours, you may remove your compression hose to take a shower. Please keep your bandage(s)    on and intact. You may want to cover your  bandage(s) to ensure it remains dry during your shower.    Reapply your compression hose after your shower and wear during waking hours only.  4. Driving   - You may resume driving when you can do so safely.    Post-Op Day Two:   1. Medication  - Ibuprofen:  Continue the same as the Day of Your Procedure.  2.  Activity   - Walk as tolerated. Elevate if possible when not walking.  3. Bandages and Compression  - You may remove your bandage after 48 hours. Continue wearing your compression hose during waking hours only for a total of seven days following your procedure.  4. Incisions   - Your leg(s) may be bruised at and around the incision site(s). This is normal.  5. Call Us If:   - You see any areas on your leg that are red and angry in appearance.   - You notice any drainage that is milky or cloudy in appearance or that has a foul odor.   - You run a temperature of 100.5 or greater.    Post-Op Day Three:  You will have a follow up appointment 2-4 days post-procedure. At this appointment, you will have an ultrasound and we will check your incisions.    ___________________________________________________________________________________________    The Two Weeks Following Your Procedure  1.  Skin Care   - Do not use any lotions, creams or powders on your leg for 14 days or until the incisions have healed.   - Do not soak in a bathtub, whirlpool, or hot tub or go swimming for 14 days or until your incisions have  healed.  2.  Medications   - You may use ibuprofen or acetaminophen (e.g., Tylenol) as needed for pain or discomfort.  3.  Activity   - Do not lift over 25 pounds. After about two weeks you may resume exercise such as aerobics, running,    tennis or weight lifting. Use your common sense and ease back into your exercise routine slowly.   - You may feel a cord-like tightness along the inside of your leg. Gentle stretching can be helpful.  4. Compression Hose   - Your doctor may instruct you to wear compression  for longer than seven days; please    follow your doctor's instructions. As a comfort measure, you may choose to wear compression for    longer than required.  5.  Travel   - Do not fly in an airplane for 14 days after your procedure.  If you have a long car trip planned within    two to three weeks following your procedure, stop and walk for a few minutes every two hours.    Periodic ankle pumps during the ride may be helpful.    Six Week Appointment   At your six week appointment, you will see your surgeon for an exam and evaluation. This office visit    will be scheduled when you return for Post-op Day Three Return Appointment.     Return to Work  1.  If you work outside the home, you may return to work in a few days depending on the extent of your    procedure, how you tolerate it, and the type of work you perform.  2.  Paperwork: If your employer requires paperwork or you would like a letter written to your employer, please    let us know. We will complete disability type forms at no charge. Please allow five business days for    forms to be completed.

## 2022-12-08 ENCOUNTER — OFFICE VISIT (OUTPATIENT)
Dept: VASCULAR SURGERY | Facility: CLINIC | Age: 57
End: 2022-12-08
Payer: COMMERCIAL

## 2022-12-08 VITALS — HEART RATE: 58 BPM | SYSTOLIC BLOOD PRESSURE: 133 MMHG | DIASTOLIC BLOOD PRESSURE: 85 MMHG | OXYGEN SATURATION: 95 %

## 2022-12-08 DIAGNOSIS — I83.90 INCOMPETENT PERFORATOR VEIN: Primary | ICD-10-CM

## 2022-12-08 PROCEDURE — 36475 ENDOVENOUS RF 1ST VEIN: CPT | Mod: RT | Performed by: INTERNAL MEDICINE

## 2022-12-08 NOTE — PROGRESS NOTES
Pre-procedure Nursing Note    Fouzia Arenas presents to clinic for Vein Procedure  .   /Person Responsible for Patient: Roscoe (Parents)  Phone Number: in the lobby    Prophylactic Medication:N/A   Sedation Medication: N/A  Compression Stockings: Patient brought with today.  The procedure is being performed on RLE.  Patient understanding of procedure matches consent? YES    Daxa Vega RN  [facility]  Vein Clinic

## 2022-12-08 NOTE — PROGRESS NOTES
Vein Clinic Procedure Note    Preoperative diagnosis:    1.  Right lateral thigh  with reflux  2.  Right upper calf  with reflux    Post operative diagnosis:  Same    Procedure:  1.  Radiofrequency  ablation of the right calf .  2.  Radiofrequency  ablation of the right thigh .    Preoperative medications: None      Procedures    Operative description  Under direct visualization, lidocaine and tumescent anesthesia used and the radiofrequency stylette advanced to the neck of the calf , greater than 5 mm from the deep vein.  Stylette removed and resistance confirmed on the device.  4 minutes of heat treatment per IFU under direct visualization.    Under direct visualization, lidocaine and tumescent anesthesia used and the radiofrequency stylette advanced to the neck of the thigh , greater than 5 mm from the deep vein.  Stylette removed and resistance confirmed on the device.  4 minutes of heat treatment per IFU under direct visualization.    Flowsheet Data 12/8/2022   Procedure Start Time: 11:09 AM   Prep: Chloraprep   Side: Right   RFS TX Time (Minutes): RIGHT LEG  VEIN 7 CM FROM KNEE CREASE: 4:01   RFS Cycles: RIGHT LEG  VEIN 7 CM FROM KNEE CREASE: 1   RFS Impedance: RIGHT LEG  VEIN 7 CM FROM KNEE CREASE: 272   How many additional vein treatments are being performed? 1   RFS Cycles 2: RIGHT LEG  VEIN POSTERIOR LATERAL THIGH 15 CM PROXIMAL FROM KNEE CREASE: 1   RFS TX Time (Minutes) 2: RIGHT LEG  VEIN POSTERIOR LATERAL THIGH 15 CM PROXIMAL FROM KNEE CREASE: 4:00   RFS Impedance 2: RIGHT LEG  VEIN POSTERIOR LATERAL THIGH 15 CM PROXIMAL FROM KNEE CREASE: 211   Sedation taken: No   Pre Pt. Physical / Cognitive Limitations: WNL   TOTAL Local anesthesia Injected (ml): 8   Max Volume Local Anesthesia (ml): 11   TOTAL Tumescent Injected volume (ml): 50   Max Volume Tumescent (ml):  286   Post Pt. Physical / Cognitive Limitations: WNL   Procedure End Time: 12:00 PM   D/C Instructions given, states readiness to leave and escorted to car: Yes       Patient's blood pressure, pulse and pulse oximetry were continuously monitored throughout the procedure under my direct supervision and was stable during the procedure.    Patient recovered in our suites and discharged home with their family with postoperative instructions and follow up.     Adair Au MD

## 2022-12-08 NOTE — LETTER
12/8/2022         RE: Fouzia Arenas  [address] [place] [facility] [address]        Dear Colleague,    Thank you for referring your patient, Fouzia Arenas, to the [facility] VEIN CLINIC [place]. Please see a copy of my visit note below.    Pre-procedure Nursing Note    Fouzia Arenas presents to clinic for Vein Procedure  .   /Person Responsible for Patient: Bienvenido and Katherine (Parents)  Phone Number: in the lobby    Prophylactic Medication:N/A   Sedation Medication: N/A  Compression Stockings: Patient brought with today.  The procedure is being performed on RLE.  Patient understanding of procedure matches consent? YES    Daxa Vega, CHACORTA  [facility]  Vein Clinic        Vein Clinic Procedure Note    Preoperative diagnosis:    1.  Right lateral thigh  with reflux  2.  Right upper calf  with reflux    Post operative diagnosis:  Same    Procedure:  1.  Radiofrequency  ablation of the right calf .  2.  Radiofrequency  ablation of the right thigh .    Preoperative medications: None      Procedures    Operative description  Under direct visualization, lidocaine and tumescent anesthesia used and the radiofrequency stylette advanced to the neck of the calf , greater than 5 mm from the deep vein.  Stylette removed and resistance confirmed on the device.  4 minutes of heat treatment per IFU under direct visualization.    Under direct visualization, lidocaine and tumescent anesthesia used and the radiofrequency stylette advanced to the neck of the thigh , greater than 5 mm from the deep vein.  Stylette removed and resistance confirmed on the device.  4 minutes of heat treatment per IFU under direct visualization.    Flowsheet Data 12/8/2022   Procedure Start Time: 11:09 AM   Prep: Chloraprep   Side: Right   RFS TX Time (Minutes): RIGHT LEG  VEIN 7 CM FROM KNEE CREASE: 4:01   RFS Cycles: RIGHT LEG  VEIN  7 CM FROM KNEE CREASE: 1   RFS Impedance: RIGHT LEG  VEIN 7 CM FROM KNEE CREASE: 272   How many additional vein treatments are being performed? 1   RFS Cycles 2: RIGHT LEG  VEIN POSTERIOR LATERAL THIGH 15 CM PROXIMAL FROM KNEE CREASE: 1   RFS TX Time (Minutes) 2: RIGHT LEG  VEIN POSTERIOR LATERAL THIGH 15 CM PROXIMAL FROM KNEE CREASE: 4:00   RFS Impedance 2: RIGHT LEG  VEIN POSTERIOR LATERAL THIGH 15 CM PROXIMAL FROM KNEE CREASE: 211   Sedation taken: No   Pre Pt. Physical / Cognitive Limitations: WNL   TOTAL Local anesthesia Injected (ml): 8   Max Volume Local Anesthesia (ml): 11   TOTAL Tumescent Injected volume (ml): 50   Max Volume Tumescent (ml): 286   Post Pt. Physical / Cognitive Limitations: WNL   Procedure End Time: 12:00 PM   D/C Instructions given, states readiness to leave and escorted to car: Yes       Patient's blood pressure, pulse and pulse oximetry were continuously monitored throughout the procedure under my direct supervision and was stable during the procedure.    Patient recovered in our suites and discharged home with their family with postoperative instructions and follow up.     Adair Au MD      Again, thank you for allowing me to participate in the care of your patient.        Sincerely,        Adair Au MD

## 2022-12-13 ENCOUNTER — ANCILLARY PROCEDURE (OUTPATIENT)
Dept: ULTRASOUND IMAGING | Facility: CLINIC | Age: 57
End: 2022-12-13
Attending: INTERNAL MEDICINE
Payer: COMMERCIAL

## 2022-12-13 ENCOUNTER — OFFICE VISIT (OUTPATIENT)
Dept: VASCULAR SURGERY | Facility: CLINIC | Age: 57
End: 2022-12-13
Attending: INTERNAL MEDICINE
Payer: COMMERCIAL

## 2022-12-13 DIAGNOSIS — Z09 POSTOP CHECK: Primary | ICD-10-CM

## 2022-12-13 DIAGNOSIS — I83.811 VARICOSE VEINS OF RIGHT LOWER EXTREMITY WITH PAIN: ICD-10-CM

## 2022-12-13 PROCEDURE — 99207 PR VEINSOLUTIONS POST OPERATIVE VISIT: CPT

## 2022-12-13 PROCEDURE — 93971 EXTREMITY STUDY: CPT | Mod: RT | Performed by: INTERNAL MEDICINE

## 2022-12-13 NOTE — PATIENT INSTRUCTIONS
Vein Closure (Ablation)  Common Things to Expect    A small lump may develop at the site(s) where the vein closure device was inserted. This is a normal step in healing. These should not be painful, but may be tender to the touch. It can take 6 weeks to 3 months for the lumps/firmness to resolve.   Bruising will look worse before it looks better and can last for 4-6 weeks.  After about 10 days, you may notice tightness/pulling on the inside thigh and knee. As your ablated vein is healing, it contracts, causing a tightness or pulling sensation. This may last for several weeks, but will resolve. Treat it with Ibuprofen or Advil.  Numbness will get better with time, but may take 3 months to a year to resolve.  You may notice that the skin on your legs has become ultra-sensitive to touch. For example, the weight of your sheets may feel painful. This usually resolves in 6 weeks.  Ankle swelling is not uncommon and may last 4-6 weeks.   To get optimal results from your procedure, wearing your compression hose is key for the first 7 days. This is necessary to ensure proper closure of the ablated vein.    For 2 weeks, no weight lifting over 25lbs, no running, and no vigorous aerobic exercise. After this time, ease back into your normal activities. If you do too much too soon, you will have more pain and bruising and possibly re-open the vein that was closed. It takes about 2 weeks for the ablated vein to permanently close. Keep in mind your body is still healing.  For 2 weeks, do not shave your legs or use lotions, powders, creams to allow proper healing of vein access sites.      If you are experiencing any of the following symptoms, please seek immediate medical attention at your local emergency department.  - Significant pain in the back of the calf possibly with difficulty walking  - Significant swelling and/or tenderness in the back of the calf  - Redness that continues to spread  - Chest pain and/or shortness of  breath

## 2022-12-13 NOTE — PROGRESS NOTES
December 13, 2022    Vein Clinic Postoperative Nurse Note    Patient is here for her 72 hour postoperative visit.    Procedure: Right leg VNUS Closure 2 perf (med nec)  Procedure Date: 12/8/22  Surgeon: Dr. Au    Ultrasound Result: The right lateral posterior thigh  closed in superficial portion. The right calf  closed in superficial portion. No evidence of RLE DVT.    Physical Exam: Incisions are approximated without signs of infection.  Ecchymosis: minimal  Swelling: minimal  Paresthesia: pt denies numbness    Patient Questions or Concerns: Overall, pt is doing well and has no concerns.    Pt is able to don her thigh high compression hose.    Reviewed postoperative instructions with patient and provided her with written material of common things to expect from her procedure.    Patient's Next Vein Clinic Appointment: 6 week post op with Dr. Au (2/6/23).    Daxa Vega RN  [facility]

## 2022-12-13 NOTE — LETTER
12/13/2022         RE: Fouzia Arenas  [address] [place] [facility] [address]        Dear Colleague,    Thank you for referring your patient, Fouzia Arenas, to the [facility] [place]. Please see a copy of my visit note below.      December 13, 2022    Vein Clinic Postoperative Nurse Note    Patient is here for her 72 hour postoperative visit.    Procedure: Right leg VNUS Closure 2 perf (med nec)  Procedure Date: 12/8/22  Surgeon: Dr. Au    Ultrasound Result: The right lateral posterior thigh  closed in superficial portion. The right calf  closed in superficial portion. No evidence of RLE DVT.    Physical Exam: Incisions are approximated without signs of infection.  Ecchymosis: minimal  Swelling: minimal  Paresthesia: pt denies numbness    Patient Questions or Concerns: Overall, pt is doing well and has no concerns.    Pt is able to don her thigh high compression hose.    Reviewed postoperative instructions with patient and provided her with written material of common things to expect from her procedure.    Patient's Next Vein Clinic Appointment: 6 week post op with Dr. Au (2/6/23).    Daxa Vega RN  [facility]      Again, thank you for allowing me to participate in the care of your patient.        Sincerely,         Vein Nurse

## 2023-01-17 NOTE — TELEPHONE ENCOUNTER
Patient left message on AF nurse line searching for name of physician that she saw a few years back for her issues with claudication of her right lower leg.  Patient reports as of late she has been having more numbness and pain in right leg.  Reviewed records and it was noted that patient was seen 8/10/2017 per Dr Adair Au.   Per note:  1. Right lower extremity lipoma dermatosclerosis, CEAP class 4 with increased risk of future venous ulcer    Venous competency study reviewed.  Severe reflux along the entire length of the greater saphenous vein.  There is also a significant venous varicosity feeding into the small saphenous vein.  We discussed radiofrequency ablation of the GSV and SSV with foam sclerotherapy of the varicose vein refluxing into the small saphenous vein.    Patient is concerned about having a procedure done.  She would like to try weight loss with decreasing the amount of sugared pop she drinks.  We discussed that if her skin changes progress, we should see her back sooner.  Otherwise three-month follow-up and reassess.   after discussing above patient had opted for using compression socks and weight loss.  She now is ready to have her current status assessed.  She would like to reestablish care with Dr Au.  Reviewed schedule and he has opening on 1/29 at 8:45am.  Patient agreed to appt for evaluation.  CHACORTA Brunner.     Qbrexza Pregnancy And Lactation Text: There is no available data on Qbrexza use in pregnant women.  There is no available data on Qbrexza use in lactation.

## 2023-02-06 ENCOUNTER — OFFICE VISIT (OUTPATIENT)
Dept: VASCULAR SURGERY | Facility: CLINIC | Age: 58
End: 2023-02-06
Payer: COMMERCIAL

## 2023-02-06 DIAGNOSIS — I83.811 VARICOSE VEINS OF RIGHT LOWER EXTREMITY WITH PAIN: Primary | ICD-10-CM

## 2023-02-06 PROCEDURE — 99213 OFFICE O/P EST LOW 20 MIN: CPT | Performed by: INTERNAL MEDICINE

## 2023-02-06 NOTE — LETTER
2/6/2023         RE: Fouzia Arenas  [address] [place] [facility] [address]        Dear Colleague,    Thank you for referring your patient, Fouzia Arenas, to the [facility] [place]. Please see a copy of my visit note below.    Vein clinic follow-up status post right  ablation    Patient has been making good changes and trying to be more active and getting up and not sitting for as long during work.  She does not notice any significant difference in her right leg after the  closure.  Possibly some reduced fatigue and edema.  She continues to wear compression.  No complaints or concerns.    Follow-up per routine.    20 minutes spent today in review of charting, discussion of patient and postvisit charting      Again, thank you for allowing me to participate in the care of your patient.        Sincerely,        Adair Au MD

## 2023-02-06 NOTE — PROGRESS NOTES
Vein clinic follow-up status post right  ablation    Patient has been making good changes and trying to be more active and getting up and not sitting for as long during work.  She does not notice any significant difference in her right leg after the  closure.  Possibly some reduced fatigue and edema.  She continues to wear compression.  No complaints or concerns.    Follow-up per routine.    20 minutes spent today in review of charting, discussion of patient and postvisit charting

## 2023-05-10 DIAGNOSIS — I10 BENIGN ESSENTIAL HYPERTENSION: ICD-10-CM

## 2023-05-10 RX ORDER — SPIRONOLACTONE 25 MG/1
25 TABLET ORAL DAILY
Qty: 90 TABLET | Refills: 0 | Status: SHIPPED | OUTPATIENT
Start: 2023-05-10 | End: 2023-08-10

## 2023-07-17 ENCOUNTER — OFFICE VISIT (OUTPATIENT)
Dept: FAMILY MEDICINE | Facility: CLINIC | Age: 58
End: 2023-07-17
Payer: COMMERCIAL

## 2023-07-17 VITALS
HEART RATE: 69 BPM | RESPIRATION RATE: 16 BRPM | WEIGHT: 293 LBS | TEMPERATURE: 98.5 F | HEIGHT: 71 IN | BODY MASS INDEX: 41.02 KG/M2 | SYSTOLIC BLOOD PRESSURE: 128 MMHG | DIASTOLIC BLOOD PRESSURE: 83 MMHG | OXYGEN SATURATION: 98 %

## 2023-07-17 DIAGNOSIS — Z13.1 SCREENING FOR DIABETES MELLITUS: ICD-10-CM

## 2023-07-17 DIAGNOSIS — I10 HYPERTENSION GOAL BP (BLOOD PRESSURE) < 130/80: ICD-10-CM

## 2023-07-17 DIAGNOSIS — Z63.6 CAREGIVER STRESS: ICD-10-CM

## 2023-07-17 DIAGNOSIS — Z12.31 VISIT FOR SCREENING MAMMOGRAM: ICD-10-CM

## 2023-07-17 DIAGNOSIS — Z13.220 LIPID SCREENING: ICD-10-CM

## 2023-07-17 DIAGNOSIS — G43.009 MIGRAINE WITHOUT AURA AND WITHOUT STATUS MIGRAINOSUS, NOT INTRACTABLE: ICD-10-CM

## 2023-07-17 DIAGNOSIS — Z12.4 CERVICAL CANCER SCREENING: ICD-10-CM

## 2023-07-17 DIAGNOSIS — N64.59 BREAST SYMPTOM: ICD-10-CM

## 2023-07-17 DIAGNOSIS — E55.9 VITAMIN D DEFICIENCY: ICD-10-CM

## 2023-07-17 DIAGNOSIS — R42 DIZZINESS: ICD-10-CM

## 2023-07-17 DIAGNOSIS — Z00.00 ROUTINE GENERAL MEDICAL EXAMINATION AT A HEALTH CARE FACILITY: Primary | ICD-10-CM

## 2023-07-17 PROCEDURE — 99396 PREV VISIT EST AGE 40-64: CPT | Performed by: PHYSICIAN ASSISTANT

## 2023-07-17 PROCEDURE — 99213 OFFICE O/P EST LOW 20 MIN: CPT | Mod: 25 | Performed by: PHYSICIAN ASSISTANT

## 2023-07-17 PROCEDURE — 87624 HPV HI-RISK TYP POOLED RSLT: CPT | Performed by: PHYSICIAN ASSISTANT

## 2023-07-17 PROCEDURE — G0145 SCR C/V CYTO,THINLAYER,RESCR: HCPCS | Performed by: PHYSICIAN ASSISTANT

## 2023-07-17 SDOH — SOCIAL STABILITY - SOCIAL INSECURITY: DEPENDENT RELATIVE NEEDING CARE AT HOME: Z63.6

## 2023-07-17 ASSESSMENT — ENCOUNTER SYMPTOMS
HEMATURIA: 0
ABDOMINAL PAIN: 1
CONSTIPATION: 1
HEMATOCHEZIA: 0
COUGH: 1
CHILLS: 0

## 2023-07-17 NOTE — PROGRESS NOTES
"   SUBJECTIVE:   CC: Fouzia is an 57 year old who presents for preventive health visit.       7/17/2023    11:15 AM   Additional Questions   Roomed by Joan Gould   Accompanied by Self     HPI        {Add if <65 person on Medicare  - Required Questions (Optional):217311}  {Outside tests to abstract? :849559}    {additional problems to add (Optional):511004}      Social History     Tobacco Use     Smoking status: Never     Smokeless tobacco: Never   Substance Use Topics     Alcohol use: Yes     Alcohol/week: 1.7 standard drinks of alcohol     {Rooming staff  Click this link to complete the Prescreen if response below is not for today's visit  Alcohol Use Prescreen >3 drinks/day or > 7 drinks/week.  If the prescreen question answer is YES, complete the full AUDIT  :129843}        7/1/2021     8:35 AM   Alcohol Use   Prescreen: >3 drinks/day or >7 drinks/week? No   {add AUDIT responses (Optional) (A score of 7 for adult men is an indication of hazardous drinking; a score of 8 or more is an indication of an alcohol use disorder.  A score of 7 or more for adult women is an indication of hazardous drinking or an alchohol use disorder):300994}  Reviewed orders with patient.  Reviewed health maintenance and updated orders accordingly - { :718830::\"Yes\"}  {Chronicprobdata (optional):579403}    Breast Cancer Screening:  Any new diagnosis of family breast, ovarian, or bowel cancer? {Yes_Link to Screening / No:244201}    FHS-7:       7/1/2021     8:36 AM   Breast CA Risk Assessment (FHS-7)   Did any of your first-degree relatives have breast or ovarian cancer? No   Did any of your relatives have bilateral breast cancer? Unknown   Did any man in your family have breast cancer? No   Did any woman in your family have breast and ovarian cancer? No   Did any woman in your family have breast cancer before age 50 y? No   Do you have 2 or more relatives with breast and/or ovarian cancer? Yes   Do you have 2 or more relatives with " "breast and/or bowel cancer? No     {If any of the questions to the BCRA (FHS-7) are answered yes, consider ordering referral for genetic counseling (Optional) :568950::\"click delete button to remove this line now\"}  {AMB Mammogram Decision Support (Optional) :281923}  Pertinent mammograms are reviewed under the imaging tab.    History of abnormal Pap smear: { :563513}      Latest Ref Rng & Units 2/6/2018     8:55 AM 2/6/2018     8:36 AM 7/7/2014    12:00 AM   PAP / HPV   PAP (Historical)   NIL  NIL    HPV 16 DNA NEG^Negative Negative      HPV 18 DNA NEG^Negative Negative      Other HR HPV NEG^Negative Negative        Reviewed and updated as needed this visit by clinical staff                  Reviewed and updated as needed this visit by Provider                 {HISTORY OPTIONS (Optional):573062}    Review of Systems  {FEMALE ROS (Optional):734351}     OBJECTIVE:   There were no vitals taken for this visit.  Physical Exam  {Exam Choices (Optional):950497}    {Diagnostic Test Results (Optional):551130::\"Diagnostic Test Results:\",\"Labs reviewed in Epic\"}    ASSESSMENT/PLAN:   {Diag Picklist:739888}    {Patient advised of split billing (Optional):310515}      COUNSELING:  {FEMALE COUNSELING MESSAGES:858269::\"Reviewed preventive health counseling, as reflected in patient instructions\"}      BMI:   Estimated body mass index is 46.52 kg/m  as calculated from the following:    Height as of 9/20/22: 1.829 m (6').    Weight as of 9/20/22: 155.6 kg (343 lb).   {Weight Management Plan needed for ACO:122009}      She reports that she has never smoked. She has never used smokeless tobacco.      {Counseling Resources  [place] Preventive Services Task Force  Cholesterol Screening  Health diet/nutrition  Pooled Cohorts Equation Calculator  USDA's MyPlate  ASA Prophylaxis  Lung CA Screening  Osteoporosis prevention/bone health :328239}  {Breast Cancer Risk Calculator  BRCA-Related Cancer Risk Assessment FHS-7 Tool :793901}  Ruthie DANIEL " DANIA Xiong  [facility] [place]

## 2023-07-17 NOTE — PROGRESS NOTES
"   SUBJECTIVE:   CC: Fouzia is an 57 year old who presents for preventive health visit.       7/17/2023     1:15 PM   Additional Questions   Roomed by Tiffani ANAND     Healthy Habits:     Getting at least 3 servings of Calcium per day:  Yes    Bi-annual eye exam:  Yes    Dental care twice a year:  Yes    Sleep apnea or symptoms of sleep apnea:  Sleep apnea    Diet:  Regular (no restrictions)    Frequency of exercise:  4-5 days/week    Duration of exercise:  15-30 minutes    Taking medications regularly:  Yes    Medication side effects:  Lightheadedness    Additional concerns today:  No    Hx of migraine - has learned to manage these better with excedrin and chiropractic adjustments. Keeps imitrex on hand, but hasn't had renewed since 2019. Has done well without any for past couple yrs. May last a day, but rarely in past was to 3 days, but hasn't had one like these in yrs.     Ongoing care with cardiology for HTN, palpitations. Just had ablation of varicose veins in Dec and has f/u in August.    Wonders about vitamins for joint relief - endorses bilateral knee aching. Usually doesn't have any issues, but periodic aching. Wondered just about taking something for prevention.  Drinking a tumeric tea 2 weeks so planning to see if this would help. No trial of glucosamine-chondroitin.     Takes cinnamon to help with blood sugar regulation and magnesium periodically if she gets leg cramps.    Not fasting - will return for lab    R sided sensation of lump dating back to 2019 - has had diagnostic mammograms and always been normal. Continues to have that periodically, but nothing new or different from the past. Mostly notices when scrubbing in the shower. No new nipple discharge/lumps.     Sochx: \"extreme stress\" - mom Dx'd with dementia x3 yrs, dad's been in the hospital and helping care for her mom for past few days 24/7 which has been difficult.   Daughter in senior yr - going to [place] to help her move.  Feels generally can " "let things \"roll off her\", but sometimes feels impacts her and causes her BP to be up - generally on home cuff is 130-180s/80s. Had been on bystolic, but was lightheaded so did trial off of it. Reports feels like this helped initially, but now has been feeling like dizziness/lightheadedness has recurred over past couple months. Admits not sure if mediated by stress as coincides with taking care of her mom/stress work. Was supposed to follow-up with cardiology in a few weeks and reports has been neglecting her own health due to caring for them.  Hx of STORMY - using CPAP consistently.       Today's PHQ-2 Score:       7/17/2023     1:24 PM   PHQ-2 ( 1999 [employer])   Q1: Little interest or pleasure in doing things 0   Q2: Feeling down, depressed or hopeless 0   PHQ-2 Score 0         Social History     Tobacco Use    Smoking status: Never    Smokeless tobacco: Never   Substance Use Topics    Alcohol use: Yes     Alcohol/week: 1.7 standard drinks of alcohol             7/17/2023     1:15 PM   Alcohol Use   Prescreen: >3 drinks/day or >7 drinks/week? No          No data to display              Reviewed orders with patient.  Reviewed health maintenance and updated orders accordingly - Yes  Patient Active Problem List   Diagnosis    Esophageal reflux    CARDIOVASCULAR SCREENING; LDL GOAL LESS THAN 160    Palpitations    STORMY (obstructive sleep apnea)    Atypical chest pain    BPPV (benign paroxysmal positional vertigo)    Morbid obesity (H)    Hypertension goal BP (blood pressure) < 130/80    Vertigo    Supraventricular tachycardia (H) - on bystolic followed by cardiology    Lipodermatosclerosis of both lower extremities    Nipple symptom or sign in female - bilateral, chronic recurrent itching     Migraine without aura and without status migrainosus, not intractable     Past Surgical History:   Procedure Laterality Date    BLADDER SURGERY      CHOLECYSTECTOMY, LAPOROSCOPIC      Cholecystectomy, Laparoscopic    COLONOSCOPY N/A " 11/25/2014    Procedure: COLONOSCOPY;  Surgeon: Wenceslao Galo MD;  Location:  GI    CYSTOSCOPY, SLING TRANSVAGINAL N/A 8/20/2018    Procedure: CYSTOSCOPY, SLING TRANSVAGINAL;;  Surgeon: Urban Elena MD;  Location: [facility]    D & C      X2-3 for abnormal bleeding    ESOPHAGOSCOPY, GASTROSCOPY, DUODENOSCOPY (EGD), COMBINED N/A 11/25/2014    Procedure: COMBINED ESOPHAGOSCOPY, GASTROSCOPY, DUODENOSCOPY (EGD), BIOPSY SINGLE OR MULTIPLE;  Surgeon: Wenceslao Galo MD;  Location: [facility]    LAPAROSCOPIC HYSTERECTOMY SUPRACERVICAL N/A 8/20/2018    Procedure: LAPAROSCOPIC HYSTERECTOMY SUPRACERVICAL;  LAPAROSCOPIC SUBTOATAL HYSTERECTOMY, BILATERAL SALPINGECTOMY, SUBURETHRAL SLING AND CYSTOSCOPY;  Surgeon: Urban Elena MD;  Location: [facility]    LAPAROSCOPIC SALPINGECTOMY Bilateral 8/20/2018    Procedure: LAPAROSCOPIC SALPINGECTOMY;;  Surgeon: Urban Elena MD;  Location: [facility]    LAPAROSCOPY      For endometriosis    LASER ABLATION VEIN VARICOSE      OPERATIVE HYSTEROSCOPY WITH MORCELLATOR N/A 3/29/2018    Procedure: OPERATIVE HYSTEROSCOPY WITH MORCELLATOR (MYOSURE);  OPERATIVE HYSTEROSCOPY WITH MORCELLATOR ;  Surgeon: Urban Elena MD;  Location: [facility]       Social History     Tobacco Use    Smoking status: Never    Smokeless tobacco: Never   Substance Use Topics    Alcohol use: Yes     Alcohol/week: 1.7 standard drinks of alcohol     Family History   Problem Relation Age of Onset    Hypertension Mother     Lipids Mother     Alzheimer Disease Mother     Hypertension Father     Prostate Cancer Father     Lipids Father     Breast Cancer Maternal Aunt     Hypertension Maternal Grandmother     C.A.D. Maternal Grandmother     Gallbladder Disease Maternal Grandmother     Cancer Maternal Grandfather         lung    Cerebrovascular Disease Paternal Grandmother     C.A.D. Paternal Grandfather     Cancer - colorectal Other         cousin maternal     Colon Cancer Cousin     Colon Cancer Cousin      Breast Cancer Paternal Aunt     Diabetes No family hx of          Current Outpatient Medications   Medication Sig Dispense Refill    aspirin (ASA) 81 MG EC tablet Take 1 tablet (81 mg) by mouth daily      Aspirin-Acetaminophen-Caffeine (EXCEDRIN EXTRA STRENGTH PO) Take 2 tablets by mouth every 6 hours as needed      Cholecalciferol (VITAMIN D-3) 125 MCG (5000 UT) TABS Take 2 capsules by mouth daily      CINNAMON PO Take 2,000 mg by mouth daily      lisinopril (ZESTRIL) 20 MG tablet Take 1 tablet (20 mg) by mouth daily      MAGNESIUM PO       meclizine (ANTIVERT) 12.5 MG tablet Take 12.5 mg by mouth 3 times daily as needed for dizziness      spironolactone (ALDACTONE) 25 MG tablet Take 1 tablet (25 mg) by mouth daily 90 tablet 0    SUMAtriptan (IMITREX) 100 MG tablet Take 1 tablet (100 mg) by mouth at onset of headache for migraine May repeat in 2 hours. Max dose 200mg in 24 hours. 9 tablet 1     Allergies   Allergen Reactions    Amoxicillin Nausea and Vomiting    Hctz [Hydrochlorothiazide] Other (See Comments)     Weak/Achy despite nl BMP    Percocet [Oxycodone-Acetaminophen] Nausea and Vomiting       Breast Cancer Screening:    FHS-7:       7/1/2021     8:36 AM 7/17/2023     1:16 PM   Breast CA Risk Assessment (FHS-7)   Did any of your first-degree relatives have breast or ovarian cancer? No No   Did any of your relatives have bilateral breast cancer? Unknown Unknown   Did any man in your family have breast cancer? No No   Did any woman in your family have breast and ovarian cancer? No Yes   Did any woman in your family have breast cancer before age 50 y? No No   Do you have 2 or more relatives with breast and/or ovarian cancer? Yes Yes   Do you have 2 or more relatives with breast and/or bowel cancer? No Yes       Mammogram Screening: Recommended mammography every 1-2 years with patient discussion and risk factor consideration  Pertinent mammograms are reviewed under the imaging tab.    History of abnormal Pap  "smear: NO - age 30-65 PAP every 5 years with negative HPV co-testing recommended      Latest Ref Rng & Units 7/17/2023     2:19 PM 2/6/2018     8:55 AM 2/6/2018     8:36 AM   PAP / HPV   PAP  Negative for Intraepithelial Lesion or Malignancy (NILM)      PAP (Historical)    NIL    HPV 16 DNA Negative Negative  Negative     HPV 18 DNA Negative Negative  Negative     Other HR HPV Negative Negative  Negative       Reviewed and updated as needed this visit by clinical staff   Tobacco  Allergies  Meds  Problems  Med Hx  Surg Hx  Fam Hx          Reviewed and updated as needed this visit by Provider     Meds  Problems                Review of Systems   Constitutional:  Negative for chills.   HENT:  Negative for congestion.    Respiratory:  Positive for cough.    Cardiovascular:  Negative for chest pain.   Gastrointestinal:  Positive for abdominal pain and constipation. Negative for hematochezia.   Genitourinary:  Negative for hematuria.          OBJECTIVE:   /83   Pulse 69   Temp 98.5  F (36.9  C) (Tympanic)   Resp 16   Ht 1.803 m (5' 11\")   Wt (!) 152.6 kg (336 lb 8 oz)   SpO2 98%   BMI 46.93 kg/m    Physical Exam  GENERAL: healthy, alert and no distress  EYES: Eyes grossly normal to inspection, PERRL and conjunctivae and sclerae normal  HENT: ear canals and TM's normal, nose and mouth without ulcers or lesions  NECK: no adenopathy, no asymmetry, masses, or scars and thyroid normal to palpation  RESP: lungs clear to auscultation - no rales, rhonchi or wheezes  BREAST: normal without masses, tenderness or nipple discharge and no palpable axillary masses or adenopathy  CV: regular rate and rhythm, normal S1 S2, no S3 or S4, no murmur, click or rub, no peripheral edema and peripheral pulses strong  ABDOMEN: soft, nontender, no hepatosplenomegaly, no masses and bowel sounds normal   (female): normal female external genitalia, normal urethral meatus, vaginal mucosa pink, moist, well rugated, and normal " cervix/adnexa/uterus without masses or discharge  MS: no gross musculoskeletal defects noted, no edema  SKIN: no suspicious lesions or rashes  NEURO: Normal strength and tone, mentation intact and speech normal  PSYCH: mentation appears normal, affect normal/bright    Diagnostic Test Results:  Labs reviewed in Epic    ASSESSMENT/PLAN:   Fouzia was seen today for physical.    Diagnoses and all orders for this visit:    Routine general medical examination at a health care facility  -     Reviewed preventative health recommendations for age.  - REVIEW OF HEALTH MAINTENANCE PROTOCOL ORDERS    Migraine without aura and without status migrainosus, not intractable   - Improvement and has required renewal of imitrex in yrs, but will keep on hand and pt will follow-up if has recurrence or needs.    Vitamin D deficiency   - continue supplementation.    Hypertension goal BP (blood pressure) < 130/80  Dizziness  Caregiver stress  -     Pt reports trial of bystolic previously prescribed by cardiology as felt was contributing to dizziness. Admits in hindsight that dizziness has not resolved/improved and wonders if it's more mediated by stress given multiple stressors per HPI. Reports ongoing elevation in BP on home cuff and I encouraged her to consider resumption of her bystolic and following back up with cardiology per their initial recommendation. Will screen CBC and TSH in the interim to ensure no other etiology for dizziness.  - CBC with platelets; Future  -     TSH with free T4 reflex; Future    Lipid screening  -     Lipid panel reflex to direct LDL Fasting; Future    Screening for diabetes mellitus  -     Comprehensive metabolic panel (BMP + Alb, Alk Phos, ALT, AST, Total. Bili, TP); Future  -     Hemoglobin A1c; Future    Cervical cancer screening  -     Pap Screen with HPV - recommended age 30 - 65 years  -     HPV Hold (Lab Only)  -     HPV High Risk Types DNA Cervical    Visit for screening mammogram  Breast symptom -  "sensation of lump dating back to 2019 - has had diagnostic mammograms and always been normal  -     normal clinical breast exam without amrit lump or mass noted. Pt endorses sensation at times along R lateral breast that their could be a lump, but this is a chronic intermittent sx for her and it's not new, changed from past. Is unable to find any lump or mass on her self exam as well. Declines repeat diagnostic mammo as has been done in past and nl. Encouraged to follow-up anytime with interval changes in breast exam. Patient in agreement with plan.   - MA Screen Bilateral w/Arjun; Future      COUNSELING:  Reviewed preventive health counseling, as reflected in patient instructions       Regular exercise       Healthy diet/nutrition       Immunizations  Pt will consider shingrix         Colorectal Cancer Screening      BMI:   Estimated body mass index is 46.93 kg/m  as calculated from the following:    Height as of this encounter: 1.803 m (5' 11\").    Weight as of this encounter: 152.6 kg (336 lb 8 oz).   Weight management plan: Discussed healthy diet and exercise guidelines      She reports that she has never smoked. She has never used smokeless tobacco.          Ruthie Xiong PA-C  [facility] [place]  "

## 2023-07-21 LAB
BKR LAB AP GYN ADEQUACY: NORMAL
BKR LAB AP GYN INTERPRETATION: NORMAL
BKR LAB AP HPV REFLEX: NORMAL
BKR LAB AP PREVIOUS ABNORMAL: NORMAL
PATH REPORT.COMMENTS IMP SPEC: NORMAL
PATH REPORT.COMMENTS IMP SPEC: NORMAL
PATH REPORT.RELEVANT HX SPEC: NORMAL

## 2023-07-25 LAB
HUMAN PAPILLOMA VIRUS 16 DNA: NEGATIVE
HUMAN PAPILLOMA VIRUS 18 DNA: NEGATIVE
HUMAN PAPILLOMA VIRUS FINAL DIAGNOSIS: NORMAL
HUMAN PAPILLOMA VIRUS OTHER HR: NEGATIVE

## 2023-08-01 ENCOUNTER — LAB (OUTPATIENT)
Dept: LAB | Facility: CLINIC | Age: 58
End: 2023-08-01
Payer: COMMERCIAL

## 2023-08-01 DIAGNOSIS — Z13.1 SCREENING FOR DIABETES MELLITUS: ICD-10-CM

## 2023-08-01 DIAGNOSIS — R42 DIZZINESS: ICD-10-CM

## 2023-08-01 DIAGNOSIS — Z13.220 LIPID SCREENING: ICD-10-CM

## 2023-08-01 LAB
ALBUMIN SERPL BCG-MCNC: 4.5 G/DL (ref 3.5–5.2)
ALP SERPL-CCNC: 100 U/L (ref 35–104)
ALT SERPL W P-5'-P-CCNC: 29 U/L (ref 0–50)
ANION GAP SERPL CALCULATED.3IONS-SCNC: 10 MMOL/L (ref 7–15)
AST SERPL W P-5'-P-CCNC: 32 U/L (ref 0–45)
BILIRUB SERPL-MCNC: 0.5 MG/DL
BUN SERPL-MCNC: 11.3 MG/DL (ref 6–20)
CALCIUM SERPL-MCNC: 9.2 MG/DL (ref 8.6–10)
CHLORIDE SERPL-SCNC: 104 MMOL/L (ref 98–107)
CHOLEST SERPL-MCNC: 188 MG/DL
CREAT SERPL-MCNC: 0.96 MG/DL (ref 0.51–0.95)
DEPRECATED HCO3 PLAS-SCNC: 26 MMOL/L (ref 22–29)
ERYTHROCYTE [DISTWIDTH] IN BLOOD BY AUTOMATED COUNT: 12.4 % (ref 10–15)
GFR SERPL CREATININE-BSD FRML MDRD: 69 ML/MIN/1.73M2
GLUCOSE SERPL-MCNC: 99 MG/DL (ref 70–99)
HBA1C MFR BLD: 5.9 % (ref 0–5.6)
HCT VFR BLD AUTO: 47.6 % (ref 35–47)
HDLC SERPL-MCNC: 50 MG/DL
HGB BLD-MCNC: 14.6 G/DL (ref 11.7–15.7)
LDLC SERPL CALC-MCNC: 120 MG/DL
MCH RBC QN AUTO: 27.9 PG (ref 26.5–33)
MCHC RBC AUTO-ENTMCNC: 30.7 G/DL (ref 31.5–36.5)
MCV RBC AUTO: 91 FL (ref 78–100)
NONHDLC SERPL-MCNC: 138 MG/DL
PLATELET # BLD AUTO: 252 10E3/UL (ref 150–450)
POTASSIUM SERPL-SCNC: 4.7 MMOL/L (ref 3.4–5.3)
PROT SERPL-MCNC: 7.7 G/DL (ref 6.4–8.3)
RBC # BLD AUTO: 5.24 10E6/UL (ref 3.8–5.2)
SODIUM SERPL-SCNC: 140 MMOL/L (ref 136–145)
TRIGL SERPL-MCNC: 90 MG/DL
TSH SERPL DL<=0.005 MIU/L-ACNC: 3.26 UIU/ML (ref 0.3–4.2)
WBC # BLD AUTO: 7.5 10E3/UL (ref 4–11)

## 2023-08-01 PROCEDURE — 80053 COMPREHEN METABOLIC PANEL: CPT

## 2023-08-01 PROCEDURE — 80061 LIPID PANEL: CPT

## 2023-08-01 PROCEDURE — 85027 COMPLETE CBC AUTOMATED: CPT

## 2023-08-01 PROCEDURE — 84443 ASSAY THYROID STIM HORMONE: CPT

## 2023-08-01 PROCEDURE — 36415 COLL VENOUS BLD VENIPUNCTURE: CPT

## 2023-08-01 PROCEDURE — 83036 HEMOGLOBIN GLYCOSYLATED A1C: CPT

## 2023-08-09 PROBLEM — N18.2 CKD (CHRONIC KIDNEY DISEASE) STAGE 2, GFR 60-89 ML/MIN: Status: ACTIVE | Noted: 2023-08-09

## 2023-08-09 NOTE — RESULT ENCOUNTER NOTE
Dear Fouzia,      My apologies on the delay of your labs since I was out of office last week. Your recent test results are noted below:    -Normal red blood cell (hgb) levels, normal white blood cell count and normal platelet levels.  -LDL(bad) cholesterol level is elevated which can increase your heart disease risk.  A diet high in fat and simple carbohydrates, genetics and being overweight can contribute to this. ADVISE: exercising 150 minutes of aerobic exercise per week (30 minutes for 5 days per week or 50 minutes for 3 days per week are options) and eating a low saturated fat/low carbohydrate diet are helpful to improve this.  -Liver and gallbladder tests (ALT,AST, Alk phos,bilirubin) are normal.  -Kidney function (GFR) is decreased, but review of your record shows this has been the case off/on dating back the past 2 yrs and overall is improved from that time. I added a chronic kidney disease diagnosis to your record for us to continue to watch this. This can be contributed to by hypertension, use of NSAIDs, dehydration, diabetes. I would encourage you to stay well-hydrated, keep BP in good control and avoid ibuprofen/motrin, etc.  -Sodium is normal.  -Potassium is normal.  -Calcium is normal.  -A1c elevated consistent with prediabetes. Would recommend continuing to work on healthy lifestyle, weight loss, low-carb diet, exercise as much as you're able.  -TSH (thyroid stimulating hormone) level is normal which indicates normal thyroid function.    For additional lab test information, labtestsonline.org is an excellent reference. Please contact the clinic at [phone] with any further questions or concerns.    Sincerely,      Ruthie Xiong PA-C  [facility]

## 2023-08-10 DIAGNOSIS — I10 BENIGN ESSENTIAL HYPERTENSION: ICD-10-CM

## 2023-08-10 RX ORDER — SPIRONOLACTONE 25 MG/1
25 TABLET ORAL DAILY
Qty: 90 TABLET | Refills: 0 | Status: SHIPPED | OUTPATIENT
Start: 2023-08-10 | End: 2023-11-13

## 2023-08-14 ENCOUNTER — OFFICE VISIT (OUTPATIENT)
Dept: VASCULAR SURGERY | Facility: CLINIC | Age: 58
End: 2023-08-14
Attending: INTERNAL MEDICINE
Payer: COMMERCIAL

## 2023-08-14 ENCOUNTER — ANCILLARY PROCEDURE (OUTPATIENT)
Dept: ULTRASOUND IMAGING | Facility: CLINIC | Age: 58
End: 2023-08-14
Attending: INTERNAL MEDICINE
Payer: COMMERCIAL

## 2023-08-14 DIAGNOSIS — I83.811 VARICOSE VEINS OF RIGHT LOWER EXTREMITY WITH PAIN: ICD-10-CM

## 2023-08-14 DIAGNOSIS — R60.0 LOCALIZED EDEMA: Primary | ICD-10-CM

## 2023-08-14 PROCEDURE — 93971 EXTREMITY STUDY: CPT | Mod: RT | Performed by: INTERNAL MEDICINE

## 2023-08-14 PROCEDURE — 99213 OFFICE O/P EST LOW 20 MIN: CPT | Performed by: INTERNAL MEDICINE

## 2023-08-14 NOTE — PROGRESS NOTES
6-month follow-up of right lower extremity  x2 radiofrequency ablation    Ultrasound reviewed and confirmed closure of perforators.    Patient doing well from a lower extremity standpoint.    We discussed that Ozempic would be fine from a cardiovascular standpoint and that she does not need to increase her hydration as her BUN is not elevated and her creatinine is only borderline elevated and she is on ACE inhibitor and spironolactone.    20 minutes spent today reviewing chart, discussion with patient and postvisit charting.

## 2023-11-05 ENCOUNTER — HEALTH MAINTENANCE LETTER (OUTPATIENT)
Age: 58
End: 2023-11-05

## 2023-11-13 ENCOUNTER — TELEPHONE (OUTPATIENT)
Dept: CARDIOLOGY | Facility: CLINIC | Age: 58
End: 2023-11-13
Payer: COMMERCIAL

## 2023-11-13 DIAGNOSIS — I10 BENIGN ESSENTIAL HYPERTENSION: ICD-10-CM

## 2023-11-13 RX ORDER — SPIRONOLACTONE 25 MG/1
25 TABLET ORAL DAILY
Qty: 90 TABLET | Refills: 0 | Status: SHIPPED | OUTPATIENT
Start: 2023-11-13 | End: 2024-01-05

## 2023-11-13 RX ORDER — LISINOPRIL 20 MG/1
20 TABLET ORAL DAILY
Qty: 90 TABLET | Refills: 0 | Status: SHIPPED | OUTPATIENT
Start: 2023-11-13 | End: 2024-01-05

## 2023-11-13 NOTE — TELEPHONE ENCOUNTER
Patient called needing refills on her lisinopril and spironolactone.  She is overdue for her annual follow up.  She did schedule with ENRIQUE Adams on 1/5/24.  Will send 90 day supply to get her to her appt.  CHACORTA Brunner

## 2024-01-02 NOTE — PROGRESS NOTES
"[facility]    I had the pleasure of seeing Fouzia when she came for follow up of HTN and PACs.  This 58 year old sees Dr. Au (Vascular) and has seen Dr. Hensley for her history of:    1. Hypertension with reduction in Bystolic dose 3/2018 due to lightheaded episodes (subsequently resolved)  2. Palpitations, with event monitor 10/2015 showing episodes of atrial tachycardia previously on carvedilol and now taking by Bystolic.  Holter monitor done 9/2022 with 21% PACs  3. Right lower extremity lipoma dermatosclerosis for which Dr. Au saw her 8/2017 with severe R GSV and small R SV. S/p R GSV RF ablation from prox thigh to upper calf and R dGSV insheath sclerotherapy. Direct sclerotherapy of lateral R thigh varicose veins and limited phlebectomy of lateral thigh varicose veins (limited by  vein's proximity to artery). S/p R LE VNUS closure 12/8/2022  4. STORMY - CPAP has been replaced         Last Visit & Interval History:  I saw Fouzia 9/2022 at which time she continued to c/o VILLAFANA and non-exertional chest \"heaviness.\" We reviewed negative stress testing done for this 9/2023. We'd tried improving cough by switching lisinopril to losartan but she thought losartan made her dizzy. D/t concerns Bystolic was contributing to chronotropic incompetence, this was held x 2 weeks. She was to call me in 2 weeks with update.    She has subsequently seen Dr. Au in Vascular and underwent VNUS procedure  12/2022.     Today's Visit:  Overall Fouzia thinks things are going pretty well. She is under quite a bit of stress, caring for various family members and friends including her elderly parents.      She notes occasional chest heaviness, similar to what she had in the past for which she had negative stress test. TUMS continue to relieve this.    Notes occasional \"waves\" of \"faintiness and shakiness,\" typically after standing for a prolonged period of time. Has not related to eating/drinking. No palpitations " associated. No syncope or severe presyncope.  She has had this in the past and was told her magnesium was low. She's been taking magnesium supplements this has improved her sxs.     Notes some occasional flushing. Reviewed that this seemed to start after she starting taking supplements (Vit D, C, E, B complex, Zinc).    VITALS:  Vitals: /75   Pulse 66   Ht 1.829 m (6')   Wt (!) 155.6 kg (343 lb)   BMI 46.52 kg/m      Diagnostic Testin hour Holter 2022 SR 62 bpm (range 50-84 bpm). <1% PVCs and 21% PACs  Nuc Stress Test 2022 with no inducible myocardial ischemia.  Hyperdynamic LVEF 76%  US LE 3/10/2021 (2 d post procedure) No DVT. R GSV closed from proximal thigh to ankle; lateral thigh  and varicose veins patent with residual reflux up to 4.7s  Stress test 2017 showed no evidence of ischemia, with breast attenuation artifact.  Echocardiogram  showed a low normal ejection fraction.  She had no significant valvular abnormalities noted.  Component      Latest Ref Rng 2022  10:34 PM 2022  8:44 PM 2023  10:03 AM   Sodium      136 - 145 mmol/L 140  137  140    Potassium      3.4 - 5.3 mmol/L 4.0   4.7    Creatinine      0.51 - 0.95 mg/dL 1.08 (H)  0.99  0.96 (H)    Urea Nitrogen      6.0 - 20.0 mg/dL 14.8   11.3    Chloride      98 - 107 mmol/L 104   104    Carbon Dioxide (CO2)      22 - 29 mmol/L 27   26    Anion Gap      7 - 15 mmol/L 9  6  10    Glucose      70 - 99 mg/dL 107 (H)   99    Calcium      8.6 - 10.0 mg/dL 9.1  9.2  9.2           Plan:  Stop supplements, then resume 1 at a time, q 2 weeks. Suggested Magnesium, then Vit D, then Vit C, followed by Zinc, B complex, Vit E  Annual follow-up       Assessment/Plan:    HTN  Remains on spironolactone 25, lisinopril 20 and BP today looks good!    PLAN:  Continue current medications  Annual follow-up     Frequent PACs  H/o asx'c AT on previous monitors  21% PAC burden on Bystolic 10 mg daily back on Holter 2022.  Bystolic held per her request given concerns for chronotropic incompetence. Her palpitations remain under good control despite stopping this    PLAN:  Continue to monitor  Annual follow-up     Weight gain  Frustrated that she is not losing weight and she doesn't think she eats very much  Limited exercise    PLAN:  Reviewed following a lower carbohydrate diet, heavy in fruits/vegetables, limited processed foods, no added sugar and where most fats come from non-animal sources        Jaqui Edmond PA-C, MSPAS      Orders Placed This Encounter   Procedures    Follow-Up with Cardiology [facility]     Orders Placed This Encounter   Medications    vitamin C (ASCORBIC ACID) 1000 MG TABS     Sig: Take 1,000 mg by mouth daily    VITAMIN E PO    vitamin C with B complex (B COMPLEX-C) tablet     Sig: Take 1 tablet by mouth daily    spironolactone (ALDACTONE) 25 MG tablet     Sig: Take 1 tablet (25 mg) by mouth daily     Dispense:  90 tablet     Refill:  3     Keep on file until pt due for refills    lisinopril (ZESTRIL) 20 MG tablet     Sig: Take 1 tablet (20 mg) by mouth daily     Dispense:  90 tablet     Refill:  3     Keep on file until pt due for refills     Medications Discontinued During This Encounter   Medication Reason    lisinopril (ZESTRIL) 20 MG tablet Reorder (No AVS)    spironolactone (ALDACTONE) 25 MG tablet Reorder (No AVS)         Encounter Diagnosis   Name Primary?    Benign essential hypertension        CURRENT MEDICATIONS:  Current Outpatient Medications   Medication Sig Dispense Refill    aspirin (ASA) 81 MG EC tablet Take 1 tablet (81 mg) by mouth daily      Aspirin-Acetaminophen-Caffeine (EXCEDRIN EXTRA STRENGTH PO) Take 2 tablets by mouth every 6 hours as needed      Cholecalciferol (VITAMIN D-3 PO) Take 1,000 Units by mouth daily      CINNAMON PO Take 1,000 mg by mouth daily      lisinopril (ZESTRIL) 20 MG tablet Take 1 tablet (20 mg) by mouth daily 90 tablet 3    MAGNESIUM PO       meclizine (ANTIVERT) 12.5 MG  tablet Take 12.5 mg by mouth 3 times daily as needed for dizziness      spironolactone (ALDACTONE) 25 MG tablet Take 1 tablet (25 mg) by mouth daily 90 tablet 3    SUMAtriptan (IMITREX) 100 MG tablet Take 1 tablet (100 mg) by mouth at onset of headache for migraine May repeat in 2 hours. Max dose 200mg in 24 hours. 9 tablet 1    vitamin C (ASCORBIC ACID) 1000 MG TABS Take 1,000 mg by mouth daily      vitamin C with B complex (B COMPLEX-C) tablet Take 1 tablet by mouth daily      VITAMIN E PO          ALLERGIES     Allergies   Allergen Reactions    Amoxicillin Nausea and Vomiting    Hctz [Hydrochlorothiazide] Other (See Comments)     Weak/Achy despite nl BMP    Percocet [Oxycodone-Acetaminophen] Nausea and Vomiting         Review of Systems:  Skin:  Negative itching;bruising   Eyes:  Negative glasses  ENT:  Negative    Respiratory:  Negative for dyspnea on exertion;shortness of breath  Cardiovascular:  Negative for;syncope or near-syncope;cyanosis;chest pain;palpitations Positive for;edema  Gastroenterology: Negative for melena;hematochezia  Genitourinary:  Negative    Musculoskeletal:  Positive for arthritis (Knees only)  Neurologic:  Negative migraine headaches  Psychiatric:  Negative    Heme/Lymph/Imm:  Negative night sweats  Endocrine:  Negative      Physical Exam:  Vitals: /75   Pulse 66   Ht 1.829 m (6')   Wt (!) 155.6 kg (343 lb)   BMI 46.52 kg/m      Constitutional:  cooperative, alert and oriented, well developed, well nourished, in no acute distress overweight      Skin:  warm and dry to the touch        Head:  normocephalic, no masses or lesions        Eyes:  pupils equal and round;conjunctivae and lids unremarkable;sclera white        ENT:  not assessed this visit        Neck:  JVP normal        Chest:  normal respiratory excursion   no reproducible pain    Cardiac: regular rhythm       grade 1;RUSB;early systolic murmur          Abdomen:  not assessed this visit obese benign    Vascular: not  assessed this visit                                      Extremities and Back:  no deformities, clubbing, cyanosis, erythema observed;not assessed this visit stasis pigmentation;erythema R medial malleolus erythema, no open wound    Neurological:  no gross motor deficits            PAST MEDICAL HISTORY:  Past Medical History:   Diagnosis Date    Classic migraine     Complex endometrial hyperplasia     without atypia    Hypertension     Motion sickness     Mumps     Obese     STORMY (obstructive sleep apnea)     Other disorder of menstruation and other abnormal bleeding from female genital tract 2/24/2011    Palpitations     PONV (postoperative nausea and vomiting)     Spider veins     SVT (supraventricular tachycardia) 9/2015       PAST SURGICAL HISTORY:  Past Surgical History:   Procedure Laterality Date    BLADDER SURGERY      CHOLECYSTECTOMY, LAPOROSCOPIC      Cholecystectomy, Laparoscopic    COLONOSCOPY N/A 11/25/2014    Procedure: COLONOSCOPY;  Surgeon: Wenceslao Galo MD;  Location:  GI    CYSTOSCOPY, SLING TRANSVAGINAL N/A 8/20/2018    Procedure: CYSTOSCOPY, SLING TRANSVAGINAL;;  Surgeon: Urban Elena MD;  Location: [facility]    D & C      X2-3 for abnormal bleeding    ESOPHAGOSCOPY, GASTROSCOPY, DUODENOSCOPY (EGD), COMBINED N/A 11/25/2014    Procedure: COMBINED ESOPHAGOSCOPY, GASTROSCOPY, DUODENOSCOPY (EGD), BIOPSY SINGLE OR MULTIPLE;  Surgeon: Wenceslao Galo MD;  Location:  GI    LAPAROSCOPIC HYSTERECTOMY SUPRACERVICAL N/A 8/20/2018    Procedure: LAPAROSCOPIC HYSTERECTOMY SUPRACERVICAL;  LAPAROSCOPIC SUBTOATAL HYSTERECTOMY, BILATERAL SALPINGECTOMY, SUBURETHRAL SLING AND CYSTOSCOPY;  Surgeon: Urban Elena MD;  Location: [facility]    LAPAROSCOPIC SALPINGECTOMY Bilateral 8/20/2018    Procedure: LAPAROSCOPIC SALPINGECTOMY;;  Surgeon: Urban Elena MD;  Location: [facility]    LAPAROSCOPY      For endometriosis    LASER ABLATION VEIN VARICOSE      OPERATIVE HYSTEROSCOPY WITH MORCELLATOR  N/A 3/29/2018    Procedure: OPERATIVE HYSTEROSCOPY WITH MORCELLATOR (MYOSURE);  OPERATIVE HYSTEROSCOPY WITH MORCELLATOR ;  Surgeon: Urban Elena MD;  Location: [facility]       FAMILY HISTORY:  Family History   Problem Relation Age of Onset    Hypertension Mother     Lipids Mother     Alzheimer Disease Mother     Hypertension Father     Prostate Cancer Father     Lipids Father     Breast Cancer Maternal Aunt     Hypertension Maternal Grandmother     C.A.D. Maternal Grandmother     Gallbladder Disease Maternal Grandmother     Cancer Maternal Grandfather         lung    Cerebrovascular Disease Paternal Grandmother     C.A.D. Paternal Grandfather     Cancer - colorectal Other         cousin maternal     Colon Cancer Cousin     Colon Cancer Cousin     Breast Cancer Paternal Aunt     Diabetes No family hx of        SOCIAL HISTORY:  Social History     Socioeconomic History    Marital status: Single     Spouse name: None    Number of children: None    Years of education: None    Highest education level: None   Tobacco Use    Smoking status: Never    Smokeless tobacco: Never   Substance and Sexual Activity    Alcohol use: Yes     Alcohol/week: 1.7 standard drinks of alcohol     Types: 2 Standard drinks or equivalent per week     Comment: 1 drink a month    Drug use: No    Sexual activity: Never   Other Topics Concern    Caffeine Concern Yes     Comment: 1-2 cans qd    Special Diet Yes     Comment: started mediterranian     Exercise Yes     Comment: 20 minutes walking 3-4 days weekly     Seat Belt Yes    Parent/sibling w/ CABG, MI or angioplasty before 65F 55M? No

## 2024-01-05 ENCOUNTER — OFFICE VISIT (OUTPATIENT)
Dept: CARDIOLOGY | Facility: CLINIC | Age: 59
End: 2024-01-05
Payer: COMMERCIAL

## 2024-01-05 VITALS
SYSTOLIC BLOOD PRESSURE: 120 MMHG | DIASTOLIC BLOOD PRESSURE: 75 MMHG | BODY MASS INDEX: 39.68 KG/M2 | HEIGHT: 72 IN | WEIGHT: 293 LBS | HEART RATE: 66 BPM

## 2024-01-05 DIAGNOSIS — I10 BENIGN ESSENTIAL HYPERTENSION: ICD-10-CM

## 2024-01-05 PROCEDURE — 99214 OFFICE O/P EST MOD 30 MIN: CPT | Performed by: PHYSICIAN ASSISTANT

## 2024-01-05 RX ORDER — MULTIVIT WITH MINERALS/LUTEIN
1000 TABLET ORAL DAILY
COMMUNITY

## 2024-01-05 RX ORDER — LISINOPRIL 20 MG/1
20 TABLET ORAL DAILY
Qty: 90 TABLET | Refills: 3 | Status: SHIPPED | OUTPATIENT
Start: 2024-01-05

## 2024-01-05 RX ORDER — SPIRONOLACTONE 25 MG/1
25 TABLET ORAL DAILY
Qty: 90 TABLET | Refills: 3 | Status: SHIPPED | OUTPATIENT
Start: 2024-01-05

## 2024-01-05 RX ORDER — MULTIVITAMIN WITH IRON
1 TABLET ORAL DAILY
COMMUNITY

## 2024-01-05 NOTE — LETTER
"1/5/2024    Ruthie Xiong PA-C  [address] [facility] [address]    RE: Fouzia Arenas       Dear Colleague,     I had the pleasure of seeing Fouzia Arenas in the [facility].  [facility]    I had the pleasure of seeing Fouzia when she came for follow up of HTN and PACs.  This 58 year old sees Dr. Au (Vascular) and has seen Dr. Hensley for her history of:    1. Hypertension with reduction in Bystolic dose 3/2018 due to lightheaded episodes (subsequently resolved)  2. Palpitations, with event monitor 10/2015 showing episodes of atrial tachycardia previously on carvedilol and now taking by Bystolic.  Holter monitor done 9/2022 with 21% PACs  3. Right lower extremity lipoma dermatosclerosis for which Dr. Au saw her 8/2017 with severe R GSV and small R SV. S/p R GSV RF ablation from prox thigh to upper calf and R dGSV insheath sclerotherapy. Direct sclerotherapy of lateral R thigh varicose veins and limited phlebectomy of lateral thigh varicose veins (limited by  vein's proximity to artery). S/p R LE VNUS closure 12/8/2022  4. STORMY - CPAP has been replaced         Last Visit & Interval History:  I saw Fouzia 9/2022 at which time she continued to c/o VILLAFANA and non-exertional chest \"heaviness.\" We reviewed negative stress testing done for this 9/2023. We'd tried improving cough by switching lisinopril to losartan but she thought losartan made her dizzy. D/t concerns Bystolic was contributing to chronotropic incompetence, this was held x 2 weeks. She was to call me in 2 weeks with update.    She has subsequently seen Dr. uA in Vascular and underwent VNUS procedure  12/2022.     Today's Visit:  Overall Fouzia thinks things are going pretty well. She is under quite a bit of stress, caring for various family members and friends including her elderly parents.      She notes occasional chest heaviness, similar to what she had in the past for which she had negative stress " "test. TUMS continue to relieve this.    Notes occasional \"waves\" of \"faintiness and shakiness,\" typically after standing for a prolonged period of time. Has not related to eating/drinking. No palpitations associated. No syncope or severe presyncope.  She has had this in the past and was told her magnesium was low. She's been taking magnesium supplements this has improved her sxs.     Notes some occasional flushing. Reviewed that this seemed to start after she starting taking supplements (Vit D, C, E, B complex, Zinc).    VITALS:  Vitals: /75   Pulse 66   Ht 1.829 m (6')   Wt (!) 155.6 kg (343 lb)   BMI 46.52 kg/m      Diagnostic Testin hour Holter 2022 SR 62 bpm (range 50-84 bpm). <1% PVCs and 21% PACs  Nuc Stress Test 2022 with no inducible myocardial ischemia.  Hyperdynamic LVEF 76%  US LE 3/10/2021 (2 d post procedure) No DVT. R GSV closed from proximal thigh to ankle; lateral thigh  and varicose veins patent with residual reflux up to 4.7s  Stress test 2017 showed no evidence of ischemia, with breast attenuation artifact.  Echocardiogram  showed a low normal ejection fraction.  She had no significant valvular abnormalities noted.  Component      Latest Ref Rng 2022  10:34 PM 2022  8:44 PM 2023  10:03 AM   Sodium      136 - 145 mmol/L 140  137  140    Potassium      3.4 - 5.3 mmol/L 4.0   4.7    Creatinine      0.51 - 0.95 mg/dL 1.08 (H)  0.99  0.96 (H)    Urea Nitrogen      6.0 - 20.0 mg/dL 14.8   11.3    Chloride      98 - 107 mmol/L 104   104    Carbon Dioxide (CO2)      22 - 29 mmol/L 27   26    Anion Gap      7 - 15 mmol/L 9  6  10    Glucose      70 - 99 mg/dL 107 (H)   99    Calcium      8.6 - 10.0 mg/dL 9.1  9.2  9.2           Plan:  Stop supplements, then resume 1 at a time, q 2 weeks. Suggested Magnesium, then Vit D, then Vit C, followed by Zinc, B complex, Vit E  Annual follow-up       Assessment/Plan:    HTN  Remains on spironolactone 25, " lisinopril 20 and BP today looks good!    PLAN:  Continue current medications  Annual follow-up     Frequent PACs  H/o asx'c AT on previous monitors  21% PAC burden on Bystolic 10 mg daily back on Holter 9/2022. Bystolic held per her request given concerns for chronotropic incompetence. Her palpitations remain under good control despite stopping this    PLAN:  Continue to monitor  Annual follow-up     Weight gain  Frustrated that she is not losing weight and she doesn't think she eats very much  Limited exercise    PLAN:  Reviewed following a lower carbohydrate diet, heavy in fruits/vegetables, limited processed foods, no added sugar and where most fats come from non-animal sources        Jaqui Edmond PA-C, MSPAS      Orders Placed This Encounter   Procedures    Follow-Up with Cardiology [facility]     Orders Placed This Encounter   Medications    vitamin C (ASCORBIC ACID) 1000 MG TABS     Sig: Take 1,000 mg by mouth daily    VITAMIN E PO    vitamin C with B complex (B COMPLEX-C) tablet     Sig: Take 1 tablet by mouth daily    spironolactone (ALDACTONE) 25 MG tablet     Sig: Take 1 tablet (25 mg) by mouth daily     Dispense:  90 tablet     Refill:  3     Keep on file until pt due for refills    lisinopril (ZESTRIL) 20 MG tablet     Sig: Take 1 tablet (20 mg) by mouth daily     Dispense:  90 tablet     Refill:  3     Keep on file until pt due for refills     Medications Discontinued During This Encounter   Medication Reason    lisinopril (ZESTRIL) 20 MG tablet Reorder (No AVS)    spironolactone (ALDACTONE) 25 MG tablet Reorder (No AVS)         Encounter Diagnosis   Name Primary?    Benign essential hypertension        CURRENT MEDICATIONS:  Current Outpatient Medications   Medication Sig Dispense Refill    aspirin (ASA) 81 MG EC tablet Take 1 tablet (81 mg) by mouth daily      Aspirin-Acetaminophen-Caffeine (EXCEDRIN EXTRA STRENGTH PO) Take 2 tablets by mouth every 6 hours as needed      Cholecalciferol (VITAMIN D-3 PO) Take  1,000 Units by mouth daily      CINNAMON PO Take 1,000 mg by mouth daily      lisinopril (ZESTRIL) 20 MG tablet Take 1 tablet (20 mg) by mouth daily 90 tablet 3    MAGNESIUM PO       meclizine (ANTIVERT) 12.5 MG tablet Take 12.5 mg by mouth 3 times daily as needed for dizziness      spironolactone (ALDACTONE) 25 MG tablet Take 1 tablet (25 mg) by mouth daily 90 tablet 3    SUMAtriptan (IMITREX) 100 MG tablet Take 1 tablet (100 mg) by mouth at onset of headache for migraine May repeat in 2 hours. Max dose 200mg in 24 hours. 9 tablet 1    vitamin C (ASCORBIC ACID) 1000 MG TABS Take 1,000 mg by mouth daily      vitamin C with B complex (B COMPLEX-C) tablet Take 1 tablet by mouth daily      VITAMIN E PO          ALLERGIES     Allergies   Allergen Reactions    Amoxicillin Nausea and Vomiting    Hctz [Hydrochlorothiazide] Other (See Comments)     Weak/Achy despite nl BMP    Percocet [Oxycodone-Acetaminophen] Nausea and Vomiting         Review of Systems:  Skin:  Negative itching;bruising   Eyes:  Negative glasses  ENT:  Negative    Respiratory:  Negative for dyspnea on exertion;shortness of breath  Cardiovascular:  Negative for;syncope or near-syncope;cyanosis;chest pain;palpitations Positive for;edema  Gastroenterology: Negative for melena;hematochezia  Genitourinary:  Negative    Musculoskeletal:  Positive for arthritis (Knees only)  Neurologic:  Negative migraine headaches  Psychiatric:  Negative    Heme/Lymph/Imm:  Negative night sweats  Endocrine:  Negative      Physical Exam:  Vitals: /75   Pulse 66   Ht 1.829 m (6')   Wt (!) 155.6 kg (343 lb)   BMI 46.52 kg/m      Constitutional:  cooperative, alert and oriented, well developed, well nourished, in no acute distress overweight      Skin:  warm and dry to the touch        Head:  normocephalic, no masses or lesions        Eyes:  pupils equal and round;conjunctivae and lids unremarkable;sclera white        ENT:  not assessed this visit        Neck:  JVP  normal        Chest:  normal respiratory excursion   no reproducible pain    Cardiac: regular rhythm       grade 1;RUSB;early systolic murmur          Abdomen:  not assessed this visit obese benign    Vascular: not assessed this visit                                      Extremities and Back:  no deformities, clubbing, cyanosis, erythema observed;not assessed this visit stasis pigmentation;erythema R medial malleolus erythema, no open wound    Neurological:  no gross motor deficits            PAST MEDICAL HISTORY:  Past Medical History:   Diagnosis Date    Classic migraine     Complex endometrial hyperplasia     without atypia    Hypertension     Motion sickness     Mumps     Obese     STORMY (obstructive sleep apnea)     Other disorder of menstruation and other abnormal bleeding from female genital tract 2/24/2011    Palpitations     PONV (postoperative nausea and vomiting)     Spider veins     SVT (supraventricular tachycardia) 9/2015       PAST SURGICAL HISTORY:  Past Surgical History:   Procedure Laterality Date    BLADDER SURGERY      CHOLECYSTECTOMY, LAPOROSCOPIC      Cholecystectomy, Laparoscopic    COLONOSCOPY N/A 11/25/2014    Procedure: COLONOSCOPY;  Surgeon: Wenceslao Galo MD;  Location:  GI    CYSTOSCOPY, SLING TRANSVAGINAL N/A 8/20/2018    Procedure: CYSTOSCOPY, SLING TRANSVAGINAL;;  Surgeon: Urban Elena MD;  Location: [facility]    D & C      X2-3 for abnormal bleeding    ESOPHAGOSCOPY, GASTROSCOPY, DUODENOSCOPY (EGD), COMBINED N/A 11/25/2014    Procedure: COMBINED ESOPHAGOSCOPY, GASTROSCOPY, DUODENOSCOPY (EGD), BIOPSY SINGLE OR MULTIPLE;  Surgeon: Wenceslao Galo MD;  Location: [facility]    LAPAROSCOPIC HYSTERECTOMY SUPRACERVICAL N/A 8/20/2018    Procedure: LAPAROSCOPIC HYSTERECTOMY SUPRACERVICAL;  LAPAROSCOPIC SUBTOATAL HYSTERECTOMY, BILATERAL SALPINGECTOMY, SUBURETHRAL SLING AND CYSTOSCOPY;  Surgeon: Urban Elena MD;  Location: [facility]    LAPAROSCOPIC SALPINGECTOMY Bilateral  8/20/2018    Procedure: LAPAROSCOPIC SALPINGECTOMY;;  Surgeon: Urban Elena MD;  Location: [facility]    LAPAROSCOPY      For endometriosis    LASER ABLATION VEIN VARICOSE      OPERATIVE HYSTEROSCOPY WITH MORCELLATOR N/A 3/29/2018    Procedure: OPERATIVE HYSTEROSCOPY WITH MORCELLATOR (MYOSURE);  OPERATIVE HYSTEROSCOPY WITH MORCELLATOR ;  Surgeon: Urban Elena MD;  Location: [facility]       FAMILY HISTORY:  Family History   Problem Relation Age of Onset    Hypertension Mother     Lipids Mother     Alzheimer Disease Mother     Hypertension Father     Prostate Cancer Father     Lipids Father     Breast Cancer Maternal Aunt     Hypertension Maternal Grandmother     C.A.D. Maternal Grandmother     Gallbladder Disease Maternal Grandmother     Cancer Maternal Grandfather         lung    Cerebrovascular Disease Paternal Grandmother     C.A.D. Paternal Grandfather     Cancer - colorectal Other         cousin maternal     Colon Cancer Cousin     Colon Cancer Cousin     Breast Cancer Paternal Aunt     Diabetes No family hx of        SOCIAL HISTORY:  Social History     Socioeconomic History    Marital status: Single     Spouse name: None    Number of children: None    Years of education: None    Highest education level: None   Tobacco Use    Smoking status: Never    Smokeless tobacco: Never   Substance and Sexual Activity    Alcohol use: Yes     Alcohol/week: 1.7 standard drinks of alcohol     Types: 2 Standard drinks or equivalent per week     Comment: 1 drink a month    Drug use: No    Sexual activity: Never   Other Topics Concern    Caffeine Concern Yes     Comment: 1-2 cans qd    Special Diet Yes     Comment: started mediterranian     Exercise Yes     Comment: 20 minutes walking 3-4 days weekly     Seat Belt Yes    Parent/sibling w/ CABG, MI or angioplasty before 65F 55M? No                 Thank you for allowing me to participate in the care of your patient.      Sincerely,     Peggy Edmond PA-C      [facility]  cc:   No referring provider defined for this encounter.

## 2024-01-05 NOTE — PATIENT INSTRUCTIONS
"Fouzia - it was good to see you today!    Overall you're doing well!    PLAN:  Try stopping supplements and then add back 1 at a time every 2 weeks to see if flushing is related.   Magnesium, Vit D, Vit C, Zinc, B complex, Vit E    2.  For weight loss, focus on PLANTS!! Part of every meal and majority of it should be fruits/veggies.  Make your fats healthy fats (eggs, avocado, peanut butter, olive oil, etc) which will make the fruits and veggies feel like they \"stick\" longer).   Consider something LIKE Whole 30 (or Plant Based Whole 3) - library    3. See us back 1 year  "

## 2024-04-16 ENCOUNTER — TELEPHONE (OUTPATIENT)
Dept: VASCULAR SURGERY | Facility: CLINIC | Age: 59
End: 2024-04-16
Payer: COMMERCIAL

## 2024-04-16 NOTE — TELEPHONE ENCOUNTER
patient is request a chair through her employer because of her leg condition. Can she fax the form required to be completed to us or drop off so Dr. Au can complete. Does she need an appt? Patient would like to talk to nurse more about this.    ph: [phone]

## 2024-04-22 NOTE — TELEPHONE ENCOUNTER
Dr. Au stated he will help fill out forms for patient. Called pt back. Pt will drop off forms for us to fill out.    Pt stated her company will not pay for an ergonomic chair unless she has a doctors note. Pt stated she sits most of the day for work and her legs are very achy when going to bed at night.    Daxa Vega RN  [facility]  [facility]

## 2024-05-10 NOTE — TELEPHONE ENCOUNTER
Called pt, informed her that her [employer] forms for an accomodation - ergonomic work chair have been completed, signed by Dr. Au and faxed back to them at [phone]. Fax confirmed.    Kept copy of forms at nurse stations. Mailed original copy to pt's home address.    Daxa Vgea RN  [facility]  [facility] [employer]

## 2024-06-04 ENCOUNTER — TELEPHONE (OUTPATIENT)
Dept: FAMILY MEDICINE | Facility: CLINIC | Age: 59
End: 2024-06-04
Payer: COMMERCIAL

## 2024-06-04 NOTE — TELEPHONE ENCOUNTER
Patient Returning Call    Reason for call:  cpap equipment    Information relayed to patient:  has appt for visit but would like equipment before November appt    Patient has additional questions:  No      Could we [facility] or would you prefer to receive a phone call?:   Patient would prefer a phone call   Okay to leave a detailed message?: Yes at Cell number on file:    Telephone Information:   Mobile [phone]

## 2024-06-05 NOTE — TELEPHONE ENCOUNTER
Chart reviewed. Patient needs to establish care before orders can be provided. Patient scheduled for first available and on wait list. Clinic will reach out if sooner appointment becomes available.     Emelyn VALDIVIA RN  [facility]

## 2024-06-06 DIAGNOSIS — R42 VERTIGO: Primary | ICD-10-CM

## 2024-06-06 RX ORDER — MECLIZINE HCL 12.5 MG 12.5 MG/1
12.5 TABLET ORAL 3 TIMES DAILY PRN
Qty: 30 TABLET | Refills: 0 | Status: SHIPPED | OUTPATIENT
Start: 2024-06-06

## 2024-06-06 NOTE — TELEPHONE ENCOUNTER
Patient having episode of vertigo and needing refill of meclizine.     Was last ordered in 2018 by us - that RX of 25 pills has lasted her this long. She doesn't get episodes very often.     LOV: 7/17/2023 by Ruthie HU     Next 5 appointments (look out 90 days)      Jul 18, 2024  9:30 AM  (Arrive by 9:10 AM)  Adult Preventative Visit with KOKI Marc CNP  [facility] [address] [place] [address] [id number] [phone]            Routing to provider to review and advise.     Lina Yin RN  [facility]

## 2024-07-10 ENCOUNTER — ANCILLARY PROCEDURE (OUTPATIENT)
Dept: MAMMOGRAPHY | Facility: CLINIC | Age: 59
End: 2024-07-10
Payer: COMMERCIAL

## 2024-07-10 PROCEDURE — 77063 BREAST TOMOSYNTHESIS BI: CPT | Mod: TC | Performed by: RADIOLOGY

## 2024-07-10 PROCEDURE — 77067 SCR MAMMO BI INCL CAD: CPT | Mod: TC | Performed by: RADIOLOGY

## 2024-07-18 ENCOUNTER — OFFICE VISIT (OUTPATIENT)
Dept: FAMILY MEDICINE | Facility: CLINIC | Age: 59
End: 2024-07-18
Payer: COMMERCIAL

## 2024-07-18 VITALS
TEMPERATURE: 97.8 F | HEIGHT: 72 IN | BODY MASS INDEX: 39.68 KG/M2 | HEART RATE: 63 BPM | WEIGHT: 293 LBS | DIASTOLIC BLOOD PRESSURE: 77 MMHG | SYSTOLIC BLOOD PRESSURE: 123 MMHG | RESPIRATION RATE: 16 BRPM | OXYGEN SATURATION: 96 %

## 2024-07-18 DIAGNOSIS — I10 HYPERTENSION GOAL BP (BLOOD PRESSURE) < 130/80: ICD-10-CM

## 2024-07-18 DIAGNOSIS — M25.551 HIP PAIN, RIGHT: ICD-10-CM

## 2024-07-18 DIAGNOSIS — G89.29 CHRONIC MIDLINE LOW BACK PAIN, UNSPECIFIED WHETHER SCIATICA PRESENT: ICD-10-CM

## 2024-07-18 DIAGNOSIS — R53.83 OTHER FATIGUE: ICD-10-CM

## 2024-07-18 DIAGNOSIS — E66.01 MORBID OBESITY (H): ICD-10-CM

## 2024-07-18 DIAGNOSIS — I48.0 PAROXYSMAL ATRIAL FIBRILLATION (H): ICD-10-CM

## 2024-07-18 DIAGNOSIS — Z13.220 LIPID SCREENING: ICD-10-CM

## 2024-07-18 DIAGNOSIS — N39.498 OTHER URINARY INCONTINENCE: ICD-10-CM

## 2024-07-18 DIAGNOSIS — M54.50 CHRONIC MIDLINE LOW BACK PAIN, UNSPECIFIED WHETHER SCIATICA PRESENT: ICD-10-CM

## 2024-07-18 DIAGNOSIS — Z12.11 SCREEN FOR COLON CANCER: ICD-10-CM

## 2024-07-18 DIAGNOSIS — Z00.00 ROUTINE GENERAL MEDICAL EXAMINATION AT A HEALTH CARE FACILITY: Primary | ICD-10-CM

## 2024-07-18 DIAGNOSIS — N18.2 CKD (CHRONIC KIDNEY DISEASE) STAGE 2, GFR 60-89 ML/MIN: ICD-10-CM

## 2024-07-18 LAB
ALBUMIN SERPL BCG-MCNC: 4.4 G/DL (ref 3.5–5.2)
ALP SERPL-CCNC: 103 U/L (ref 40–150)
ALT SERPL W P-5'-P-CCNC: 25 U/L (ref 0–50)
ANION GAP SERPL CALCULATED.3IONS-SCNC: 9 MMOL/L (ref 7–15)
AST SERPL W P-5'-P-CCNC: 26 U/L (ref 0–45)
BILIRUB SERPL-MCNC: 0.4 MG/DL
BUN SERPL-MCNC: 19.9 MG/DL (ref 6–20)
CALCIUM SERPL-MCNC: 9.3 MG/DL (ref 8.8–10.4)
CHLORIDE SERPL-SCNC: 103 MMOL/L (ref 98–107)
CHOLEST SERPL-MCNC: 188 MG/DL
CREAT SERPL-MCNC: 1.09 MG/DL (ref 0.51–0.95)
EGFRCR SERPLBLD CKD-EPI 2021: 59 ML/MIN/1.73M2
ERYTHROCYTE [DISTWIDTH] IN BLOOD BY AUTOMATED COUNT: 12.4 % (ref 10–15)
FASTING STATUS PATIENT QL REPORTED: YES
FASTING STATUS PATIENT QL REPORTED: YES
GLUCOSE SERPL-MCNC: 99 MG/DL (ref 70–99)
HBA1C MFR BLD: 5.4 % (ref 0–5.6)
HCO3 SERPL-SCNC: 26 MMOL/L (ref 22–29)
HCT VFR BLD AUTO: 42.8 % (ref 35–47)
HDLC SERPL-MCNC: 49 MG/DL
HGB BLD-MCNC: 13.7 G/DL (ref 11.7–15.7)
LDLC SERPL CALC-MCNC: 124 MG/DL
MCH RBC QN AUTO: 27.8 PG (ref 26.5–33)
MCHC RBC AUTO-ENTMCNC: 32 G/DL (ref 31.5–36.5)
MCV RBC AUTO: 87 FL (ref 78–100)
NONHDLC SERPL-MCNC: 139 MG/DL
PLATELET # BLD AUTO: 256 10E3/UL (ref 150–450)
POTASSIUM SERPL-SCNC: 4.6 MMOL/L (ref 3.4–5.3)
PROT SERPL-MCNC: 7.9 G/DL (ref 6.4–8.3)
RBC # BLD AUTO: 4.92 10E6/UL (ref 3.8–5.2)
SODIUM SERPL-SCNC: 138 MMOL/L (ref 135–145)
TRIGL SERPL-MCNC: 77 MG/DL
TSH SERPL DL<=0.005 MIU/L-ACNC: 3.53 UIU/ML (ref 0.3–4.2)
WBC # BLD AUTO: 6.6 10E3/UL (ref 4–11)

## 2024-07-18 PROCEDURE — 99214 OFFICE O/P EST MOD 30 MIN: CPT | Mod: 25 | Performed by: NURSE PRACTITIONER

## 2024-07-18 PROCEDURE — 82043 UR ALBUMIN QUANTITATIVE: CPT | Performed by: NURSE PRACTITIONER

## 2024-07-18 PROCEDURE — 83036 HEMOGLOBIN GLYCOSYLATED A1C: CPT | Performed by: NURSE PRACTITIONER

## 2024-07-18 PROCEDURE — 80053 COMPREHEN METABOLIC PANEL: CPT | Performed by: NURSE PRACTITIONER

## 2024-07-18 PROCEDURE — 36415 COLL VENOUS BLD VENIPUNCTURE: CPT | Performed by: NURSE PRACTITIONER

## 2024-07-18 PROCEDURE — 82570 ASSAY OF URINE CREATININE: CPT | Performed by: NURSE PRACTITIONER

## 2024-07-18 PROCEDURE — 80061 LIPID PANEL: CPT | Performed by: NURSE PRACTITIONER

## 2024-07-18 PROCEDURE — 84443 ASSAY THYROID STIM HORMONE: CPT | Performed by: NURSE PRACTITIONER

## 2024-07-18 PROCEDURE — 99396 PREV VISIT EST AGE 40-64: CPT | Performed by: NURSE PRACTITIONER

## 2024-07-18 PROCEDURE — 85027 COMPLETE CBC AUTOMATED: CPT | Performed by: NURSE PRACTITIONER

## 2024-07-18 NOTE — PATIENT INSTRUCTIONS
Patient Education   Preventive Care Advice   This is general advice given by our system to help you stay healthy. However, your care team may have specific advice just for you. Please talk to your care team about your preventive care needs.  Nutrition  Eat 5 or more servings of fruits and vegetables each day.  Try wheat bread, brown rice and whole grain pasta (instead of white bread, rice, and pasta).  Get enough calcium and vitamin D. Check the label on foods and aim for 100% of the RDA (recommended daily allowance).  Lifestyle  Exercise at least 150 minutes each week  (30 minutes a day, 5 days a week).  Do muscle strengthening activities 2 days a week. These help control your weight and prevent disease.  No smoking.  Wear sunscreen to prevent skin cancer.  Have a dental exam and cleaning every 6 months.  Yearly exams  See your health care team every year to talk about:  Any changes in your health.  Any medicines your care team has prescribed.  Preventive care, family planning, and ways to prevent chronic diseases.  Shots (vaccines)   HPV shots (up to age 26), if you've never had them before.  Hepatitis B shots (up to age 59), if you've never had them before.  COVID-19 shot: Get this shot when it's due.  Flu shot: Get a flu shot every year.  Tetanus shot: Get a tetanus shot every 10 years.  Pneumococcal, hepatitis A, and RSV shots: Ask your care team if you need these based on your risk.  Shingles shot (for age 50 and up)  General health tests  Diabetes screening:  Starting at age 35, Get screened for diabetes at least every 3 years.  If you are younger than age 35, ask your care team if you should be screened for diabetes.  Cholesterol test: At age 39, start having a cholesterol test every 5 years, or more often if advised.  Bone density scan (DEXA): At age 50, ask your care team if you should have this scan for osteoporosis (brittle bones).  Hepatitis C: Get tested at least once in your life.  STIs (sexually  transmitted infections)  Before age 24: Ask your care team if you should be screened for STIs.  After age 24: Get screened for STIs if you're at risk. You are at risk for STIs (including HIV) if:  You are sexually active with more than one person.  You don't use condoms every time.  You or a partner was diagnosed with a sexually transmitted infection.  If you are at risk for HIV, ask about PrEP medicine to prevent HIV.  Get tested for HIV at least once in your life, whether you are at risk for HIV or not.  Cancer screening tests  Cervical cancer screening: If you have a cervix, begin getting regular cervical cancer screening tests starting at age 21.  Breast cancer scan (mammogram): If you've ever had breasts, begin having regular mammograms starting at age 40. This is a scan to check for breast cancer.  Colon cancer screening: It is important to start screening for colon cancer at age 45.  Have a colonoscopy test every 10 years (or more often if you're at risk) Or, ask your provider about stool tests like a FIT test every year or Cologuard test every 3 years.  To learn more about your testing options, visit:   .  For help making a decision, visit:   https://bit.ly/my00844.  Prostate cancer screening test: If you have a prostate, ask your care team if a prostate cancer screening test (PSA) at age 55 is right for you.  Lung cancer screening: If you are a current or former smoker ages 50 to 80, ask your care team if ongoing lung cancer screenings are right for you.  For informational purposes only. Not to replace the advice of your health care provider. Copyright   2023 [facility]. All rights reserved. Clinically reviewed by the [facility] Transitions Program. [employer] [id number] - REV 01/24.  Preventing Falls: Care Instructions  Injuries and health problems such as trouble walking or poor eyesight can increase your risk of falling. So can some medicines. But there are things you can do to help  "prevent falls. You can exercise to get stronger. You can also arrange your home to make it safer.    Talk to your doctor about the medicines you take. Ask if any of them increase the risk of falls and whether they can be changed or stopped.   Try to exercise regularly. It can help improve your strength and balance. This can help lower your risk of falling.     Practice fall safety and prevention.    Wear low-heeled shoes that fit well and give your feet good support. Talk to your doctor if you have foot problems that make this hard.  Carry a cellphone or wear a medical alert device that you can use to call for help.  Use stepladders instead of chairs to reach high objects. Don't climb if you're at risk for falls. Ask for help, if needed.  Wear the correct eyeglasses, if you need them.    Make your home safer.    Remove rugs, cords, clutter, and furniture from walkways.  Keep your house well lit. Use night-lights in hallways and bathrooms.  Install and use sturdy handrails on stairways.  Wear nonskid footwear, even inside. Don't walk barefoot or in socks without shoes.    Be safe outside.    Use handrails, curb cuts, and ramps whenever possible.  Keep your hands free by using a shoulder bag or backpack.  Try to walk in well-lit areas. Watch out for uneven ground, changes in pavement, and debris.  Be careful in the winter. Walk on the grass or gravel when sidewalks are slippery. Use de-icer on steps and walkways. Add non-slip devices to shoes.    Put grab bars and nonskid mats in your shower or tub and near the toilet. Try to use a shower chair or bath bench when bathing.   Get into a tub or shower by putting in your weaker leg first. Get out with your strong side first. Have a phone or medical alert device in the bathroom with you.   Where can you learn more?  Go to https://www.[employer].com.net/patiented  Enter G117 in the search box to learn more about \"Preventing Falls: Care Instructions.\"  Current as of: July 17, " 2023               Content Version: 14.0    3128-5833 [employer].   Care instructions adapted under license by your healthcare professional. If you have questions about a medical condition or this instruction, always ask your healthcare professional. [employer] disclaims any warranty or liability for your use of this information.

## 2024-07-18 NOTE — RESULT ENCOUNTER NOTE
Deagab Fragoso,     -Normal red blood cell (hgb) levels, normal white blood cell count and normal platelet levels.  -A1C (diabetic test) is normal and indicates that your blood sugar has been in a normal range the last 3 months.      Thank you,     Kaycee Carty, KOKI, CNP  [facility]

## 2024-07-18 NOTE — PROGRESS NOTES
Preventive Care Visit  [facility] PRIOR KOKI Pope CNP, Family Medicine  Jul 18, 2024    Assessment & Plan     Fouzia was seen today for physical.    Diagnoses and all orders for this visit:    Routine general medical examination at a health care facility  -     REVIEW OF HEALTH MAINTENANCE PROTOCOL ORDERS  -     PRIMARY CARE FOLLOW-UP SCHEDULING; Future    Screen for colon cancer  -     Colonoscopy Screening  Referral; Future    Lipid screening  -     Lipid panel reflex to direct LDL Non-fasting    CKD (chronic kidney disease) stage 2, GFR 60-89 ml/min  -     Albumin Random Urine Quantitative with Creat Ratio    Morbid obesity (H)  Plan further consultation with weight management clinic.    -     Adult Comprehensive Weight Management  Referral; Future    Hypertension goal BP (blood pressure) < 130/80  Currently stable on Lisinopril 20 mg daily and Spironolactone 25 mg daily.      -     Comprehensive metabolic panel (BMP + Alb, Alk Phos, ALT, AST, Total. Bili, TP)    Paroxysmal atrial fibrillation (H)  History of atrial tachycardia.  Follows with cardiology, last seen 1/5/2024.      Other fatigue  Further evaluation for thyroid vs. Anemia vs. Diabetes.    -     TSH with free T4 reflex  -     Hemoglobin A1c  -     CBC with platelets      Other urinary incontinence  History of supracervical hysterectomy and bladder sling procedure in 2018, persistent incontinence.    Discussed follow-up with pelvic floor physical therapist and urogynecologist.  -     Physical Therapy  Referral; Future  -     Adult Urology  Referral; Future        BMI  Estimated body mass index is 45.16 kg/m  as calculated from the following:    Height as of this encounter: 1.829 m (6').    Weight as of this encounter: 151 kg (333 lb).     Counseling  Appropriate preventive services were addressed with this patient.  Checklist reviewing preventive services available has been given to  the patient.    Return in about 3 months (around 10/18/2024) for No improvement or sooner with worsening symptoms.          Sheryl Fragoso is a 58 year old, presenting for the following:  Physical (Male Physical - No Concerns)        7/18/2024     9:16 AM   Additional Questions   Roomed by ARMANDO Jones   Accompanied by Self     Health Care Directive  Patient does not have a Health Care Directive or Living Will.    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    One adult daughter - recently graduated from [employer].  Cares for mother with Alzheimer's.  She is still living in her own home.      History of vein removal from right leg.  Follows with Dr. Au - vascular.      Right leg aching from hip down to knee down to ankle.    Right hip pain with walking.   Difficulty with sleeping at night and walking.    Numbness in bilateral legs when she is in bed at night.   +Lower back pain - midline.  [place] a pop when she was doing heavy lifting.     Hysterectomy 5-6 years ago.    Wakes up with cramping in lower abdomen.    Constant feeling that she has a bladder infection.    +Urinary incontinence.    Had a bladder lift with hysterectomy.      Dizziness/Vertigo  Has done physical therapy previously.    Uses Meclizine as needed.     Low energy/more fatigue than normal.    Stopped drinking soda almost 2 years ago.  Was drinking 7 cans daily of Coca-Cola.  Hasn't lost any weight.    Started Noom - this is offered through her work.     Recent Labs   Lab Test 08/01/23  1003 07/01/21  0938   CHOL 188 189   HDL 50 48*   * 123*   TRIG 90 90     The 10-year ASCVD risk score (Michelle BALL, et al., 2019) is: 3.5%    Values used to calculate the score:      Age: 58 years      Sex: Female      Is Non- : No      Diabetic: No      Tobacco smoker: No      Systolic Blood Pressure: 123 mmHg      Is BP treated: Yes      HDL Cholesterol: 49 mg/dL       Total Cholesterol: 188 mg/dL        7/18/2024   General Health   How would you rate your overall physical health? (!) FAIR   Feel stress (tense, anxious, or unable to sleep) Only a little      (!) STRESS CONCERN      7/18/2024   Nutrition   Three or more servings of calcium each day? Yes   Diet: Low salt   How many servings of fruit and vegetables per day? (!) 2-3   How many sweetened beverages each day? 0-1            7/18/2024   Exercise   Days per week of moderate/strenous exercise 0 days   Average minutes spent exercising at this level 0 min      (!) EXERCISE CONCERN      7/18/2024   Social Factors   Frequency of gathering with friends or relatives More than three times a week   Worry food won't last until get money to buy more No   Food not last or not have enough money for food? No   Do you have housing? (Housing is defined as stable permanent housing and does not include staying ouside in a car, in a tent, in an abandoned building, in an overnight shelter, or couch-surfing.) Yes   Are you worried about losing your housing? No   Lack of transportation? No   Unable to get utilities (heat,electricity)? No            7/18/2024   Fall Risk   Fallen 2 or more times in the past year? No   Trouble with walking or balance? Yes             7/18/2024   Dental   Dentist two times every year? Yes            7/18/2024   TB Screening   Were you born outside of the [place]? No            7/18/2024   Substance Use   Alcohol more than 3/day or more than 7/wk No   Do you use any other substances recreationally? No        Social History     Tobacco Use    Smoking status: Never    Smokeless tobacco: Never   Vaping Use    Vaping status: Never Used   Substance Use Topics    Alcohol use: Yes     Alcohol/week: 1.7 standard drinks of alcohol     Types: 2 Standard drinks or equivalent per week     Comment: 1 drink a month    Drug use: No         7/10/2024   LAST FHS-7 RESULTS   1st degree relative breast or ovarian cancer No   Any relative  bilateral breast cancer No   Any male have breast cancer No   Any ONE woman have BOTH breast AND ovarian cancer No   Any woman with breast cancer before 50yrs Yes   2 or more relatives with breast AND/OR ovarian cancer Yes   2 or more relatives with breast AND/OR bowel cancer No        Mammogram Screening - Mammogram every 1-2 years updated in Health Maintenance based on mutual decision making        7/18/2024   STI Screening   New sexual partner(s) since last STI/HIV test? No        History of abnormal Pap smear: No - age 30- 64 PAP with HPV every 5 years recommended        Latest Ref Rng & Units 7/17/2023     2:19 PM 2/6/2018     8:55 AM 2/6/2018     8:36 AM   PAP / HPV   PAP  Negative for Intraepithelial Lesion or Malignancy (NILM)      PAP (Historical)    NIL    HPV 16 DNA Negative Negative  Negative     HPV 18 DNA Negative Negative  Negative     Other HR HPV Negative Negative  Negative       ASCVD Risk   The 10-year ASCVD risk score (Michelle BALL, et al., 2019) is: 3.5%    Values used to calculate the score:      Age: 58 years      Sex: Female      Is Non- : No      Diabetic: No      Tobacco smoker: No      Systolic Blood Pressure: 123 mmHg      Is BP treated: Yes      HDL Cholesterol: 49 mg/dL      Total Cholesterol: 188 mg/dL      Reviewed and updated as needed this visit by Provider                    BP Readings from Last 3 Encounters:   07/18/24 123/77   01/05/24 120/75   07/17/23 128/83    Wt Readings from Last 3 Encounters:   07/18/24 (!) 151 kg (333 lb)   01/05/24 (!) 155.6 kg (343 lb)   07/17/23 (!) 152.6 kg (336 lb 8 oz)                  Patient Active Problem List   Diagnosis    Esophageal reflux    CARDIOVASCULAR SCREENING; LDL GOAL LESS THAN 160    Palpitations    STORMY (obstructive sleep apnea)    Atypical chest pain    BPPV (benign paroxysmal positional vertigo)    Morbid obesity (H)    Hypertension goal BP (blood pressure) < 130/80    Vertigo    Supraventricular  tachycardia (H) - on bystolic followed by cardiology    Lipodermatosclerosis of both lower extremities    Nipple symptom or sign in female - bilateral, chronic recurrent itching     Migraine without aura and without status migrainosus, not intractable    CKD (chronic kidney disease) stage 2, GFR 60-89 ml/min    Paroxysmal atrial fibrillation (H)     Past Surgical History:   Procedure Laterality Date    BLADDER SURGERY      CHOLECYSTECTOMY, LAPOROSCOPIC      Cholecystectomy, Laparoscopic    COLONOSCOPY N/A 11/25/2014    Procedure: COLONOSCOPY;  Surgeon: Wenceslao Galo MD;  Location:  GI    CYSTOSCOPY, SLING TRANSVAGINAL N/A 8/20/2018    Procedure: CYSTOSCOPY, SLING TRANSVAGINAL;;  Surgeon: Urban Eelna MD;  Location: [facility]    D & C      X2-3 for abnormal bleeding    ESOPHAGOSCOPY, GASTROSCOPY, DUODENOSCOPY (EGD), COMBINED N/A 11/25/2014    Procedure: COMBINED ESOPHAGOSCOPY, GASTROSCOPY, DUODENOSCOPY (EGD), BIOPSY SINGLE OR MULTIPLE;  Surgeon: Wenceslao Galo MD;  Location: [facility]    LAPAROSCOPIC HYSTERECTOMY SUPRACERVICAL N/A 8/20/2018    Procedure: LAPAROSCOPIC HYSTERECTOMY SUPRACERVICAL;  LAPAROSCOPIC SUBTOATAL HYSTERECTOMY, BILATERAL SALPINGECTOMY, SUBURETHRAL SLING AND CYSTOSCOPY;  Surgeon: Urban Elena MD;  Location: [facility]    LAPAROSCOPIC SALPINGECTOMY Bilateral 8/20/2018    Procedure: LAPAROSCOPIC SALPINGECTOMY;;  Surgeon: Urban Elena MD;  Location: [facility]    LAPAROSCOPY      For endometriosis    LASER ABLATION VEIN VARICOSE      OPERATIVE HYSTEROSCOPY WITH MORCELLATOR N/A 3/29/2018    Procedure: OPERATIVE HYSTEROSCOPY WITH MORCELLATOR (MYOSURE);  OPERATIVE HYSTEROSCOPY WITH MORCELLATOR ;  Surgeon: Urban Elena MD;  Location: [facility]       Social History     Tobacco Use    Smoking status: Never    Smokeless tobacco: Never   Substance Use Topics    Alcohol use: Yes     Alcohol/week: 1.7 standard drinks of alcohol     Types: 2 Standard drinks or equivalent per week      Comment: 1 drink a month     Family History   Problem Relation Age of Onset    Hypertension Mother     Lipids Mother     Alzheimer Disease Mother     Hypertension Father     Prostate Cancer Father     Lipids Father     Breast Cancer Maternal Aunt     Hypertension Maternal Grandmother     C.A.D. Maternal Grandmother     Gallbladder Disease Maternal Grandmother     Cancer Maternal Grandfather         lung    Cerebrovascular Disease Paternal Grandmother     C.A.D. Paternal Grandfather     Cancer - colorectal Other         cousin maternal     Colon Cancer Cousin     Colon Cancer Cousin     Breast Cancer Paternal Aunt     Diabetes No family hx of          Current Outpatient Medications   Medication Sig Dispense Refill    aspirin (ASA) 81 MG EC tablet Take 1 tablet (81 mg) by mouth daily      Aspirin-Acetaminophen-Caffeine (EXCEDRIN EXTRA STRENGTH PO) Take 2 tablets by mouth every 6 hours as needed      Cholecalciferol (VITAMIN D-3 PO) Take 1,000 Units by mouth daily      CINNAMON PO Take 1,000 mg by mouth daily      lisinopril (ZESTRIL) 20 MG tablet Take 1 tablet (20 mg) by mouth daily 90 tablet 3    MAGNESIUM PO       meclizine (ANTIVERT) 12.5 MG tablet Take 1 tablet (12.5 mg) by mouth 3 times daily as needed for dizziness 30 tablet 0    spironolactone (ALDACTONE) 25 MG tablet Take 1 tablet (25 mg) by mouth daily 90 tablet 3    SUMAtriptan (IMITREX) 100 MG tablet Take 1 tablet (100 mg) by mouth at onset of headache for migraine May repeat in 2 hours. Max dose 200mg in 24 hours. 9 tablet 1    vitamin C (ASCORBIC ACID) 1000 MG TABS Take 1,000 mg by mouth daily      vitamin C with B complex (B COMPLEX-C) tablet Take 1 tablet by mouth daily      VITAMIN E PO            Review of Systems  Constitutional, HEENT, cardiovascular, pulmonary, gi and gu systems are negative, except as otherwise noted.     Objective    Exam  /77 (BP Location: Left arm, Patient Position: Sitting, Cuff Size: Adult Large)   Pulse 63   Temp  97.8  F (36.6  C) (Oral)   Resp 16   Ht 1.829 m (6')   Wt (!) 151 kg (333 lb)   LMP  (LMP Unknown)   SpO2 96%   Breastfeeding No   BMI 45.16 kg/m     Estimated body mass index is 45.16 kg/m  as calculated from the following:    Height as of this encounter: 1.829 m (6').    Weight as of this encounter: 151 kg (333 lb).    Physical Exam    GENERAL: alert and no distress  EYES: Eyes grossly normal to inspection  HENT: ear canals and TM's normal, nose and mouth without ulcers or lesions  NECK: no adenopathy, no asymmetry, masses, or scars  RESP: lungs clear to auscultation - no rales, rhonchi or wheezes  CV: regular rate and rhythm, normal S1 S2  ABDOMEN: soft, nontender, no hepatosplenomegaly, no masses and bowel sounds normal  MS: no gross musculoskeletal defects noted, no edema  SKIN: no suspicious lesions or rashes  NEURO: Normal strength and tone, mentation intact and speech normal  PSYCH: mentation appears normal, affect normal/bright        Signed Electronically by: Kaycee Carty, APRN CNP

## 2024-07-19 LAB
CREAT UR-MCNC: 114 MG/DL
MICROALBUMIN UR-MCNC: <12 MG/L
MICROALBUMIN/CREAT UR: NORMAL MG/G{CREAT}

## 2024-07-19 NOTE — RESULT ENCOUNTER NOTE
Dear Fouzia,     -LDL(bad) cholesterol level is slightly elevated, HDL(good) cholesterol level is slightly low which can increase your heart disease risk.  A diet high in fat and simple carbohydrates, genetics and being overweight can contribute to this. ADVISE: exercising 150 minutes of aerobic exercise per week (30 minutes for 5 days per week or 50 minutes for 3 days per week are options) and eating a low saturated fat/low carbohydrate diet are helpful to improve this. In 12 months, you should recheck your fasting cholesterol panel.    -Liver and gallbladder tests (ALT,AST, Alk phos,bilirubin) are normal.  -Kidney function (GFR) is just slightly decreased.  ADVISE: increasing water intake, avoiding excessive NSAIDS (aspirin, Aleve, Ibuprofen/Advil).    -Sodium is normal.  -Potassium is normal.  -Calcium is normal.  -Glucose (diabetic screening test) is normal.    -TSH (thyroid stimulating hormone) level is normal which indicates normal thyroid function.      Please send a [employer] message or call [phone]  if you have any questions.      KOKI Hernandez, CNP  [facility] [place]    If you have further questions about the interpretation of your labs, [email or web address] is a good website to check out for further information.

## 2024-07-19 NOTE — RESULT ENCOUNTER NOTE
Dear Fouzia,     -Microalbumin (urine protein) test is normal.  ADVISE: rechecking this annually.      Thank you,     Kaycee Carty, KOKI, CNP  [facility] [place]    If you have further questions about the interpretation of your labs, [email or web address] is a good website to check out for further information.

## 2024-07-22 ENCOUNTER — TELEPHONE (OUTPATIENT)
Dept: VASCULAR SURGERY | Facility: CLINIC | Age: 59
End: 2024-07-22
Payer: COMMERCIAL

## 2024-07-22 DIAGNOSIS — I83.811 VARICOSE VEINS OF RIGHT LOWER EXTREMITY WITH PAIN: Primary | ICD-10-CM

## 2024-07-22 NOTE — TELEPHONE ENCOUNTER
Vein Clinic - Nurse Triage Call    Situation: Pt calling c/o right leg achiness x last 2 weeks.    Background: Pt had vein procedure: Right leg VNUS closure 2 perfs with Dr. Au in 2022. And previously had a Right leg VNUS closure GSV.    Assessment: Pt c/o right leg achiness x last 2 weeks. Pain is worse at end of day and after prolonged sitting which pt does sit at a desk for several hours for work. Pt stated the area of achiness is to her medial knee down to ankle but then sometimes the medial or lateral aspect of her thigh as well.     Pt has tried essential oils to her leg and magnesium butter with not much relief. Pt stated she noticed elevating her leg gave her some relief of her symptoms. Pt noticed her compression sock helps a little bit. Also, pt stated Excedrin is the most helpful otherwise aleve and advil give her some relief.    Pt denies leg swelling, redness, inflammation.    Recommendation: Scheduled pt for a right leg venous comp and return visit with Dr. Au to re-evaluate her right leg symptoms.    Patient is in agreement with plan and had no further questions.    Daxa Vega RN  [facility]  Vein Clinic

## 2024-07-22 NOTE — Clinical Note
Is that okay I just went ahead and order a right leg comp study for this patient and follow up with you?

## 2024-07-22 NOTE — TELEPHONE ENCOUNTER
Patient is calling and has concerns about symptoms.please advise.     MACHELLE VENTURA,   [facility] VEIN CLINIC

## 2024-07-26 ENCOUNTER — ANCILLARY PROCEDURE (OUTPATIENT)
Dept: ULTRASOUND IMAGING | Facility: CLINIC | Age: 59
End: 2024-07-26
Attending: INTERNAL MEDICINE
Payer: COMMERCIAL

## 2024-07-26 DIAGNOSIS — I83.811 VARICOSE VEINS OF RIGHT LOWER EXTREMITY WITH PAIN: ICD-10-CM

## 2024-07-26 PROCEDURE — 93971 EXTREMITY STUDY: CPT | Mod: LT | Performed by: INTERNAL MEDICINE

## 2024-08-19 ENCOUNTER — OFFICE VISIT (OUTPATIENT)
Dept: VASCULAR SURGERY | Facility: CLINIC | Age: 59
End: 2024-08-19
Attending: INTERNAL MEDICINE
Payer: COMMERCIAL

## 2024-08-19 DIAGNOSIS — I83.811 VARICOSE VEINS OF RIGHT LOWER EXTREMITY WITH PAIN: Primary | ICD-10-CM

## 2024-08-19 PROCEDURE — 99214 OFFICE O/P EST MOD 30 MIN: CPT | Performed by: INTERNAL MEDICINE

## 2024-08-19 NOTE — PROGRESS NOTES
August 19, 2024    Vein Procedure Recommendation    Spoke with patient in clinic.    Dr. Au has recommended patient to have the following vein procedure(s):     1. Right leg Venaseal SSV and ASV  (Or VNUS closure if Venaseal is not covered by pt's insurance)      Patient Pre-op Questions:  Preferred Pharmacy: [facility] in [place] [address]  Anticoagulant/ASA: Yes, Aspirin 81 mg  Artificial Joint or Heart Valve:  No  Open ulcer:  No  Sedation nausea: No    Patient is recommended to wear Thigh High compression hose following her procedure (if doing VNUS closure). Pt does already have a pair of thigh high hose.    Handed patient written procedure instructions to review on her own (see After Visit Summary).    Next steps:    Insurance Submission  Informed patient this process could take up to 14 business days, but once approved, the patient will be contacted by our surgery scheduler to schedule the above procedure. Gave patient our surgery scheduler's information.    Informed patient to call our office regarding the status of their insurance authorization if it has been more than 3 weeks and have not heard from our surgery scheduler.    Patient is in agreement with all of the above and has no further questions at this time.    Daxa Vega RN  [facility]  Vein Clinic

## 2024-08-19 NOTE — PROGRESS NOTES
Vein Clinic Consultation Note    HPI:  Fouzia Arenas is a 59 year old female who was seen today in consultation for recurrent right lower extremity heaviness, edema and venous stasis changes.  We had previously ablated right GSV and perforators.  She had good relief of symptoms up until April of this year with recurrent symptoms similar to her previous venous incompetence.    She remains compliant with her elevation and compression greater than 3 months.  Her symptoms affect her ability to work, requiring frequent breaks.  The right leg is quite uncomfortable and brings her to tears.      ROS    PHH:    Past Medical History:   Diagnosis Date    Classic migraine     Complex endometrial hyperplasia     without atypia    Hypertension     Motion sickness     Mumps     Obese     STORMY (obstructive sleep apnea)     Other disorder of menstruation and other abnormal bleeding from female genital tract 2/24/2011    Palpitations     PONV (postoperative nausea and vomiting)     Spider veins     SVT (supraventricular tachycardia) (H24) 9/2015        Past Surgical History:   Procedure Laterality Date    BLADDER SURGERY      CHOLECYSTECTOMY, LAPOROSCOPIC      Cholecystectomy, Laparoscopic    COLONOSCOPY N/A 11/25/2014    Procedure: COLONOSCOPY;  Surgeon: Wenceslao Galo MD;  Location:  GI    CYSTOSCOPY, SLING TRANSVAGINAL N/A 8/20/2018    Procedure: CYSTOSCOPY, SLING TRANSVAGINAL;;  Surgeon: Urban Elena MD;  Location: [facility]    D & C      X2-3 for abnormal bleeding    ESOPHAGOSCOPY, GASTROSCOPY, DUODENOSCOPY (EGD), COMBINED N/A 11/25/2014    Procedure: COMBINED ESOPHAGOSCOPY, GASTROSCOPY, DUODENOSCOPY (EGD), BIOPSY SINGLE OR MULTIPLE;  Surgeon: Wenceslao Galo MD;  Location: [facility]    LAPAROSCOPIC HYSTERECTOMY SUPRACERVICAL N/A 8/20/2018    Procedure: LAPAROSCOPIC HYSTERECTOMY SUPRACERVICAL;  LAPAROSCOPIC SUBTOATAL HYSTERECTOMY, BILATERAL SALPINGECTOMY, SUBURETHRAL SLING AND CYSTOSCOPY;  Surgeon: Ulices  Urban Calderon MD;  Location: [facility]    LAPAROSCOPIC SALPINGECTOMY Bilateral 8/20/2018    Procedure: LAPAROSCOPIC SALPINGECTOMY;;  Surgeon: Urban Elena MD;  Location: [facility]    LAPAROSCOPY      For endometriosis    LASER ABLATION VEIN VARICOSE      OPERATIVE HYSTEROSCOPY WITH MORCELLATOR N/A 3/29/2018    Procedure: OPERATIVE HYSTEROSCOPY WITH MORCELLATOR (MYOSURE);  OPERATIVE HYSTEROSCOPY WITH MORCELLATOR ;  Surgeon: Urban Elena MD;  Location: [facility]       ALLERGIES:  Amoxicillin, Hctz [hydrochlorothiazide], and Percocet [oxycodone-acetaminophen]    Allergies   Allergen Reactions    Amoxicillin Nausea and Vomiting    Hctz [Hydrochlorothiazide] Other (See Comments)     Weak/Achy despite nl BMP    Percocet [Oxycodone-Acetaminophen] Nausea and Vomiting       MEDS:    Current Outpatient Medications:     aspirin (ASA) 81 MG EC tablet, Take 1 tablet (81 mg) by mouth daily, Disp: , Rfl:     Aspirin-Acetaminophen-Caffeine (EXCEDRIN EXTRA STRENGTH PO), Take 2 tablets by mouth every 6 hours as needed, Disp: , Rfl:     Cholecalciferol (VITAMIN D-3 PO), Take 1,000 Units by mouth daily, Disp: , Rfl:     CINNAMON PO, Take 1,000 mg by mouth daily, Disp: , Rfl:     lisinopril (ZESTRIL) 20 MG tablet, Take 1 tablet (20 mg) by mouth daily, Disp: 90 tablet, Rfl: 3    MAGNESIUM PO, , Disp: , Rfl:     meclizine (ANTIVERT) 12.5 MG tablet, Take 1 tablet (12.5 mg) by mouth 3 times daily as needed for dizziness, Disp: 30 tablet, Rfl: 0    spironolactone (ALDACTONE) 25 MG tablet, Take 1 tablet (25 mg) by mouth daily, Disp: 90 tablet, Rfl: 3    SUMAtriptan (IMITREX) 100 MG tablet, Take 1 tablet (100 mg) by mouth at onset of headache for migraine May repeat in 2 hours. Max dose 200mg in 24 hours., Disp: 9 tablet, Rfl: 1    vitamin C (ASCORBIC ACID) 1000 MG TABS, Take 1,000 mg by mouth daily, Disp: , Rfl:     vitamin C with B complex (B COMPLEX-C) tablet, Take 1 tablet by mouth daily, Disp: , Rfl:     VITAMIN E PO, , Disp: ,  Rfl:     Current Facility-Administered Medications:     lidocaine 1 % injection 4 mL, 4 mL, , , Waqas Waterman MD, 4 mL at 18 1116    triamcinolone (KENALOG-40) injection 40 mg, 40 mg, , , Waqas Waterman MD, 40 mg at 18 1116    SOCIAL HABITS:    History   Smoking Status    Never   Smokeless Tobacco    Never     Social History    Substance and Sexual Activity      Alcohol use: Yes        Alcohol/week: 1.7 standard drinks of alcohol        Types: 2 Standard drinks or equivalent per week        Comment: 1 drink a month      History   Drug Use No       FAMILY HISTORY:    Family History   Problem Relation Age of Onset    Hypertension Mother     Lipids Mother     Alzheimer Disease Mother     Hypertension Father     Prostate Cancer Father     Lipids Father     Breast Cancer Maternal Aunt     Hypertension Maternal Grandmother     C.A.D. Maternal Grandmother     Gallbladder Disease Maternal Grandmother     Cancer Maternal Grandfather         lung    Cerebrovascular Disease Paternal Grandmother     C.A.D. Paternal Grandfather     Cancer - colorectal Other         cousin maternal     Colon Cancer Cousin     Colon Cancer Cousin     Breast Cancer Paternal Aunt     Diabetes No family hx of      Results for orders placed or performed in visit on 24    Venous Competency Right    Narrative    Name:  Fouzia Arenas                                                     Patient ID: [id number]  Date: 2024                                                       : 1965  Sex: female    Impression        Examined by: HOWARD Oneill RVT  Age:  58 year old                                                           Reading MD: DANIAL Au     INDICATION:Varicose veins with pain     EXAM TYPE  LEFT LOWER EXTREMITY VENOUS DUPLEX FOR VENOUS INSUFFICIENCY TECHNICAL   SUMMARY     A duplex ultrasound study using color flow was performed, to evaluate the   left lower extremity veins for valvular incompetence with  the patient in a   steep reversed trendelenberg.      LEFT:     The deep veins demonstrate phasic flow, compress and respond to   augmentations.  There is no DVT.  The common femoral vein is incompetent   and free of thrombus. The remaining deep veins are competent and free of   thrombus.      The GSV is absent from the proximal-mid thigh to the knee. The proximal   calf GSV is non-compressible without color flow, consistent with   chronic-occlusive thrombus for 1.3 cm. The remaining GSV demonstrates   phasic flow, compresses and responds to augmentations. The great saphenous   vein measures 9.8 mm at the saphenofemoral junction, 6.6 mm at the   proximal thigh, and is absent at the knee. The GSV is incompetent at the   SFJ and again from the proximal to distal calf, with the greatest reflux   time of 1482 milliseconds.  The GSV gives rise to multiple incompetent   varicose veins, the largest measures 3.5 mm off the Distal Calf that   courses Toward the ankle with a reflux time of 1115 milliseconds.      The AASV is incompetent ( 5.4 mm) draining into the saphenofemoral   junction. The AASV is incompetent from the saphenofemoal junction to the   proximal thigh with a reflux time of 4577 milliseconds. The AASV takes a   straight course for 11 cm. The AASV gives rise to a varicose branch   measuring 4.3 mm off the Proximal Thigh that courses Toward the ankle with   a reflux time of 1115 milliseconds.      The Giacomini vein is competent ( 3.0 mm) communicating with the small   saphenous vein at the knee level.      The SSV demonstrates phasic flow, compresses and responds to augmentations   from the popliteal space to the ankle.  No  thrombus is seen. The   saphenopopliteal junction is absent. The SSV is incompetent from the   Proximal Calf to Mid Calf with a reflux time of 2457 milliseconds.   The   SSV gives rise to multiple incompetent varicose veins, the largest   measuring 7.1 mm off the Proximal Calf that courses  Lateral with a reflux   time of 4566 milliseconds.     Perforators: There is an incompetent  vein ( 2.8 mm) at 13 cm   from medial mallelous that communicates with PTV to varicose veins.      Lateral thigh varicose veins are noted measuring 5.0 mm with 1665   milliseconds of incompetence.     RIGHT:     The CFV demonstrate phasic flow, compress and respond to augmentations.    There is no DVT.       FINAL SUMMARY:  Left lower extremity, no DVT.  Previously treated left GSV is absent in the thigh.  Incompetent left AASV with associated varicose veins in the thigh and calf  Incompetent left SSV with associated varicose veins in the calf.    Incompetence Criteria: greater than 500 milliseconds in superficial and    veins and greater than 1000 milliseconds in deep veins.             VEIN CLINIC LEG DRAWING    VCSS  PAIN:: 2  Varicose Veins:: 1  Venous Edema:: 2  Skin Pigmentation:: 1  Inflamation:: 0  Induration:: 1  Number of active ulcers:: 0  Active ulcer duration:: 0  Active ulcer diameter:: 0  Compression Therapy:: 1  VCSS Score:: 8    CEAP:  CEAP:: C5         ASSESSMENT: Recurrence of right lower extremity symptoms of venous incompetence now associated with AASV and SSV incompetence.  Refractory to conservative management with compression and elevation.  Quality of life limiting symptoms of pain, heaviness that affect her ability to work and sit for prolonged periods of time without elevation.    PLAN: VenaSeal ablation of right AASV and right SSV.      Adair Au MD    30 minutes spent today reviewing chart, discussing with patient and postvisit charting.

## 2024-08-19 NOTE — LETTER
8/19/2024      Fouzia Arenas  [address] [facility] [address]      Dear Colleague,    Thank you for referring your patient, Fouzia Arenas, to the [facility] [place]. Please see a copy of my visit note below.        Vein Clinic Consultation Note    HPI:  Fouzia Arenas is a 59 year old female who was seen today in consultation for recurrent right lower extremity heaviness, edema and venous stasis changes.  We had previously ablated right GSV and perforators.  She had good relief of symptoms up until April of this year with recurrent symptoms similar to her previous venous incompetence.    She remains compliant with her elevation and compression greater than 3 months.  Her symptoms affect her ability to work, requiring frequent breaks.  The right leg is quite uncomfortable and brings her to tears.      ROS    PHH:    Past Medical History:   Diagnosis Date     Classic migraine      Complex endometrial hyperplasia     without atypia     Hypertension      Motion sickness      Mumps      Obese      STORMY (obstructive sleep apnea)      Other disorder of menstruation and other abnormal bleeding from female genital tract 2/24/2011     Palpitations      PONV (postoperative nausea and vomiting)      Spider veins      SVT (supraventricular tachycardia) (H24) 9/2015        Past Surgical History:   Procedure Laterality Date     BLADDER SURGERY       CHOLECYSTECTOMY, LAPOROSCOPIC      Cholecystectomy, Laparoscopic     COLONOSCOPY N/A 11/25/2014    Procedure: COLONOSCOPY;  Surgeon: Wenceslao Galo MD;  Location:  GI     CYSTOSCOPY, SLING TRANSVAGINAL N/A 8/20/2018    Procedure: CYSTOSCOPY, SLING TRANSVAGINAL;;  Surgeon: Urban Elena MD;  Location: [facility]     D & C      X2-3 for abnormal bleeding     ESOPHAGOSCOPY, GASTROSCOPY, DUODENOSCOPY (EGD), COMBINED N/A 11/25/2014    Procedure: COMBINED ESOPHAGOSCOPY, GASTROSCOPY, DUODENOSCOPY (EGD), BIOPSY SINGLE OR MULTIPLE;  Surgeon: Iraj  Wenceslao SOMERS MD;  Location: [facility]     LAPAROSCOPIC HYSTERECTOMY SUPRACERVICAL N/A 8/20/2018    Procedure: LAPAROSCOPIC HYSTERECTOMY SUPRACERVICAL;  LAPAROSCOPIC SUBTOATAL HYSTERECTOMY, BILATERAL SALPINGECTOMY, SUBURETHRAL SLING AND CYSTOSCOPY;  Surgeon: Urban Elena MD;  Location: [facility]     LAPAROSCOPIC SALPINGECTOMY Bilateral 8/20/2018    Procedure: LAPAROSCOPIC SALPINGECTOMY;;  Surgeon: Urban Elena MD;  Location: [facility]     LAPAROSCOPY      For endometriosis     LASER ABLATION VEIN VARICOSE       OPERATIVE HYSTEROSCOPY WITH MORCELLATOR N/A 3/29/2018    Procedure: OPERATIVE HYSTEROSCOPY WITH MORCELLATOR (MYOSURE);  OPERATIVE HYSTEROSCOPY WITH MORCELLATOR ;  Surgeon: Urban Elena MD;  Location: [facility]       ALLERGIES:  Amoxicillin, Hctz [hydrochlorothiazide], and Percocet [oxycodone-acetaminophen]    Allergies   Allergen Reactions     Amoxicillin Nausea and Vomiting     Hctz [Hydrochlorothiazide] Other (See Comments)     Weak/Achy despite nl BMP     Percocet [Oxycodone-Acetaminophen] Nausea and Vomiting       MEDS:    Current Outpatient Medications:      aspirin (ASA) 81 MG EC tablet, Take 1 tablet (81 mg) by mouth daily, Disp: , Rfl:      Aspirin-Acetaminophen-Caffeine (EXCEDRIN EXTRA STRENGTH PO), Take 2 tablets by mouth every 6 hours as needed, Disp: , Rfl:      Cholecalciferol (VITAMIN D-3 PO), Take 1,000 Units by mouth daily, Disp: , Rfl:      CINNAMON PO, Take 1,000 mg by mouth daily, Disp: , Rfl:      lisinopril (ZESTRIL) 20 MG tablet, Take 1 tablet (20 mg) by mouth daily, Disp: 90 tablet, Rfl: 3     MAGNESIUM PO, , Disp: , Rfl:      meclizine (ANTIVERT) 12.5 MG tablet, Take 1 tablet (12.5 mg) by mouth 3 times daily as needed for dizziness, Disp: 30 tablet, Rfl: 0     spironolactone (ALDACTONE) 25 MG tablet, Take 1 tablet (25 mg) by mouth daily, Disp: 90 tablet, Rfl: 3     SUMAtriptan (IMITREX) 100 MG tablet, Take 1 tablet (100 mg) by mouth at onset of headache for migraine May  repeat in 2 hours. Max dose 200mg in 24 hours., Disp: 9 tablet, Rfl: 1     vitamin C (ASCORBIC ACID) 1000 MG TABS, Take 1,000 mg by mouth daily, Disp: , Rfl:      vitamin C with B complex (B COMPLEX-C) tablet, Take 1 tablet by mouth daily, Disp: , Rfl:      VITAMIN E PO, , Disp: , Rfl:     Current Facility-Administered Medications:      lidocaine 1 % injection 4 mL, 4 mL, , , Waqas Waterman MD, 4 mL at 18 1116     triamcinolone (KENALOG-40) injection 40 mg, 40 mg, , , Waqas Waterman MD, 40 mg at 18 1116    SOCIAL HABITS:    History   Smoking Status     Never   Smokeless Tobacco     Never     Social History    Substance and Sexual Activity      Alcohol use: Yes        Alcohol/week: 1.7 standard drinks of alcohol        Types: 2 Standard drinks or equivalent per week        Comment: 1 drink a month      History   Drug Use No       FAMILY HISTORY:    Family History   Problem Relation Age of Onset     Hypertension Mother      Lipids Mother      Alzheimer Disease Mother      Hypertension Father      Prostate Cancer Father      Lipids Father      Breast Cancer Maternal Aunt      Hypertension Maternal Grandmother      C.A.D. Maternal Grandmother      Gallbladder Disease Maternal Grandmother      Cancer Maternal Grandfather         lung     Cerebrovascular Disease Paternal Grandmother      C.A.D. Paternal Grandfather      Cancer - colorectal Other         cousin maternal      Colon Cancer Cousin      Colon Cancer Cousin      Breast Cancer Paternal Aunt      Diabetes No family hx of      Results for orders placed or performed in visit on 24   US Venous Competency Right    Narrative    Name:  Fouzia Arenas                                                     Patient ID: [id number]  Date: 2024                                                       : 1965  Sex: female    Impression        Examined by: HOWARD Oneill RVT  Age:  58 year old                                                            Reading MD: DANIAL Au     INDICATION:Varicose veins with pain     EXAM TYPE  LEFT LOWER EXTREMITY VENOUS DUPLEX FOR VENOUS INSUFFICIENCY TECHNICAL   SUMMARY     A duplex ultrasound study using color flow was performed, to evaluate the   left lower extremity veins for valvular incompetence with the patient in a   steep reversed trendelenberg.      LEFT:     The deep veins demonstrate phasic flow, compress and respond to   augmentations.  There is no DVT.  The common femoral vein is incompetent   and free of thrombus. The remaining deep veins are competent and free of   thrombus.      The GSV is absent from the proximal-mid thigh to the knee. The proximal   calf GSV is non-compressible without color flow, consistent with   chronic-occlusive thrombus for 1.3 cm. The remaining GSV demonstrates   phasic flow, compresses and responds to augmentations. The great saphenous   vein measures 9.8 mm at the saphenofemoral junction, 6.6 mm at the   proximal thigh, and is absent at the knee. The GSV is incompetent at the   SFJ and again from the proximal to distal calf, with the greatest reflux   time of 1482 milliseconds.  The GSV gives rise to multiple incompetent   varicose veins, the largest measures 3.5 mm off the Distal Calf that   courses Toward the ankle with a reflux time of 1115 milliseconds.      The AASV is incompetent ( 5.4 mm) draining into the saphenofemoral   junction. The AASV is incompetent from the saphenofemoal junction to the   proximal thigh with a reflux time of 4577 milliseconds. The AASV takes a   straight course for 11 cm. The AASV gives rise to a varicose branch   measuring 4.3 mm off the Proximal Thigh that courses Toward the ankle with   a reflux time of 1115 milliseconds.      The Giacomini vein is competent ( 3.0 mm) communicating with the small   saphenous vein at the knee level.      The SSV demonstrates phasic flow, compresses and responds to augmentations   from the popliteal space to the  ankle.  No  thrombus is seen. The   saphenopopliteal junction is absent. The SSV is incompetent from the   Proximal Calf to Mid Calf with a reflux time of 2457 milliseconds.   The   SSV gives rise to multiple incompetent varicose veins, the largest   measuring 7.1 mm off the Proximal Calf that courses Lateral with a reflux   time of 4566 milliseconds.     Perforators: There is an incompetent  vein ( 2.8 mm) at 13 cm   from medial mallelous that communicates with PTV to varicose veins.      Lateral thigh varicose veins are noted measuring 5.0 mm with 1665   milliseconds of incompetence.     RIGHT:     The CFV demonstrate phasic flow, compress and respond to augmentations.    There is no DVT.       FINAL SUMMARY:  Left lower extremity, no DVT.  Previously treated left GSV is absent in the thigh.  Incompetent left AASV with associated varicose veins in the thigh and calf  Incompetent left SSV with associated varicose veins in the calf.    Incompetence Criteria: greater than 500 milliseconds in superficial and    veins and greater than 1000 milliseconds in deep veins.             VEIN CLINIC LEG DRAWING    VCSS  PAIN:: 2  Varicose Veins:: 1  Venous Edema:: 2  Skin Pigmentation:: 1  Inflamation:: 0  Induration:: 1  Number of active ulcers:: 0  Active ulcer duration:: 0  Active ulcer diameter:: 0  Compression Therapy:: 1  VCSS Score:: 8    CEAP:  CEAP:: C5         ASSESSMENT: Recurrence of right lower extremity symptoms of venous incompetence now associated with AASV and SSV incompetence.  Refractory to conservative management with compression and elevation.  Quality of life limiting symptoms of pain, heaviness that affect her ability to work and sit for prolonged periods of time without elevation.    PLAN: VenaSeal ablation of right AASV and right SSV.      Adiar Au MD    30 minutes spent today reviewing chart, discussing with patient and postvisit charting.      Again, thank you for  allowing me to participate in the care of your patient.        Sincerely,        Adair Au MD

## 2024-09-13 ENCOUNTER — TELEPHONE (OUTPATIENT)
Dept: GASTROENTEROLOGY | Facility: CLINIC | Age: 59
End: 2024-09-13
Payer: COMMERCIAL

## 2024-09-13 DIAGNOSIS — Z12.11 SPECIAL SCREENING FOR MALIGNANT NEOPLASMS, COLON: Primary | ICD-10-CM

## 2024-09-13 NOTE — TELEPHONE ENCOUNTER
"Endoscopy Scheduling Screen    Have you had any respiratory illness or flu-like symptoms in the last 10 days?  No    What is your communication preference for Instructions and/or Bowel Prep?   MyChart    What insurance is in the chart?  Other:  [employer]    Ordering/Referring Provider: Kaycee Carty APRN CNP in [facility]   (If ordering provider performs procedure, schedule with ordering provider unless otherwise instructed. )    BMI: Estimated body mass index is 45.16 kg/m  as calculated from the following:    Height as of 7/18/24: 1.829 m (6').    Weight as of 7/18/24: 151 kg (333 lb).     Sedation Ordered  moderate sedation.   If patient BMI > 50 do not schedule in [facility].    If patient BMI > 45 do not schedule at [facility].    Are you taking methadone or Suboxone?  NO, No RN review required.    Have you been diagnosed and are being treated for severe PTSD or severe anxiety?  NO, No RN review required.    Are you taking any prescription medications for pain 3 or more times per week?   NO, No RN review required.    Do you have a history of malignant hyperthermia?  No    (Females) Are you currently pregnant?   No     Have you been diagnosed or told you have pulmonary hypertension?   No    Do you have an LVAD?  No    Have you been told you have moderate to severe sleep apnea?  Yes. Do you use a CPAP? Yes. (RN Review required for scheduling unless scheduling in Hospital.)     Have you been told you have COPD, asthma, or any other lung disease?  No    Do you have any heart conditions?  Yes     In the past year, have you had any hospitalizations for heart related issues including cardiomyopathy, heart attack, or stent placement?  No    Do you have any implantable devices in your body (pacemaker, ICD)?  No    Do you take nitroglycerine?  No    Have you ever had or are you waiting for an organ transplant?  No. Continue scheduling, no site restrictions.    Have you had a stroke or transient ischemic attack (TIA aka \"mini " "stroke\" in the last 6 months?   No    Have you been diagnosed with or been told you have cirrhosis of the liver?   No.    Are you currently on dialysis?   No    Do you need assistance transferring?   No    BMI: Estimated body mass index is 45.16 kg/m  as calculated from the following:    Height as of 7/18/24: 1.829 m (6').    Weight as of 7/18/24: 151 kg (333 lb).     Is patients BMI > 40 and scheduling location UP?  No    Do you take an injectable or oral medication for weight loss or diabetes (excluding insulin)?  No    Do you take the medication Naltrexone?  No    Do you take blood thinners?  No       Prep   Are you currently on dialysis or do you have chronic kidney disease?  Yes ([facility] Prep)    Do you have a diagnosis of diabetes?  No    Do you have a diagnosis of cystic fibrosis (CF)?  No    On a regular basis do you go 3 -5 days between bowel movements?  No    BMI > 40?  Yes (Extended Prep)    Preferred Pharmacy:    [id number] [place] [address] AT [place] [address] & [address] [address]  Phone: [phone] Fax: [phone]      Final Scheduling Details     Procedure scheduled  Colonoscopy    Surgeon:  Enrique     Date of procedure:  10.24.24     Pre-OP / PAC:   No - Not required for this site.    Location  [facility] - Patient preference.    Sedation   Moderate Sedation - Per order.      Patient Reminders:   You will receive a call from a Nurse to review instructions and health history.  This assessment must be completed prior to your procedure.  Failure to complete the Nurse assessment may result in the procedure being cancelled.      On the day of your procedure, please designate an adult(s) who can drive you home stay with you for the next 24 hours. The medicines used in the exam will make you sleepy. You will not be able to drive.      You cannot take public transportation, ride share services, or non-medical taxi service without a " responsible caregiver.  Medical transport services are allowed with the requirement that a responsible caregiver will receive you at your destination.  We require that drivers and caregivers are confirmed prior to your procedure.

## 2024-09-27 ENCOUNTER — OFFICE VISIT (OUTPATIENT)
Dept: SLEEP MEDICINE | Facility: CLINIC | Age: 59
End: 2024-09-27
Payer: COMMERCIAL

## 2024-09-27 VITALS
HEIGHT: 72 IN | HEART RATE: 58 BPM | BODY MASS INDEX: 39.68 KG/M2 | WEIGHT: 293 LBS | OXYGEN SATURATION: 95 % | SYSTOLIC BLOOD PRESSURE: 111 MMHG | DIASTOLIC BLOOD PRESSURE: 77 MMHG

## 2024-09-27 DIAGNOSIS — E66.01 MORBID OBESITY (H): ICD-10-CM

## 2024-09-27 DIAGNOSIS — I47.10 SUPRAVENTRICULAR TACHYCARDIA (H): ICD-10-CM

## 2024-09-27 DIAGNOSIS — I48.0 PAROXYSMAL ATRIAL FIBRILLATION (H): ICD-10-CM

## 2024-09-27 DIAGNOSIS — G47.33 OSA (OBSTRUCTIVE SLEEP APNEA): Primary | ICD-10-CM

## 2024-09-27 DIAGNOSIS — I10 HYPERTENSION GOAL BP (BLOOD PRESSURE) < 130/80: ICD-10-CM

## 2024-09-27 PROCEDURE — 99204 OFFICE O/P NEW MOD 45 MIN: CPT | Performed by: PHYSICIAN ASSISTANT

## 2024-09-27 NOTE — PROGRESS NOTES
Outpatient Sleep Medicine Consultation:    Name: Fouzia Arenas MRN# [id number]   Age: 59 year old YOB: 1965     Date of Consultation: September 27, 2024  Consultation is requested by: Peggy Edmond PA-C  [address]  [place] [id number] Peggy Edmond  Primary care provider: Kaycee Carty       Reason for Sleep Consult:     Chief Complaint   Patient presents with    Sleep Problem     Re establish care, stormy     Patient s Reason for visit  Fouzia Arenas main reason for visit: update need equipment  Patient states problem(s) started:    Fouzia Arenas's goals for this visit: check in         Assessment and Plan:     Summary Sleep Diagnoses:  1. STORMY (obstructive sleep apnea)  Comorbid Diagnoses:  2. Morbid obesity (H)  3. Supraventricular tachycardia (H)  4. Paroxysmal atrial fibrillation (H)  5. Hypertension goal BP (blood pressure) < 130/80    Patient presents to clinic today to reestablish care of her severe obstructive sleep apnea managed with CPAP therapy.  Review of CPAP download shows current pressure setting 11 cm H2O is treating sleep apnea very well with low residual AHI of 4 events per hour.  Compliance on CPAP is excellent, uses nightly for an average of 8.5 hours.  No indication to adjust pressure settings today.  She tolerates CPAP therapy well overall but does not love her current nasal pillow mask, interested in looking at alternatives and in particular a nasal mask versus the pillows.  Prescription was renewed today for new CPAP mask and supplies and sent to [facility].  She will continue nightly CPAP use.  We reviewed benefits not only to her sleep but also cardiovascular health with continued compliance.  No other sleep concerns today.  Will plan on a follow-up visit going forward every 1-2 years or of course sooner as needed.  - Comprehensive DME         History of Present Illness:     Fouzia Arenas is a 59-year-old female with morbid obesity, HTN, SVT, A-fib,  "CKD, migraine, STORMY on CPAP who presents to clinic today to re-establish care.    Previously seen in our office by my colleagues Dr. Wong and Dr. Skaggs but last visit 2016.    Reviewed her diagnostic split-night PSG completed 9/28/2016 (305#, BMI 41.8) revealing AHI 36.1, RDI 37.2, supine AHI 32.1, REM AHI 87.7, oxygen north 77% with 2.2 minutes spent less than or equal to 88%.  CPAP titrated up to 13 cm H2O with resolution of obstructive events but some treatment emergent central seen.  No abnormal movements or behaviors noted during sleep.      Patient was set up with a Batool Respironics DreamStation set at 9-12 cm H2O on 10/11/2016. Got her current DreamStation 2 machine from recall ~February 2022.     Presents to my clinic today to reestablish care.  Primary reason for today's visit is to obtain prescription for new CPAP mask and supplies.  Has been purchasing supplies out-of-pocket on [employer] but unhappy with her current nasal pillow mask, states \"I think the pieces are too big I have used a small and extra small but something is not working for me\".  One of her friends has a nasal mask and she thinks she may do better with that versus pillows.  No significant leaking from mask. They are not having significant oral/nasal dryness or epistaxis.  They are not having pain/skin breakdown. They are not snoring with the mask on. They are not having gasp arousals. The pressure settings are comfortable.     Respironics Dreamstation 2 CPAP 11 cmH2O download (8/26/2024 - 9/24/2024):  30 total days of use. 0 nonuse days.  Average use 8 hours 36 minutes per day.  30 days with >4 hours use.  Large leak 44 sec per day average.  AHI 4.0.     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  Wakes up due to Pain;Use the bathroom  She has trouble falling back asleep 0  times a week.   It usually takes few minutes to get back to sleep  Patient is usually up at 8am  Uses alarm: Yes    Weekends/Non-work Days/All Other Days:  Usually gets into bed at 1-2am   Takes patient about 15minutes to fall asleep  Patient is usually up at 9am  Uses alarm: No    Sleep Need  Patient estimates she gets 6-7 hours sleep on average   Patient thinks she needs about probably more sleep    Fouzia Arenas prefers to sleep in this position(s): Back;Side;Stomach     Naps  Patient takes a purposeful nap 0 times a week and naps are usually 0 in duration  She dozes off unintentionally 0 days per week  Patient has had a driving accident or near-miss due to sleepiness/drowsiness: No    SLEEP DISRUPTIONS:    Breathing/Snoring  Patient snores:Yes  Other people complain about her snoring: No  Patient has been told she stops breathing in her sleep:Yes  Denies morning headache, morning nasal congestion, morning dry mouth, frequent nocturia, nocturnal GERD    Movement:  Denies RLS symptoms but did have a back injury in December, has some residual sciatica type pain/numbness from that down her right leg   Patient has been told she kicks her legs at night:   No     Behaviors in Sleep:  Fouzia Arenas has experienced the following behaviors while sleeping:  Recurring Nightmares - stress related   Teeth grinding - has guard doesn't wear     Denies sleepwalking, sleep talking, sleep eating, dream enactment, night terrors, sleep paralysis, hypnagogic/hypnopompic hallucinations, cataplexy      Is there anything else you would like your sleep provider to know: i think some of it is back issues      CAFFEINE AND OTHER SUBSTANCES:    Patient consumes caffeinated beverages per day:  maybe 1 or 2  Last caffeine use is usually: 4-5 pm  List of any prescribed or over the counter stimulants that patient takes:  None  List of  any prescribed or over the counter sleep medication patient takes:  None  List of previous sleep medications that patient has tried:  None  Patient drinks alcohol to help them sleep: No  Patient drinks alcohol near bedtime: No    Family History:  Patient has a family member been diagnosed with a sleep disorder: Yes  dad and others       SCALES:    EPWORTH SLEEPINESS SCALE         9/27/2024     8:23 AM    Walker Sleepiness Scale ( CHAZ Jimenez  [id number]<br>ESS - [place]/English - Final version - 21 Nov 07 - [facility] [place].)   Sitting and reading Slight chance of dozing   Watching TV Slight chance of dozing   Sitting, inactive in a public place (e.g. a theatre or a meeting) Would never doze   As a passenger in a car for an hour without a break Would never doze   Lying down to rest in the afternoon when circumstances permit Slight chance of dozing   Sitting and talking to someone Would never doze   Sitting quietly after a lunch without alcohol Slight chance of dozing   In a car, while stopped for a few minutes in traffic Would never doze   Walker Score (MC) 4   Walker Score (Sleep) 4       INSOMNIA SEVERITY INDEX (KOREY)          9/27/2024     8:08 AM   Insomnia Severity Index (KOREY)   Difficulty falling asleep 2   Difficulty staying asleep 2   Problems waking up too early 0   How SATISFIED/DISSATISFIED are you with your CURRENT sleep pattern? 2   How NOTICEABLE to others do you think your sleep problem is in terms of impairing the quality of your life? 0   How WORRIED/DISTRESSED are you about your current sleep problem? 1   To what extent do you consider your sleep problem to INTERFERE with your daily functioning (e.g. daytime fatigue, mood, ability to function at work/daily chores, concentration, memory, mood, etc.) CURRENTLY? 1   KOREY Total Score 8       Guidelines for Scoring/Interpretation:  Total score categories:  0-7 = No clinically significant insomnia   8-14 = Subthreshold insomnia   15-21 = Clinical  insomnia (moderate severity)  22-28 = Clinical insomnia (severe)  Used via courtesy of www.myhealth.va.gov with permission from Adair Buck PhD., [facility]      Allergies:    Allergies   Allergen Reactions    Amoxicillin Nausea and Vomiting    Hctz [Hydrochlorothiazide] Other (See Comments)     Weak/Achy despite nl BMP    Percocet [Oxycodone-Acetaminophen] Nausea and Vomiting       Medications:    Current Outpatient Medications   Medication Sig Dispense Refill    aspirin (ASA) 81 MG EC tablet Take 1 tablet (81 mg) by mouth daily      Aspirin-Acetaminophen-Caffeine (EXCEDRIN EXTRA STRENGTH PO) Take 2 tablets by mouth every 6 hours as needed      Cholecalciferol (VITAMIN D-3 PO) Take 1,000 Units by mouth daily      CINNAMON PO Take 1,000 mg by mouth daily      lisinopril (ZESTRIL) 20 MG tablet Take 1 tablet (20 mg) by mouth daily 90 tablet 3    MAGNESIUM PO       meclizine (ANTIVERT) 12.5 MG tablet Take 1 tablet (12.5 mg) by mouth 3 times daily as needed for dizziness 30 tablet 0    spironolactone (ALDACTONE) 25 MG tablet Take 1 tablet (25 mg) by mouth daily 90 tablet 3    SUMAtriptan (IMITREX) 100 MG tablet Take 1 tablet (100 mg) by mouth at onset of headache for migraine May repeat in 2 hours. Max dose 200mg in 24 hours. 9 tablet 1    vitamin C (ASCORBIC ACID) 1000 MG TABS Take 1,000 mg by mouth daily      vitamin C with B complex (B COMPLEX-C) tablet Take 1 tablet by mouth daily      VITAMIN E PO          Problem List:  Patient Active Problem List    Diagnosis Date Noted    Paroxysmal atrial fibrillation (H) 07/18/2024     Priority: Medium    CKD (chronic kidney disease) stage 2, GFR 60-89 ml/min 08/09/2023     Priority: Medium    Supraventricular tachycardia (H) - on bystolic followed by cardiology 07/02/2019     Priority: Medium    Lipodermatosclerosis of both lower extremities 07/02/2019     Priority: Medium    Nipple symptom or sign in female - bilateral, chronic recurrent itching  07/02/2019      Priority: Medium    Vertigo 05/08/2018     Priority: Medium    Hypertension goal BP (blood pressure) < 130/80 03/26/2018     Priority: Medium    Morbid obesity (H) 02/06/2018     Priority: Medium    Atypical chest pain 04/05/2017     Priority: Medium    STORMY (obstructive sleep apnea) 09/30/2016     Priority: Medium    Palpitations      Priority: Medium    Esophageal reflux 12/07/2013     Priority: Medium    CARDIOVASCULAR SCREENING; LDL GOAL LESS THAN 160 12/07/2013     Priority: Medium    BPPV (benign paroxysmal positional vertigo) 09/12/2012     Priority: Medium    Migraine without aura and without status migrainosus, not intractable 08/19/2010     Priority: Medium     Formatting of this note might be different from the original.  Migraine Common          Past Medical/Surgical History:  Past Medical History:   Diagnosis Date    Classic migraine     Complex endometrial hyperplasia     without atypia    Hypertension     Motion sickness     Mumps     Obese     STORMY (obstructive sleep apnea)     Other disorder of menstruation and other abnormal bleeding from female genital tract 2/24/2011    Palpitations     PONV (postoperative nausea and vomiting)     Spider veins     SVT (supraventricular tachycardia) (H) 9/2015     Past Surgical History:   Procedure Laterality Date    BLADDER SURGERY      CHOLECYSTECTOMY, LAPOROSCOPIC      Cholecystectomy, Laparoscopic    COLONOSCOPY N/A 11/25/2014    Procedure: COLONOSCOPY;  Surgeon: Wenceslao Galo MD;  Location:  GI    CYSTOSCOPY, SLING TRANSVAGINAL N/A 8/20/2018    Procedure: CYSTOSCOPY, SLING TRANSVAGINAL;;  Surgeon: Urban Elena MD;  Location: [facility]    D & C      X2-3 for abnormal bleeding    ESOPHAGOSCOPY, GASTROSCOPY, DUODENOSCOPY (EGD), COMBINED N/A 11/25/2014    Procedure: COMBINED ESOPHAGOSCOPY, GASTROSCOPY, DUODENOSCOPY (EGD), BIOPSY SINGLE OR MULTIPLE;  Surgeon: Wenceslao Galo MD;  Location:  GI    LAPAROSCOPIC HYSTERECTOMY SUPRACERVICAL N/A  8/20/2018    Procedure: LAPAROSCOPIC HYSTERECTOMY SUPRACERVICAL;  LAPAROSCOPIC SUBTOATAL HYSTERECTOMY, BILATERAL SALPINGECTOMY, SUBURETHRAL SLING AND CYSTOSCOPY;  Surgeon: Urban Elena MD;  Location: [facility]    LAPAROSCOPIC SALPINGECTOMY Bilateral 8/20/2018    Procedure: LAPAROSCOPIC SALPINGECTOMY;;  Surgeon: Urban Elena MD;  Location: [facility]    LAPAROSCOPY      For endometriosis    LASER ABLATION VEIN VARICOSE      OPERATIVE HYSTEROSCOPY WITH MORCELLATOR N/A 3/29/2018    Procedure: OPERATIVE HYSTEROSCOPY WITH MORCELLATOR (MYOSURE);  OPERATIVE HYSTEROSCOPY WITH MORCELLATOR ;  Surgeon: Urban Elena MD;  Location: [facility]       Social History:  Social History     Socioeconomic History    Marital status: Single     Spouse name: Not on file    Number of children: Not on file    Years of education: Not on file    Highest education level: Not on file   Occupational History    Not on file   Tobacco Use    Smoking status: Never    Smokeless tobacco: Never   Vaping Use    Vaping status: Never Used   Substance and Sexual Activity    Alcohol use: Yes     Alcohol/week: 1.7 standard drinks of alcohol     Types: 2 Standard drinks or equivalent per week     Comment: 1 drink a month    Drug use: No    Sexual activity: Never   Other Topics Concern     Service Not Asked    Blood Transfusions Not Asked    Caffeine Concern Yes     Comment: 1-2 cans qd    Occupational Exposure Not Asked    Hobby Hazards Not Asked    Sleep Concern Not Asked    Stress Concern Not Asked    Weight Concern Not Asked    Special Diet Yes     Comment: started mediterranian     Back Care Not Asked    Exercise Yes     Comment: 20 minutes walking 3-4 days weekly     Bike Helmet Not Asked    Seat Belt Yes    Self-Exams Not Asked    Parent/sibling w/ CABG, MI or angioplasty before 65F 55M? No   Social History Narrative    Not on file     Social Determinants of Health     Financial Resource Strain: Low Risk  (7/18/2024)    Financial  Resource Strain     Within the past 12 months, have you or your family members you live with been unable to get utilities (heat, electricity) when it was really needed?: No   Food Insecurity: Low Risk  (7/18/2024)    Food Insecurity     Within the past 12 months, did you worry that your food would run out before you got money to buy more?: No     Within the past 12 months, did the food you bought just not last and you didn t have money to get more?: No   Transportation Needs: Low Risk  (7/18/2024)    Transportation Needs     Within the past 12 months, has lack of transportation kept you from medical appointments, getting your medicines, non-medical meetings or appointments, work, or from getting things that you need?: No   Physical Activity: Inactive (7/18/2024)    Exercise Vital Sign     Days of Exercise per Week: 0 days     Minutes of Exercise per Session: 0 min   Stress: No Stress Concern Present (7/18/2024)    [place] [employer] - Occupational Stress Questionnaire     Feeling of Stress : Only a little   Social Connections: Unknown (7/18/2024)    Social Connection and Isolation Panel [NHANES]     Frequency of Communication with Friends and Family: Not on file     Frequency of Social Gatherings with Friends and Family: More than three times a week     Attends Worship Services: Not on file     Active Member of Clubs or Organizations: Not on file     Attends Club or Organization Meetings: Not on file     Marital Status: Not on file   Interpersonal Safety: Low Risk  (7/18/2024)    Interpersonal Safety     Do you feel physically and emotionally safe where you currently live?: Yes     Within the past 12 months, have you been hit, slapped, kicked or otherwise physically hurt by someone?: No     Within the past 12 months, have you been humiliated or emotionally abused in other ways by your partner or ex-partner?: No   Housing Stability: Low Risk  (7/18/2024)    Housing Stability     Do you have  "housing? : Yes     Are you worried about losing your housing?: No       Family History:  Family History   Problem Relation Age of Onset    Hypertension Mother     Lipids Mother     Alzheimer Disease Mother     Hypertension Father     Prostate Cancer Father     Lipids Father     Breast Cancer Maternal Aunt     Hypertension Maternal Grandmother     C.A.D. Maternal Grandmother     Gallbladder Disease Maternal Grandmother     Cancer Maternal Grandfather         lung    Cerebrovascular Disease Paternal Grandmother     C.A.D. Paternal Grandfather     Cancer - colorectal Other         cousin maternal     Colon Cancer Cousin     Colon Cancer Cousin     Breast Cancer Paternal Aunt     Diabetes No family hx of        Review of Systems:  In the last TWO WEEKS have you experienced any of the following symptoms?  Fevers: No  Night Sweats: Yes  Weight Gain: Yes  Pain at Night: Yes  Double Vision: No  Changes in Vision: No  Difficulty Breathing through Nose: Yes  Sore Throat in Morning: No  Dry Mouth in the Morning: Yes  Shortness of Breath Lying Flat: No  Shortness of Breath With Activity: Yes  Awakening with Shortness of Breath: No  Increased Cough: Yes  Heart Racing at Night: Yes  Swelling in Feet or Legs: Yes  Diarrhea at Night: No  Heartburn at Night: Yes  Urinating More than Once at Night: Yes  Losing Control of Urine at Night: Yes  Joint Pains at Night: Yes  Headaches in Morning: Yes  Weakness in Arms or Legs: Yes  Depressed Mood: No  Anxiety: No     Physical Examination:  Vitals: /77   Pulse 58   Ht 1.829 m (6' 0.01\")   Wt (!) 152.4 kg (336 lb)   LMP  (LMP Unknown)   SpO2 95%   BMI 45.56 kg/m    BMI= Body mass index is 45.56 kg/m .  General appearance: Awake, alert, cooperative. Well groomed. Sitting comfortably in chair. In no apparent distress.  HEENT: Head: Normocephalic, atraumatic. Eyes:Conjunctiva clear. Sclera normal. Nose: External appearance without deformity.   Pulmonary:  Able to speak easily in full " "sentences. No cough or wheeze.   Skin:  No rashes or significant lesions on visible skin.   Neurologic: Alert, oriented x3.   Psychiatric: Mood euthymic. Affect congruent with full range and intensity.         Data: All pertinent previous laboratory data reviewed     Recent Labs   Lab Test 07/18/24  1011 08/01/23  1003    140   POTASSIUM 4.6 4.7   CHLORIDE 103 104   CO2 26 26   ANIONGAP 9 10   GLC 99 99   BUN 19.9 11.3   CR 1.09* 0.96*   ZACH 9.3 9.2       Recent Labs   Lab Test 07/18/24  1011   WBC 6.6   RBC 4.92   HGB 13.7   HCT 42.8   MCV 87   MCH 27.8   MCHC 32.0   RDW 12.4          Recent Labs   Lab Test 07/18/24  1011   PROTTOTAL 7.9   ALBUMIN 4.4   BILITOTAL 0.4   ALKPHOS 103   AST 26   ALT 25       TSH   Date Value   07/18/2024 3.53 uIU/mL   08/01/2023 3.26 uIU/mL   07/01/2021 3.62 mU/L   02/06/2018 4.12 mU/L (H)       No results found for: \"UAMP\", \"UBARB\", \"BENZODIAZEUR\", \"UCANN\", \"UCOC\", \"OPIT\", \"UPCP\"    No results found for: \"IRONSAT\", \"EI87830\", \"KAMILLA\"    No results found for: \"PH\", \"PHARTERIAL\", \"PO2\", \"KJ3RNCEUQUE\", \"SAT\", \"PCO2\", \"HCO3\", \"BASEEXCESS\", \"FATEMEH\", \"BEB\"    @LABRCNTIPR(phv:4,pco2v:4,po2v:4,hco3v:4,jade:4,o2per:4)@    Echocardiology: No results found for this or any previous visit (from the past 4320 hour(s)).    Chest x-ray: No results found for this or any previous visit from the past 365 days.      Chest CT: No results found for this or any previous visit from the past 365 days.      PFT: Most Recent [facility] Pulmonary Function Testing    No results found for: \"20001\"  No results found for: \"20002\"  No results found for: \"20003\"  No results found for: \"20015\"  No results found for: \"20016\"  No results found for: \"20027\"  No results found for: \"20028\"  No results found for: \"20029\"  No results found for: \"20079\"  No results found for: \"20080\"  No results found for: \"20081\"  No results found for: \"20335\"  No results found for: \"20105\"  No results found for: \"20053\"  No results found " "for: \"20054\"  No results found for: \"20055\"      Irma Larry PA-C 9/27/2024     [facility]  [address] [address]     [facility]  [address], [place] [address]    Chart documentation was completed, in part, with [email or web address] voice-recognition software. Even though reviewed, some grammatical, spelling, and word errors may remain.    45 minutes spent on day of encounter reviewing medical records, history and physical examination, review of previous testing and interpretation, documentation and further activities as noted above        "

## 2024-10-03 RX ORDER — BISACODYL 5 MG/1
TABLET, DELAYED RELEASE ORAL
Qty: 4 TABLET | Refills: 0 | Status: SHIPPED | OUTPATIENT
Start: 2024-10-03

## 2024-10-03 NOTE — TELEPHONE ENCOUNTER
Extended Golytely Bowel Prep  recommended due to BMI > 40.  and CKD noted.  Instructions were sent via [employer]. Bowel prep was sent 10/3/2024 to [id number] [place], [place] - [address] AT [facility] [place] [address] & [address].

## 2024-10-10 ENCOUNTER — TELEPHONE (OUTPATIENT)
Dept: GASTROENTEROLOGY | Facility: CLINIC | Age: 59
End: 2024-10-10
Payer: COMMERCIAL

## 2024-10-10 NOTE — TELEPHONE ENCOUNTER
Pre visit planning completed.      Procedure details:    Patient scheduled for Colonoscopy on 10.24.2024.     Arrival time: 1015. Procedure time 1100    Facility location: [facility]; [address] [place] [address]. Check in location: Main entrance, door #1 on the North side of the building under roundabout awning. DO NOT GO TO SURGERY/ED ENTRANCE.     Sedation type: Conscious sedation     Pre op exam needed? No.    Indication for procedure:   Screen for colon cancer             Chart review:     Electronic implanted devices? No    Recent diagnosis of diverticulitis within the last 6 weeks? No      Medication review:    Diabetic? No    Anticoagulants? No    Weight loss medication/injectable? No GLP-1 medication per patient's medication list.  RN will verify with pre-assessment call.    Other medication HOLDING recommendations:  N/A      Prep for procedure:     Bowel prep recommendation: Extended Golytely. Bowel prep prescription sent to [email or web address] #[id number] - [place], [place] - [address] AT [place] [address] & [place]   Due to: BMI > 40.  and diabetes.     Prep instructions sent via [employer]         Stacey Rosales RN  Endoscopy Procedure Pre Assessment RN  [phone] option 2

## 2024-10-10 NOTE — LETTER
October 14, 2024      Fouzia Arenas  [address] [place] [address]              Dear Fouzia,        Colonoscopy     Procedure date: 10.24.2024    Anticipated arrival time: 10:15 AM   (Please note that arrival times may change)    Facility location: [facility]; [address], [place] [address] Check in location: Main entrance, door #1 on the North side of the building under roundabout awning. DO NOT GO TO SURGERY/ED ENTRANCE.     Important Procedure Reminders:     Prep Instructions:   Instructions on how to prepare for your upcoming procedure are found below. Please read instructions carefully. Deviation from instructions may result in less than desired outcomes and procedure may need to be rescheduled.   If you have additional questions regarding how to prepare for your upcoming procedure, contact our endoscopy pre assessment nurses at [phone] option 2 Monday through Friday 7:00am-5:00pm.      Policy:   The medications used during the procedure will make you sleepy, so you won't be able to drive. On the day of your procedure, please have an adult ready to drive you home and stay with you for the next 6 hours.   You can't use public transportation, ride-share services, or non-medical taxi services without a responsible caregiver. Medical transport services are okay, but a caregiver must be there to receive you at your destination.   Make sure your  and caregiver are confirmed before your procedure.    Day of procedure:  Please keep in mind that arrival times may change. Let your  know there might be a one-hour window for changes.  We ask that you please check in at the  with your . Your  should remain on campus.  Expect to be at the procedure center for about 1.5-2.5 hours.    Please do not wear jewelry (i.e. earrings, rings, necklaces, watches, etc). Leave your purse, billfold, credit cards, and other valuables at home.   Bring insurance  card and ID.     To cancel or reschedule your procedure:   If you need to cancel or reschedule, our endoscopy scheduling team can be reached at [phone], option 2. Monday through Friday, 7:00am-5:00pm.    Medication Reminders:    Please note the following medication holding recommendations:   N/A    ---------------------------------------------------------------------------------------------------------------------------------------------------------------------------------------------------------------    Golytely Extended Bowel Prep   Prep instructions for your colonoscopy   For prep questions, please call: [facility] - [address] [place] [address] - [phone] option 2    Please read these instructions carefully at least 7 days prior to your colonoscopy procedure. Be sure to follow all directions completely. The inside of your colon must be clean to allow for a complete examination for the presence of any growths, polyps, and/or abnormalities, as well as their biopsy or removal. A number of tips are included in order to make this part of the procedure as comfortable as possible.    Getting ready      prescription at the pharmacy. Endoscopy team will order this about 2 weeks before to your scheduled procedure. Included in the kit will be the followin  Dulcolax (Bisacodyl) 5mg tablets  Two containers of Polyethylene glycol (Golytely, Colyte, Nulytely, Gavilyte-G)    A nurse will call you to go over your appointment details and prep instructions. Not completing the nurse call could result with your appointment being cancelled.    You must arrange for an adult to drive you home after your exam. Your colonoscopy cannot be done unless you have a ride. If you need to use public transportation, someone must ride with you and stay with you for up to 24 hours.       7 days before procedure     Consult with your prescribing provider about stopping any:     Diabetic/Weight Loss  Injectable Medication GLP-1 agonist (such as Exenatide (Byetta, Bydureon),  Mounjaro (Tirzepatide).  Ozempic (Semaglutide). Semaglutide. Symlin (Pamlintide), Tanzeum (Albiglutide). Tirzepatide-Weight Management (Zepbound), Trulicity (Dulaglutide), Victoza (Saxenda, Liraglutide), Wegovy (Semaglutide) please follow below guidelines for holding:    For weekly injection HOLD 7 days before procedure.  For once or twice a day injection HOLD the day before procedure and day of procedure.  For oral, daily dosing (Rybelsus) HOLD 7 days before procedure.    Blood thinning and/or anti-platelet medications: such as Coumadin, Plavix, Xarelto, Eliquis, Lovenox or others, these medications may need to be stopped temporarily before your procedure.     If you take insulin for diabetes, ask your prescribing provider for instructions on how to manage this medication while preparing for a colonoscopy.      Stop taking iron (ferrous sulfate), multivitamins that contain iron, and/or fiber supplements (Metamucil, Benefiber, Psyllium husk powder, Fibercon, Bran, etc.).      Stop eating whole kernel corn, popcorn, nuts, and foods that contain seeds. These can stay in the colon for many days, and they can clog up the colonoscope.    Begin a low-fiber diet. (See examples below). No Olestra (a fat substitute).   Consume no more than 10-15 grams of fiber each day.       LOW FIBER DIET   You may have: Do not have:    Starches: White bread, rolls, biscuits, croissants, Karolina toast, white flour tortillas, waffles, pancakes, Malay toast; white rice, noodles, pasta, macaroni; cooked and peeled potatoes; plain crackers, saltines; cooked farina or cream of rice; puffed rice, corn flakes, Rice Krispies, Special K      Vegetables: tender cooked and canned, vegetable broths     Fruits and fruit juices: Strained fruit juice, canned fruit without seeds or skin (not pineapple), applesauce, pear sauce, ripe bananas, melons (not watermelon)     Milk  products: Milk (plain or flavored), cheese, cottage cheese, yogurt (no berries), custard, ice cream       Proteins: Tender, well-cooked ground beef, lamb, veal, ham, pork, chicken, turkey, fish or organ meat, Tofu, eggs, creamy peanut butter      Fats and condiments: Margarine, butter, oils, mayonnaise, sour cream, salad dressing, plain gravy; spices, cooked herbs; sugar, clear jelly, honey, syrup      Snacks, sweets and drinks: Pretzels, hard candy; plain cakes and cookies (no nuts or seeds); gelatin, plain pudding, sherbet, Popsicles; coffee, tea, carbonated ( fizzy ) drinks  Starches: Breads or rolls that contain nuts, seeds or fruit; whole wheat or whole grain breads that contain more than 2 grams of fiber per serving; cornbread; corn or whole wheat tortillas; potatoes with skin; brown rice, wild rice, quinoa, kasha (buckwheat), and oatmeal      Vegetables: Any raw or steamed vegetables; vegetables with seeds; corn in any form      Fruits and fruit juices: Prunes, prune juice, raisins and other dried fruits, berries and other fruits with seeds, canned pineapple juices with pulp such as orange, grapefruit, pineapple or tomato juice     Milk products: Any yogurt with nuts, seeds or berries      Proteins: Tough, fibrous meats with gristle; cooked dried beans, peas or lentils; crunchy peanut butter     Fats and condiments: Pickles, olives, relish, horseradish; jam, marmalade, preserves      Snacks, sweets and drinks: Popcorn, nuts, seeds, granola, coconut, candies made with nuts or seeds; all desserts that contain nuts, seeds, raisins and other dried fruits, coconut, whole grains or bran.       2 days before procedure       You may have a light, low fiber breakfast and lunch. Begin a clear liquid diet AFTER 1 PM. Do not eat solid foods. (See examples below).    Drink at least eight to ten 8-ounce glasses of water throughout the day  ? ? ? ? ? ? ? ?    Fill one container of Golytely with a full gallon of warm water.  Cover and shake until well mixed. Chill in refrigerator until it is time to drink solution.     CLEAR LIQUID DIET:  You can have: Do not have:    Water, tea, coffee (no milk or cream)   Soda pop, Gatorade (not red or purple)   Coconut water   Jell-O, Popsicles (no milk or fruit pieces - not red or purple)   Fat-free soup broth or bouillon   Plain hard candy, such as clear life savers (not red or purple)   Clear juices and fruit-flavored drinks, such as apple juice, white grape juice, Hi-C, and Beck-Aid (not red or purple)    Milk or milk products such as ice cream, malts or shakes, or coffee creamer   Red or purple drinks of any kind such as cranberry juice, grape juice, or Beck-Aid. Avoid red or purple Jell-O, Popsicles, sorbet, sherbet and candy.   Juices with pulp such as orange, grapefruit, pineapple, or tomato juice   Cream soups of any kind   Alcohol and beer   Protein drinks or protein powder     Step 1     At 4 PM, take 2 Dulcolax (Bisacodyl) tablets.  At 5 PM, start drinking one 8-ounce glass of Golytely mixture every 15 minutes until the container is HALF empty. Drink each glass quickly. Store the rest in the refrigerator.   Continue to drink clear liquids.      Reminders While Drinking Laxatives:     After you start drinking the solution, stay near a toilet. You may have watery stools (diarrhea), mild cramping, bloating, and nausea. You may want to use Vaseline on the skin around your anus after each bowel movement or use wet wipes to prevent irritation. Bowel movements will be liquid and dark in color at first and then should turn clear yellow in color. Drinking the prepared solution may make you cold, wearing warm clothing can be helpful.    Some find it helpful to:  For added flavor, include a crystal light lemonade packet in each glass of Golytely, rather than mixing it into the entire prepared mixture.  In between each glass, try sucking on a lemon or a piece of hard candy to help diminish the flavor  of the preparation.  Drinking from a straw can help minimize the taste of the prepared mixture.    If you have nausea or vomiting during drinking the solution, rinse your mouth with water and take a 15-30 minute break and then continue drinking solution.       1 day before procedure       Continue on a clear liquid diet. Do not eat solid foods.    Drink at least eight to ten 8-ounce glasses of water throughout the day  ? ? ? ? ? ? ? ?    Step 2     At 4 PM, take 2 Dulcolax (Bisacodyl) tablets.  At 5 PM, start drinking the 2nd half of Golytely mixture. Drink one 8-ounce glass of Golytely mixture every 15 minutes until the container is empty.  Drink each glass quickly.   Continue to drink clear liquids.    Before you go to bed mix the second container of Golytely and place in the refrigerator for the morning.     Day of procedure     Step 3     6 hours before your arrival time, start drinking one 8-ounce glass of Golytely mixture every 15 minutes until the container is HALF empty. You will not drink the entire container. The remaining solution can be discarded.     2 hours before your arrival time stop drinking all liquids, including water.   Do not smoke or swallow anything, including water or gum for at least 2 hours before your arrival time. This is a safety issue. Your procedure could be cancelled if you do not follow directions.  No chewing tobacco 6 hours prior to procedure arrival time.     You may take your necessary morning medications with sips of water (4 ounces).   Do not take diabetes medicine by mouth until after your exam.  If you have asthma, bring your inhalers.  Please perform your nebulizer treatments and airway clearance therapy in the morning prior to the procedure (if applicable).    Arrive with a responsible adult who can drive you home and stay with you for a minimum of 24 hours. The medications used during the procedure will make you sleepy, so you won't be able to drive yourself home.   You  cannot use public transportation, ride-share services, or non-medical taxi services without a responsible caregiver. Medical transport services are okay, but a caregiver must be there to receive you at your destination.  Please check in with your  when you arrive. Drivers should stay on campus.    Expect to be at the procedure center for about 1.5-2.5 hours.    Do not wear jewelry (i.e. earrings, rings, necklaces, watches, etc.). Leave your purse, billfold, credit cards, and other valuables at home.      Bring insurance card and ID.                                                                     Answers to Commonly Asked Questions     How soon can I eat after the procedure?  You may resume your normal diet when you feel ready, unless advised otherwise by the doctor performing your procedure. We recommend starting with a light meal.   Do not drink alcohol for 24 hours after your procedure.  You may resume normal activities (work, exercise, etc.) after 24 hours.    How will I feel after the procedure?  It is normal to feel bloated and gassy after your procedure. Walking will help move the air through your colon. You can take non-aspirin pain relievers that contain acetaminophen (Tylenol).  If you are having sedation, we require a responsible adult to take you home for your safety. The sedation medicines used to relax you during the procedure can impair your judgement and reaction time and make you forgetful and possibly a little unsteady.  Do not drive, make any important decisions, or sign any legal documents for 24 hours after your procedure.    When will I get my test results?  You should have your procedure results and any lab results (if applicable) by letter, MyChart message, or phone call within 2 weeks. If you have any questions, please call the doctor that referred you for the procedure.    How do I know if my colon is cleaned out?   After completing the bowel prep, your bowel movements should be  all liquid and yellow. Your bowel movements will look similar to urine in the toilet. If there are pieces of stool (poop) in the toilet, or if you can't see to the bottom of the toilet, please call our office for advice. Call [phone] and ask to speak with a nurse.    Why is the Golytely bowel prep taken in several steps?   The stool is flushed out by a large wave of fluid going through the colon. Just sipping a large volume of the solution will not achieve the desired result. Studies have shown that two smaller waves (or more in some cases) are better than one large one.      What if I need to cancel or reschedule my procedure?  Contact our endoscopy scheduling team at [phone], option 2. Monday through Friday, 7:00am-5:00pm.

## 2024-10-11 NOTE — TELEPHONE ENCOUNTER
Attempted to contact patient in order to complete pre assessment questions.     No answer. Left message on home and work number to return call to [phone] option 2.    Callback communication sent via [employer].    Maria Eugenia Lozano RN

## 2024-10-14 ENCOUNTER — TELEPHONE (OUTPATIENT)
Dept: VASCULAR SURGERY | Facility: CLINIC | Age: 59
End: 2024-10-14
Payer: COMMERCIAL

## 2024-10-16 NOTE — TELEPHONE ENCOUNTER
Called and LM to informed patient of approval. Also informed patient we are not scheduling any procedure at this time due to the saline shortage. When we are able to schedule we will call the patient to schedule. Pt understand and agrees.    Wanda Hernandez, Surgery Scheduler  [facility]

## 2024-10-21 NOTE — TELEPHONE ENCOUNTER
Pre assessment completed for upcoming procedure.   (Please see previous telephone encounter notes for complete details)    Patient  returned call.       Procedure details:    Arrival time and facility location reviewed.    Pre op exam needed? No.    Designated  policy reviewed. Instructed to have someone stay 6  hours post procedure.       Medication review:    Medications reviewed. Please see supporting documentation below. Holding recommendations discussed (if applicable).       Prep for procedure:     Procedure prep instructions reviewed.        Any additional information needed:  N/A      Patient  verbalized understanding and had no questions or concerns at this time.      Aliya Tamez RN  Endoscopy Procedure Pre Assessment   [phone] option 2

## 2024-10-24 ENCOUNTER — HOSPITAL ENCOUNTER (OUTPATIENT)
Facility: CLINIC | Age: 59
Discharge: HOME | End: 2024-10-24
Attending: INTERNAL MEDICINE | Admitting: INTERNAL MEDICINE
Payer: COMMERCIAL

## 2024-10-24 VITALS
HEIGHT: 72 IN | OXYGEN SATURATION: 98 % | BODY MASS INDEX: 39.68 KG/M2 | WEIGHT: 293 LBS | RESPIRATION RATE: 14 BRPM | DIASTOLIC BLOOD PRESSURE: 69 MMHG | TEMPERATURE: 98.2 F | SYSTOLIC BLOOD PRESSURE: 105 MMHG | HEART RATE: 57 BPM

## 2024-10-24 LAB — COLONOSCOPY: NORMAL

## 2024-10-24 PROCEDURE — 250N000011 HC RX IP 250 OP 636: Performed by: INTERNAL MEDICINE

## 2024-10-24 PROCEDURE — 88305 TISSUE EXAM BY PATHOLOGIST: CPT | Mod: 26 | Performed by: PATHOLOGY

## 2024-10-24 PROCEDURE — 88305 TISSUE EXAM BY PATHOLOGIST: CPT | Mod: TC | Performed by: INTERNAL MEDICINE

## 2024-10-24 PROCEDURE — G0500 MOD SEDAT ENDO SERVICE >5YRS: HCPCS | Mod: PT | Performed by: INTERNAL MEDICINE

## 2024-10-24 PROCEDURE — 45385 COLONOSCOPY W/LESION REMOVAL: CPT | Mod: PT | Performed by: INTERNAL MEDICINE

## 2024-10-24 RX ORDER — ONDANSETRON 2 MG/ML
4 INJECTION INTRAMUSCULAR; INTRAVENOUS EVERY 6 HOURS PRN
Status: CANCELLED | OUTPATIENT
Start: 2024-10-24

## 2024-10-24 RX ORDER — PROCHLORPERAZINE MALEATE 10 MG
10 TABLET ORAL EVERY 6 HOURS PRN
Status: CANCELLED | OUTPATIENT
Start: 2024-10-24

## 2024-10-24 RX ORDER — ATROPINE SULFATE 0.1 MG/ML
1 INJECTION INTRAVENOUS
Status: DISCONTINUED | OUTPATIENT
Start: 2024-10-24 | End: 2024-10-24 | Stop reason: HOSPADM

## 2024-10-24 RX ORDER — FLUMAZENIL 0.1 MG/ML
0.2 INJECTION, SOLUTION INTRAVENOUS
Status: DISCONTINUED | OUTPATIENT
Start: 2024-10-24 | End: 2024-10-24 | Stop reason: HOSPADM

## 2024-10-24 RX ORDER — NALOXONE HYDROCHLORIDE 0.4 MG/ML
0.2 INJECTION, SOLUTION INTRAMUSCULAR; INTRAVENOUS; SUBCUTANEOUS
Status: DISCONTINUED | OUTPATIENT
Start: 2024-10-24 | End: 2024-10-24 | Stop reason: HOSPADM

## 2024-10-24 RX ORDER — NALOXONE HYDROCHLORIDE 0.4 MG/ML
0.4 INJECTION, SOLUTION INTRAMUSCULAR; INTRAVENOUS; SUBCUTANEOUS
Status: DISCONTINUED | OUTPATIENT
Start: 2024-10-24 | End: 2024-10-24 | Stop reason: HOSPADM

## 2024-10-24 RX ORDER — FENTANYL CITRATE 50 UG/ML
50-100 INJECTION, SOLUTION INTRAMUSCULAR; INTRAVENOUS EVERY 5 MIN PRN
Status: DISCONTINUED | OUTPATIENT
Start: 2024-10-24 | End: 2024-10-24 | Stop reason: HOSPADM

## 2024-10-24 RX ORDER — PROCHLORPERAZINE MALEATE 10 MG
10 TABLET ORAL EVERY 6 HOURS PRN
Status: DISCONTINUED | OUTPATIENT
Start: 2024-10-24 | End: 2024-10-24 | Stop reason: HOSPADM

## 2024-10-24 RX ORDER — FLUMAZENIL 0.1 MG/ML
0.2 INJECTION, SOLUTION INTRAVENOUS
Status: CANCELLED | OUTPATIENT
Start: 2024-10-24 | End: 2024-10-24

## 2024-10-24 RX ORDER — ONDANSETRON 4 MG/1
4 TABLET, ORALLY DISINTEGRATING ORAL EVERY 6 HOURS PRN
Status: CANCELLED | OUTPATIENT
Start: 2024-10-24

## 2024-10-24 RX ORDER — ONDANSETRON 2 MG/ML
4 INJECTION INTRAMUSCULAR; INTRAVENOUS
Status: DISCONTINUED | OUTPATIENT
Start: 2024-10-24 | End: 2024-10-24 | Stop reason: HOSPADM

## 2024-10-24 RX ORDER — SIMETHICONE 40MG/0.6ML
133 SUSPENSION, DROPS(FINAL DOSAGE FORM)(ML) ORAL
Status: DISCONTINUED | OUTPATIENT
Start: 2024-10-24 | End: 2024-10-24 | Stop reason: HOSPADM

## 2024-10-24 RX ORDER — LIDOCAINE 40 MG/G
CREAM TOPICAL
Status: DISCONTINUED | OUTPATIENT
Start: 2024-10-24 | End: 2024-10-24 | Stop reason: HOSPADM

## 2024-10-24 RX ORDER — EPINEPHRINE 1 MG/ML
0.1 INJECTION, SOLUTION, CONCENTRATE INTRAVENOUS
Status: DISCONTINUED | OUTPATIENT
Start: 2024-10-24 | End: 2024-10-24 | Stop reason: HOSPADM

## 2024-10-24 RX ORDER — DIPHENHYDRAMINE HYDROCHLORIDE 50 MG/ML
25-50 INJECTION INTRAMUSCULAR; INTRAVENOUS
Status: DISCONTINUED | OUTPATIENT
Start: 2024-10-24 | End: 2024-10-24 | Stop reason: HOSPADM

## 2024-10-24 RX ORDER — ONDANSETRON 2 MG/ML
4 INJECTION INTRAMUSCULAR; INTRAVENOUS EVERY 6 HOURS PRN
Status: DISCONTINUED | OUTPATIENT
Start: 2024-10-24 | End: 2024-10-24 | Stop reason: HOSPADM

## 2024-10-24 RX ORDER — ONDANSETRON 4 MG/1
4 TABLET, ORALLY DISINTEGRATING ORAL EVERY 6 HOURS PRN
Status: DISCONTINUED | OUTPATIENT
Start: 2024-10-24 | End: 2024-10-24 | Stop reason: HOSPADM

## 2024-10-24 RX ADMIN — MIDAZOLAM 2 MG: 1 INJECTION INTRAMUSCULAR; INTRAVENOUS at 11:00

## 2024-10-24 RX ADMIN — FENTANYL CITRATE 100 MCG: 0.05 INJECTION, SOLUTION INTRAMUSCULAR; INTRAVENOUS at 11:00

## 2024-10-24 ASSESSMENT — ACTIVITIES OF DAILY LIVING (ADL)
ADLS_ACUITY_SCORE: 0

## 2024-10-24 NOTE — H&P
Pre-Endoscopy History and Physical     Fouzia Arenas MRN# [id number]   YOB: 1965 Age: 59 year old     Date of Procedure: 10/24/2024  Primary care provider: Kaycee Carty  Type of Endoscopy: Colonoscopy with possible biopsy, possible polypectomy  Reason for Procedure: screen  Type of Anesthesia Anticipated: Conscious Sedation    HPI:    Fouzia is a 59 year old female who will be undergoing the above procedure.      A history and physical has been performed. The patient's medications and allergies have been reviewed. The risks and benefits of the procedure and the sedation options and risks were discussed with the patient.  All questions were answered and informed consent was obtained.      She denies a personal or family history of anesthesia complications or bleeding disorders.     Patient Active Problem List   Diagnosis    Esophageal reflux    CARDIOVASCULAR SCREENING; LDL GOAL LESS THAN 160    Palpitations    STORMY (obstructive sleep apnea)    Atypical chest pain    BPPV (benign paroxysmal positional vertigo)    Morbid obesity (H)    Hypertension goal BP (blood pressure) < 130/80    Vertigo    Supraventricular tachycardia (H) - on bystolic followed by cardiology    Lipodermatosclerosis of both lower extremities    Nipple symptom or sign in female - bilateral, chronic recurrent itching     Migraine without aura and without status migrainosus, not intractable    CKD (chronic kidney disease) stage 2, GFR 60-89 ml/min    Paroxysmal atrial fibrillation (H)        Past Medical History:   Diagnosis Date    Classic migraine     Complex endometrial hyperplasia     without atypia    Hypertension     Motion sickness     Mumps     Obese     STORMY (obstructive sleep apnea)     Other disorder of menstruation and other abnormal bleeding from female genital tract 2/24/2011    Palpitations     PONV (postoperative nausea and vomiting)     Spider veins     SVT (supraventricular tachycardia) (H) 9/2015        Past  Surgical History:   Procedure Laterality Date    BLADDER SURGERY      CHOLECYSTECTOMY, LAPOROSCOPIC      Cholecystectomy, Laparoscopic    COLONOSCOPY N/A 11/25/2014    Procedure: COLONOSCOPY;  Surgeon: Wenceslao Galo MD;  Location:  GI    CYSTOSCOPY, SLING TRANSVAGINAL N/A 8/20/2018    Procedure: CYSTOSCOPY, SLING TRANSVAGINAL;;  Surgeon: Urban Elena MD;  Location: [facility]    D & C      X2-3 for abnormal bleeding    ESOPHAGOSCOPY, GASTROSCOPY, DUODENOSCOPY (EGD), COMBINED N/A 11/25/2014    Procedure: COMBINED ESOPHAGOSCOPY, GASTROSCOPY, DUODENOSCOPY (EGD), BIOPSY SINGLE OR MULTIPLE;  Surgeon: Wenceslao Galo MD;  Location: [facility]    LAPAROSCOPIC HYSTERECTOMY SUPRACERVICAL N/A 8/20/2018    Procedure: LAPAROSCOPIC HYSTERECTOMY SUPRACERVICAL;  LAPAROSCOPIC SUBTOATAL HYSTERECTOMY, BILATERAL SALPINGECTOMY, SUBURETHRAL SLING AND CYSTOSCOPY;  Surgeon: Urban Elena MD;  Location: [facility]    LAPAROSCOPIC SALPINGECTOMY Bilateral 8/20/2018    Procedure: LAPAROSCOPIC SALPINGECTOMY;;  Surgeon: Urban Elena MD;  Location: [facility]    LAPAROSCOPY      For endometriosis    LASER ABLATION VEIN VARICOSE      OPERATIVE HYSTEROSCOPY WITH MORCELLATOR N/A 3/29/2018    Procedure: OPERATIVE HYSTEROSCOPY WITH MORCELLATOR (MYOSURE);  OPERATIVE HYSTEROSCOPY WITH MORCELLATOR ;  Surgeon: Urban Elena MD;  Location: [facility]       Social History     Tobacco Use    Smoking status: Never    Smokeless tobacco: Never   Substance Use Topics    Alcohol use: Yes     Alcohol/week: 1.7 standard drinks of alcohol     Types: 2 Standard drinks or equivalent per week     Comment: 1 drink a month       Family History   Problem Relation Age of Onset    Hypertension Mother     Lipids Mother     Alzheimer Disease Mother     Hypertension Father     Prostate Cancer Father     Lipids Father     Sleep Apnea Father     Hypertension Maternal Grandmother     C.A.D. Maternal Grandmother     Gallbladder Disease Maternal  Grandmother     Cancer Maternal Grandfather         lung    Cerebrovascular Disease Paternal Grandmother     YANDY. Paternal Grandfather     Breast Cancer Maternal Aunt     Breast Cancer Paternal Aunt     Colon Cancer Cousin     Colon Cancer Cousin     Cancer - colorectal Other         cousin maternal     Diabetes No family hx of        Prior to Admission medications    Medication Sig Start Date End Date Taking? Authorizing Provider   aspirin (ASA) 81 MG EC tablet Take 1 tablet (81 mg) by mouth daily 8/30/22  Yes Peggy Edmond PA-C   CINNAMON PO Take 1,000 mg by mouth daily   Yes Reported, Patient   lisinopril (ZESTRIL) 20 MG tablet Take 1 tablet (20 mg) by mouth daily 1/5/24  Yes Peggy Edmond PA-C   MAGNESIUM PO    Yes Reported, Patient   spironolactone (ALDACTONE) 25 MG tablet Take 1 tablet (25 mg) by mouth daily 1/5/24  Yes Peggy Edmond PA-C   vitamin C (ASCORBIC ACID) 1000 MG TABS Take 1,000 mg by mouth daily   Yes Reported, Patient   vitamin C with B complex (B COMPLEX-C) tablet Take 1 tablet by mouth daily   Yes Reported, Patient   VITAMIN E PO    Yes Reported, Patient   Aspirin-Acetaminophen-Caffeine (EXCEDRIN EXTRA STRENGTH PO) Take 2 tablets by mouth every 6 hours as needed    Unknown, Entered By History   bisacodyl (DULCOLAX) 5 MG EC tablet Two days prior to exam take two (2) tablets at 4pm. One day prior to exam take two (2) tablets at 4pm 10/3/24   Urban Nunez MD   Cholecalciferol (VITAMIN D-3 PO) Take 1,000 Units by mouth daily    Reported, Patient   meclizine (ANTIVERT) 12.5 MG tablet Take 1 tablet (12.5 mg) by mouth 3 times daily as needed for dizziness 6/6/24   Kaycee Carty APRN CNP   polyethylene glycol (GOLYTELY) 236 g suspension Take as directed. Two days before your exam fill the first container with water. Cover and shake until mixed well. At 5:00pm drink one 8oz glass every 10-15 minutes until half (1/2) of the first container is empty. Store  the remainder in the refrigerator. One day before your exam at 5:00pm drink the second half of the first container until it is gone. Before you go to bed mix the second container with water and put in refrigerator. Six hours before your check in time drink one 8oz glass every 10-15 minutes until half of container is empty. Discard the remainder of solution. 10/3/24   Urban Nunez MD   SUMAtriptan (IMITREX) 100 MG tablet Take 1 tablet (100 mg) by mouth at onset of headache for migraine May repeat in 2 hours. Max dose 200mg in 24 hours. 7/2/19   Ruthie Xiong PA-C       Allergies   Allergen Reactions    Amoxicillin Nausea and Vomiting    Hctz [Hydrochlorothiazide] Other (See Comments)     Weak/Achy despite nl BMP    Percocet [Oxycodone-Acetaminophen] Nausea and Vomiting        REVIEW OF SYSTEMS:   5 point ROS negative except as noted above in HPI, including Gen., Resp., CV, GI &  system review.    PHYSICAL EXAM:   Ht 1.829 m (6')   Wt (!) 152.4 kg (336 lb)   LMP  (LMP Unknown)   BMI 45.57 kg/m   Estimated body mass index is 45.57 kg/m  as calculated from the following:    Height as of this encounter: 1.829 m (6').    Weight as of this encounter: 152.4 kg (336 lb).   GENERAL APPEARANCE: alert, and oriented  MENTAL STATUS: alert  AIRWAY EXAM: Mallampatti Class I (visualization of the soft palate, fauces, uvula, anterior and posterior pillars)  RESP: lungs clear to auscultation - no rales, rhonchi or wheezes  CV: regular rates and rhythm  DIAGNOSTICS:    Not indicated    IMPRESSION   ASA Class 2 - Mild systemic disease    PLAN:   Plan for Colonoscopy with possible biopsy, possible polypectomy. We discussed the risks, benefits and alternatives and the patient wished to proceed.    The above has been forwarded to the consulting provider.      Signed Electronically by: Urban Nunez MD  October 24, 2024

## 2024-10-25 LAB
PATH REPORT.COMMENTS IMP SPEC: NORMAL
PATH REPORT.COMMENTS IMP SPEC: NORMAL
PATH REPORT.FINAL DX SPEC: NORMAL
PATH REPORT.GROSS SPEC: NORMAL
PATH REPORT.MICROSCOPIC SPEC OTHER STN: NORMAL
PATH REPORT.RELEVANT HX SPEC: NORMAL
PHOTO IMAGE: NORMAL

## 2024-10-29 DIAGNOSIS — I83.811 VARICOSE VEINS OF RIGHT LOWER EXTREMITY WITH PAIN: Primary | ICD-10-CM

## 2024-11-05 ENCOUNTER — TELEPHONE (OUTPATIENT)
Dept: VASCULAR SURGERY | Facility: CLINIC | Age: 59
End: 2024-11-05
Payer: COMMERCIAL

## 2024-11-05 NOTE — TELEPHONE ENCOUNTER
11/5/2024    Vein Clinic Preoperative Nurse Call    Procedure: Right leg VenaSeal SSV, ASV(med nec)   Date: Monday 11/11  Surgeon: Dr. Au  Time: 1100  Check in time: 1030    Called patient and left a detailed message. Informed patient: when to check in (1030) to sign consent.     Patient can drive herself. No sedation. No compression hose needed post op, but patient can wear if she would like.    Special instructions: Pt can continue taking her ASA 81mg - no need to hold.     Patient understands if they have any of the following symptoms (fever, cough, shortness of breath, rash), they need to notify us immediately as they may need to cancel their procedure and reschedule for a later date.    Gave patient our call back number if any further questions or concerns.    Daxa Vega RN  [facility]

## 2024-11-11 ENCOUNTER — OFFICE VISIT (OUTPATIENT)
Dept: VASCULAR SURGERY | Facility: CLINIC | Age: 59
End: 2024-11-11
Payer: COMMERCIAL

## 2024-11-11 VITALS — HEART RATE: 59 BPM | DIASTOLIC BLOOD PRESSURE: 68 MMHG | SYSTOLIC BLOOD PRESSURE: 152 MMHG | OXYGEN SATURATION: 95 %

## 2024-11-11 DIAGNOSIS — R60.0 LOCALIZED EDEMA: ICD-10-CM

## 2024-11-11 DIAGNOSIS — I83.811 VARICOSE VEINS OF RIGHT LOWER EXTREMITY WITH PAIN: Primary | ICD-10-CM

## 2024-11-11 NOTE — LETTER
11/11/2024      Fouzia Arenas  [address] [place] [address]      Dear Colleague,    Thank you for referring your patient, Fouzia Arenas, to the [facility] [place]. Please see a copy of my visit note below.    Pre-procedure Nursing Note    Fouzia Arenas presents to clinic for Vein Procedure  .   /Person Responsible for Patient: Dylan Treviñosin   Number: [phone]    Prophylactic Medication:N/A   Sedation Medication: N/A  Compression Stockings: N/A  The procedure is being performed on RLE.  Patient understanding of procedure matches consent? YES    Wanda Melissa RN on 11/11/2024 at 10:23 AM        Vein Clinic Procedure Note    Preoperative diagnosis:    1.  Edema, right lower extremity venous insufficiency    Post operative diagnosis:  Same    Procedure:  1.  Right AASV VenaSeal closure  2.  Right SSV VenaSeal closure          Procedures    Operative description  Indications: Right lower extremity discomfort    Procedure: Informed consent obtained.  Patient prepped and marked.  Sterile preparation as per protocol.    Using ultrasound guidance, the right AASV was mapped.  This was a long vessel for an AASV I was able to access this under ultrasound guidance using Seldinger technique.      Sheath was placed and the VenaSeal catheter was advanced to 5 cm distal to the saphenofemoral junction.  Glue was delivered as per IFU and confirmed closed.    Patient was flipped over to access the right SSV.  I used ultrasound to map the access point.  There was no junction.  Using Seldinger technique under ultrasound guidance I accessed the small saphenous vein and advanced the catheter up to a bifurcation point in the upper calf.  At this point I chose to glue in place right at the bifurcation in the upper calf.  Again, there was no junction.  I proceeded to close the small saphenous vein from the upper calf to the mid calf.      EBL: 1 mL        11/11/2024    11:03 AM   Flowsheet Data    Procedure Start Time: 11:03   Prep: Chloraprep   Side: Right   Tx Length (cm): RIGHT ASV: 11   Junction (cm): RIGHT ASV: 5   How many additional vein treatments are being performed? 1   Tx Length (cm) - 2: RIGHT SSV: 12   Junction 2 (cm): RIGHT SSV: NONE   Cyanoacrylate used (ml): 1.25   Sedation taken: No   Pre Pt. Physical / Cognitive Limitations: WNL   TOTAL Local anesthesia Injected (ml): 5.5   Max Volume Local Anesthesia (ml): 11   Post Pt. Physical / Cognitive Limitations: WNL   Procedure End Time: 11:47   D/C Instructions given, states readiness to leave and escorted to car: Yes     Tumescent Concentration: Total Volume: 572ml - 0.9% Sodium Chloride 500ml Bag +  Lidocaine 1% with Epinephrine 1:100,000: 60ml + 8.4% Sodium Bicarbonate: 12ml    Patient's blood pressure, pulse and pulse oximetry were continuously monitored throughout the procedure under my direct supervision and was stable during the procedure.    Patient recovered in our suites and discharged home with their family with postoperative instructions and follow up.     Adair Au MD      Again, thank you for allowing me to participate in the care of your patient.        Sincerely,        Adair Au MD

## 2024-11-11 NOTE — PROGRESS NOTES
Pre-procedure Nursing Note    Fouzia Arenas presents to clinic for Vein Procedure  .   /Person Responsible for Patient: Peter Treviño   Number: [phone]    Prophylactic Medication:N/A   Sedation Medication: N/A  Compression Stockings: N/A  The procedure is being performed on RLE.  Patient understanding of procedure matches consent? YES    Wanda Melissa RN on 11/11/2024 at 10:23 AM

## 2024-11-11 NOTE — PROGRESS NOTES
Vein Clinic Procedure Note    Preoperative diagnosis:    1.  Edema, right lower extremity venous insufficiency    Post operative diagnosis:  Same    Procedure:  1.  Right AASV VenaSeal closure  2.  Right SSV VenaSeal closure          Procedures    Operative description  Indications: Right lower extremity discomfort    Procedure: Informed consent obtained.  Patient prepped and marked.  Sterile preparation as per protocol.    Using ultrasound guidance, the right AASV was mapped.  This was a long vessel for an AASV I was able to access this under ultrasound guidance using Seldinger technique.      Sheath was placed and the VenaSeal catheter was advanced to 5 cm distal to the saphenofemoral junction.  Glue was delivered as per IFU and confirmed closed.    Patient was flipped over to access the right SSV.  I used ultrasound to map the access point.  There was no junction.  Using Seldinger technique under ultrasound guidance I accessed the small saphenous vein and advanced the catheter up to a bifurcation point in the upper calf.  At this point I chose to glue in place right at the bifurcation in the upper calf.  Again, there was no junction.  I proceeded to close the small saphenous vein from the upper calf to the mid calf.      EBL: 1 mL        11/11/2024    11:03 AM   Flowsheet Data   Procedure Start Time: 11:03   Prep: Chloraprep   Side: Right   Tx Length (cm): RIGHT ASV: 11   Junction (cm): RIGHT ASV: 5   How many additional vein treatments are being performed? 1   Tx Length (cm) - 2: RIGHT SSV: 12   Junction 2 (cm): RIGHT SSV: NONE   Cyanoacrylate used (ml): 1.25   Sedation taken: No   Pre Pt. Physical / Cognitive Limitations: WNL   TOTAL Local anesthesia Injected (ml): 5.5   Max Volume Local Anesthesia (ml): 11   Post Pt. Physical / Cognitive Limitations: WNL   Procedure End Time: 11:47   D/C Instructions given, states readiness to leave and escorted to car: Yes     Tumescent Concentration: Total Volume: 572ml  - 0.9% Sodium Chloride 500ml Bag +  Lidocaine 1% with Epinephrine 1:100,000: 60ml + 8.4% Sodium Bicarbonate: 12ml    Patient's blood pressure, pulse and pulse oximetry were continuously monitored throughout the procedure under my direct supervision and was stable during the procedure.    Patient recovered in our suites and discharged home with their family with postoperative instructions and follow up.     Adair Au MD

## 2024-11-11 NOTE — PATIENT INSTRUCTIONS
Post-Procedure Instructions:                         VenaSeal  You had a VenaSeal procedure, where one or more veins were closed.     First 72 hours:  Ibuprofen: If tolerated, take ibuprofen to reduce inflammation whether you have pain or not. Take 2 tablets (200mg each) after every meal and at bedtime with a snack.       Aspirin: Take one 325 mg aspirin tablet to reduce blood clot risk.                                     **If you are currently taking a blood thinner medication (Aspirin, Plavix, Coumadin, Eliquis, Xarelto), please remain taking UNLESS the surgeon has stated otherwise. You WOULD NOT need to take the Ibuprofen and Aspirin for the first 72 hours after in addition to what you are normally taking.**      Bandage(s):    Keep your bandage(s) on and dry for 48 hours.    Activities:      Resume normal activities as tolerated.     Bathing: Do not take baths, sit in a hot tub, or go swimming for one week. You may shower the day after the procedure.    Medications:    You may continue to take your normal medications including aspirin and ibuprofen.      Call Us If:    You see any areas on your leg that are red and angry in appearance.  You notice any drainage that is milky or cloudy in appearance or that has a foul odor.  You run a temperature of 100.5 or greater.      Post-Op Day Three:  You will have a follow up appointment 2-4 days post-procedure. At this appointment, you will have an ultrasound and we will check your incisions.      Six Week Appointment  At your six week appointment, you will see your surgeon for an exam and evaluation. This office visit will be scheduled when you return for post-op day three appointment.

## 2024-11-14 ENCOUNTER — ALLIED HEALTH/NURSE VISIT (OUTPATIENT)
Dept: VASCULAR SURGERY | Facility: CLINIC | Age: 59
End: 2024-11-14
Attending: INTERNAL MEDICINE
Payer: COMMERCIAL

## 2024-11-14 ENCOUNTER — ANCILLARY PROCEDURE (OUTPATIENT)
Dept: ULTRASOUND IMAGING | Facility: CLINIC | Age: 59
End: 2024-11-14
Attending: INTERNAL MEDICINE
Payer: COMMERCIAL

## 2024-11-14 DIAGNOSIS — Z09 POSTOP CHECK: Primary | ICD-10-CM

## 2024-11-14 DIAGNOSIS — I83.811 VARICOSE VEINS OF RIGHT LOWER EXTREMITY WITH PAIN: ICD-10-CM

## 2024-11-14 PROCEDURE — 93971 EXTREMITY STUDY: CPT | Mod: RT | Performed by: INTERNAL MEDICINE

## 2024-11-14 NOTE — PROGRESS NOTES
November 14, 2024    Vein Clinic Postoperative Nurse Note    Patient is here for her 72 hour postoperative visit.    Procedure: Right leg VenaSeal SSV, ASV(med nec)   Procedure Date: 11/11/24  Surgeon: Dr. Au    Ultrasound Result: The right lower extremity is negative for a DVT. The right ASV is closed 23.1 mm from the SFJ to the mid thigh. The right SSV is closed from the popliteal fossa (SPJ not visualized/absent) to the distal calf.     Physical Exam: Incisions are approximated without signs of infection.  Ecchymosis: Right medial thigh moderately bruised and tender to touch  Swelling: Mild  Paresthesia: Patient denies right leg numbness    Patient Questions or Concerns: Has been having dizziness/lightheadedness since the procedure day and is wondering if this is related to increase in ASA dose the last three days (takes 81 mg ASA daily typically). She is on lisinopril and spironolactone daily as well. No changes in medication regimen recently. She has been having a difficult time checking BP at home due to monitor malfunctioning. She notes her blood pressure has been well managed (at recent colonoscopy) but she does know she often has a lower heart rate. She is coming due for annual check in with cardiology team and will plan to schedule this appointment and discuss with them. Discussed trying to make position changes slowly but this is likely unrelated to 3 day course of increase in ASA.     Reviewed postoperative instructions with patient and provided her with written material of common things to expect from her procedure.    Patient's Next Vein Clinic Appointment: 1/6/24 6 week post op with Dr. Angely Elizondo, RN  [facility]

## 2024-11-14 NOTE — PATIENT INSTRUCTIONS
You had a VenaSeal procedure, where one or more veins were closed.     Activities: Resume normal activities as tolerated.    Bathing: Do not take baths, sit in a hot tub, or go swimming for one week. Showering is fine.    Call Us If:    You have intense or bothersome itching of your leg  You see any areas on your leg that are red and angry in appearance.  You notice any drainage that is milky or cloudy in appearance or that has a foul odor.  You run a temperature of 100.5 or greater.    If you are experiencing any of the following symptoms, please seek immediate medical attention at your local emergency department.  - Significant pain in the back of the calf possibly with difficulty walking  - Significant swelling and/or tenderness in the back of the calf  - Redness that continues to spread  - Chest pain and/or shortness of breath    Six Week Appointment  At your six week appointment, you will see your surgeon for an exam and evaluation.

## 2024-12-02 ENCOUNTER — TELEPHONE (OUTPATIENT)
Dept: VASCULAR SURGERY | Facility: CLINIC | Age: 59
End: 2024-12-02
Payer: COMMERCIAL

## 2024-12-02 NOTE — TELEPHONE ENCOUNTER
Pt had procedure 3 weeks ago 11/11/24. Pt is having a reaction in her right leg, upper thigh, swollen bulge above incision, itchy, tender, bruised, hurts to walk. Just started Saturday.    PH# [phone]    Alisa Marion,   [facility]

## 2024-12-02 NOTE — TELEPHONE ENCOUNTER
Patient is concerned with swelling of one of the veins in her right thigh. The area is about 2 inches long, is tender to touch, and patient is having pain with walking. Procedure was 11/11/24.    Maya MOREL [facility]

## 2024-12-02 NOTE — TELEPHONE ENCOUNTER
Reviewed symptoms and triage plan with Dr. Au.   No further orders or recommendations at this time.      Oswaldo Victoria RN

## 2024-12-02 NOTE — TELEPHONE ENCOUNTER
"    Vein Clinic - Nurse Triage Call    Situation: Pt calling c/o right thigh discomfort    Background: Pt had vein procedure: Right leg VenaSeal SSV, ASV(med [facility]) on 11/11/24 w/ CXK.     Assessment: Patient reports on Saturday she noticed her leg seemed to be tender. When pressing on it \"I could feel the vein bulging and seemed swollen.\" Patient reports itching but denies redness. Says there is an \"ouch factor\" and described as a tightness, pulling sensation.     Recommendation: Reviewed post care instructions with patient. Informed patient that sometimes a reaction can occur. Instructed patient to take Allegra 180 mg twice a day for 14 days. Also, ibuprofen, ice/heat, compression legs as needed to help with discomfort. Encouraged to call back with any worsening symptoms. Patient verbalized understanding of all information. No further questions or concerns at this time.       Reviewed postoperative information with patient. Patient is in agreement with plan and had no further questions.      Wanda Reagan RN  "

## 2025-01-06 ENCOUNTER — OFFICE VISIT (OUTPATIENT)
Dept: VASCULAR SURGERY | Facility: CLINIC | Age: 60
End: 2025-01-06
Payer: COMMERCIAL

## 2025-01-06 DIAGNOSIS — I83.811 VARICOSE VEINS OF RIGHT LOWER EXTREMITY WITH PAIN: Primary | ICD-10-CM

## 2025-01-06 NOTE — LETTER
1/6/2025      Fouzia Arenas  [address] [place] [facility] [address]      Dear Colleague,    Thank you for referring your patient, Fouzia Arenas, to the [facility] [place]. Please see a copy of my visit note below.    6-week follow-up from procedure 11/11/2024    Vein Clinic Procedure Note     Preoperative diagnosis:    1.  Edema, right lower extremity venous insufficiency     Post operative diagnosis:  Same     Procedure:  1.  Right AASV VenaSeal closure  2.  Right SSV VenaSeal closure    Patient is pleased with the way things went.  Less discomfort.  She initially had some swelling and redness but this has resolved.  She does not have any questions about the procedure or follow-up.    20 minutes spent reviewing chart, discussing with patient and postvisit charting.      Again, thank you for allowing me to participate in the care of your patient.        Sincerely,        Adair Au MD    Electronically signed

## 2025-01-06 NOTE — PROGRESS NOTES
6-week follow-up from procedure 11/11/2024    Vein Clinic Procedure Note     Preoperative diagnosis:    1.  Edema, right lower extremity venous insufficiency     Post operative diagnosis:  Same     Procedure:  1.  Right AASV VenaSeal closure  2.  Right SSV VenaSeal closure    Patient is pleased with the way things went.  Less discomfort.  She initially had some swelling and redness but this has resolved.  She does not have any questions about the procedure or follow-up.    20 minutes spent reviewing chart, discussing with patient and postvisit charting.

## 2025-01-21 ENCOUNTER — NURSE TRIAGE (OUTPATIENT)
Dept: FAMILY MEDICINE | Facility: CLINIC | Age: 60
End: 2025-01-21

## 2025-01-21 ENCOUNTER — OFFICE VISIT (OUTPATIENT)
Dept: FAMILY MEDICINE | Facility: CLINIC | Age: 60
End: 2025-01-21
Payer: COMMERCIAL

## 2025-01-21 VITALS
HEART RATE: 52 BPM | HEIGHT: 72 IN | SYSTOLIC BLOOD PRESSURE: 136 MMHG | TEMPERATURE: 99.1 F | DIASTOLIC BLOOD PRESSURE: 78 MMHG | WEIGHT: 293 LBS | RESPIRATION RATE: 16 BRPM | OXYGEN SATURATION: 98 % | BODY MASS INDEX: 39.68 KG/M2

## 2025-01-21 DIAGNOSIS — R05.1 ACUTE COUGH: Primary | ICD-10-CM

## 2025-01-21 DIAGNOSIS — J32.9 SINUSITIS, UNSPECIFIED CHRONICITY, UNSPECIFIED LOCATION: ICD-10-CM

## 2025-01-21 LAB
FLUAV AG SPEC QL IA: NEGATIVE
FLUBV AG SPEC QL IA: NEGATIVE

## 2025-01-21 PROCEDURE — G2211 COMPLEX E/M VISIT ADD ON: HCPCS

## 2025-01-21 PROCEDURE — 87635 SARS-COV-2 COVID-19 AMP PRB: CPT

## 2025-01-21 PROCEDURE — 99214 OFFICE O/P EST MOD 30 MIN: CPT

## 2025-01-21 PROCEDURE — 87804 INFLUENZA ASSAY W/OPTIC: CPT

## 2025-01-21 RX ORDER — BENZONATATE 100 MG/1
100 CAPSULE ORAL 3 TIMES DAILY PRN
Qty: 20 CAPSULE | Refills: 0 | Status: SHIPPED | OUTPATIENT
Start: 2025-01-21 | End: 2025-02-10

## 2025-01-21 RX ORDER — DOXYCYCLINE 100 MG/1
100 CAPSULE ORAL 2 TIMES DAILY
Qty: 14 CAPSULE | Refills: 0 | Status: SHIPPED | OUTPATIENT
Start: 2025-01-21 | End: 2025-01-28

## 2025-01-21 RX ORDER — GUAIFENESIN 600 MG/1
1200 TABLET, EXTENDED RELEASE ORAL 2 TIMES DAILY
Qty: 56 TABLET | Refills: 0 | Status: SHIPPED | OUTPATIENT
Start: 2025-01-21 | End: 2025-02-04

## 2025-01-21 NOTE — PROGRESS NOTES
Assessment & Plan     Acute cough  Sinusitis, unspecified chronicity, unspecified location  Patient notes this is the 3rd episode of symptoms since this fall. Swabbed for COVID and influenza today in clinic; influenza test is negative. Will treat for sinusitis with doxycyline BID for 7 days. Also sending Tessalon Perles to be taken up to 3 times a day for cough suppression, but especially at bedtime and Mucinex to take during the day to help with cough and congestion. Continue taking ibuprofen and tylenol every 4-6 hours as needed for myalgias, fevers, and headaches. Also encouraged patient drink plenty of fluids and electrolyte drinks and prioritize rest and getting good sleep.   - COVID-19 Virus (Coronavirus) by PCR Nose  - Influenza A & B Antigen - Clinic Collect  - benzonatate (TESSALON) 100 MG capsule  Dispense: 20 capsule; Refill: 0  - guaiFENesin (MUCINEX) 600 MG 12 hr tablet  Dispense: 56 tablet; Refill: 0  - doxycycline hyclate (VIBRAMYCIN) 100 MG capsule  Dispense: 14 capsule; Refill: 0    Patient should follow up in 1-2 weeks if symptoms do not improve or sooner for new or worsening symptoms. All questions answered to patient's satisfaction. Warning signs of when to seek emergency care were discussed.     Carla Angela PA-C    30 minutes spent by me on the date of the encounter doing chart review, history and exam, documentation and further activities per the note      BMI  Estimated body mass index is 45.87 kg/m  as calculated from the following:    Height as of this encounter: 1.829 m (6').    Weight as of this encounter: 153.4 kg (338 lb 3.2 oz).   Weight management plan: Patient was referred to their PCP to discuss a diet and exercise plan.    Sheryl Fragoso is a 59 year old, presenting for the following health issues:  URI        1/21/2025     1:50 PM   Additional Questions   Roomed by Mecca DA SILVA CMA         History of Present Illness       Headaches:   Since the patient's last clinic visit,  "headaches are: worsened  The patient is getting headaches:  Right now daily all day  She is not able to do normal daily activities when she has a migraine.  The patient is taking the following rescue/relief medications:  Excedrin and other   Patient states \"I get only a small amount of relief\" from the rescue/relief medications.   The patient is taking the following medications to prevent migraines:  Other  In the past 4 weeks, the patient has gone to an Urgent Care or Emergency Room 0 times times due to headaches.    Reason for visit:  Cough headache light headedness nasea  Symptom onset:  3-7 days ago  Symptoms include:  Cough headache light headed weak  Symptom intensity:  Severe  Symptom progression:  Worsening  Had these symptoms before:  Yes  Has tried/received treatment for these symptoms:  No  What makes it better:  Laying down   She is taking medications regularly.     Fouzia is a 59 year old female who presents today with URI symptoms and headache.     Symptoms started Thrusday night while she was working late. Started getting a headache. Has been in bed since Friday due to overall low energy, cough, and head pressure. Has been coughing a lot. Feels like there is a wheezing in her lungs at times. Sitting up is hard; feels a little nausous and achy and feels like she needs to lay daown again.     Has stage 2 CKD, and tries not to take NSAIDS if she can avoid it.  She has a history of migraines but this does not feel like migraines she has had in the past.     Has been taking nyquil and excedrin. For symptoms.    Has had something like this 3 times since October. Also had the throw up flu with diarrhea earlier this winter. Hasn't been treated with antibiotics yet.     Feels like there is a lot of pressure behind her face. Some blood in nostril the first few days and then cleared up. Cough is productive of clear phelgem. If she blows her nose, discharge is clear.       1/21 TRIAGE NOTE:   1. LOCATION: \"Where " "does it hurt?\"   Top of head - ?my whole head hurts\"  2. ONSET: \"When did the headache start?\" (e.g., minutes, hours, days)   Since Friday 4 days   3. PATTERN: \"Does the pain come and go, or has it been constant since it started?\"  Constant ache   4. SEVERITY: \"How bad is the pain?\" and \"What does it keep you from doing?\" (e.g., Scale 1-10; mild, moderate, or severe)  6/10   5. RECURRENT SYMPTOM: \"Have you ever had headaches before?\" If Yes, ask: \"When was the last time?\" and \"What happened that time?\"   Feels like tension but not like my migraines   6. CAUSE: \"What do you think is causing the headache?\"  Not sure been sick off and on since November 7. MIGRAINE: \"Have you been diagnosed with migraine headaches?\" If Yes, ask: \"Is this headache similar?\"   Yes but this is different   8. HEAD INJURY: \"Has there been any recent injury to the head?\"   No   9. OTHER SYMPTOMS: \"Do you have any other symptoms?\" (e.g., fever, stiff neck, eye pain, sore throat, cold symptoms)  URI symptoms - wheezing stuffy nose with clear mucous. Denies fever   10. PREGNANCY: \"Is there any chance you are pregnant?\" \"When was your last menstrual period?\"  Not asked      Review of Systems  Constitutional, neuro, ENT, endocrine, pulmonary, cardiac, gastrointestinal, genitourinary, musculoskeletal, integument and psychiatric systems are negative, except as otherwise noted.      Objective    /78   Pulse 52   Temp 99.1  F (37.3  C) (Tympanic)   Resp 16   Ht 1.829 m (6')   Wt (!) 153.4 kg (338 lb 3.2 oz)   LMP  (LMP Unknown)   SpO2 98%   BMI 45.87 kg/m    Body mass index is 45.87 kg/m .  Physical Exam   GENERAL: alert and no distress  EYES: Eyes grossly normal to inspection, PERRL and conjunctivae and sclerae normal  HENT: normal cephalic/atraumatic, nasal mucosa edematous , rhinorrhea yellow and clear, oropharynx clear but erythematous, and oral mucous membranes moist. No tonsillitis or tonsillar exudates.  NECK: no adenopathy, no " asymmetry, masses, or scars  RESP: lungs clear to auscultation - no rales, rhonchi or wheezes  CV: regular rate and rhythm, normal S1 S2, no S3 or S4, no murmur, click or rub, no peripheral edema  MS: no gross musculoskeletal defects noted, no edema  PSYCH: mentation appears normal, affect normal      Signed Electronically by: Carla Angela PA-C

## 2025-01-21 NOTE — TELEPHONE ENCOUNTER
"Reason for Disposition   Patient wants to be seen    Additional Information   Negative: Difficult to awaken or acting confused (e.g., disoriented, slurred speech)   Negative: Weakness of the face, arm or leg on one side of the body and new-onset   Negative: Numbness of the face, arm or leg on one side of the body and new-onset   Negative: Loss of speech or garbled speech and new-onset   Negative: Passed out (e.g., fainted, lost consciousness, blacked out and was not responding)   Negative: Sounds like a life-threatening emergency to the triager   Negative: Followed a head injury within last 3 days   Negative: Traumatic Brain Injury (TBI) is suspected   Negative: Sinus pain or congestion is main symptom(s)   Negative: Influenza suspected (i.e., cough, fever, other respiratory symptoms; probable influenza exposure)   Negative: Pregnant   Negative: Unable to walk without falling   Negative: Stiff neck (can't touch chin to chest)   Negative: Other family members (or people in same household) with headaches and possibility of carbon monoxide exposure   Negative: SEVERE headache, states 'worst headache' of life   Negative: SEVERE headache, sudden-onset (i.e., reaching maximum intensity within seconds to 1 hour)   Negative: Severe pain in one eye   Negative: Loss of vision or double vision (Exception: Same as previously diagnosed migraines.)   Negative: Patient sounds very sick or weak to the triager   Negative: Fever > 103 F (39.4 C)   Negative: Fever > 100 F (37.8 C) and has diabetes mellitus or a weak immune system (e.g., HIV positive, cancer chemotherapy, organ transplant, splenectomy, chronic steroids)   Negative: SEVERE headache (e.g., excruciating) and has had severe headaches before   Negative: SEVERE headache and not relieved by pain meds   Negative: SEVERE headache and vomiting   Negative: SEVERE headache and fever    Answer Assessment - Initial Assessment Questions  1. LOCATION: \"Where does it hurt?\"       Top " "of head - ?my whole head hurts\"  2. ONSET: \"When did the headache start?\" (e.g., minutes, hours, days)       Since Friday 4 days   3. PATTERN: \"Does the pain come and go, or has it been constant since it started?\"      Constant ache   4. SEVERITY: \"How bad is the pain?\" and \"What does it keep you from doing?\"  (e.g., Scale 1-10; mild, moderate, or severe)      6/10   5. RECURRENT SYMPTOM: \"Have you ever had headaches before?\" If Yes, ask: \"When was the last time?\" and \"What happened that time?\"       Feels like tension but not like my migraines   6. CAUSE: \"What do you think is causing the headache?\"      Not sure been sick off and on since November 7. MIGRAINE: \"Have you been diagnosed with migraine headaches?\" If Yes, ask: \"Is this headache similar?\"       Yes but this is different   8. HEAD INJURY: \"Has there been any recent injury to the head?\"       No   9. OTHER SYMPTOMS: \"Do you have any other symptoms?\" (e.g., fever, stiff neck, eye pain, sore throat, cold symptoms)      URI symptoms -   wheezing stuffy nose with clear mucous.  Denies fever   10. PREGNANCY: \"Is there any chance you are pregnant?\" \"When was your last menstrual period?\"        Not asked    Protocols used: Headache-A-OH    "

## 2025-01-22 ENCOUNTER — TELEPHONE (OUTPATIENT)
Dept: FAMILY MEDICINE | Facility: CLINIC | Age: 60
End: 2025-01-22
Payer: COMMERCIAL

## 2025-01-22 ENCOUNTER — TELEPHONE (OUTPATIENT)
Dept: NURSING | Facility: CLINIC | Age: 60
End: 2025-01-22
Payer: COMMERCIAL

## 2025-01-22 LAB — SARS-COV-2 RNA RESP QL NAA+PROBE: POSITIVE

## 2025-01-22 NOTE — TELEPHONE ENCOUNTER
Pt calling     Asking what the results for her COVID - they were positive - she is asking for what to do now     She still has the headache and is congested.   RN reviewed cdc isolation guidelines, home care and what to watch for with worsening symptoms     Patient stated an understanding and agreed with plan.    Lexis Henderson RN, BSN  [facility] [place][note 2]         
6

## 2025-01-22 NOTE — TELEPHONE ENCOUNTER
Patient classified as COVID treatment eligible by Epic high risk algorithm:  Yes    Coronavirus (COVID-19) Notification    Reason for call  Notify of POSITIVE COVID-19 lab result, assess symptoms,  review [facility] recommendations    Lab Result   Lab test for 2019-nCoV rRt-PCR or SARS-COV-2 PCR  Oropharyngeal AND/OR nasopharyngeal swabs were POSITIVE for 2019-nCoV RNA [OR] SARS-COV-2 RNA (COVID-19) RNA     We have been unable to reach patient by phone at this time to notify of their Positive COVID-19 result.    Left voicemail message requesting a call back to [phone] [facility] for results.        A Positive COVID-19 letter will be sent via [employer] or the mail.    Pohebe Darby

## 2025-01-23 ENCOUNTER — MYC MEDICAL ADVICE (OUTPATIENT)
Dept: FAMILY MEDICINE | Facility: CLINIC | Age: 60
End: 2025-01-23
Payer: COMMERCIAL

## 2025-01-23 NOTE — TELEPHONE ENCOUNTER
LOV 1/21/2025    rx of doxycycline.   Patient reports this is not new for her.     Routing to provider to review and advise.     Melany Flores RN   [facility]

## 2025-01-23 NOTE — TELEPHONE ENCOUNTER
Called patient at 3:30 and discussed risks and benefits of staying on doxycycline prescribed for sinusitis. Through shared decision making, patient will stop taking doxycyline for sinusitis and continue with supportive cares for COVID infection.     Carla Angela PA-C

## 2025-01-23 NOTE — TELEPHONE ENCOUNTER
Called # [phone] and spoke to patient     Patient denied dehydration and stated she is able to drink plenty of fluids and keep it down. Advised patient to call with new or worsening symptoms or inability to drink or keep down fluids.    Patient agreed with plan of care.     Melany Flores RN on 1/23/2025 at 3:50 PM   [facility]

## 2025-01-27 ENCOUNTER — HOSPITAL ENCOUNTER (EMERGENCY)
Facility: CLINIC | Age: 60
Discharge: LEFT BEFORE DISCHARGE | End: 2025-01-27
Payer: COMMERCIAL

## 2025-01-27 ENCOUNTER — NURSE TRIAGE (OUTPATIENT)
Dept: FAMILY MEDICINE | Facility: CLINIC | Age: 60
End: 2025-01-27
Payer: COMMERCIAL

## 2025-01-27 VITALS
BODY MASS INDEX: 44.41 KG/M2 | OXYGEN SATURATION: 98 % | TEMPERATURE: 98.2 F | SYSTOLIC BLOOD PRESSURE: 133 MMHG | HEIGHT: 68 IN | WEIGHT: 293 LBS | RESPIRATION RATE: 18 BRPM | HEART RATE: 64 BPM | DIASTOLIC BLOOD PRESSURE: 92 MMHG

## 2025-01-27 PROCEDURE — 99281 EMR DPT VST MAYX REQ PHY/QHP: CPT

## 2025-01-27 ASSESSMENT — COLUMBIA-SUICIDE SEVERITY RATING SCALE - C-SSRS
6. HAVE YOU EVER DONE ANYTHING, STARTED TO DO ANYTHING, OR PREPARED TO DO ANYTHING TO END YOUR LIFE?: NO
2. HAVE YOU ACTUALLY HAD ANY THOUGHTS OF KILLING YOURSELF IN THE PAST MONTH?: NO
1. IN THE PAST MONTH, HAVE YOU WISHED YOU WERE DEAD OR WISHED YOU COULD GO TO SLEEP AND NOT WAKE UP?: NO

## 2025-01-27 NOTE — TELEPHONE ENCOUNTER
Patient with covid on day 10  No energy  Whole body aches  Nauseated  Oximeter - stays at 88% - at rest. Not sob. Always have a fan blowing on.   Using CPAP during the day for naps    Advised ED. [facility] or closest ER. Patient has someone who can take her.       Reason for Disposition   Oxygen level (e.g., pulse oximetry) 90% or lower    Protocols used: Oxygen Monitoring and [id number]      ALLEN VICENTE RN on 1/27/2025 at 10:27 AM   [facility]

## 2025-01-27 NOTE — ED TRIAGE NOTES
"Patient was diagnosed with COVID 10 days ago, patient states she continues to feel unwell and \"worn out\".      Triage Assessment (Adult)       Row Name 01/27/25 8685          Triage Assessment    Airway WDL WDL        Respiratory WDL    Respiratory WDL WDL        Skin Circulation/Temperature WDL    Skin Circulation/Temperature WDL WDL        Cardiac WDL    Cardiac WDL WDL        Peripheral/Neurovascular WDL    Peripheral Neurovascular WDL WDL        Cognitive/Neuro/Behavioral WDL    Cognitive/Neuro/Behavioral WDL WDL                     "

## 2025-02-05 NOTE — LETTER
8/14/2023         RE: Fouzia Arenas  [address] [place] [facility] [address]        Dear Colleague,    Thank you for referring your patient, Fouzia Arenas, to the [facility] [place]. Please see a copy of my visit note below.    6-month follow-up of right lower extremity  x2 radiofrequency ablation    Ultrasound reviewed and confirmed closure of perforators.    Patient doing well from a lower extremity standpoint.    We discussed that Ozempic would be fine from a cardiovascular standpoint and that she does not need to increase her hydration as her BUN is not elevated and her creatinine is only borderline elevated and she is on ACE inhibitor and spironolactone.    20 minutes spent today reviewing chart, discussion with patient and postvisit charting.      Again, thank you for allowing me to participate in the care of your patient.        Sincerely,        Adair Au MD   No

## 2025-02-25 DIAGNOSIS — I10 BENIGN ESSENTIAL HYPERTENSION: ICD-10-CM

## 2025-02-25 RX ORDER — SPIRONOLACTONE 25 MG/1
25 TABLET ORAL DAILY
Qty: 90 TABLET | Refills: 0 | Status: SHIPPED | OUTPATIENT
Start: 2025-02-25

## 2025-02-25 RX ORDER — LISINOPRIL 20 MG/1
20 TABLET ORAL DAILY
Qty: 90 TABLET | Refills: 0 | Status: SHIPPED | OUTPATIENT
Start: 2025-02-25

## 2025-04-25 NOTE — PROGRESS NOTES
"[facility]    I had the pleasure of seeing Fouzia when she came for follow up of HTN and PACs.  This 59 year old sees Dr. Au (Vascular) and has seen Dr. Hensley for her history of:    1. Hypertension with reduction in Bystolic dose 3/2018 due to lightheaded episodes (subsequently resolved)  2. Palpitations, with event monitor 10/2015 showing episodes of atrial tachycardia previously on carvedilol and now taking by Bystolic.  Holter monitor done 2022 with 21% PACs  3. Right lower extremity lipoma dermatosclerosis for which Dr. Au saw her 2017 with severe R GSV and small R SV. S/p R GSV RF ablation from prox thigh to upper calf and R dGSV insheath sclerotherapy. Direct sclerotherapy of lateral R thigh varicose veins and limited phlebectomy of lateral thigh varicose veins (limited by  vein's proximity to artery). S/p R LE VNUS closure 2022 and 2024  4. STORMY - CPAP has been replaced       Last Visit & Interval History:  I saw Fouzia 2024 at which time she was feeling well despite increased stress in caring for various family members and friends. TUMS improved the chest heaviness she'd previously undergone stress test for (, negative). Given c/o flushing and lightheadedness/shakiness, I asked her to stop all supplements (Mg, Vit D, VitC, Zinc, B complex, Vit E) and then restart one at a time to see if ADRs improved.    She was in ER 2025 with COVID x 10 days.    Today's Visit:  Fouzia has not done well since her COVID dx, with major fatigue and hair loss. Feels more lightheadedness, with \"a wave\" coming over her causing SOB. No fainting/falling. No dizziness but feels very dizzy.    No c/o exertional CP but still gets indigestion. Just had another R LE venous procedure done.    VITALS:  Vitals: /72   Pulse 63   Ht 1.829 m (6')   Wt (!) 156.6 kg (345 lb 3.2 oz)   LMP  (LMP Unknown)   SpO2 99%   BMI 46.82 kg/m      Diagnostic Testin hour Holter 2022 SR 62 " bpm (range 50-84 bpm). <1% PVCs and 21% PACs  Nuc Stress Test 9/13/2022 with no inducible myocardial ischemia.  Hyperdynamic LVEF 76%  US LE 3/10/2021 (2 d post procedure) No DVT. R GSV closed from proximal thigh to ankle; lateral thigh  and varicose veins patent with residual reflux up to 4.7s  Stress test 5/2017 showed no evidence of ischemia, with breast attenuation artifact.  Echocardiogram 2016 showed a low normal ejection fraction.  She had no significant valvular abnormalities noted.          Plan:  Updated echo - will send MyChart message unless significantly abnl   Annual follow-up with me       Assessment/Plan:    HTN  Remains on spironolactone 25, lisinopril 20  BP looks good here (and was low at dentist when checked earlier this month)    PLAN:  Continue current medications  Annual follow-up     Frequent PACs  H/o asx'c AT on previous monitors  Holter 9/2022 showed 21% PAC burden on Bystolic 10 mg daily. Bystolic held per her request given concerns for chronotropic incompetence and has remained off of this without c/o recurrent palpitations     PLAN:  Continue to monitor  Annual follow-up   Updated echo        Jaqui Edmond PA-C, MSPAS      Orders Placed This Encounter   Procedures    Follow-Up with Cardiology [facility]    Echocardiogram Complete     Orders Placed This Encounter   Medications    lisinopril (ZESTRIL) 20 MG tablet     Sig: Take 1 tablet (20 mg) by mouth daily.     Dispense:  90 tablet     Refill:  3    spironolactone (ALDACTONE) 25 MG tablet     Sig: Take 1 tablet (25 mg) by mouth daily.     Dispense:  90 tablet     Refill:  3     Medications Discontinued During This Encounter   Medication Reason    MAGNESIUM PO Stopped by Patient (No AVS)    vitamin C (ASCORBIC ACID) 1000 MG TABS Stopped by Patient (No AVS)    spironolactone (ALDACTONE) 25 MG tablet Reorder (No AVS)    lisinopril (ZESTRIL) 20 MG tablet Reorder (No AVS)           Encounter Diagnosis   Name Primary?    Benign essential  hypertension          CURRENT MEDICATIONS:  Current Outpatient Medications   Medication Sig Dispense Refill    aspirin (ASA) 81 MG EC tablet Take 1 tablet (81 mg) by mouth daily      Aspirin-Acetaminophen-Caffeine (EXCEDRIN EXTRA STRENGTH PO) Take 2 tablets by mouth every 6 hours as needed      bisacodyl (DULCOLAX) 5 MG EC tablet Two days prior to exam take two (2) tablets at 4pm. One day prior to exam take two (2) tablets at 4pm 4 tablet 0    Cholecalciferol (VITAMIN D-3 PO) Take 1,000 Units by mouth daily      CINNAMON PO Take 1,000 mg by mouth daily      lisinopril (ZESTRIL) 20 MG tablet Take 1 tablet (20 mg) by mouth daily. 90 tablet 3    meclizine (ANTIVERT) 12.5 MG tablet Take 1 tablet (12.5 mg) by mouth 3 times daily as needed for dizziness 30 tablet 0    polyethylene glycol (GOLYTELY) 236 g suspension Take as directed. Two days before your exam fill the first container with water. Cover and shake until mixed well. At 5:00pm drink one 8oz glass every 10-15 minutes until half (1/2) of the first container is empty. Store the remainder in the refrigerator. One day before your exam at 5:00pm drink the second half of the first container until it is gone. Before you go to bed mix the second container with water and put in refrigerator. Six hours before your check in time drink one 8oz glass every 10-15 minutes until half of container is empty. Discard the remainder of solution. 8000 mL 0    spironolactone (ALDACTONE) 25 MG tablet Take 1 tablet (25 mg) by mouth daily. 90 tablet 3    SUMAtriptan (IMITREX) 100 MG tablet Take 1 tablet (100 mg) by mouth at onset of headache for migraine May repeat in 2 hours. Max dose 200mg in 24 hours. 9 tablet 1    vitamin C with B complex (B COMPLEX-C) tablet Take 1 tablet by mouth daily      VITAMIN E PO          ALLERGIES     Allergies   Allergen Reactions    Amoxicillin Nausea and Vomiting    Hctz [Hydrochlorothiazide] Other (See Comments)     Weak/Achy despite nl BMP    Percocet  [Oxycodone-Acetaminophen] Nausea and Vomiting         Review of Systems:  Skin:  Negative itching, bruising   Eyes:  Negative glasses  ENT:  Negative    Respiratory:  Negative for dyspnea on exertion, shortness of breath  Cardiovascular:  Negative for, syncope or near-syncope, cyanosis, chest pain, palpitations Positive for, edema  Gastroenterology: Negative for melena, hematochezia  Genitourinary:  Negative    Musculoskeletal:  Positive for arthritis (Knees only)  Neurologic:  Negative migraine headaches  Psychiatric:  Negative    Heme/Lymph/Imm:  Negative night sweats  Endocrine:  Negative      Physical Exam:  Vitals: /72   Pulse 63   Ht 1.829 m (6')   Wt (!) 156.6 kg (345 lb 3.2 oz)   LMP  (LMP Unknown)   SpO2 99%   BMI 46.82 kg/m      Constitutional:  cooperative, alert and oriented, well developed, well nourished, in no acute distress overweight      Skin:  warm and dry to the touch        Head:  normocephalic, no masses or lesions        Eyes:  pupils equal and round, conjunctivae and lids unremarkable, sclera white        ENT:  not assessed this visit        Neck:  JVP normal        Chest:  normal respiratory excursion   no reproducible pain    Cardiac: regular rhythm       grade 1, RUSB, early systolic murmur          Abdomen:  not assessed this visit obese benign    Vascular: not assessed this visit                                      Extremities and Back:  no deformities, clubbing, cyanosis, erythema observed, not assessed this visit stasis pigmentation, erythema R medial malleolus erythema, no open wound    Neurological:  no gross motor deficits            PAST MEDICAL HISTORY:  Past Medical History:   Diagnosis Date    Classic migraine     Complex endometrial hyperplasia     without atypia    Hypertension     Motion sickness     Mumps     Obese     STORMY (obstructive sleep apnea)     Other disorder of menstruation and other abnormal bleeding from female genital tract 2/24/2011    Palpitations      PONV (postoperative nausea and vomiting)     Spider veins     SVT (supraventricular tachycardia) 9/2015       PAST SURGICAL HISTORY:  Past Surgical History:   Procedure Laterality Date    BLADDER SURGERY      CHOLECYSTECTOMY, LAPOROSCOPIC      Cholecystectomy, Laparoscopic    COLONOSCOPY N/A 11/25/2014    Procedure: COLONOSCOPY;  Surgeon: Wenceslao Galo MD;  Location:  GI    COLONOSCOPY N/A 10/24/2024    Procedure: Colonoscopy with polypectomy by cold snare;  Surgeon: Urban Nunez MD;  Location:  GI    CYSTOSCOPY, SLING TRANSVAGINAL N/A 8/20/2018    Procedure: CYSTOSCOPY, SLING TRANSVAGINAL;;  Surgeon: Urban Elena MD;  Location: [facility]    D & C      X2-3 for abnormal bleeding    ESOPHAGOSCOPY, GASTROSCOPY, DUODENOSCOPY (EGD), COMBINED N/A 11/25/2014    Procedure: COMBINED ESOPHAGOSCOPY, GASTROSCOPY, DUODENOSCOPY (EGD), BIOPSY SINGLE OR MULTIPLE;  Surgeon: Wenceslao Galo MD;  Location: [facility]    LAPAROSCOPIC HYSTERECTOMY SUPRACERVICAL N/A 8/20/2018    Procedure: LAPAROSCOPIC HYSTERECTOMY SUPRACERVICAL;  LAPAROSCOPIC SUBTOATAL HYSTERECTOMY, BILATERAL SALPINGECTOMY, SUBURETHRAL SLING AND CYSTOSCOPY;  Surgeon: Urban Elena MD;  Location: [facility]    LAPAROSCOPIC SALPINGECTOMY Bilateral 8/20/2018    Procedure: LAPAROSCOPIC SALPINGECTOMY;;  Surgeon: Urban Elena MD;  Location: [facility]    LAPAROSCOPY      For endometriosis    LASER ABLATION VEIN VARICOSE      OPERATIVE HYSTEROSCOPY WITH MORCELLATOR N/A 3/29/2018    Procedure: OPERATIVE HYSTEROSCOPY WITH MORCELLATOR (MYOSURE);  OPERATIVE HYSTEROSCOPY WITH MORCELLATOR ;  Surgeon: Urban Elena MD;  Location: [facility]       FAMILY HISTORY:  Family History   Problem Relation Age of Onset    Hypertension Mother     Lipids Mother     Alzheimer Disease Mother     Hypertension Father     Prostate Cancer Father     Lipids Father     Sleep Apnea Father     Hypertension Maternal Grandmother     C.A.D. Maternal Grandmother      Gallbladder Disease Maternal Grandmother     Cancer Maternal Grandfather         lung    Cerebrovascular Disease Paternal Grandmother     C.A.D. Paternal Grandfather     Breast Cancer Maternal Aunt     Breast Cancer Paternal Aunt     Colon Cancer Cousin     Colon Cancer Cousin     Cancer - colorectal Other         cousin maternal     Diabetes No family hx of        SOCIAL HISTORY:  Social History     Socioeconomic History    Marital status: Single     Spouse name: None    Number of children: None    Years of education: None    Highest education level: None   Tobacco Use    Smoking status: Never    Smokeless tobacco: Never   Vaping Use    Vaping status: Never Used   Substance and Sexual Activity    Alcohol use: Yes     Alcohol/week: 1.7 standard drinks of alcohol     Types: 2 Standard drinks or equivalent per week     Comment: 1 drink a month    Drug use: No    Sexual activity: Never   Other Topics Concern    Caffeine Concern Yes     Comment: 1-2 cans qd    Special Diet Yes     Comment: started mediterranian     Exercise Yes     Comment: 20 minutes walking 3-4 days weekly     Seat Belt Yes    Parent/sibling w/ CABG, MI or angioplasty before 65F 55M? No     Social Drivers of Health     Financial Resource Strain: Low Risk  (7/18/2024)    Financial Resource Strain     Within the past 12 months, have you or your family members you live with been unable to get utilities (heat, electricity) when it was really needed?: No   Food Insecurity: Low Risk  (7/18/2024)    Food Insecurity     Within the past 12 months, did you worry that your food would run out before you got money to buy more?: No     Within the past 12 months, did the food you bought just not last and you didn t have money to get more?: No   Transportation Needs: Low Risk  (7/18/2024)    Transportation Needs     Within the past 12 months, has lack of transportation kept you from medical appointments, getting your medicines, non-medical meetings or appointments,  work, or from getting things that you need?: No   Physical Activity: Inactive (7/18/2024)    Exercise Vital Sign     Days of Exercise per Week: 0 days     Minutes of Exercise per Session: 0 min   Stress: No Stress Concern Present (7/18/2024)    [place] [employer] - Occupational Stress Questionnaire     Feeling of Stress : Only a little   Social Connections: Unknown (7/18/2024)    Social Connection and Isolation Panel [NHANES]     Frequency of Social Gatherings with Friends and Family: More than three times a week   Interpersonal Safety: Low Risk  (10/24/2024)    Interpersonal Safety     Do you feel physically and emotionally safe where you currently live?: Yes     Within the past 12 months, have you been hit, slapped, kicked or otherwise physically hurt by someone?: No     Within the past 12 months, have you been humiliated or emotionally abused in other ways by your partner or ex-partner?: No   Housing Stability: Low Risk  (7/18/2024)    Housing Stability     Do you have housing? : Yes     Are you worried about losing your housing?: No

## 2025-04-28 ENCOUNTER — OFFICE VISIT (OUTPATIENT)
Dept: CARDIOLOGY | Facility: CLINIC | Age: 60
End: 2025-04-28
Payer: COMMERCIAL

## 2025-04-28 VITALS
BODY MASS INDEX: 39.68 KG/M2 | DIASTOLIC BLOOD PRESSURE: 72 MMHG | HEART RATE: 63 BPM | SYSTOLIC BLOOD PRESSURE: 128 MMHG | OXYGEN SATURATION: 99 % | WEIGHT: 293 LBS | HEIGHT: 72 IN

## 2025-04-28 DIAGNOSIS — I10 BENIGN ESSENTIAL HYPERTENSION: ICD-10-CM

## 2025-04-28 RX ORDER — SPIRONOLACTONE 25 MG/1
25 TABLET ORAL DAILY
Qty: 90 TABLET | Refills: 3 | Status: SHIPPED | OUTPATIENT
Start: 2025-04-28

## 2025-04-28 RX ORDER — LISINOPRIL 20 MG/1
20 TABLET ORAL DAILY
Qty: 90 TABLET | Refills: 3 | Status: SHIPPED | OUTPATIENT
Start: 2025-04-28

## 2025-04-28 NOTE — PATIENT INSTRUCTIONS
Fouzia - it was nice to see you today!    Today we reviewed:    Heart-wise doing OK but post-COVID have noted fatigue and hair loss  Stopping supplements helped some things but now back in cinnamon and Pumpkin Seed Oil    MEDICATION CHANGES:    NONE    RECOMMENDATIONS:    Caretaking is hard - continue to do what brings you some oscar!  Watch for side effects of any supplements  Updated echo  - MyChart message OK unless very abnl  Pressures, pumping function, calves etc    FOLLOW UP:    1 year but earlier if issues    IMPORTANT PHONE NUMBERS FOR  [facility] ([place]):  Arrhythmia nurses Elmira Real, & Maria Luisa: [phone]

## 2025-04-28 NOTE — LETTER
"4/28/2025    Kaycee Carty, APRN CNP  [address] [facility] [id number]    RE: Fouzia Arenas       Dear Colleague,     I had the pleasure of seeing Fouzia Arenas in the [facility].  [facility]    I had the pleasure of seeing Fouzia when she came for follow up of HTN and PACs.  This 59 year old sees Dr. Au (Vascular) and has seen Dr. Hensley for her history of:    1. Hypertension with reduction in Bystolic dose 3/2018 due to lightheaded episodes (subsequently resolved)  2. Palpitations, with event monitor 10/2015 showing episodes of atrial tachycardia previously on carvedilol and now taking by Bystolic.  Holter monitor done 9/2022 with 21% PACs  3. Right lower extremity lipoma dermatosclerosis for which Dr. Au saw her 8/2017 with severe R GSV and small R SV. S/p R GSV RF ablation from prox thigh to upper calf and R dGSV insheath sclerotherapy. Direct sclerotherapy of lateral R thigh varicose veins and limited phlebectomy of lateral thigh varicose veins (limited by  vein's proximity to artery). S/p R LE VNUS closure 12/8/2022 and 12/2024  4. STORMY - CPAP has been replaced       Last Visit & Interval History:  I saw Fouzia 1/2024 at which time she was feeling well despite increased stress in caring for various family members and friends. TUMS improved the chest heaviness she'd previously undergone stress test for (2022, negative). Given c/o flushing and lightheadedness/shakiness, I asked her to stop all supplements (Mg, Vit D, VitC, Zinc, B complex, Vit E) and then restart one at a time to see if ADRs improved.    She was in ER 1/2025 with COVID x 10 days.    Today's Visit:  Fouzia has not done well since her COVID dx, with major fatigue and hair loss. Feels more lightheadedness, with \"a wave\" coming over her causing SOB. No fainting/falling. No dizziness but feels very dizzy.    No c/o exertional CP but still gets indigestion. Just had another R LE venous " procedure done.    VITALS:  Vitals: /72   Pulse 63   Ht 1.829 m (6')   Wt (!) 156.6 kg (345 lb 3.2 oz)   LMP  (LMP Unknown)   SpO2 99%   BMI 46.82 kg/m      Diagnostic Testin hour Holter 2022 SR 62 bpm (range 50-84 bpm). <1% PVCs and 21% PACs  Nuc Stress Test 2022 with no inducible myocardial ischemia.  Hyperdynamic LVEF 76%  US LE 3/10/2021 (2 d post procedure) No DVT. R GSV closed from proximal thigh to ankle; lateral thigh  and varicose veins patent with residual reflux up to 4.7s  Stress test 2017 showed no evidence of ischemia, with breast attenuation artifact.  Echocardiogram  showed a low normal ejection fraction.  She had no significant valvular abnormalities noted.          Plan:  Updated echo - will send [employer] message unless significantly abnl   Annual follow-up with me       Assessment/Plan:    HTN  Remains on spironolactone 25, lisinopril 20  BP looks good here (and was low at dentist when checked earlier this month)    PLAN:  Continue current medications  Annual follow-up     Frequent PACs  H/o asx'c AT on previous monitors  Holter 2022 showed 21% PAC burden on Bystolic 10 mg daily. Bystolic held per her request given concerns for chronotropic incompetence and has remained off of this without c/o recurrent palpitations     PLAN:  Continue to monitor  Annual follow-up   Updated echo        Jaqui Edmond PA-C, MSPAS      Orders Placed This Encounter   Procedures     Follow-Up with Cardiology SPAKRLE     Echocardiogram Complete     Orders Placed This Encounter   Medications     lisinopril (ZESTRIL) 20 MG tablet     Sig: Take 1 tablet (20 mg) by mouth daily.     Dispense:  90 tablet     Refill:  3     spironolactone (ALDACTONE) 25 MG tablet     Sig: Take 1 tablet (25 mg) by mouth daily.     Dispense:  90 tablet     Refill:  3     Medications Discontinued During This Encounter   Medication Reason     MAGNESIUM PO Stopped by Patient (No AVS)     vitamin C (ASCORBIC  ACID) 1000 MG TABS Stopped by Patient (No AVS)     spironolactone (ALDACTONE) 25 MG tablet Reorder (No AVS)     lisinopril (ZESTRIL) 20 MG tablet Reorder (No AVS)           Encounter Diagnosis   Name Primary?     Benign essential hypertension          CURRENT MEDICATIONS:  Current Outpatient Medications   Medication Sig Dispense Refill     aspirin (ASA) 81 MG EC tablet Take 1 tablet (81 mg) by mouth daily       Aspirin-Acetaminophen-Caffeine (EXCEDRIN EXTRA STRENGTH PO) Take 2 tablets by mouth every 6 hours as needed       bisacodyl (DULCOLAX) 5 MG EC tablet Two days prior to exam take two (2) tablets at 4pm. One day prior to exam take two (2) tablets at 4pm 4 tablet 0     Cholecalciferol (VITAMIN D-3 PO) Take 1,000 Units by mouth daily       CINNAMON PO Take 1,000 mg by mouth daily       lisinopril (ZESTRIL) 20 MG tablet Take 1 tablet (20 mg) by mouth daily. 90 tablet 3     meclizine (ANTIVERT) 12.5 MG tablet Take 1 tablet (12.5 mg) by mouth 3 times daily as needed for dizziness 30 tablet 0     polyethylene glycol (GOLYTELY) 236 g suspension Take as directed. Two days before your exam fill the first container with water. Cover and shake until mixed well. At 5:00pm drink one 8oz glass every 10-15 minutes until half (1/2) of the first container is empty. Store the remainder in the refrigerator. One day before your exam at 5:00pm drink the second half of the first container until it is gone. Before you go to bed mix the second container with water and put in refrigerator. Six hours before your check in time drink one 8oz glass every 10-15 minutes until half of container is empty. Discard the remainder of solution. 8000 mL 0     spironolactone (ALDACTONE) 25 MG tablet Take 1 tablet (25 mg) by mouth daily. 90 tablet 3     SUMAtriptan (IMITREX) 100 MG tablet Take 1 tablet (100 mg) by mouth at onset of headache for migraine May repeat in 2 hours. Max dose 200mg in 24 hours. 9 tablet 1     vitamin C with B complex (B  COMPLEX-C) tablet Take 1 tablet by mouth daily       VITAMIN E PO          ALLERGIES     Allergies   Allergen Reactions     Amoxicillin Nausea and Vomiting     Hctz [Hydrochlorothiazide] Other (See Comments)     Weak/Achy despite nl BMP     Percocet [Oxycodone-Acetaminophen] Nausea and Vomiting         Review of Systems:  Skin:  Negative itching, bruising   Eyes:  Negative glasses  ENT:  Negative    Respiratory:  Negative for dyspnea on exertion, shortness of breath  Cardiovascular:  Negative for, syncope or near-syncope, cyanosis, chest pain, palpitations Positive for, edema  Gastroenterology: Negative for melena, hematochezia  Genitourinary:  Negative    Musculoskeletal:  Positive for arthritis (Knees only)  Neurologic:  Negative migraine headaches  Psychiatric:  Negative    Heme/Lymph/Imm:  Negative night sweats  Endocrine:  Negative      Physical Exam:  Vitals: /72   Pulse 63   Ht 1.829 m (6')   Wt (!) 156.6 kg (345 lb 3.2 oz)   LMP  (LMP Unknown)   SpO2 99%   BMI 46.82 kg/m      Constitutional:  cooperative, alert and oriented, well developed, well nourished, in no acute distress overweight      Skin:  warm and dry to the touch        Head:  normocephalic, no masses or lesions        Eyes:  pupils equal and round, conjunctivae and lids unremarkable, sclera white        ENT:  not assessed this visit        Neck:  JVP normal        Chest:  normal respiratory excursion   no reproducible pain    Cardiac: regular rhythm       grade 1, RUSB, early systolic murmur          Abdomen:  not assessed this visit obese benign    Vascular: not assessed this visit                                      Extremities and Back:  no deformities, clubbing, cyanosis, erythema observed, not assessed this visit stasis pigmentation, erythema R medial malleolus erythema, no open wound    Neurological:  no gross motor deficits            PAST MEDICAL HISTORY:  Past Medical History:   Diagnosis Date     Classic migraine       Complex endometrial hyperplasia     without atypia     Hypertension      Motion sickness      Mumps      Obese      STORMY (obstructive sleep apnea)      Other disorder of menstruation and other abnormal bleeding from female genital tract 2/24/2011     Palpitations      PONV (postoperative nausea and vomiting)      Spider veins      SVT (supraventricular tachycardia) 9/2015       PAST SURGICAL HISTORY:  Past Surgical History:   Procedure Laterality Date     BLADDER SURGERY       CHOLECYSTECTOMY, LAPOROSCOPIC      Cholecystectomy, Laparoscopic     COLONOSCOPY N/A 11/25/2014    Procedure: COLONOSCOPY;  Surgeon: Wenceslao Galo MD;  Location:  GI     COLONOSCOPY N/A 10/24/2024    Procedure: Colonoscopy with polypectomy by cold snare;  Surgeon: Urban Nunez MD;  Location:  GI     CYSTOSCOPY, SLING TRANSVAGINAL N/A 8/20/2018    Procedure: CYSTOSCOPY, SLING TRANSVAGINAL;;  Surgeon: Urban Elena MD;  Location: [facility]     D & C      X2-3 for abnormal bleeding     ESOPHAGOSCOPY, GASTROSCOPY, DUODENOSCOPY (EGD), COMBINED N/A 11/25/2014    Procedure: COMBINED ESOPHAGOSCOPY, GASTROSCOPY, DUODENOSCOPY (EGD), BIOPSY SINGLE OR MULTIPLE;  Surgeon: Wenceslao Galo MD;  Location: [facility]     LAPAROSCOPIC HYSTERECTOMY SUPRACERVICAL N/A 8/20/2018    Procedure: LAPAROSCOPIC HYSTERECTOMY SUPRACERVICAL;  LAPAROSCOPIC SUBTOATAL HYSTERECTOMY, BILATERAL SALPINGECTOMY, SUBURETHRAL SLING AND CYSTOSCOPY;  Surgeon: Urban Elena MD;  Location: [facility]     LAPAROSCOPIC SALPINGECTOMY Bilateral 8/20/2018    Procedure: LAPAROSCOPIC SALPINGECTOMY;;  Surgeon: Urban Elena MD;  Location: [facility]     LAPAROSCOPY      For endometriosis     LASER ABLATION VEIN VARICOSE       OPERATIVE HYSTEROSCOPY WITH MORCELLATOR N/A 3/29/2018    Procedure: OPERATIVE HYSTEROSCOPY WITH MORCELLATOR (MYOSURE);  OPERATIVE HYSTEROSCOPY WITH MORCELLATOR ;  Surgeon: Urban Elena MD;  Location: [facility]       FAMILY HISTORY:  Family  History   Problem Relation Age of Onset     Hypertension Mother      Lipids Mother      Alzheimer Disease Mother      Hypertension Father      Prostate Cancer Father      Lipids Father      Sleep Apnea Father      Hypertension Maternal Grandmother      C.A.D. Maternal Grandmother      Gallbladder Disease Maternal Grandmother      Cancer Maternal Grandfather         lung     Cerebrovascular Disease Paternal Grandmother      C.A.D. Paternal Grandfather      Breast Cancer Maternal Aunt      Breast Cancer Paternal Aunt      Colon Cancer Cousin      Colon Cancer Cousin      Cancer - colorectal Other         cousin maternal      Diabetes No family hx of        SOCIAL HISTORY:  Social History     Socioeconomic History     Marital status: Single     Spouse name: None     Number of children: None     Years of education: None     Highest education level: None   Tobacco Use     Smoking status: Never     Smokeless tobacco: Never   Vaping Use     Vaping status: Never Used   Substance and Sexual Activity     Alcohol use: Yes     Alcohol/week: 1.7 standard drinks of alcohol     Types: 2 Standard drinks or equivalent per week     Comment: 1 drink a month     Drug use: No     Sexual activity: Never   Other Topics Concern     Caffeine Concern Yes     Comment: 1-2 cans qd     Special Diet Yes     Comment: started mediterranian      Exercise Yes     Comment: 20 minutes walking 3-4 days weekly      Seat Belt Yes     Parent/sibling w/ CABG, MI or angioplasty before 65F 55M? No     Social Drivers of Health     Financial Resource Strain: Low Risk  (7/18/2024)    Financial Resource Strain      Within the past 12 months, have you or your family members you live with been unable to get utilities (heat, electricity) when it was really needed?: No   Food Insecurity: Low Risk  (7/18/2024)    Food Insecurity      Within the past 12 months, did you worry that your food would run out before you got money to buy more?: No      Within the past 12  months, did the food you bought just not last and you didn t have money to get more?: No   Transportation Needs: Low Risk  (7/18/2024)    Transportation Needs      Within the past 12 months, has lack of transportation kept you from medical appointments, getting your medicines, non-medical meetings or appointments, work, or from getting things that you need?: No   Physical Activity: Inactive (7/18/2024)    Exercise Vital Sign      Days of Exercise per Week: 0 days      Minutes of Exercise per Session: 0 min   Stress: No Stress Concern Present (7/18/2024)    [employer] - Occupational Stress Questionnaire      Feeling of Stress : Only a little   Social Connections: Unknown (7/18/2024)    Social Connection and Isolation Panel [NHANES]      Frequency of Social Gatherings with Friends and Family: More than three times a week   Interpersonal Safety: Low Risk  (10/24/2024)    Interpersonal Safety      Do you feel physically and emotionally safe where you currently live?: Yes      Within the past 12 months, have you been hit, slapped, kicked or otherwise physically hurt by someone?: No      Within the past 12 months, have you been humiliated or emotionally abused in other ways by your partner or ex-partner?: No   Housing Stability: Low Risk  (7/18/2024)    Housing Stability      Do you have housing? : Yes      Are you worried about losing your housing?: No                 Thank you for allowing me to participate in the care of your patient.      Sincerely,     Peggy Edmond PA-C     [facility]  cc:   Peggy Edmond PA-C  [address] [place] [id number]

## 2025-05-29 ENCOUNTER — TELEPHONE (OUTPATIENT)
Dept: VASCULAR SURGERY | Facility: CLINIC | Age: 60
End: 2025-05-29
Payer: COMMERCIAL

## 2025-05-29 NOTE — TELEPHONE ENCOUNTER
Called patient and left voicemail to schedule 6 month follow up from:    Procedure: Right leg VenaSeal SSV, ASV(med nec)   Procedure Date: 11/11/24  Surgeon: Dr. Au    Patient needs right leg ultrasound duplex and return visit with Dr Au around July 2025.     Paper records show that patient also received a reminder call 2/19/25, notified patient that as this is the second reminder call they will not get another reminder but that we are happy to schedule them.    Call back number provided.    Wanda Melissa, RN

## 2025-06-18 ENCOUNTER — PATIENT OUTREACH (OUTPATIENT)
Dept: CARE COORDINATION | Facility: CLINIC | Age: 60
End: 2025-06-18
Payer: COMMERCIAL

## 2025-06-20 ENCOUNTER — HOSPITAL ENCOUNTER (OUTPATIENT)
Dept: CARDIOLOGY | Facility: CLINIC | Age: 60
Discharge: HOME | End: 2025-06-20
Attending: PHYSICIAN ASSISTANT | Admitting: PHYSICIAN ASSISTANT
Payer: COMMERCIAL

## 2025-06-20 DIAGNOSIS — I10 BENIGN ESSENTIAL HYPERTENSION: ICD-10-CM

## 2025-06-20 LAB — LVEF ECHO: NORMAL

## 2025-06-20 PROCEDURE — 93306 TTE W/DOPPLER COMPLETE: CPT

## 2025-06-20 PROCEDURE — 93306 TTE W/DOPPLER COMPLETE: CPT | Mod: 26 | Performed by: INTERNAL MEDICINE

## 2025-06-23 ENCOUNTER — RESULTS FOLLOW-UP (OUTPATIENT)
Dept: CARDIOLOGY | Facility: CLINIC | Age: 60
End: 2025-06-23

## 2025-07-02 ENCOUNTER — PATIENT OUTREACH (OUTPATIENT)
Dept: CARE COORDINATION | Facility: CLINIC | Age: 60
End: 2025-07-02
Payer: COMMERCIAL

## (undated) DEVICE — SOL RINGERS LACTATED 1000ML BAG 2B2324X

## (undated) DEVICE — LINEN TOWEL PACK X5 5464

## (undated) DEVICE — DECANTER BAG 2002S

## (undated) DEVICE — SOL NACL 0.9% IRRIG 3000ML BAG 2B7477

## (undated) DEVICE — BLADE CLIPPER 4406

## (undated) DEVICE — TUBING IRRIG TUR Y TYPE 96" LF 6543-01

## (undated) DEVICE — EVAC SYSTEM CLEAR FLOW SC082500

## (undated) DEVICE — ENDO TROCAR FIRST ENTRY KII FIOS Z-THRD 11X100MM CTF33

## (undated) DEVICE — SU VICRYL 4-0 PS-2 18" UND J496H

## (undated) DEVICE — ESU HOLDER LAP INST DISP PURPLE LONG 330MM H-PRO-330

## (undated) DEVICE — NDL 22GA 1.5"

## (undated) DEVICE — SUCTION CANISTER MEDIVAC LINER 3000ML W/LID 65651-530

## (undated) DEVICE — SYSTEM CLEARIFY VISUALIZATION 21-345

## (undated) DEVICE — SOL WATER IRRIG 1000ML BOTTLE 2F7114

## (undated) DEVICE — KIT ENDO TURNOVER/PROCEDURE W/CLEAN A SCOPE LINERS 103888

## (undated) DEVICE — RETR RING LONE STAR 16.6X16.2CM 3715

## (undated) DEVICE — Device

## (undated) DEVICE — GOWN IMPERVIOUS SPECIALTY XLG/XLONG 32474

## (undated) DEVICE — ENDO SNARE EXACTO COLD 9MM LOOP 2.4MMX230CM 00711115

## (undated) DEVICE — MORCELLATOR LAPAROSCOPIC LINA XCISE MOR-1515-6

## (undated) DEVICE — CATH TRAY FOLEY SURESTEP 16FR WDRAIN BAG STLK LATEX A300316A

## (undated) DEVICE — DEVICE SUTURE GRASPER TROCAR CLOSURE 14GA PMITCSG

## (undated) DEVICE — WIPES FOLEY CARE SURESTEP PROVON DFC100

## (undated) DEVICE — GLOVE PROTEXIS W/NEU-THERA 8.0  2D73TE80

## (undated) DEVICE — SU WND CLOSURE VLOC 180 ABS 2-0 6" GS-21 VLOCL0305

## (undated) DEVICE — SUCTION CANISTER BEMIS HI FLOW 006772-901

## (undated) DEVICE — RETR ELASTIC STAYS LONE STAR SHARP 5MM 8/PACK 3311-8G

## (undated) DEVICE — SEAL SET MYOSURE ROD LENS SCOPE SINGLE USE 40-902

## (undated) DEVICE — BLADE KNIFE SURG 15 371115

## (undated) DEVICE — TUBING C02 INSUFFLATION LAP FILTER HEATER 6198

## (undated) DEVICE — NDL SPINAL 22GA 3.5" QUINCKE 405181

## (undated) DEVICE — SOL WATER IRRIG 3000ML BAG 2B7117

## (undated) DEVICE — ESU HANDPIECE OLYMPUS PK SPATULA PK-SP0533

## (undated) DEVICE — SURGICEL POWDER ABSORBABLE HEMOSTAT 3GM 3013SP

## (undated) DEVICE — ENDO TROCAR FIRST ENTRY KII FIOS Z-THRD 05X100MM CTF03

## (undated) DEVICE — ENDO TROCAR SLEEVE KII Z-THREADED 05X100MM CTS02

## (undated) DEVICE — TUBING SYS AQUILEX BLUE INFLOW AQL-110 YLW OUTFLOW AQL-111

## (undated) DEVICE — UTERINE MANIPULATOR RUMI 5.1MMX6CM UML516

## (undated) DEVICE — SOL NACL 0.9% IRRIG 1000ML BOTTLE 07138-09

## (undated) DEVICE — SUCTION IRR STRYKERFLOW II W/TIP 250-070-520

## (undated) DEVICE — ESU FCP OLYMPUS PK CUTTING 5MMX33CM PK-CF0533

## (undated) DEVICE — UTERINE MANIPULATOR RUMI 6.7MMX8CM UMB678

## (undated) DEVICE — SU VICRYL 3-0 CT-2 27" J332H

## (undated) DEVICE — ENDO TROCAR FIRST ENTRY KII FIOS Z-THRD 12X100MM CTF73

## (undated) DEVICE — PACK TVT HYSTEROSCOPY SMA15HYFSE

## (undated) DEVICE — SU VICRYL 2-0 UR-6 27" J602H

## (undated) RX ORDER — GLYCOPYRROLATE 0.2 MG/ML
INJECTION, SOLUTION INTRAMUSCULAR; INTRAVENOUS
Status: DISPENSED
Start: 2018-08-20

## (undated) RX ORDER — FENTANYL CITRATE 50 UG/ML
INJECTION, SOLUTION INTRAMUSCULAR; INTRAVENOUS
Status: DISPENSED
Start: 2024-10-24

## (undated) RX ORDER — ONDANSETRON 2 MG/ML
INJECTION INTRAMUSCULAR; INTRAVENOUS
Status: DISPENSED
Start: 2018-08-20

## (undated) RX ORDER — ONDANSETRON 2 MG/ML
INJECTION INTRAMUSCULAR; INTRAVENOUS
Status: DISPENSED
Start: 2018-03-29

## (undated) RX ORDER — DEXAMETHASONE SODIUM PHOSPHATE 4 MG/ML
INJECTION, SOLUTION INTRA-ARTICULAR; INTRALESIONAL; INTRAMUSCULAR; INTRAVENOUS; SOFT TISSUE
Status: DISPENSED
Start: 2018-08-20

## (undated) RX ORDER — KETOROLAC TROMETHAMINE 30 MG/ML
INJECTION, SOLUTION INTRAMUSCULAR; INTRAVENOUS
Status: DISPENSED
Start: 2018-03-29

## (undated) RX ORDER — PHENAZOPYRIDINE HYDROCHLORIDE 200 MG/1
TABLET, FILM COATED ORAL
Status: DISPENSED
Start: 2018-08-20

## (undated) RX ORDER — FENTANYL CITRATE 50 UG/ML
INJECTION, SOLUTION INTRAMUSCULAR; INTRAVENOUS
Status: DISPENSED
Start: 2018-08-20

## (undated) RX ORDER — SCOLOPAMINE TRANSDERMAL SYSTEM 1 MG/1
PATCH, EXTENDED RELEASE TRANSDERMAL
Status: DISPENSED
Start: 2018-08-20

## (undated) RX ORDER — HYDROCODONE BITARTRATE AND ACETAMINOPHEN 5; 325 MG/1; MG/1
TABLET ORAL
Status: DISPENSED
Start: 2018-08-20

## (undated) RX ORDER — PROPOFOL 10 MG/ML
INJECTION, EMULSION INTRAVENOUS
Status: DISPENSED
Start: 2018-08-20

## (undated) RX ORDER — CLINDAMYCIN PHOSPHATE 900 MG/50ML
INJECTION, SOLUTION INTRAVENOUS
Status: DISPENSED
Start: 2018-08-20

## (undated) RX ORDER — CEFAZOLIN SODIUM 1 G/50ML
SOLUTION INTRAVENOUS
Status: DISPENSED
Start: 2018-03-29

## (undated) RX ORDER — KETOROLAC TROMETHAMINE 30 MG/ML
INJECTION, SOLUTION INTRAMUSCULAR; INTRAVENOUS
Status: DISPENSED
Start: 2018-08-20

## (undated) RX ORDER — LIDOCAINE HYDROCHLORIDE 20 MG/ML
INJECTION, SOLUTION EPIDURAL; INFILTRATION; INTRACAUDAL; PERINEURAL
Status: DISPENSED
Start: 2018-08-20

## (undated) RX ORDER — FENTANYL CITRATE 50 UG/ML
INJECTION, SOLUTION INTRAMUSCULAR; INTRAVENOUS
Status: DISPENSED
Start: 2018-03-29

## (undated) RX ORDER — PROPOFOL 10 MG/ML
INJECTION, EMULSION INTRAVENOUS
Status: DISPENSED
Start: 2018-03-29

## (undated) RX ORDER — DEXAMETHASONE SODIUM PHOSPHATE 4 MG/ML
INJECTION, SOLUTION INTRA-ARTICULAR; INTRALESIONAL; INTRAMUSCULAR; INTRAVENOUS; SOFT TISSUE
Status: DISPENSED
Start: 2018-03-29

## (undated) RX ORDER — FUROSEMIDE 10 MG/ML
INJECTION INTRAMUSCULAR; INTRAVENOUS
Status: DISPENSED
Start: 2018-08-20

## (undated) RX ORDER — LIDOCAINE HYDROCHLORIDE 20 MG/ML
INJECTION, SOLUTION EPIDURAL; INFILTRATION; INTRACAUDAL; PERINEURAL
Status: DISPENSED
Start: 2018-03-29

## (undated) RX ORDER — NEOSTIGMINE METHYLSULFATE 1 MG/ML
VIAL (ML) INJECTION
Status: DISPENSED
Start: 2018-08-20